# Patient Record
Sex: FEMALE | Race: WHITE | NOT HISPANIC OR LATINO | Employment: FULL TIME | URBAN - METROPOLITAN AREA
[De-identification: names, ages, dates, MRNs, and addresses within clinical notes are randomized per-mention and may not be internally consistent; named-entity substitution may affect disease eponyms.]

---

## 2017-06-26 ENCOUNTER — TRANSCRIBE ORDERS (OUTPATIENT)
Dept: ADMINISTRATIVE | Facility: HOSPITAL | Age: 42
End: 2017-06-26

## 2017-06-26 ENCOUNTER — ALLSCRIPTS OFFICE VISIT (OUTPATIENT)
Dept: OTHER | Facility: OTHER | Age: 42
End: 2017-06-26

## 2017-06-26 DIAGNOSIS — R20.0 ANESTHESIA OF SKIN: ICD-10-CM

## 2017-06-26 DIAGNOSIS — E61.1 IRON DEFICIENCY: ICD-10-CM

## 2017-06-26 DIAGNOSIS — E55.9 VITAMIN D DEFICIENCY: ICD-10-CM

## 2017-06-26 DIAGNOSIS — G47.30 SLEEP APNEA, UNSPECIFIED TYPE: Primary | ICD-10-CM

## 2017-07-25 ENCOUNTER — LAB CONVERSION - ENCOUNTER (OUTPATIENT)
Dept: OTHER | Facility: OTHER | Age: 42
End: 2017-07-25

## 2017-07-25 ENCOUNTER — GENERIC CONVERSION - ENCOUNTER (OUTPATIENT)
Dept: OTHER | Facility: OTHER | Age: 42
End: 2017-07-25

## 2017-07-25 LAB
25(OH)D3 SERPL-MCNC: 52 NG/ML (ref 30–100)
ANA PATTERN 1 (HISTORICAL): ABNORMAL
ANA TITER 1 (HISTORICAL): ABNORMAL TITER
ANTI-NUCLEAR ANTIBODY (ANA) (HISTORICAL): POSITIVE
BASOPHILS # BLD AUTO: 0.5 %
BASOPHILS # BLD AUTO: 38 CELLS/UL (ref 0–200)
DEPRECATED RDW RBC AUTO: 12.1 % (ref 11–15)
EOSINOPHIL # BLD AUTO: 236 CELLS/UL (ref 15–500)
EOSINOPHIL # BLD AUTO: 3.1 %
ERYTHROCYTE SEDIMENTATION RATE (HISTORICAL): 2 MM/H
FERRITIN SERPL-MCNC: 24 NG/ML (ref 10–232)
FOLATE SERPL-MCNC: 20.1 NG/ML
HCT VFR BLD AUTO: 39.8 % (ref 35–45)
HGB BLD-MCNC: 12.9 G/DL (ref 11.7–15.5)
LYMPHOCYTES # BLD AUTO: 2364 CELLS/UL (ref 850–3900)
LYMPHOCYTES # BLD AUTO: 31.1 %
MCH RBC QN AUTO: 28.5 PG (ref 27–33)
MCHC RBC AUTO-ENTMCNC: 32.4 G/DL (ref 32–36)
MCV RBC AUTO: 87.9 FL (ref 80–100)
MONOCYTES # BLD AUTO: 737 CELLS/UL (ref 200–950)
MONOCYTES (HISTORICAL): 9.7 %
NEUTROPHILS # BLD AUTO: 4226 CELLS/UL (ref 1500–7800)
NEUTROPHILS # BLD AUTO: 55.6 %
PLATELET # BLD AUTO: 286 THOUSAND/UL (ref 140–400)
PMV BLD AUTO: 10.9 FL (ref 7.5–12.5)
RBC # BLD AUTO: 4.53 MILLION/UL (ref 3.8–5.1)
RHEUMATOID FACTOR (HISTORICAL): 9 IU/ML
VIT B12 SERPL-MCNC: 995 PG/ML (ref 200–1100)
VITAMIN B6 (HISTORICAL): NORMAL
WBC # BLD AUTO: 7.6 THOUSAND/UL (ref 3.8–10.8)

## 2017-09-07 ENCOUNTER — ALLSCRIPTS OFFICE VISIT (OUTPATIENT)
Dept: OTHER | Facility: OTHER | Age: 42
End: 2017-09-07

## 2017-09-07 DIAGNOSIS — Z12.31 ENCOUNTER FOR SCREENING MAMMOGRAM FOR MALIGNANT NEOPLASM OF BREAST: ICD-10-CM

## 2017-10-10 ENCOUNTER — TRANSCRIBE ORDERS (OUTPATIENT)
Dept: ADMINISTRATIVE | Facility: HOSPITAL | Age: 42
End: 2017-10-10

## 2017-10-10 DIAGNOSIS — F59: ICD-10-CM

## 2017-10-10 DIAGNOSIS — R20.0 NUMBNESS: ICD-10-CM

## 2017-10-10 DIAGNOSIS — R68.89 MECHANICAL AND MOTOR PROBLEMS WITH INTERNAL ORGANS: Primary | ICD-10-CM

## 2017-10-16 ENCOUNTER — HOSPITAL ENCOUNTER (OUTPATIENT)
Dept: NEUROLOGY | Facility: AMBULATORY SURGERY CENTER | Age: 42
Discharge: HOME | End: 2017-10-16
Payer: COMMERCIAL

## 2017-10-16 DIAGNOSIS — R68.89 MECHANICAL AND MOTOR PROBLEMS WITH INTERNAL ORGANS: ICD-10-CM

## 2017-10-16 DIAGNOSIS — R20.0 NUMBNESS: ICD-10-CM

## 2017-10-16 DIAGNOSIS — F59: ICD-10-CM

## 2017-10-16 PROCEDURE — 95816 EEG AWAKE AND DROWSY: CPT

## 2017-10-17 ENCOUNTER — GENERIC CONVERSION - ENCOUNTER (OUTPATIENT)
Dept: OTHER | Facility: OTHER | Age: 42
End: 2017-10-17

## 2017-10-17 NOTE — PROCEDURES
[address] [facility] , [address]  Phone [phone]  Fax [phone]                                                  Patient Name: Tamiko Urbano   Date performed:  10/16/2017 Medical Record #: [id number]   Report date: 10/17/17 File #: KVS    Referring physician: Dr Peggy Wallace ACCT#: -   : 1975 Study type: Routine, awake and drowsy   Age: 40 y Technologist: MIREYA Cox EEG  T   Location: Outpatient ICD diagnosis: -       ELECTROENCEPHALOGRAM      Patient History:  Patient is a 40 y o  female who has been experiencing the left part of her face and left arm going numb since about 6 years ago after the birth of her daughter  She had a difficult pregnancy, throwing up through the entire pregnancy, broke out in a rash and was in labor 27 hrs, then having emergency   She states her symptoms come and go, happening several times a month  She feels like this happens more when she's in a stressful situation and during the time of her menstrual cycle  A new symptom involves her left leg and she feels herself shaking inside  She said you can't see it, but when someone holds her hand, they can feel the tremor  MRI about 6 years ago was reported as normal      Tech Note: During HV, pt experienced numbness in her left face, mouth and hand  The hand symptom left, but she stated she still felt a slight numbness remaining in her left face at the end of the study  Medications:  Multi-vitamin, iron, vitamin D      Description of Procedure: This EEG was performed with simultaneous video recording  Electrodes were applied using the International 10-20 System, with additional electrodes used including EOG, ECG and T1/T2  The EEG was recorded from 7:35:42 AM  until 08:40:04 AM for a total of 33 3 minutes  The recording was technically satisfactory for interpretation  Skull Defect (if any):  None  Findings:     State:   This routine EEG was obtained during wakefulness, drowsiness and brief light sleep  Background Activity: The background during wakefulness consisted of low to moderate amplitude, very well-modulated 11 - 12 Hz alpha activity located symmetrically over the posterior head regions with good reactivity and attenuation to eye opening  Drowsiness was reflected by slowing and attenuation of background rhythms, and Stage 2 sleep was accompanied by well-formed and symmetrically placed vertex waves and sleep spindles  There was no pathologic focal, lateralized, or generalized slowing of the background noted  No epileptiform transients were seen  Activation Procedures:   Photic stimulation produced a symmetric occipital driving response to the mid-range of flash frequencies, and was non-activating for photo-paroxysmal abnormalities  Hyperventilation was performed with good effort for 4 minutes and specifically did not alter the background rhythms  Clinical note:   Hyperventilation was specifically non-activating electrographically  However, after 1 min 30 secs of hyperventilating, the patient reported some light-headedness  At 2 mins 40 secs she reported numbness and tingling of her left cheek and lips, which continued past cessation of over-breathing  At 3 mins post-hyperventilation she reported tingling of the fingertips on her left hand  By the end of the study, the hand symptoms had resolved; facial sensations were improved but not fully gone  Other findings: The single lead ECG revealed a normal sinus rhythm averaging 84 bpm without ectopy, increasing to 104 bpm during hyperventilation         Interpretation: This is a normal routine EEG obtained in waking and sleep states, without evidence of focal structural or paroxysmal abnormality  However, hyperventilation was clinically activating for reproduction of the patients presenting symptoms of left face and hand numbness and tingling    Further clinical correlation is advised  Alta ZHU    [facility]

## 2017-10-20 ENCOUNTER — HOSPITAL ENCOUNTER (OUTPATIENT)
Dept: RADIOLOGY | Facility: HOSPITAL | Age: 42
Discharge: HOME | End: 2017-10-20
Attending: PSYCHIATRY & NEUROLOGY
Payer: COMMERCIAL

## 2017-10-20 DIAGNOSIS — R68.89 MECHANICAL AND MOTOR PROBLEMS WITH INTERNAL ORGANS: ICD-10-CM

## 2017-10-20 DIAGNOSIS — R20.0 NUMBNESS: ICD-10-CM

## 2017-10-20 DIAGNOSIS — F59: ICD-10-CM

## 2017-10-20 PROCEDURE — 70551 MRI BRAIN STEM W/O DYE: CPT

## 2017-10-26 NOTE — PROGRESS NOTES
Assessment  1  Encounter for gynecological examination without abnormal finding (V72 31) (Z01 419)    Plan  Encounter for gynecological examination without abnormal finding    · Call [phone] if: You find a new or different kind of lump in your breast ;  Status:Complete;   Done: 11LOJ5168 04:37PM  Ordered; For:Encounter for gynecological examination without abnormal finding; Ordered   By:Neno Payne;   · Call [phone] if: You have any warning signs of skin cancer ; Status:Complete;    Done: 54AJL7624 04:37PM  Ordered; For:Encounter for gynecological examination without abnormal finding; Ordered   By:Neno Payne;   · Follow-up visit in 1 year Evaluation and Treatment  Follow-up  Status: Hold For -  Scheduling  Requested for: [id number]  Ordered; For: Encounter for gynecological examination without abnormal finding;    Ordered By: Radha Burleson  Performed:   Due: 48QSG9505   · We recommend that you follow these steps to lower your risk of osteoporosis  ;  Status:Complete;   Done: 82UXE4249 04:37PM  Ordered; For:Encounter for gynecological examination without abnormal finding; Ordered   By:Neno Payne;  Encounter for screening mammogram for malignant neoplasm of breast    · * MAMMO SCREENING BILATERAL W CAD; Status:Hold For - Scheduling,Retrospective  Authorization; Requested RHO:[id number];   Perform:[facility]; XHI:[id number]; Last Updated Emeka Granado; 9/7/2017   2:38:26 PM;Ordered; For:Encounter for screening mammogram for malignant neoplasm   of breast; Ordered By:Neno Payne;  Vaginal itching    · Start: Fluconazole 150 MG Oral Tablet (Diflucan); TAKE 1 TABLET 1 TIME ONLY  Rx By: Radha Burleson; Dispense: 1 Days ; #:1 Tablet;  Refill: 0;For: Vaginal itching;   PAOLA = N; Verified Transmission to [id number]; Last Updated By: System,   [employer]; 9/7/2017 3:11:10 PM    Call for heavy or prolonged bleeding, to discuss IUD contraception or p r n  She is aware that I would advise against using entirely natural methods for treatment of a rheumatologic disorder  At this time she would plan to use these only as an adjuvant  Discussion/Summary  health maintenance visit the risks and benefits of cervical cancer screening were discussed cervical cancer screening is current Breast cancer screening: the risks and benefits of breast cancer screening were discussed, monthly self breast exam was advised and mammogram has been ordered  The patient has the current Goals: Diagnosis of her new symptoms, contraception  The patent has the current Barriers: None  Patient is able to Self-Care  Self Referrals: No      Chief Complaint  Yearly- Patient is currently getting worked up for a autoimmune deficiency  History of Present Illness  HPI: Patient returns today for annual exam  She had an episode of tingling in her face in June of this year  Her family doctor believes it may be rheumatologic as her FLOR was elevated  She has an appointment with Rheumatology next month  Discussed that withdrawal as a method of birth control may not be advisable given a possible rheumatologic diagnosis  Her partner has thus far been reluctant to have a vasectomy  She will revisit this with him  We also discussed IUD use as an alternative for her  She is willing to consider Mirena and ParaGard brochures and will let me know if she would like to pursue 1 of these methods  She would need to return for full discussion  She is aware that she is not due for a Pap smear today  She has had some minor vaginal itching since a recent course of antibiotics  She is requesting Diflucan as she believes she has a yeast infection  1        GYN HM, Adult Female [facility]: The patient is being seen for a gynecology evaluation  The last health maintenance visit was 1 year(s) ago  General Health:   Lifestyle:  She does not exercise regularly  -- She does not use tobacco  The patient has never smoked cigarettes  -- She denies alcohol use  -- She denies drug use  Reproductive health: the patient is premenopausal--   she reports no menstrual problems  Menstrual history: Recent menstrual periods: bleeding has been normal  The cycles have been regular  The duration of her recent periods has been regular  -- she uses contraception  For contraception, she uses withdrawal -- she is sexually active  She is monogamous with a male partner  -- pregnancy history: G 1P 1  Screening: Cervical cancer screening includes a pap smear performed 8/8/2016,-- human papilloma virus screening performed 8/8/2016-- and-- negative pap / negative HPV  Breast cancer screening includes a mammogram performed 5 years ago  She hasn't been previously screened for colorectal cancer  1 Amended By: Kathy Dunlap; Sep 07 2017 6:25 PM EST    Review of Systems  no pelvic pain,-- no pelvic pressure,-- no vaginal pain,-- no vaginal discharge,-- no vaginal itching,-- no nonmenstrual bleeding,-- no dysuria,-- no bladder pain,-- no burning sensation during urination,-- no change in urinary frequency-- and-- no urinary loss of control  Additional findings include Denies changes in bowel habits  Active Problems  1  Anxiety (300 00) (F41 9)  2  Bleeding after intercourse (626 7) (N93 0)  3  Encounter for gynecological examination without abnormal finding (V72 31) (Z01 419)  4  Encounter for screening for human papillomavirus (HPV) (V73 81) (Z11 51)  5  Encounter for screening mammogram for malignant neoplasm of breast (V76 12)   (Z12 31)  6  Fatigue (780 79) (R53 83)  7  Iron deficiency (280 9) (E61 1)  8  Lower back pain (724 2) (M54 5)  9  Marital or partner relational problem (V61 10) (Z63 0)  10  Numbness (782 0) (R20 0)  11  Other degeneration of cervical disc (722 4) (M50 30)  12  PAC (premature atrial contraction) (427 61) (I49 1)  13  Rotator cuff tear arthropathy (716 81) (M12 819)  14   Vitamin D deficiency (268 9) (E55 9)    Past Medical History   · History of Abdominal pain, epigastric (789 06) (R10 13)   · History of Abdominal pain, RUQ (789 01) (R10 11)   · History of Back Pain   · History of  Delivery (669 70)   · History of Dysmenorrhea (625 3) (N94 6)   · History of  1 (V22 0) (Z34 00)   · History of Groin (Inguinal) Pain (789 09)   · History of esophageal reflux (V12 79) (Z87 19)   · History of gastritis (V12 79) (Z87 19)   · History of herpes zoster (V12 09) (Z86 19)   · History of infection due to human papilloma virus (HPV) (V12 09) (Z86 19)   · History of insomnia (V13 89) (B52 752)   · History of migraine (V12 49) (Z86 69)   · History of ovarian cyst (V13 29) (Z87 42)   · History of shortness of breath (V13 89) (Y08 827)   · History of temporomandibular joint disorder (V13 59) (Z87 39)   · History of Hypertension in pregnancy, preeclampsia (642 40) (O14 90)   · History of Inguinal hernia (550 90) (K40 90)   · History of Mastodynia (611 71) (N64 4)   · History of Nasal congestion (478 19) (R09 81)   · History of Neck pain (723 1) (M54 2)   · History of Ovarian cyst (620 2) (N83 20)   · History of Pain, upper back (724 5) (M54 9)   · History of Palpitations (785 1) (R00 2)   · History of Right upper quadrant abdominal pain (789 01) (R10 11)   · History of Shoulder pain, left (719 41) (M25 512)   · History of Tachycardia (785 0) (R00 0)    The active problems and past medical history were reviewed and updated today  Surgical History   · History of Cervix Cryosurgery   · History of  Section   · History of Colonoscopy (Fiberoptic)   · History of Dilation And Curettage   · History of Laparoscopy (Diagnostic)   · History of Oral Surgery Tooth Extraction    The surgical history was reviewed and updated today         Family History  Mother    · Family history of Diabetes Mellitus (V18 0)   · Family history of cardiac disorder (V17 49) (Z82 49)   · Family history of Gallbladder Disease   · Family history of Hypertension (V17 49)  Father    · Family history of Diabetes Mellitus (V18 0)   · Family history of cardiac disorder (V17 49) (Z82 49)   · Family history of Hypertension (V17 49)   · Family history of Hypertension (V17 49)   · Family history of Pure Hypercholesterolemia  Sister    · Family history of Congenital Malformations  Family History    · Family history of Headache Syndromes    The family history was reviewed and updated today  Social History   · Denied: History of Alcohol Use (History)   · Denied: History of Coffee   · Currently sexually active   · Denied: History of Daily Cola Consumption (___ Cans/Day)   · Denied: History of Daily Tea Consumption (___ Cups/Day)   · Lack of exercise (V69 0) (Z72 3)   · Marital History - Currently    · Never A Smoker   · No drug use   · [employer] Affiliation [employer]   · Uses condoms   · Withdrawal contraception  The social history was reviewed and updated today  Current Meds  1  Iron TABS; Therapy: (Recorded:67Qnd7164) to Recorded  2  Turmeric TABS; Therapy: (Recorded:30Dgd9374) to Recorded  3  Vitamin D3 CHEW;   Therapy: (Recorded:32Pkb5506) to Recorded    Allergies  1  Amoxicillin TABS  2  Penicillins  3  Sulfa Drugs  4  Shellfish    Vitals   Recorded: 49JKF3540 74:14XE   Systolic 294, LUE, Sitting   Diastolic 76, LUE, Sitting   Height 4 ft 11 in   Weight 149 lb    BMI Calculated 30 09   BSA Calculated 1 63   LMP [id number]     Physical Exam    Constitutional   General appearance: No acute distress, well appearing and well nourished  Neck   Neck: Normal, supple, trachea midline, no masses  Thyroid: Normal, no thyromegaly  Genitourinary   External genitalia: Normal and no lesions appreciated  Vagina: Normal, no lesions or dryness appreciated  -- Discharge consistent with yeast infection1   Urethra: Normal    Urethral meatus: Normal    Bladder: Normal, soft, non-tender and no prolapse or masses appreciated     Cervix: Normal, no palpable masses  Uterus: Normal, non-tender, not enlarged, and no palpable masses  Adnexa/parametria: Normal, non-tender and no fullness or masses appreciated  Chest   Breasts: Normal and no dimpling or skin changes noted  Abdomen   Abdomen: Normal, non-tender, and no organomegaly noted  Lymphatic   Palpation of lymph nodes in neck, axillae, groin and/or other locations: No lymphadenopathy or masses noted          1 Amended By: Silverio Miller; Sep 07 2017 6:25 PM EST    Signatures   Electronically signed by : DIONY Arroyo ; Sep  7 2017  6:25PM EST                       (Author)

## 2018-01-11 NOTE — MISCELLANEOUS
Message   Date: 07/25/2017 04:31 PM    Per Dr Lazarus Rocker, needs to see a Rheumatologist because FLOR results show some sort of connective tissue disorder  Pt may cancel appt with neurologist if she has not seen them already  Forward labs to the Rheumatologist   Message was left for pt on her cellphone with instructions to schedule with Dr Asia Whitlock and to let us know when she is scheduled so we can forward labs  BF/VFP        Active Problems    1  Abnormal urine odor (791 9) (R82 90)   2  Acute bronchitis (466 0) (J20 9)   3  Acute pharyngitis (462) (J02 9)   4  Acute upper respiratory infection (465 9) (J06 9)   5  Anxiety (300 00) (F41 9)   6  Bleeding after intercourse (626 7) (N93 0)   7  Dehydration (276 51) (E86 0)   8  Encounter for gynecological examination without abnormal finding (V72 31) (Z01 419)   9  Encounter for screening for human papillomavirus (HPV) (V73 81) (Z11 51)   10  Encounter for screening mammogram for malignant neoplasm of breast (V76 12)    (Z12 31)   11  Fatigue (780 79) (R53 83)   12  Iron deficiency (280 9) (E61 1)   13  Lower back pain (724 2) (M54 5)   14  Marital or partner relational problem (V61 10) (Z63 0)   15  Numbness (782 0) (R20 0)   16  Other degeneration of cervical disc (722 4) (M50 30)   17  Ovarian cyst (620 2) (N83 20)   18  PAC (premature atrial contraction) (427 61) (I49 1)   19  Rotator cuff tear arthropathy (716 81) (M12 819)   20  Routine Gynecological Exam With Cervical Pap Smear (V72 31)   21  Screening for depression (V79 0) (Z13 89)   22  Visit for routine gyn exam (V72 31) (Z01 419)   23  Vitamin D deficiency (268 9) (E55 9)   24  Wheezing (786 07) (R06 2)    Current Meds   1  No Reported Medications Recorded    Allergies    1  Amoxicillin TABS   2  Penicillins   3  Sulfa Drugs    4   Shellfish    Signatures   Electronically signed by : Marisela Jacobo DO; Jul 26 2017  8:40AM EST                       (Author)

## 2018-01-13 VITALS
TEMPERATURE: 98.9 F | DIASTOLIC BLOOD PRESSURE: 72 MMHG | BODY MASS INDEX: 35.41 KG/M2 | SYSTOLIC BLOOD PRESSURE: 112 MMHG | HEIGHT: 55 IN | OXYGEN SATURATION: 98 % | WEIGHT: 153 LBS | HEART RATE: 83 BPM | RESPIRATION RATE: 16 BRPM

## 2018-01-14 VITALS
DIASTOLIC BLOOD PRESSURE: 76 MMHG | SYSTOLIC BLOOD PRESSURE: 112 MMHG | BODY MASS INDEX: 30.04 KG/M2 | HEIGHT: 59 IN | WEIGHT: 149 LBS

## 2018-03-27 ENCOUNTER — OFFICE VISIT (OUTPATIENT)
Dept: FAMILY MEDICINE CLINIC | Facility: CLINIC | Age: 43
End: 2018-03-27
Payer: COMMERCIAL

## 2018-03-27 VITALS
SYSTOLIC BLOOD PRESSURE: 118 MMHG | WEIGHT: 158 LBS | HEIGHT: 60 IN | HEART RATE: 95 BPM | TEMPERATURE: 99.4 F | DIASTOLIC BLOOD PRESSURE: 64 MMHG | OXYGEN SATURATION: 98 % | BODY MASS INDEX: 31.02 KG/M2

## 2018-03-27 DIAGNOSIS — S86.919A LOWER EXTREMITY TENDON STRAIN, INITIAL ENCOUNTER: ICD-10-CM

## 2018-03-27 DIAGNOSIS — R76.8 ELEVATED ANTINUCLEAR ANTIBODY (ANA) LEVEL: ICD-10-CM

## 2018-03-27 DIAGNOSIS — M79.671 FOOT PAIN, RIGHT: Primary | ICD-10-CM

## 2018-03-27 DIAGNOSIS — M99.06 SOMATIC DYSFUNCTION OF LOWER EXTREMITY: ICD-10-CM

## 2018-03-27 PROCEDURE — 98925 OSTEOPATH MANJ 1-2 REGIONS: CPT | Performed by: FAMILY MEDICINE

## 2018-03-27 PROCEDURE — 99213 OFFICE O/P EST LOW 20 MIN: CPT | Performed by: FAMILY MEDICINE

## 2018-03-27 RX ORDER — AZITHROMYCIN 250 MG/1
TABLET, FILM COATED ORAL
Refills: 0 | COMMUNITY
Start: 2018-03-25 | End: 2018-06-04 | Stop reason: ALTCHOICE

## 2018-03-27 NOTE — PROGRESS NOTES
Assessment/Plan: patient here today for right foot pain x 2 months and possible yeast infection x5 days    No problem-specific Assessment & Plan notes found for this encounter  1  Foot pain, right     2  Elevated antinuclear antibody (FLOR) level  Ambulatory referral to Gastroenterology   3  Lower extremity tendon strain, initial encounter     4  Somatic dysfunction of lower extremity          There are no diagnoses linked to this encounter  Subjective:      Patient ID: Sasha Jon is a 40 y o  female  Right foot around arch, discomfort comes and goes  No know etiology  Seen by Neurology and Rheumatology  Elevated FLOR, sed rate normal         The following portions of the patient's history were reviewed and updated as appropriate: allergies, current medications, past family history, past medical history, past social history, past surgical history and problem list     Review of Systems   HENT: Negative  Cardiovascular: Negative  Musculoskeletal:        Right foot pain, comes and goes  Right foot feels heavy  Skin: Negative  Neurological: Negative  Objective:      /64 (BP Location: Left arm, Patient Position: Sitting, Cuff Size: Standard)   Pulse 95   Temp 99 4 °F (37 4 °C) (Oral)   Ht 5' (1 524 m)   Wt 71 7 kg (158 lb)   SpO2 98%   BMI 30 86 kg/m²          Physical Exam   Constitutional: She is oriented to person, place, and time  She appears well-developed and well-nourished  Cardiovascular: Normal rate and regular rhythm  Pulmonary/Chest: Effort normal and breath sounds normal    Musculoskeletal:   Decreased dorsi flexion right foot  Neurological: She is alert and oriented to person, place, and time  Skin: Skin is warm and dry  Psychiatric: She has a normal mood and affect   Her behavior is normal  Judgment and thought content normal

## 2018-03-27 NOTE — PATIENT INSTRUCTIONS
GI evaluation for elevated FLOR  Was seen by rheumatology    Unfold ankle, recheck, dorsiflexion equal

## 2018-06-04 ENCOUNTER — OFFICE VISIT (OUTPATIENT)
Dept: URGENT CARE | Facility: CLINIC | Age: 43
End: 2018-06-04
Payer: COMMERCIAL

## 2018-06-04 VITALS
SYSTOLIC BLOOD PRESSURE: 134 MMHG | HEART RATE: 74 BPM | RESPIRATION RATE: 16 BRPM | DIASTOLIC BLOOD PRESSURE: 65 MMHG | OXYGEN SATURATION: 100 % | TEMPERATURE: 97.1 F

## 2018-06-04 DIAGNOSIS — N39.0 URINARY TRACT INFECTION WITH HEMATURIA, SITE UNSPECIFIED: ICD-10-CM

## 2018-06-04 DIAGNOSIS — R31.9 URINARY TRACT INFECTION WITH HEMATURIA, SITE UNSPECIFIED: ICD-10-CM

## 2018-06-04 DIAGNOSIS — R10.9 FLANK PAIN: Primary | ICD-10-CM

## 2018-06-04 LAB
SL AMB  POCT GLUCOSE, UA: ABNORMAL
SL AMB LEUKOCYTE ESTERASE,UA: ABNORMAL
SL AMB POCT BILIRUBIN,UA: ABNORMAL
SL AMB POCT BLOOD,UA: ABNORMAL
SL AMB POCT CLARITY,UA: CLEAR
SL AMB POCT COLOR,UA: YELLOW
SL AMB POCT KETONES,UA: ABNORMAL
SL AMB POCT NITRITE,UA: ABNORMAL
SL AMB POCT PH,UA: 6
SL AMB POCT SPECIFIC GRAVITY,UA: 1
SL AMB POCT URINE PROTEIN: ABNORMAL
SL AMB POCT UROBILINOGEN: 0.2

## 2018-06-04 PROCEDURE — 87086 URINE CULTURE/COLONY COUNT: CPT | Performed by: FAMILY MEDICINE

## 2018-06-04 PROCEDURE — 81002 URINALYSIS NONAUTO W/O SCOPE: CPT | Performed by: FAMILY MEDICINE

## 2018-06-04 PROCEDURE — 99213 OFFICE O/P EST LOW 20 MIN: CPT | Performed by: FAMILY MEDICINE

## 2018-06-04 RX ORDER — NITROFURANTOIN 25; 75 MG/1; MG/1
100 CAPSULE ORAL 2 TIMES DAILY
Qty: 14 CAPSULE | Refills: 0 | Status: SHIPPED | OUTPATIENT
Start: 2018-06-04 | End: 2018-06-04 | Stop reason: SDUPTHER

## 2018-06-04 RX ORDER — NITROFURANTOIN 25; 75 MG/1; MG/1
100 CAPSULE ORAL 2 TIMES DAILY
Qty: 14 CAPSULE | Refills: 0 | Status: SHIPPED | OUTPATIENT
Start: 2018-06-04 | End: 2018-06-08

## 2018-06-04 NOTE — PATIENT INSTRUCTIONS
Please increase her fluids and take the antibiotics as prescribed  Give a call to this clinic on Wednesday or Thursday to get the results of her culture  Consider having a formal urinalysis and a formal culture at her primary care doctor because of the blood found in your urine

## 2018-06-04 NOTE — PROGRESS NOTES
[facility] - Patient Visit Note  Terri Holm 44 y o  female MRN: [id number]      Assessment / Plan:  Flank pain [R10 9]  1  Flank pain  POCT urine dip auto non-scope    Urine culture   2  Urinary tract infection with hematuria, site unspecified  nitrofurantoin (MACROBID) 100 mg capsule     Reason For Visit / Chief Complaint  Chief Complaint   Patient presents with    Back Pain     right    Flank Pain     right, today             Moira Barker Discussion:  Will empirically treat for complicated urinary tract infection with culture pending  Patient will be calling on Wednesday or Thursday for the results  Patient will also check in with family practice and have repeat formal urinalysis to evaluate the hematuria noted  HPI:  Terri Holm is a 44 y o  female Patient           Who  Presents with symptoms referable to urinary tract infection  She states that she has mostly sign silent urinary tract infections and is told by her physician on routine urinalysis that she has a urinary tract infection  At the present time she complains of a bit of frequency but no dysuria or urgency per se 1st day of last normal menstrual period is that the patient is do right now and denies pregnancy  She is allergic to penicillin and sulfa  She has no history of acute or chronic kidney disease  She complains of pain in the right flank just this morning describes it is positional to a large extent and does not have any trauma to the area or reason for the pain  She has had no fever no chills           Historical Information   Past Medical History:   Diagnosis Date    Dysmenorrhea     Esophageal reflux     last assessed - [id number]    Herpes zoster     LUQ level T-8 resolved; last assessed - [id number]    History of infection due to human papilloma virus (HPV)     Inguinal hernia     last assessed - [id number]    Insomnia     Mastodynia     Resolved -     Migraine     Ovarian cyst     last assessed - [id number]    Ovarian cyst 2013    Palpitations     last assessed - [id number]    Shortness of breath     Tachycardia     Resolved - [id number]    Takes dietary supplements     Temporomandibular joint disorder     last assessed - [id number]     Past Surgical History:   Procedure Laterality Date     SECTION      COLONOSCOPY  2012    Fiberoptic    DIAGNOSTIC LAPAROSCOPY      DILATION AND CURETTAGE OF UTERUS      Resolved     GYNECOLOGIC CRYOSURGERY      age 24   [facility] TOOTH EXTRACTION       Social History   History   Alcohol Use No     History   Drug Use No     History   Smoking Status    Never Smoker   Smokeless Tobacco    Never Used     Family History   Problem Relation Age of Onset    Diabetes Mother     Heart disease Mother      cardiac disorder    Gallbladder disease Mother     Hypertension Mother     Diabetes Father     Heart disease Father      cardiac disorder    Hypertension Father     Hyperlipidemia Father      Pure Hypercholesterolemia    Other Sister      congenital malformations    Other Family      headache syndromes         ALLERGIES:         Allergies   Allergen Reactions    Amoxicillin Hives    Penicillins Hives    Shellfish Allergy     Sulfa Antibiotics Rash       MEDS:    Current Outpatient Prescriptions:     IRON PO, Take by mouth, Disp: , Rfl:     TURMERIC PO, Take by mouth, Disp: , Rfl:     nitrofurantoin (MACROBID) 100 mg capsule, Take 1 capsule (100 mg total) by mouth 2 (two) times a day for 7 days, Disp: 14 capsule, Rfl: 0    FACILITY ADMINISTERED MEDS:        REVIEW OF SYSTEMS    GENERAL: NEGATIVE for:  Generalized Fatigue                             Chills                              Fever                             Myalgias     OPTHALMIC: NEGATIVE for:  Diplopia                            Scotomata                            Visual Changes Blurred Vision     ENT:  EARS NEGATIVE for:  Hearing Difficulty                            Tinnitus                            Vertigo                            Dizziness                            Ear Pain                            Ear Drainage               NOSE NEGATIVE for:  Nasal Congestion                            Nasal Discharge                            Sinus Pain / Pressure               THROAT NEGATIVE for:  Sore Throat / Throat Pain                            Difficulty Swallowing     RESPIRATORY: NEGATIVE for:  Cough                            Wheezing                            Sputum Production                            Sob / Tachypnea                            Hemoptysis     CARDIOVASCULAR: NEGATIVE for:  Chest Pain                             SOB (cardiac Related)                             Dyspnea on Exertion                             Orthopnea                             PND                             Leg Edema                             Palpitations                               Irregularities/rythym                       CURRENT VITALS:   Blood Pressure: 134/65 (06/04/18 0953)  Pulse: 74 (06/04/18 0953)  Temperature: (!) 97 1 °F (36 2 °C) (06/04/18 0953)  Respirations: 16 (06/04/18 0953)  SpO2: 100 % (06/04/18 0953)  /65   Pulse 74   Temp (!) 97 1 °F (36 2 °C)   Resp 16   SpO2 100%       PHYSICAL EXAM:         General Appearance:    Alert, cooperative, no apparent distress, appears stated age     Oriented x3    Head:    Normocephalic, without obvious abnormality, atraumatic   Eyes:      EOM's intact,      CLARICE,        conjunctiva/corneas clear,          fundi not visualized well   Ears:     Normal external ear canals     Tm right side  Normal     Tm left side    Normal       Nose:   Nares normal externally, septum midline,     mucosa normal,     No anterior drainage         Sinuses   with out   tenderness to palpation / percussion     Throat:   Lips, mucosa, and tongue normal       Anterior pharynx   Normal      Posterior pharynx   Normal      No exudate obvious       Neck:   Supple, symmetrical, trachea midline and moveable    Normal thyroid click present    No carotid bruits appreciated        Lymphatics:     Adenopathy in anterior cervical chain  Normal    Adenopathy in posterior cervical chain   Normal     Lungs:     Clear to auscultation bilaterally    No rales    No ronchi    No wheeze     Heart[de-identified]    Regular rate and rhythm, S1 and S2 normal,     No S3, S4, audible    No murmurs, rubs      Extremities:     Extremities grossly normal     atraumatic,     no cyanosis or edema        Skin:     Skin color, texture, turgor normal, no rashes or lesions       Examination of the flank There is mild tenderness to palpation on the right flank and to percussion  There is no paravertebral   Muscle spasm noted  Counseling / Coordination of Care  Total clinic time spent today  15  minutes  Greater than 50% of total time was spent with the patient and / or family counseling and / or coordination of care  Portions of the record may have been created with voice recognition software   Occasional wrong word or "sound a like" substitutions may have occurred due to the inherent limitations of voice recognition software   Read the chart carefully and recognize, using context, where substitutions have occurred

## 2018-06-05 LAB — BACTERIA UR CULT: NORMAL

## 2018-06-07 ENCOUNTER — TELEPHONE (OUTPATIENT)
Dept: FAMILY MEDICINE CLINIC | Facility: CLINIC | Age: 43
End: 2018-06-07

## 2018-06-07 NOTE — TELEPHONE ENCOUNTER
Patient went urgent care on Monday 6/4/18 for back pain, they checked her urine which showed red blood cells and white blood cells in urine, they prescribed Macrobid 100mg for 7 days  She said she called urgent care for urine results and they said there was no signs of bacteria, patient asked them if she should continue antibiotic and they said she should call PCP  She states her temp today is 99 9, took antibiotic x3 days

## 2018-06-08 ENCOUNTER — OFFICE VISIT (OUTPATIENT)
Dept: FAMILY MEDICINE CLINIC | Facility: CLINIC | Age: 43
End: 2018-06-08
Payer: COMMERCIAL

## 2018-06-08 VITALS
BODY MASS INDEX: 30.55 KG/M2 | HEIGHT: 60 IN | DIASTOLIC BLOOD PRESSURE: 74 MMHG | OXYGEN SATURATION: 97 % | SYSTOLIC BLOOD PRESSURE: 126 MMHG | HEART RATE: 91 BPM | WEIGHT: 155.6 LBS | TEMPERATURE: 98.2 F

## 2018-06-08 DIAGNOSIS — M54.50 ACUTE LEFT-SIDED LOW BACK PAIN WITHOUT SCIATICA: ICD-10-CM

## 2018-06-08 DIAGNOSIS — M99.03 SEGMENTAL AND SOMATIC DYSFUNCTION OF LUMBAR REGION: ICD-10-CM

## 2018-06-08 DIAGNOSIS — S39.012A LUMBAR STRAIN, INITIAL ENCOUNTER: ICD-10-CM

## 2018-06-08 DIAGNOSIS — R50.9 FEVER, UNSPECIFIED FEVER CAUSE: Primary | ICD-10-CM

## 2018-06-08 PROCEDURE — 3008F BODY MASS INDEX DOCD: CPT | Performed by: FAMILY MEDICINE

## 2018-06-08 PROCEDURE — 1036F TOBACCO NON-USER: CPT | Performed by: FAMILY MEDICINE

## 2018-06-08 PROCEDURE — 99213 OFFICE O/P EST LOW 20 MIN: CPT | Performed by: FAMILY MEDICINE

## 2018-06-08 PROCEDURE — 98925 OSTEOPATH MANJ 1-2 REGIONS: CPT | Performed by: FAMILY MEDICINE

## 2018-06-08 RX ORDER — NITROFURANTOIN MONOHYD/M-CRYST 100 MG
100 CAPSULE ORAL 2 TIMES DAILY
COMMUNITY
End: 2019-09-05 | Stop reason: ALTCHOICE

## 2018-06-08 NOTE — PROGRESS NOTES
Assessment/Plan: patient here today for follow up from urgent care for uti and back pain   Pt stated that she has ua done in urgent care     No problem-specific Assessment & Plan notes found for this encounter  1  Fever, unspecified fever cause     2  Acute left-sided low back pain without sciatica     3  Lumbar strain, initial encounter     4  Segmental and somatic dysfunction of lumbar region            Diagnoses and all orders for this visit:    Fever, unspecified fever cause    Other orders  -     nitrofurantoin (MACROBID) 100 mg capsule; Take 100 mg by mouth 2 (two) times a day          Subjective:      Patient ID: Jose David Hall is a 44 y o  female  Left low back pain localized left mid lumbar  The following portions of the patient's history were reviewed and updated as appropriate: allergies, current medications, past family history, past medical history, past social history, past surgical history and problem list     Review of Systems   Constitutional: Negative  HENT: Negative  Respiratory: Negative  Gastrointestinal: Negative  Genitourinary: Negative  Negative for dysuria, flank pain, frequency, urgency, vaginal discharge and vaginal pain  Musculoskeletal: Positive for back pain  Objective:      /74 (BP Location: Left arm, Patient Position: Sitting, Cuff Size: Standard)   Pulse 91   Temp 98 2 °F (36 8 °C) (Oral)   Ht 5' (1 524 m)   Wt 70 6 kg (155 lb 9 6 oz)   LMP 05/14/2018   SpO2 97%   BMI 30 39 kg/m²          Physical Exam   Constitutional: She appears well-developed and well-nourished  Cardiovascular: Normal rate and regular rhythm  Pulmonary/Chest: Effort normal    Musculoskeletal:   Multiple TV foldings, L2-3 rotated right

## 2018-08-31 ENCOUNTER — OFFICE VISIT (OUTPATIENT)
Dept: URGENT CARE | Facility: CLINIC | Age: 43
End: 2018-08-31
Payer: COMMERCIAL

## 2018-08-31 VITALS
WEIGHT: 150 LBS | DIASTOLIC BLOOD PRESSURE: 62 MMHG | BODY MASS INDEX: 29.45 KG/M2 | TEMPERATURE: 98 F | HEART RATE: 74 BPM | SYSTOLIC BLOOD PRESSURE: 137 MMHG | HEIGHT: 60 IN | OXYGEN SATURATION: 97 %

## 2018-08-31 DIAGNOSIS — B02.9 HERPES ZOSTER WITHOUT COMPLICATION: Primary | ICD-10-CM

## 2018-08-31 PROCEDURE — 99213 OFFICE O/P EST LOW 20 MIN: CPT | Performed by: NURSE PRACTITIONER

## 2018-08-31 RX ORDER — VALACYCLOVIR HYDROCHLORIDE 1 G/1
1000 TABLET, FILM COATED ORAL 3 TIMES DAILY
Qty: 21 TABLET | Refills: 0 | Status: SHIPPED | OUTPATIENT
Start: 2018-08-31 | End: 2019-09-05 | Stop reason: ALTCHOICE

## 2018-08-31 NOTE — PROGRESS NOTES
[facility]        NAME: Katherine Sender is a 44 y o  female  : 1975    MRN: [id number]  DATE: 2018  TIME: 4:01 PM    Assessment and Plan   Herpes zoster without complication [T12 9]  1  Herpes zoster without complication  valACYclovir (VALTREX) 1,000 mg tablet         Patient Instructions       Follow up with PCP in 3-5 days  Proceed to  ER if symptoms worsen  Chief Complaint     Chief Complaint   Patient presents with    Neck Pain      c/o neck pain that started 2 days ago also c/o left side of face being numb         History of Present Illness       21-year-old female with a chief complaint of a swollen lymph node in the left occipital region  She noticed this about 2 days ago  At 1st she thought it was a stiff neck but now has noticed the swollen lymph node in the posterior aspect of her neck/occipital area  She started experiencing some tingling in the left lower jaw today and noticed some small reddened areas in the left occipital region  She has had shingles a total of 3 times in the past, always on her trunk  Last outbreak was about 5 years ago  Review of Systems   Review of Systems   Constitutional: Negative  Swollen, painful lymph node left occipital region  HENT: Negative  Eyes: Negative  Respiratory: Negative  Cardiovascular: Negative  Gastrointestinal: Negative  Endocrine: Negative  Genitourinary: Negative  Skin:        Red pimples in the left posterior neck/occipital area  Neurological: Positive for numbness (Tingling left lower jaw  )  Negative for dizziness           Current Medications       Current Outpatient Prescriptions:     IRON PO, Take by mouth, Disp: , Rfl:     nitrofurantoin (MACROBID) 100 mg capsule, Take 100 mg by mouth 2 (two) times a day, Disp: , Rfl:     TURMERIC PO, Take by mouth, Disp: , Rfl:     valACYclovir (VALTREX) 1,000 mg tablet, Take 1 tablet (1,000 mg total) by mouth 3 (three) times a day for 7 days, Disp: 21 tablet, Rfl: 0    Current Allergies     Allergies as of 2018 - Reviewed 2018   Allergen Reaction Noted    Amoxicillin Hives 10/24/2012    Penicillins Hives 10/24/2012    Shellfish allergy  2013    Sulfa antibiotics Rash 2013            The following portions of the patient's history were reviewed and updated as appropriate: allergies, current medications, past family history, past medical history, past social history, past surgical history and problem list      Past Medical History:   Diagnosis Date    Dysmenorrhea     Esophageal reflux     last assessed - [id number]    Herpes zoster     LUQ level T-8 resolved; last assessed - [id number]    History of infection due to human papilloma virus (HPV)     Inguinal hernia     last assessed - [id number]    Insomnia     Mastodynia     Resolved -     Migraine     Ovarian cyst     last assessed - [id number]    Ovarian cyst     Palpitations     last assessed - 36SVY5276    Shortness of breath     Tachycardia     Resolved - [id number]    Takes dietary supplements     Temporomandibular joint disorder     last assessed - 62Lnc0282       Past Surgical History:   Procedure Laterality Date     SECTION      COLONOSCOPY  2012    Fiberoptic    DIAGNOSTIC LAPAROSCOPY      DILATION AND CURETTAGE OF UTERUS      Resolved     GYNECOLOGIC CRYOSURGERY      age 24    TOOTH EXTRACTION         Family History   Problem Relation Age of Onset    Diabetes Mother     Heart disease Mother         cardiac disorder    Gallbladder disease Mother     Hypertension Mother     Diabetes Father     Heart disease Father         cardiac disorder    Hypertension Father     Hyperlipidemia Father         Pure Hypercholesterolemia    Other Sister         congenital malformations    Other Family         headache syndromes         Medications have been verified          Objective   /62   Pulse 74   Temp 98 °F (36 7 °C)   Ht 5' (1 524 m)   Wt 68 kg (150 lb)   SpO2 97%   BMI 29 29 kg/m²        Physical Exam     Physical Exam   Constitutional: She is oriented to person, place, and time  She appears well-developed and well-nourished  No distress  HENT:   Head: Normocephalic and atraumatic  Right Ear: External ear normal    Left Ear: External ear normal    Nose: Nose normal    Mouth/Throat: Oropharynx is clear and moist    Palpable left occipital lymph node, painful to palpation  There are scattered red papules noted in the distribution of the left occipital nerve  Sensory exam was normal in the distribution of the left occipital nerve without hyperesthesia  Eyes: Conjunctivae and EOM are normal  Pupils are equal, round, and reactive to light  Neck: Normal range of motion  Neck supple  Cardiovascular: Normal rate and regular rhythm  Pulmonary/Chest: Effort normal and breath sounds normal    Abdominal: Soft  Bowel sounds are normal    Neurological: She is alert and oriented to person, place, and time  She has normal reflexes  Skin: Skin is warm  She is not diaphoretic  Psychiatric: She has a normal mood and affect  Her behavior is normal  Judgment and thought content normal    Nursing note and vitals reviewed

## 2018-08-31 NOTE — PATIENT INSTRUCTIONS
Herpes Zoster   WHAT YOU SHOULD KNOW:   Herpes zoster (HZ) is also called shingles  It is an infection caused by the same virus that causes chickenpox (varicella-zoster virus)  After you get chickenpox, the virus stays in your body for several years without causing any symptoms  HZ occurs when the virus becomes active again  Once active, the virus will travel along a nerve to your skin and cause a rash  CARE AGREEMENT:   You have the right to help plan your care  Learn about your health condition and how it may be treated  Discuss treatment options with your caregivers to decide what care you want to receive  You always have the right to refuse treatment  RISKS:   If left untreated, HZ may cause eye problems, such as a drooping eyelid or blindness  It may lead to a brain infection or stroke  HZ can also cause nerve damage and lead to twitching, dizziness, or loss of taste and hearing  The blisters may leave scars or changes in skin color  HZ may cause pain even after the rash is gone  It may also lead to trouble moving parts of your body  WHILE YOU ARE HERE:   Informed consent  is a legal document that explains the tests, treatments, or procedures that you may need  Informed consent means you understand what will be done and can make decisions about what you want  You give your permission when you sign the consent form  You can have someone sign this form for you if you are not able to sign it  You have the right to understand your medical care in words you know  Before you sign the consent form, understand the risks and benefits of what will be done  Make sure all your questions are answered  Medicines:   · Antiviral medicine: These help decrease symptoms and healing time  They may also decrease your risk of developing nerve pain  You will need to start taking them within 3 days of the start of symptoms to prevent nerve pain  · Pain medicine:   You may need NSAIDs, acetaminophen, or opioid medicine depending on how much pain you are in  Do not wait until the pain is severe before you ask for more pain medicine  · Topical anesthetics: These are used to numb the skin and decrease pain  They can be a cream, gel, spray, or patch  · Anticonvulsants: These decrease nerve pain and may help you sleep at night  · Antidepressants: These may also be used to decrease nerve pain  · Epidural medicine: This is put into your spine to block pain  This medicine treats severe pain that does not get better with other pain medicines  Epidural medicine includes numbing medicine and steroids  Tests:  Your caregiver may send skin scrapings or fluid from your blisters for tests to see if you have HZ  © 2014 [employer] is for End User's use only and may not be sold, redistributed or otherwise used for commercial purposes  All illustrations and images included in CareNotes® are the copyrighted property of [employer] , [employer]  or [employer]  The above information is an  only  It is not intended as medical advice for individual conditions or treatments  Talk to your doctor, nurse or pharmacist before following any medical regimen to see if it is safe and effective for you

## 2018-12-03 ENCOUNTER — OFFICE VISIT (OUTPATIENT)
Dept: URGENT CARE | Facility: CLINIC | Age: 43
End: 2018-12-03
Payer: COMMERCIAL

## 2018-12-03 VITALS
SYSTOLIC BLOOD PRESSURE: 133 MMHG | DIASTOLIC BLOOD PRESSURE: 59 MMHG | WEIGHT: 150 LBS | HEART RATE: 82 BPM | OXYGEN SATURATION: 97 % | TEMPERATURE: 97.6 F | RESPIRATION RATE: 18 BRPM | BODY MASS INDEX: 29.45 KG/M2 | HEIGHT: 60 IN

## 2018-12-03 DIAGNOSIS — R82.90 MALODOROUS URINE: Primary | ICD-10-CM

## 2018-12-03 DIAGNOSIS — R42 DIZZINESS: ICD-10-CM

## 2018-12-03 LAB
SL AMB  POCT GLUCOSE, UA: NEGATIVE
SL AMB LEUKOCYTE ESTERASE,UA: NEGATIVE
SL AMB POCT BILIRUBIN,UA: NEGATIVE
SL AMB POCT BLOOD,UA: NEGATIVE
SL AMB POCT CLARITY,UA: CLEAR
SL AMB POCT COLOR,UA: YELLOW
SL AMB POCT KETONES,UA: NORMAL
SL AMB POCT NITRITE,UA: NEGATIVE
SL AMB POCT PH,UA: 7.5
SL AMB POCT SPECIFIC GRAVITY,UA: 1.01
SL AMB POCT URINE PROTEIN: NEGATIVE
SL AMB POCT UROBILINOGEN: NEGATIVE

## 2018-12-03 PROCEDURE — 99213 OFFICE O/P EST LOW 20 MIN: CPT | Performed by: PHYSICIAN ASSISTANT

## 2018-12-03 PROCEDURE — 81002 URINALYSIS NONAUTO W/O SCOPE: CPT | Performed by: PHYSICIAN ASSISTANT

## 2018-12-03 PROCEDURE — 87086 URINE CULTURE/COLONY COUNT: CPT | Performed by: PHYSICIAN ASSISTANT

## 2018-12-03 NOTE — PROGRESS NOTES
[facility]        NAME: Sheeba Mays is a 44 y o  female  : 1975    MRN: [id number]  DATE: December 3, 2018  TIME: 9:38 AM    Assessment and Plan   Malodorous urine [R82 90]  1  Malodorous urine  POCT urine dip    Urine culture   2  Dizziness           Patient Instructions     If symptoms return go to the emergency room for further evaluation  Follow up with PCP in 3-5 days  Chief Complaint     Chief Complaint   Patient presents with    Headache     Pt c/o headache with high BP, sweating alot, and lightheaded x 1 week off and on   Rash     Pt c/o red rash on stomach that started today  History of Present Illness       Patient states he woke up this morning with a mild headache some lightheadedness and feeling like she would pass out  She took her blood pressure and the diastolic blood pressure was elevated to 120  She states that she went to take a the recycle is a got winded coming back  All symptoms have since resolved  She incidentally notes some foul-smelling urine and cloudiness  Denies any burning on urination blood in urine frequency hesitancy or urgency  She also has a rash on her abdomen which she noticed upon awakening this morning  Rash was initially itchy that has since resolved  Review of Systems   Review of Systems   Constitutional: Negative for chills and fever  HENT: Negative for congestion, ear pain and sore throat  Eyes: Negative for redness  Respiratory: Negative for cough, shortness of breath and wheezing  Cardiovascular: Positive for chest pain (Had 1 episode of chest tightness 2 days ago which resolved with the Zantac)  Negative for palpitations  Gastrointestinal: Negative for abdominal pain, diarrhea, nausea and vomiting  Genitourinary: Negative for difficulty urinating, dysuria, flank pain, frequency, hematuria and urgency  Musculoskeletal: Negative for myalgias  Skin: Positive for rash     Neurological: Positive for dizziness and headaches (Frontal aspect now resolved)  Negative for speech difficulty, weakness and numbness  Hematological: Negative for adenopathy           Current Medications       Current Outpatient Prescriptions:     IRON PO, Take by mouth, Disp: , Rfl:     nitrofurantoin (MACROBID) 100 mg capsule, Take 100 mg by mouth 2 (two) times a day, Disp: , Rfl:     TURMERIC PO, Take by mouth, Disp: , Rfl:     valACYclovir (VALTREX) 1,000 mg tablet, Take 1 tablet (1,000 mg total) by mouth 3 (three) times a day for 7 days, Disp: 21 tablet, Rfl: 0    Current Allergies     Allergies as of 2018 - Reviewed 2018   Allergen Reaction Noted    Amoxicillin Hives 10/24/2012    Penicillins Hives 10/24/2012    Shellfish allergy  2013    Sulfa antibiotics Rash 2013            The following portions of the patient's history were reviewed and updated as appropriate: allergies, current medications, past family history, past medical history, past social history, past surgical history and problem list      Past Medical History:   Diagnosis Date    Dysmenorrhea     Esophageal reflux     last assessed - [id number]    Herpes zoster     LUQ level T-8 resolved; last assessed - 27LND2116    History of infection due to human papilloma virus (HPV)     Inguinal hernia     last assessed - [id number]    Insomnia     Mastodynia     Resolved -     Migraine     Ovarian cyst     last assessed - 41Vty2519    Ovarian cyst 2013    Palpitations     last assessed - [id number]    Shortness of breath     Tachycardia     Resolved - 02MYS1219    Takes dietary supplements     Temporomandibular joint disorder     last assessed - 16Edc5760       Past Surgical History:   Procedure Laterality Date     SECTION      COLONOSCOPY  2012    Fiberoptic    DIAGNOSTIC LAPAROSCOPY      DILATION AND CURETTAGE OF UTERUS      Resolved     GYNECOLOGIC CRYOSURGERY      age 24    TOOTH EXTRACTION         Family History   Problem Relation Age of Onset    Diabetes Mother     Heart disease Mother         cardiac disorder    Gallbladder disease Mother     Hypertension Mother     Diabetes Father     Heart disease Father         cardiac disorder    Hypertension Father     Hyperlipidemia Father         Pure Hypercholesterolemia    Other Sister         congenital malformations    Other Family         headache syndromes         Medications have been verified  Objective   /59   Pulse 82   Temp 97 6 °F (36 4 °C)   Resp 18   Ht 5' (1 524 m)   Wt 68 kg (150 lb)   SpO2 97%   BMI 29 29 kg/m²        Physical Exam     Physical Exam   Constitutional: She is oriented to person, place, and time  She appears well-developed and well-nourished  HENT:   Head: Normocephalic and atraumatic  Right Ear: External ear normal    Left Ear: External ear normal    Nose: Nose normal    Mouth/Throat: Oropharynx is clear and moist    Eyes: Pupils are equal, round, and reactive to light  Conjunctivae and EOM are normal    Neck: Normal range of motion  Neck supple  Cardiovascular: Normal rate, regular rhythm and normal heart sounds  Pulmonary/Chest: Effort normal and breath sounds normal    Lymphadenopathy:     She has no cervical adenopathy  Neurological: She is alert and oriented to person, place, and time  She has normal reflexes  Skin: Skin is warm and dry  Psychiatric: She has a normal mood and affect  Her behavior is normal  Judgment and thought content normal    Nursing note and vitals reviewed

## 2018-12-03 NOTE — PATIENT INSTRUCTIONS
Acute Rash   AMBULATORY CARE:   A rash  is irritation, redness, or itchiness in the skin or mucus membranes  Mucus membranes are areas such as the lining of your nose or throat  Acute means the rash starts suddenly, worsens quickly, and lasts a short time  An acute rash may be caused by a disease, such as hepatitis or vasculitis  The rash may be a reaction to something you are allergic to, such as certain foods, or latex  Certain medicines, including antibiotics, NSAIDs, prescription pain medicines, and aspirin can also cause a rash  Seek care immediately if:   · You have sudden trouble breathing or chest pain  · You are vomiting, have a headache or muscle aches, and your throat hurts  Contact your healthcare provider if:   · You have a fever  · You get open wounds from scratching your skin, or you have a wound that is red, swollen, or painful  · Your rash lasts longer than 3 months  · You have swelling or pain in your joints  · You have questions or concerns about your condition or care  Medicines:  If your rash does not go away on its own, you may need the following:  · Antihistamines  may be given to help decrease itching  · Steroids  may be given to decrease inflammation  · Antibiotics  help fight or prevent a bacterial infection  · Take your medicine as directed  Contact your healthcare provider if you think your medicine is not helping or if you have side effects  Tell him of her if you are allergic to any medicine  Keep a list of the medicines, vitamins, and herbs you take  Include the amounts, and when and why you take them  Bring the list or the pill bottles to follow-up visits  Carry your medicine list with you in case of an emergency  Prevent a rash or care for your skin when you have a rash:  Dry skin can lead to more problems  Do not scratch your skin if it itches  You may cause a skin infection by scratching   The following may prevent dry skin, and help your skin look better:  · Use thick cream lotions or petroleum jelly to help soothe your rash  These products work well on areas with thick skin, such as your feet  Cool compresses may also be used to soothe your skin  Apply a cool compress or a cool, wet towel, and then cover it with a dry towel  · Use lukewarm water when you bathe  Hot water may damage your skin more  Pat your skin dry  Do not rub your skin with a towel  · Use detergents, soaps, shampoos, and bubble baths made for sensitive skin  Wear clothes that are made of cotton instead of nylon or wool  Cotton is softer, so it will not hurt your skin as much  Follow up with your healthcare provider as directed: You may need to see a dermatologist if healthcare providers do not know what is causing your rash  You may also need to see a dermatologist if your rash does not get better even with treatment  You may need to see a dietitian if you have allergies to foods  Write down your questions so you remember to ask them during your visits  © 2017 [facility] [facility] Information is for End User's use only and may not be sold, redistributed or otherwise used for commercial purposes  All illustrations and images included in CareNotes® are the copyrighted property of A D A M , Inc  or Kenrick Dudley  The above information is an  only  It is not intended as medical advice for individual conditions or treatments  Talk to your doctor, nurse or pharmacist before following any medical regimen to see if it is safe and effective for you  Dizziness   AMBULATORY CARE:   Dizziness  is a feeling of being off balance or unsteady  Common causes of dizziness are an inner ear fluid imbalance or a lack of oxygen in your blood  Dizziness may be acute (lasts 3 days or less) or chronic (lasts longer than 3 days)  You may have dizzy spells that last from seconds to a few hours     Common symptoms that may happen with dizziness:   · A feeling that your surroundings are moving even though you are standing still    · Ringing in your ears or hearing loss     · Feeling faint or lightheaded     · Weakness or unsteadiness     · Double vision or eye movements you cannot control    · Nausea or vomiting     · Confusion  Seek care immediately if:   · You have a headache and a stiff neck  · You have shaking chills and a fever  · You vomit over and over with no relief  · Your vomit or bowel movements are red or black  · You have pain in your chest, back, or abdomen  · You have numbness, especially in your face, arms, or legs  · You have trouble moving your arms or legs  · You are confused  Contact your healthcare provider if:   · You have a fever  · Your symptoms do not get better with treatment  · You have questions or concerns about your condition or care  Treatment for dizziness  depends on the cause  Your healthcare provider may give you oxygen or medicines to decrease your dizziness and nausea  He may also refer you to a specialist  Pilo Gonzalez may need to be admitted to the hospital for treatment  Manage your symptoms:   · Do not drive  or operate heavy machinery when you are dizzy  · Get up slowly  from sitting or lying down  · Drink plenty of liquids  Liquids help prevent dehydration  Ask how much liquid to drink each day and which liquids are best for you  Follow up with your healthcare provider as directed:  Write down your questions so you remember to ask them during your visits  © 2017 [address] [employer] Information is for End User's use only and may not be sold, redistributed or otherwise used for commercial purposes  All illustrations and images included in CareNotes® are the copyrighted property of A D A M , Inc  or Kenrick Dudley  The above information is an  only  It is not intended as medical advice for individual conditions or treatments   Talk to your doctor, nurse or pharmacist before following any medical regimen to see if it is safe and effective for you

## 2018-12-04 LAB — BACTERIA UR CULT: NORMAL

## 2019-02-22 ENCOUNTER — ANNUAL EXAM (OUTPATIENT)
Dept: OBGYN CLINIC | Facility: CLINIC | Age: 44
End: 2019-02-22
Payer: COMMERCIAL

## 2019-02-22 VITALS — BODY MASS INDEX: 30.27 KG/M2 | DIASTOLIC BLOOD PRESSURE: 70 MMHG | WEIGHT: 155 LBS | SYSTOLIC BLOOD PRESSURE: 126 MMHG

## 2019-02-22 DIAGNOSIS — Z12.4 ENCOUNTER FOR PAPANICOLAOU SMEAR FOR CERVICAL CANCER SCREENING: Primary | ICD-10-CM

## 2019-02-22 DIAGNOSIS — Z12.39 SCREENING FOR MALIGNANT NEOPLASM OF BREAST: ICD-10-CM

## 2019-02-22 PROCEDURE — S0612 ANNUAL GYNECOLOGICAL EXAMINA: HCPCS | Performed by: OBSTETRICS & GYNECOLOGY

## 2019-02-22 NOTE — PROGRESS NOTES
ASSESSMENT & PLAN: Nini Dawson is a 44 y o   with normal gynecologic exam     1   Routine well woman exam done today  2  Pap and HPV:  The patient's last pap and hpv was   It was normal     Pap and cotesting was not done today  Due   Current ASCCP Guidelines reviewed  3   Mammogram ordered  4  The following were reviewed in today's visit: breast self exam and mammography screening ordered      CC:  Annual Gynecologic Examination    HPI: Nini Dawson is a 44 y o   who presents for annual gynecologic examination  She has the following concerns:  No GYN complaints; doing well  Cycles regular, reports acne    Health Maintenance:    She wears her seatbelt routinely  She does perform regular monthly self breast exams  She feels safe at home  There is no problem list on file for this patient        Past Medical History:   Diagnosis Date    Dysmenorrhea     Esophageal reflux     last assessed - [id number]    Herpes zoster     LUQ level T-8 resolved; last assessed - [id number]    History of infection due to human papilloma virus (HPV)     Inguinal hernia     last assessed - [id number]    Insomnia     Mastodynia     Resolved -     Migraine     Ovarian cyst     last assessed - [id number]    Ovarian cyst     Palpitations     last assessed - [id number]    Shortness of breath     Tachycardia     Resolved - [id number]    Takes dietary supplements     Temporomandibular joint disorder     last assessed - [id number]       Past Surgical History:   Procedure Laterality Date     SECTION      COLONOSCOPY  2012    Fiberoptic    DIAGNOSTIC LAPAROSCOPY      DILATION AND CURETTAGE OF UTERUS      Resolved     GYNECOLOGIC CRYOSURGERY      age 24   [place] TOOTH EXTRACTION         Past OB/Gyn History:  OB History        2    Para   1    Term                AB        Living           SAB        TAB        Ectopic        Multiple        Live Births Obstetric Comments   Hypertension in pregnancy, preeclampsia              Pt does not have menstrual issues     History of sexually transmitted infection: No   History of abnormal pap smears: Yes had cryo age 24  Patient is currently sexually active  heterosexual  The current method of family planning is none      Family History   Problem Relation Age of Onset    Diabetes Mother     Heart disease Mother         cardiac disorder    Gallbladder disease Mother     Hypertension Mother     Diabetes Father     Heart disease Father         cardiac disorder    Hypertension Father     Hyperlipidemia Father         Pure Hypercholesterolemia    Other Sister         congenital malformations    Other Family         headache syndromes    Breast cancer Cousin        Social History:  Social History     Socioeconomic History    Marital status:      Spouse name: Not on file    Number of children: Not on file    Years of education: Not on file    Highest education level: Not on file   Occupational History    Not on file   Social Needs    Financial resource strain: Not on file    Food insecurity:     Worry: Not on file     Inability: Not on file    Transportation needs:     Medical: Not on file     Non-medical: Not on file   Tobacco Use    Smoking status: Never Smoker    Smokeless tobacco: Never Used   Substance and Sexual Activity    Alcohol use: No    Drug use: No    Sexual activity: Not Currently     Birth control/protection: Condom     Comment: withdrawal contraception   Lifestyle    Physical activity:     Days per week: Not on file     Minutes per session: Not on file    Stress: Not on file   Relationships    Social connections:     Talks on phone: Not on file     Gets together: Not on file     Attends Hindu service: Not on file     Active member of club or organization: Not on file     Attends meetings of clubs or organizations: Not on file     Relationship status: Not on file   Natalia Jones Intimate partner violence:     Fear of current or ex partner: Not on file     Emotionally abused: Not on file     Physically abused: Not on file     Forced sexual activity: Not on file   Other Topics Concern    Not on file   Social History Narrative    Coffee - denied    History of daily cola consumption (___ Cans/Day) - denied    History of daily tea consumption (___ Cups/Day) - denied    Lack of exercise    Marital History - Currently  - per [employer]    Synagogue Affiliation [employer]       Allergies   Allergen Reactions    Amoxicillin Hives    Penicillins Hives    Shellfish Allergy     Sulfa Antibiotics Rash       Current Outpatient Medications:     IRON PO, Take by mouth, Disp: , Rfl:     TURMERIC PO, Take by mouth, Disp: , Rfl:     nitrofurantoin (MACROBID) 100 mg capsule, Take 100 mg by mouth 2 (two) times a day, Disp: , Rfl:     valACYclovir (VALTREX) 1,000 mg tablet, Take 1 tablet (1,000 mg total) by mouth 3 (three) times a day for 7 days, Disp: 21 tablet, Rfl: 0    Review of Systems:    Review of Systems  Constitutional :no fever, feels well, no tiredness, no recent weight gain or loss  ENT: no ear ache, no loss of hearing, no nosebleeds or nasal discharge, no sore throat or hoarseness  Cardiovascular: no complaints of slow or fast heart beat, no chest pain, no palpitations, no leg claudication or lower extremity edema  Respiratory: no complaints of shortness of shortness of breath, no BAUTISTA  Breasts:no complaints of breast pain, breast lump, or nipple discharge  Gastrointestinal: no complaints of abdominal pain, constipation, nausea, vomiting, or diarrhea or bloody stools  Genitourinary : no complaints of dysuria, incontinence, pelvic pain, no dysmenorrhea, vaginal discharge or abnormal vaginal bleeding and as noted in HPI  Musculoskeletal: no complaints of arthralgia, no myalgia, no joint swelling or stiffness, no limb pain or swelling    Integumentary: no complaints of skin rash or lesion, itching or dry skin  Neurological: no complaints of headache, no confusion, no numbness or tingling, no dizziness or fainting    Objective      /70   Wt 70 3 kg (155 lb)   LMP 02/16/2019   BMI 30 27 kg/m²     General:   appears stated age, cooperative, alert normal mood and affect   Neck: normal, supple,trachea midline, no masses   Heart: regular rate and rhythm, S1, S2 normal, no murmur, click, rub or gallop   Lungs: clear to auscultation bilaterally   Breasts: normal appearance, no masses or tenderness, Inspection negative, No nipple retraction or dimpling, No nipple discharge or bleeding, No axillary or supraclavicular adenopathy, Normal to palpation without dominant masses   Abdomen: soft, non-tender, without masses or organomegaly   Vulva: normal female genitalia, Bartholin's, Urethra, Zeb normal, no lesions, normal female hair distribution   Vagina: normal vagina, no discharge, exudate, lesion, or erythema   Urethra: normal   Cervix: Normal, no discharge  Uterus: normal size, contour, position, consistency, mobility, non-tender   Adnexa: normal adnexa   Lymphatic palpation of lymph nodes in neck, axilla, groin and/or other locations: no lymphadenopathy or masses noted   Skin normal skin turgor and no rashes     Psychiatric orientation to person, place, and time: normal  mood and affect: normal

## 2019-02-22 NOTE — PATIENT INSTRUCTIONS
Thank you for trusting [facility] with your care  Let us know how we did! You may be receiving a survey about your experience with [facility]  Please take a moment to complete the survey  Thank you!

## 2019-03-15 ENCOUNTER — HOSPITAL ENCOUNTER (OUTPATIENT)
Dept: MAMMOGRAPHY | Facility: HOSPITAL | Age: 44
Discharge: HOME | End: 2019-03-15
Payer: COMMERCIAL

## 2019-03-15 VITALS — BODY MASS INDEX: 30.04 KG/M2 | HEIGHT: 60 IN | WEIGHT: 153 LBS

## 2019-03-15 DIAGNOSIS — Z12.39 SCREENING FOR MALIGNANT NEOPLASM OF BREAST: ICD-10-CM

## 2019-03-15 PROCEDURE — 77067 SCR MAMMO BI INCL CAD: CPT

## 2019-03-15 PROCEDURE — 77063 BREAST TOMOSYNTHESIS BI: CPT

## 2019-04-18 ENCOUNTER — HOSPITAL ENCOUNTER (EMERGENCY)
Facility: HOSPITAL | Age: 44
Discharge: HOME | End: 2019-04-19
Attending: EMERGENCY MEDICINE | Admitting: INTERNAL MEDICINE
Payer: COMMERCIAL

## 2019-04-18 VITALS
BODY MASS INDEX: 30.3 KG/M2 | SYSTOLIC BLOOD PRESSURE: 119 MMHG | RESPIRATION RATE: 18 BRPM | HEIGHT: 60 IN | DIASTOLIC BLOOD PRESSURE: 67 MMHG | HEART RATE: 91 BPM | TEMPERATURE: 99.4 F | OXYGEN SATURATION: 98 % | WEIGHT: 154.32 LBS

## 2019-04-18 DIAGNOSIS — IMO0001 FOOD POISONING, ACCIDENTAL OR UNINTENTIONAL, INITIAL ENCOUNTER: Primary | ICD-10-CM

## 2019-04-18 LAB
ALBUMIN SERPL BCP-MCNC: 4.1 G/DL (ref 3.5–5.7)
ALP SERPL-CCNC: 58 U/L (ref 40–150)
ALT SERPL W P-5'-P-CCNC: 27 U/L (ref 7–52)
ANION GAP SERPL CALCULATED.3IONS-SCNC: 11 MMOL/L (ref 4–13)
AST SERPL W P-5'-P-CCNC: 22 U/L (ref 13–39)
BACTERIA UR QL AUTO: ABNORMAL /HPF
BASOPHILS # BLD AUTO: 0.1 THOUSANDS/ΜL (ref 0–0.1)
BASOPHILS NFR BLD AUTO: 1 % (ref 0–1)
BILIRUB DIRECT SERPL-MCNC: 0.1 MG/DL (ref 0–0.2)
BILIRUB SERPL-MCNC: 0.3 MG/DL (ref 0.2–1)
BILIRUB UR QL STRIP: NEGATIVE
BUN SERPL-MCNC: 18 MG/DL (ref 7–25)
CALCIUM SERPL-MCNC: 9.4 MG/DL (ref 8.6–10.5)
CHLORIDE SERPL-SCNC: 102 MMOL/L (ref 98–107)
CLARITY UR: CLEAR
CO2 SERPL-SCNC: 26 MMOL/L (ref 21–31)
COLOR UR: YELLOW
CREAT SERPL-MCNC: 0.8 MG/DL (ref 0.6–1.2)
EOSINOPHIL # BLD AUTO: 0.2 THOUSAND/ΜL (ref 0–0.61)
EOSINOPHIL NFR BLD AUTO: 1 % (ref 0–6)
ERYTHROCYTE [DISTWIDTH] IN BLOOD BY AUTOMATED COUNT: 12.3 % (ref 11.6–15.1)
ETHANOL SERPL-MCNC: <10 MG/DL
EXT PREG TEST URINE: NEGATIVE
GFR SERPL CREATININE-BSD FRML MDRD: 91 ML/MIN/1.73SQ M
GLUCOSE SERPL-MCNC: 133 MG/DL (ref 65–140)
GLUCOSE UR STRIP-MCNC: NEGATIVE MG/DL
HCT VFR BLD AUTO: 40.9 % (ref 37–47)
HGB BLD-MCNC: 14 G/DL (ref 11.5–15.4)
HGB UR QL STRIP.AUTO: NEGATIVE
KETONES UR STRIP-MCNC: ABNORMAL MG/DL
LEUKOCYTE ESTERASE UR QL STRIP: NEGATIVE
LYMPHOCYTES # BLD AUTO: 3.5 THOUSANDS/ΜL (ref 0.6–4.47)
LYMPHOCYTES NFR BLD AUTO: 24 % (ref 14–44)
MCH RBC QN AUTO: 29.1 PG (ref 26.8–34.3)
MCHC RBC AUTO-ENTMCNC: 34.2 G/DL (ref 31.4–37.4)
MCV RBC AUTO: 85 FL (ref 82–98)
MONOCYTES # BLD AUTO: 1.2 THOUSAND/ΜL (ref 0.17–1.22)
MONOCYTES NFR BLD AUTO: 8 % (ref 4–12)
NEUTROPHILS # BLD AUTO: 9.6 THOUSANDS/ΜL (ref 1.85–7.62)
NEUTS SEG NFR BLD AUTO: 66 % (ref 43–75)
NITRITE UR QL STRIP: NEGATIVE
NON-SQ EPI CELLS URNS QL MICRO: ABNORMAL /HPF
PH UR STRIP.AUTO: 6 [PH]
PLATELET # BLD AUTO: 447 THOUSANDS/UL (ref 149–390)
PMV BLD AUTO: 8.9 FL (ref 8.9–12.7)
POTASSIUM SERPL-SCNC: 3.1 MMOL/L (ref 3.5–5.5)
PROT SERPL-MCNC: 7.2 G/DL (ref 6.4–8.9)
PROT UR STRIP-MCNC: ABNORMAL MG/DL
RBC # BLD AUTO: 4.81 MILLION/UL (ref 3.81–5.12)
RBC #/AREA URNS AUTO: ABNORMAL /HPF
SODIUM SERPL-SCNC: 139 MMOL/L (ref 134–143)
SP GR UR STRIP.AUTO: 1.02 (ref 1–1.03)
UROBILINOGEN UR QL STRIP.AUTO: 0.2 E.U./DL
WBC # BLD AUTO: 14.6 THOUSAND/UL (ref 4.31–10.16)
WBC #/AREA URNS AUTO: ABNORMAL /HPF

## 2019-04-18 PROCEDURE — 99284 EMERGENCY DEPT VISIT MOD MDM: CPT

## 2019-04-18 PROCEDURE — G0480 DRUG TEST DEF 1-7 CLASSES: HCPCS | Performed by: EMERGENCY MEDICINE

## 2019-04-18 PROCEDURE — 96376 TX/PRO/DX INJ SAME DRUG ADON: CPT

## 2019-04-18 PROCEDURE — 85025 COMPLETE CBC W/AUTO DIFF WBC: CPT | Performed by: EMERGENCY MEDICINE

## 2019-04-18 PROCEDURE — 96361 HYDRATE IV INFUSION ADD-ON: CPT

## 2019-04-18 PROCEDURE — 96375 TX/PRO/DX INJ NEW DRUG ADDON: CPT

## 2019-04-18 PROCEDURE — 81001 URINALYSIS AUTO W/SCOPE: CPT | Performed by: EMERGENCY MEDICINE

## 2019-04-18 PROCEDURE — 96365 THER/PROPH/DIAG IV INF INIT: CPT

## 2019-04-18 PROCEDURE — 81025 URINE PREGNANCY TEST: CPT | Performed by: EMERGENCY MEDICINE

## 2019-04-18 PROCEDURE — 80076 HEPATIC FUNCTION PANEL: CPT | Performed by: EMERGENCY MEDICINE

## 2019-04-18 PROCEDURE — 36415 COLL VENOUS BLD VENIPUNCTURE: CPT | Performed by: EMERGENCY MEDICINE

## 2019-04-18 PROCEDURE — 80048 BASIC METABOLIC PNL TOTAL CA: CPT | Performed by: EMERGENCY MEDICINE

## 2019-04-18 PROCEDURE — 96366 THER/PROPH/DIAG IV INF ADDON: CPT

## 2019-04-18 PROCEDURE — 93005 ELECTROCARDIOGRAM TRACING: CPT

## 2019-04-18 PROCEDURE — 80320 DRUG SCREEN QUANTALCOHOLS: CPT | Performed by: EMERGENCY MEDICINE

## 2019-04-18 RX ORDER — POTASSIUM CHLORIDE 750 MG/1
20 TABLET, EXTENDED RELEASE ORAL ONCE
Status: DISCONTINUED | OUTPATIENT
Start: 2019-04-18 | End: 2019-04-18

## 2019-04-18 RX ORDER — ONDANSETRON 2 MG/ML
4 INJECTION INTRAMUSCULAR; INTRAVENOUS ONCE
Status: COMPLETED | OUTPATIENT
Start: 2019-04-18 | End: 2019-04-18

## 2019-04-18 RX ORDER — POTASSIUM CHLORIDE 14.9 MG/ML
20 INJECTION INTRAVENOUS ONCE
Status: COMPLETED | OUTPATIENT
Start: 2019-04-18 | End: 2019-04-18

## 2019-04-18 RX ORDER — ONDANSETRON 4 MG/1
4 TABLET, FILM COATED ORAL EVERY 6 HOURS
Qty: 12 TABLET | Refills: 0 | Status: SHIPPED | OUTPATIENT
Start: 2019-04-18 | End: 2019-09-05 | Stop reason: ALTCHOICE

## 2019-04-18 RX ADMIN — ONDANSETRON 4 MG: 2 INJECTION INTRAMUSCULAR; INTRAVENOUS at 18:27

## 2019-04-18 RX ADMIN — ONDANSETRON 4 MG: 2 INJECTION INTRAMUSCULAR; INTRAVENOUS at 21:19

## 2019-04-18 RX ADMIN — POTASSIUM CHLORIDE 20 MEQ: 200 INJECTION, SOLUTION INTRAVENOUS at 21:56

## 2019-04-18 RX ADMIN — SODIUM CHLORIDE 1000 ML: 0.9 INJECTION, SOLUTION INTRAVENOUS at 18:27

## 2019-04-19 LAB
ATRIAL RATE: 91 BPM
P AXIS: 48 DEGREES
PR INTERVAL: 144 MS
QRS AXIS: 23 DEGREES
QRSD INTERVAL: 82 MS
QT INTERVAL: 362 MS
QTC INTERVAL: 445 MS
T WAVE AXIS: 48 DEGREES
VENTRICULAR RATE: 91 BPM

## 2019-04-19 PROCEDURE — 93010 ELECTROCARDIOGRAM REPORT: CPT | Performed by: INTERNAL MEDICINE

## 2019-07-31 ENCOUNTER — TELEPHONE (OUTPATIENT)
Dept: OBGYN CLINIC | Facility: MEDICAL CENTER | Age: 44
End: 2019-07-31

## 2019-07-31 DIAGNOSIS — Z32.01 POSITIVE URINE PREGNANCY TEST: Primary | ICD-10-CM

## 2019-07-31 NOTE — TELEPHONE ENCOUNTER
Positive UPT  LMP=6/25/19  Will be completing PN labs at outside draw facility    Advised to complete labs on 8/5/19

## 2019-08-05 ENCOUNTER — APPOINTMENT (OUTPATIENT)
Dept: LAB | Facility: HOSPITAL | Age: 44
End: 2019-08-05
Payer: COMMERCIAL

## 2019-08-05 ENCOUNTER — TELEPHONE (OUTPATIENT)
Dept: OBGYN CLINIC | Facility: MEDICAL CENTER | Age: 44
End: 2019-08-05

## 2019-08-05 DIAGNOSIS — Z32.01 POSITIVE URINE PREGNANCY TEST: ICD-10-CM

## 2019-08-05 LAB
ABO GROUP BLD: NORMAL
B-HCG SERPL-ACNC: 5955 MIU/ML
BLD GP AB SCN SERPL QL: NEGATIVE
PROGEST SERPL-MCNC: 21.4 NG/ML
RH BLD: POSITIVE
SPECIMEN EXPIRATION DATE: NORMAL

## 2019-08-05 PROCEDURE — 86850 RBC ANTIBODY SCREEN: CPT

## 2019-08-05 PROCEDURE — 84144 ASSAY OF PROGESTERONE: CPT

## 2019-08-05 PROCEDURE — 36415 COLL VENOUS BLD VENIPUNCTURE: CPT

## 2019-08-05 PROCEDURE — 86900 BLOOD TYPING SEROLOGIC ABO: CPT

## 2019-08-05 PROCEDURE — 86901 BLOOD TYPING SEROLOGIC RH(D): CPT

## 2019-08-05 PROCEDURE — 84702 CHORIONIC GONADOTROPIN TEST: CPT

## 2019-08-05 NOTE — TELEPHONE ENCOUNTER
----- Message from Army Hallman sent at 8/5/2019 12:06 PM EDT -----  Pt called in stating she wanted to let you know she went for bw today, but over the weekend she had bleeding and a lot of cramping  Please follow up

## 2019-08-05 NOTE — TELEPHONE ENCOUNTER
----- Message from Virgilio Li sent at 8/5/2019 12:06 PM EDT -----  Pt called in stating she wanted to let you know she went for bw today, but over the weekend she had bleeding and a lot of cramping  Please follow up

## 2019-08-07 ENCOUNTER — TELEPHONE (OUTPATIENT)
Dept: OBGYN CLINIC | Facility: MEDICAL CENTER | Age: 44
End: 2019-08-07

## 2019-08-07 DIAGNOSIS — Z32.01 POSITIVE BLOOD PREGNANCY TEST: Primary | ICD-10-CM

## 2019-08-07 NOTE — TELEPHONE ENCOUNTER
Notified of need for dating US  States she had episode of cramping and passed a clot on Monday  Denies pain or bleeding now    Advised to schedule US ASAP

## 2019-08-07 NOTE — TELEPHONE ENCOUNTER
----- Message from Landry Hubbard RN sent at 8/6/2019  9:54 AM EDT -----  Left message on voicemail  ----- Message -----  From: Lacie Trivedi MD  Sent: 8/6/2019   9:32 AM EDT  To: [facility]    HCG level reviewed  The level is [id number]  Get ultrasound for dating

## 2019-08-08 ENCOUNTER — HOSPITAL ENCOUNTER (OUTPATIENT)
Dept: ULTRASOUND IMAGING | Facility: HOSPITAL | Age: 44
Discharge: HOME | End: 2019-08-08
Payer: COMMERCIAL

## 2019-08-08 DIAGNOSIS — Z32.01 POSITIVE BLOOD PREGNANCY TEST: ICD-10-CM

## 2019-08-08 PROCEDURE — 76801 OB US < 14 WKS SINGLE FETUS: CPT

## 2019-08-09 DIAGNOSIS — Z32.01 ENCOUNTER FOR PREGNANCY TEST, RESULT POSITIVE: Primary | ICD-10-CM

## 2019-08-22 ENCOUNTER — HOSPITAL ENCOUNTER (OUTPATIENT)
Dept: ULTRASOUND IMAGING | Facility: HOSPITAL | Age: 44
Discharge: HOME | End: 2019-08-22
Payer: COMMERCIAL

## 2019-08-22 DIAGNOSIS — Z32.01 ENCOUNTER FOR PREGNANCY TEST, RESULT POSITIVE: ICD-10-CM

## 2019-08-22 PROCEDURE — 76817 TRANSVAGINAL US OBSTETRIC: CPT

## 2019-08-22 PROCEDURE — 76816 OB US FOLLOW-UP PER FETUS: CPT

## 2019-09-04 ENCOUNTER — TELEPHONE (OUTPATIENT)
Dept: OBGYN CLINIC | Facility: MEDICAL CENTER | Age: 44
End: 2019-09-04

## 2019-09-04 NOTE — TELEPHONE ENCOUNTER
----- Message from Nasim Rosa sent at 9/4/2019 11:22 AM EDT -----  Prenatal pt  States she is 10 weeks  C/o spotting and cramping  Can you please follow up  Thanks!

## 2019-09-05 ENCOUNTER — INITIAL PRENATAL (OUTPATIENT)
Dept: OBGYN CLINIC | Facility: MEDICAL CENTER | Age: 44
End: 2019-09-05

## 2019-09-05 DIAGNOSIS — Z34.91 ENCOUNTER FOR PREGNANCY RELATED EXAMINATION IN FIRST TRIMESTER: Primary | ICD-10-CM

## 2019-09-05 PROCEDURE — OBC: Performed by: OBSTETRICS & GYNECOLOGY

## 2019-09-05 NOTE — PATIENT INSTRUCTIONS
Pregnancy at [address] to [address]:   What changes are happening in my body? You are now at the end of your first trimester and entering your second trimester  Morning sickness usually goes away by this time  You may have other symptoms such as fatigue, frequent urination, and headaches  You may have gained between 2 to 4 pounds by now  How do I care for myself at this stage of my pregnancy? · Get plenty of rest   You may feel more tired than normal  You may need to take naps or go to bed earlier  · Manage nausea and vomiting  Avoid fatty and spicy foods  Eat small meals throughout the day instead of large meals  Ny may help to decrease nausea  Ask your healthcare provider about other ways of decreasing nausea and vomiting  · Eat a variety of healthy foods  Healthy foods include fruits, vegetables, whole-grain breads, low-fat dairy foods, beans, lean meats, and fish  Drink liquids as directed  Ask how much liquid to drink each day and which liquids are best for you  Limit caffeine to less than 200 milligrams each day  Limit your intake of fish to 2 servings each week  Choose fish low in mercury such as canned light tuna, shrimp, salmon, cod, or tilapia  Do not  eat fish high in mercury such as swordfish, tilefish, xavier mackerel, and shark  · Take prenatal vitamins as directed  Your need for certain vitamins and minerals, such as folic acid, increases during pregnancy  Prenatal vitamins provide some of the extra vitamins and minerals you need  Prenatal vitamins may also help to decrease the risk of certain birth defects  · Do not smoke  If you smoke, it is never too late to quit  Smoking increases your risk of a miscarriage and other health problems during your pregnancy  Smoking can cause your baby to be born too early or weigh less at birth  Ask your healthcare provider for information if you need help quitting  · Do not drink alcohol    Alcohol passes from your body to your baby through the placenta  It can affect your baby's brain development and cause fetal alcohol syndrome (FAS)  FAS is a group of conditions that causes mental, behavior, and growth problems  · Talk to your healthcare provider before you take any medicines  Many medicines may harm your baby if you take them when you are pregnant  Do not take any medicines, vitamins, herbs, or supplements without first talking to your healthcare provider  Never use illegal or street drugs (such as marijuana or cocaine) while you are pregnant  What are some safety tips during pregnancy? · Avoid hot tubs and saunas  Do not use a hot tub or sauna while you are pregnant, especially during your first trimester  Hot tubs and saunas may raise your baby's temperature and increase the risk of birth defects  · Avoid toxoplasmosis  This is an infection caused by eating raw meat or being around infected cat feces  It can cause birth defects, miscarriages, and other problems  Wash your hands after you touch raw meat  Make sure any meat is well-cooked before you eat it  Avoid raw eggs and unpasteurized milk  Use gloves or ask someone else to clean your cat's litter box while you are pregnant  What changes are happening with my baby? Your baby has fully formed fingernails and toenails  Your baby's heartbeat can now be heard  Ask your healthcare provider if you can listen to your baby's heartbeat  By week 14, your baby is over 4 inches long from the top of the head to the rump (baby's bottom)  Your baby weighs over 3 ounces  What do I need to know about prenatal care? During the first 28 weeks of your pregnancy, you will see your healthcare provider once a month  Prenatal care can help prevent problems during pregnancy and childbirth  Your healthcare provider will check your blood pressure and weight  You may also need any of the following:  · A urine test  may also be done to check for sugar and protein   These can be signs of gestational diabetes or infection  · Genetic disorders screening tests  may be offered to you  This screening test checks your baby's risk of genetic disorders such as Down syndrome  The screening test includes a blood test and ultrasound  · Your baby's heart rate  will be checked  When should I seek immediate care? · You have pain or cramping in your abdomen or low back  · You have heavy vaginal bleeding or clotting  · You pass material that looks like tissue or large clots  Collect the material and bring it with you  When should I contact my healthcare provider? · You cannot keep food or drinks down, and you are losing weight  · You have light bleeding  · You have chills or a fever  · You have vaginal itching, burning, or pain  · You have yellow, green, white, or foul-smelling vaginal discharge  · You have pain or burning when you urinate, less urine than usual, or pink or bloody urine  · You have questions or concerns about your condition or care  CARE AGREEMENT:   You have the right to help plan your care  Learn about your health condition and how it may be treated  Discuss treatment options with your caregivers to decide what care you want to receive  You always have the right to refuse treatment  The above information is an  only  It is not intended as medical advice for individual conditions or treatments  Talk to your doctor, nurse or pharmacist before following any medical regimen to see if it is safe and effective for you  © 2017 [address] [employer]  Information is for End User's use only and may not be sold, redistributed or otherwise used for commercial purposes  All illustrations and images included in CareNotes® are the copyrighted property of A D A M [employer]  or Kenrick Dudley    Pregnancy at [address] to [address] [facility]:   What changes are happening to your body:  Pregnancy hormones may cause your body to go through many changes during this stage of your pregnancy  You may feel more tired than usual, and have mood swings, nausea and vomiting, and headaches  Your breasts may feel tender and swollen and you may urinate more frequently  Seek care immediately if:   · You have pain or cramping in your abdomen or low back  · You have heavy vaginal bleeding or clotting  · You pass material that looks like tissue or large clots  Collect the material and bring it with you  Contact your healthcare provider if:   · You have light bleeding  · You have chills or a fever  · You have vaginal itching, burning, or pain  · You have yellow, green, white, or foul-smelling vaginal discharge  · You have pain or burning when you urinate, less urine than usual, or pink or bloody urine  · You have questions or concerns about your condition or care  How to care for yourself at this stage of your pregnancy:   · Manage nausea and vomiting  Avoid fatty and spicy foods  Eat small meals throughout the day instead of large meals  Ny may help to decrease nausea  Ask your healthcare provider about other ways of decreasing nausea and vomiting  · Eat a variety of healthy foods  Healthy foods include fruits, vegetables, whole-grain breads, low-fat dairy foods, beans, lean meats, and fish  Drink liquids as directed  Ask how much liquid to drink each day and which liquids are best for you  Limit caffeine to less than 200 milligrams each day  Limit your intake of fish to 2 servings each week  Choose fish low in mercury such as canned light tuna, shrimp, salmon, cod, or tilapia  Do not  eat fish high in mercury such as swordfish, tilefish, xavier mackerel, and shark  · Take prenatal vitamins as directed  Your need for certain vitamins and minerals, such as folic acid, increases during pregnancy  Prenatal vitamins provide some of the extra vitamins and minerals you need   Prenatal vitamins may also help to decrease the risk of certain birth defects  · Ask how much weight you should gain each month  Too much or too little weight gain can be unhealthy for you and your baby  · Do not smoke  If you smoke, it is never too late to quit  Smoking increases your risk of a miscarriage and other health problems during your pregnancy  Smoking can cause your baby to be born too early or weigh less at birth  Ask your healthcare provider for information if you need help quitting  · Do not drink alcohol  Alcohol passes from your body to your baby through the placenta  It can affect your baby's brain development and cause fetal alcohol syndrome (FAS)  FAS is a group of conditions that causes mental, behavior, and growth problems  · Talk to your healthcare provider before you take any medicines  Many medicines may harm your baby if you take them when you are pregnant  Do not take any medicines, vitamins, herbs, or supplements without first talking to your healthcare provider  Never use illegal or street drugs (such as marijuana or cocaine) while you are pregnant  Safety tips during pregnancy:   · Avoid hot tubs and saunas  Do not use a hot tub or sauna while you are pregnant, especially during your first trimester  Hot tubs and saunas may raise your baby's temperature and increase the risk of birth defects  · Avoid toxoplasmosis  This is an infection caused by eating raw meat or being around infected cat feces  It can cause birth defects, miscarriages, and other problems  Wash your hands after you touch raw meat  Make sure any meat is well-cooked before you eat it  Avoid raw eggs and unpasteurized milk  Use gloves or ask someone else to clean your cat's litter box while you are pregnant  Changes that are happening with your baby:  By 10 weeks, your baby will be about 2 ½ inches long from the top of the head to the rump (baby's bottom)  Your baby weighs about ½ ounce  Major body organs, such as the brain, heart, and lungs, are forming  Your baby's facial features are also starting to form  What you need to know about prenatal care:  Prenatal care is a series of visits with your healthcare provider throughout your pregnancy  During the first 28 weeks of your pregnancy, you will see your healthcare provider once a month  Prenatal care can help prevent problems during pregnancy and childbirth  Your healthcare provider will ask questions about your health and any previous pregnancies you have had  He will also ask about any medicines you are taking  You may also need any of the following:  · A pap smear  will be done to check your cervix for abnormal cells  The cervix is the narrow opening at the bottom of your uterus  The cervix meets the top part of the vagina  · A pelvic exam  allows your healthcare provider to see your cervix (the bottom part of your uterus)  Your healthcare provider uses a speculum to gently open your vagina  He will check the size and shape of your uterus  · Blood tests  may be done to check for anemia or blood type  Your healthcare provider may also order other blood tests to check if you are immune to certain diseases such as Hepatitis B  He may also recommend an HIV test     · Urine tests  may also be done to check for signs of infection  · Your blood pressure and weight  will be checked  © 2017 [facility] Information is for End User's use only and may not be sold, redistributed or otherwise used for commercial purposes  All illustrations and images included in CareNotes® are the copyrighted property of A D A [facility] [employer]  or Kenrick Dudley  The above information is an  only  It is not intended as medical advice for individual conditions or treatments  Talk to your doctor, nurse or pharmacist before following any medical regimen to see if it is safe and effective for you

## 2019-09-05 NOTE — PROGRESS NOTES
OB INTAKE INTERVIEW      Pt presents for OB intake    OB History    Para Term  AB Living   2 1 1     1   SAB TAB Ectopic Multiple Live Births           1      # Outcome Date GA Lbr Uriah/2nd Weight Sex Delivery Anes PTL Lv   2 Current            1 Term 11 40w0d  3175 g (7 lb) F CS-Unspec  N NANCIE      Complications: Failure to progress in labor      Obstetric Comments   Hypertension in pregnancy, preeclampsia         Hx of  delivery prior to 36 weeks 6 days: NO     Last Menstrual Period:   Patient's last menstrual period was 2019  Ultrasound date:   2019 7 weeks  Estimated date of delivery:   Estimated Date of Delivery: None noted  confirmed by LMP or US  ? History of Diabetes: no  History of Hypertension:no    States she is currently being evaluated by endocrine for autoimmune disorder but has no definitive diagnosis      Infection Screening: Does the pt have a hx of MRSA? NO    H&P visit scheduled  2019? Interview education  Information on [facility] Pregnancy Essentials reviewed  Handouts given: Baby and Me phone rachael guide  Baby and Me support center  [facility] [place] Nicolasa Soto in Pregnancy information sheet   [facility]  Discussed genetic testing-    - pt interested :         Declined SEQ/QUAD/NIPT testing               Depression Screening Follow-up Plan: Patient's depression screening was Negative  with an [place] score of  4       Patient is unsure if she will be continuing pregnancy  Information on womens center given  Will complete labs prior to next appointment if decides to continue pregnancy    The patient was oriented to our practice and all questions were answered    Interviewed by: Shay Keller RN 19

## 2019-09-20 ENCOUNTER — OFFICE VISIT (OUTPATIENT)
Dept: FAMILY MEDICINE CLINIC | Facility: CLINIC | Age: 44
End: 2019-09-20
Payer: COMMERCIAL

## 2019-09-20 VITALS
OXYGEN SATURATION: 98 % | HEART RATE: 94 BPM | BODY MASS INDEX: 29.41 KG/M2 | HEIGHT: 60 IN | TEMPERATURE: 98.4 F | WEIGHT: 149.8 LBS | SYSTOLIC BLOOD PRESSURE: 126 MMHG | DIASTOLIC BLOOD PRESSURE: 88 MMHG

## 2019-09-20 DIAGNOSIS — R30.0 DYSURIA: Primary | ICD-10-CM

## 2019-09-20 DIAGNOSIS — R39.15 URGENCY OF URINATION: ICD-10-CM

## 2019-09-20 DIAGNOSIS — R35.0 FREQUENCY OF MICTURITION: ICD-10-CM

## 2019-09-20 LAB
SL AMB  POCT GLUCOSE, UA: ABNORMAL
SL AMB LEUKOCYTE ESTERASE,UA: ABNORMAL
SL AMB POCT BILIRUBIN,UA: ABNORMAL
SL AMB POCT BLOOD,UA: ABNORMAL
SL AMB POCT CLARITY,UA: ABNORMAL
SL AMB POCT COLOR,UA: YELLOW
SL AMB POCT KETONES,UA: ABNORMAL
SL AMB POCT NITRITE,UA: ABNORMAL
SL AMB POCT PH,UA: 6.5
SL AMB POCT SPECIFIC GRAVITY,UA: 1.02
SL AMB POCT URINE PROTEIN: ABNORMAL
SL AMB POCT UROBILINOGEN: 0.2

## 2019-09-20 PROCEDURE — 81003 URINALYSIS AUTO W/O SCOPE: CPT | Performed by: FAMILY MEDICINE

## 2019-09-20 PROCEDURE — 3008F BODY MASS INDEX DOCD: CPT | Performed by: FAMILY MEDICINE

## 2019-09-20 PROCEDURE — 99213 OFFICE O/P EST LOW 20 MIN: CPT | Performed by: FAMILY MEDICINE

## 2019-09-20 RX ORDER — CIPROFLOXACIN 250 MG/1
250 TABLET, FILM COATED ORAL EVERY 12 HOURS SCHEDULED
Qty: 14 TABLET | Refills: 0 | Status: SHIPPED | OUTPATIENT
Start: 2019-09-20 | End: 2019-09-27

## 2019-09-20 RX ORDER — PHENAZOPYRIDINE HYDROCHLORIDE 200 MG/1
200 TABLET, FILM COATED ORAL
Qty: 10 TABLET | Refills: 0 | Status: SHIPPED | OUTPATIENT
Start: 2019-09-20 | End: 2020-08-21 | Stop reason: ALTCHOICE

## 2019-09-20 NOTE — PROGRESS NOTES
Chief Complaint   Patient presents with    Difficulty Urinating    Urinary Frequency       Assessment/Plan:  Water for hydration  Diagnoses and all orders for this visit:    Dysuria  -     POCT urine dip auto non-scope  -     ciprofloxacin (CIPRO) 250 mg tablet; Take 1 tablet (250 mg total) by mouth every 12 (twelve) hours for 7 days  -     phenazopyridine (PYRIDIUM) 200 mg tablet; Take 1 tablet (200 mg total) by mouth 3 (three) times a day with meals    Urgency of urination  -     ciprofloxacin (CIPRO) 250 mg tablet; Take 1 tablet (250 mg total) by mouth every 12 (twelve) hours for 7 days    Frequency of micturition  -     ciprofloxacin (CIPRO) 250 mg tablet; Take 1 tablet (250 mg total) by mouth every 12 (twelve) hours for 7 days          Subjective:      Patient ID: Sophie Flynn is a 38 y o  female  Start past Monday with urgency and frequency  Had a D and C past Saturday  The following portions of the patient's history were reviewed and updated as appropriate: allergies, current medications, past family history, past medical history, past social history, past surgical history and problem list     Review of Systems   Constitutional: Negative  HENT: Negative  Eyes: Negative  Respiratory: Negative  Cardiovascular: Negative  Gastrointestinal: Negative  Genitourinary: Positive for frequency and urgency  Negative for dysuria  Musculoskeletal: Negative  Skin: Negative  Neurological: Negative  Psychiatric/Behavioral: Negative  Objective:      /88 (BP Location: Left arm, Patient Position: Sitting, Cuff Size: Standard)   Pulse 94   Temp 98 4 °F (36 9 °C) (Oral)   Ht 5' (1 524 m)   Wt 67 9 kg (149 lb 12 8 oz)   LMP 06/25/2019   SpO2 98%   BMI 29 26 kg/m²          Physical Exam   Constitutional: She is oriented to person, place, and time  She appears well-developed and well-nourished  HENT:   Head: Normocephalic and atraumatic     Right Ear: External ear normal    Left Ear: External ear normal    Nose: Nose normal    Mouth/Throat: Oropharynx is clear and moist    Eyes: Pupils are equal, round, and reactive to light  Conjunctivae and EOM are normal    Neck: Normal range of motion  Neck supple  Cardiovascular: Normal rate, regular rhythm and normal heart sounds  Pulmonary/Chest: Effort normal and breath sounds normal    Abdominal: Soft  Bowel sounds are normal    Neurological: She is alert and oriented to person, place, and time  She has normal reflexes  Skin: Skin is warm and dry  Psychiatric: She has a normal mood and affect  Her behavior is normal  Judgment and thought content normal          Depression Screening: Unable to be performed due to medical contraindication  Patient recently went through miscarriage

## 2019-11-11 ENCOUNTER — OFFICE VISIT (OUTPATIENT)
Dept: URGENT CARE | Facility: HOSPITAL | Age: 44
End: 2019-11-11
Payer: COMMERCIAL

## 2019-11-11 VITALS
BODY MASS INDEX: 30.35 KG/M2 | WEIGHT: 154.6 LBS | HEIGHT: 60 IN | TEMPERATURE: 99.1 F | HEART RATE: 90 BPM | OXYGEN SATURATION: 97 % | DIASTOLIC BLOOD PRESSURE: 84 MMHG | SYSTOLIC BLOOD PRESSURE: 125 MMHG

## 2019-11-11 DIAGNOSIS — N39.0 URINARY TRACT INFECTION WITHOUT HEMATURIA, SITE UNSPECIFIED: Primary | ICD-10-CM

## 2019-11-11 DIAGNOSIS — R35.0 FREQUENT URINATION: ICD-10-CM

## 2019-11-11 LAB
SL AMB  POCT GLUCOSE, UA: NEGATIVE
SL AMB LEUKOCYTE ESTERASE,UA: ABNORMAL
SL AMB POCT BILIRUBIN,UA: NEGATIVE
SL AMB POCT BLOOD,UA: ABNORMAL
SL AMB POCT CLARITY,UA: ABNORMAL
SL AMB POCT COLOR,UA: YELLOW
SL AMB POCT KETONES,UA: NEGATIVE
SL AMB POCT NITRITE,UA: POSITIVE
SL AMB POCT PH,UA: 5
SL AMB POCT SPECIFIC GRAVITY,UA: 1.02
SL AMB POCT URINE HCG: NEGATIVE
SL AMB POCT URINE PROTEIN: 30
SL AMB POCT UROBILINOGEN: 0.2

## 2019-11-11 PROCEDURE — 81002 URINALYSIS NONAUTO W/O SCOPE: CPT | Performed by: NURSE PRACTITIONER

## 2019-11-11 PROCEDURE — 99213 OFFICE O/P EST LOW 20 MIN: CPT | Performed by: NURSE PRACTITIONER

## 2019-11-11 PROCEDURE — 87086 URINE CULTURE/COLONY COUNT: CPT | Performed by: NURSE PRACTITIONER

## 2019-11-11 PROCEDURE — 87077 CULTURE AEROBIC IDENTIFY: CPT | Performed by: NURSE PRACTITIONER

## 2019-11-11 PROCEDURE — 81025 URINE PREGNANCY TEST: CPT | Performed by: NURSE PRACTITIONER

## 2019-11-11 PROCEDURE — 87186 SC STD MICRODIL/AGAR DIL: CPT | Performed by: NURSE PRACTITIONER

## 2019-11-11 RX ORDER — NITROFURANTOIN 25; 75 MG/1; MG/1
100 CAPSULE ORAL 2 TIMES DAILY
Qty: 6 CAPSULE | Refills: 0 | Status: SHIPPED | OUTPATIENT
Start: 2019-11-11 | End: 2019-11-14

## 2019-11-11 NOTE — PATIENT INSTRUCTIONS
Urine dip positive  Will send for culture  Call in 2-3 days for results  Start antibiotic  Take probiotic  Increase oral fluids  Tylenol or Motrin for pain or fever  Pyridium for bladder spasms  Follow up with PCP if no improvement  Go to ER with abd pain, flank pain or worsening symptoms  Urinary Tract Infection in Women   WHAT YOU NEED TO KNOW:   A urinary tract infection (UTI) is caused by bacteria that get inside your urinary tract  Most bacteria that enter your urinary tract come out when you urinate  If the bacteria stay in your urinary tract, you may get an infection  Your urinary tract includes your kidneys, ureters, bladder, and urethra  Urine is made in your kidneys, and it flows from the ureters to the bladder  Urine leaves the bladder through the urethra  A UTI is more common in your lower urinary tract, which includes your bladder and urethra  DISCHARGE INSTRUCTIONS:   Return to the emergency department if:   · You are urinating very little or not at all  · You have a high fever with shaking chills  · You have side or back pain that gets worse  Contact your healthcare provider if:   · You have a fever  · You do not feel better after 2 days of taking antibiotics  · You are vomiting  · You have questions or concerns about your condition or care  Medicines:   · Antibiotics  help fight a bacterial infection  · Medicines  may be given to decrease pain and burning when you urinate  They will also help decrease the feeling that you need to urinate often  These medicines will make your urine orange or red  · Take your medicine as directed  Contact your healthcare provider if you think your medicine is not helping or if you have side effects  Tell him or her if you are allergic to any medicine  Keep a list of the medicines, vitamins, and herbs you take  Include the amounts, and when and why you take them  Bring the list or the pill bottles to follow-up visits  Carry your medicine list with you in case of an emergency  Follow up with your healthcare provider as directed:  Write down your questions so you remember to ask them during your visits  Prevent another UTI:   · Empty your bladder often  Urinate and empty your bladder as soon as you feel the need  Do not hold your urine for long periods of time  · Wipe from front to back after you urinate or have a bowel movement  This will help prevent germs from getting into your urinary tract through your urethra  · Drink liquids as directed  Ask how much liquid to drink each day and which liquids are best for you  You may need to drink more liquids than usual to help flush out the bacteria  Do not drink alcohol, caffeine, or citrus juices  These can irritate your bladder and increase your symptoms  Your healthcare provider may recommend cranberry juice to help prevent a UTI  · Urinate after you have sex  This can help flush out bacteria passed during sex  · Do not douche or use feminine deodorants  These can change the chemical balance in your vagina  · Change sanitary pads or tampons often  This will help prevent germs from getting into your urinary tract  · Do pelvic muscle exercises often  Pelvic muscle exercises may help you start and stop urinating  Strong pelvic muscles may help you empty your bladder easier  Squeeze these muscles tightly for 5 seconds like you are trying to hold back urine  Then relax for 5 seconds  Gradually work up to squeezing for 10 seconds  Do 3 sets of 15 repetitions a day, or as directed  © 2017 [address] Information is for End User's use only and may not be sold, redistributed or otherwise used for commercial purposes  All illustrations and images included in CareNotes® are the copyrighted property of [employer]  or Kenrick Dudley  The above information is an  only   It is not intended as medical advice for individual conditions or treatments  Talk to your doctor, nurse or pharmacist before following any medical regimen to see if it is safe and effective for you

## 2019-11-12 NOTE — PROGRESS NOTES
[address] [facility]        NAME: Nikia Clay is a 38 y o  female  : 1975    MRN: [id number]  DATE: 2019  TIME: 7:02 PM    Assessment and Plan   Frequent urination [R35 0]  1  Frequent urination  POCT urine dip    Urine culture    POCT urine HCG   2  Urinary tract infection without hematuria, site unspecified  nitrofurantoin (MACROBID) 100 mg capsule         Patient Instructions     Patient Instructions     Urine dip positive  Will send for culture  Call in 2-3 days for results  Start antibiotic  Take probiotic  Increase oral fluids  Tylenol or Motrin for pain or fever  Pyridium for bladder spasms  Follow up with PCP if no improvement  Go to ER with abd pain, flank pain or worsening symptoms  Urinary Tract Infection in Women   WHAT YOU NEED TO KNOW:   A urinary tract infection (UTI) is caused by bacteria that get inside your urinary tract  Most bacteria that enter your urinary tract come out when you urinate  If the bacteria stay in your urinary tract, you may get an infection  Your urinary tract includes your kidneys, ureters, bladder, and urethra  Urine is made in your kidneys, and it flows from the ureters to the bladder  Urine leaves the bladder through the urethra  A UTI is more common in your lower urinary tract, which includes your bladder and urethra  DISCHARGE INSTRUCTIONS:   Return to the emergency department if:   · You are urinating very little or not at all  · You have a high fever with shaking chills  · You have side or back pain that gets worse  Contact your healthcare provider if:   · You have a fever  · You do not feel better after 2 days of taking antibiotics  · You are vomiting  · You have questions or concerns about your condition or care  Medicines:   · Antibiotics  help fight a bacterial infection  · Medicines  may be given to decrease pain and burning when you urinate   They will also help decrease the feeling that you need to urinate often  These medicines will make your urine orange or red  · Take your medicine as directed  Contact your healthcare provider if you think your medicine is not helping or if you have side effects  Tell him or her if you are allergic to any medicine  Keep a list of the medicines, vitamins, and herbs you take  Include the amounts, and when and why you take them  Bring the list or the pill bottles to follow-up visits  Carry your medicine list with you in case of an emergency  Follow up with your healthcare provider as directed:  Write down your questions so you remember to ask them during your visits  Prevent another UTI:   · Empty your bladder often  Urinate and empty your bladder as soon as you feel the need  Do not hold your urine for long periods of time  · Wipe from front to back after you urinate or have a bowel movement  This will help prevent germs from getting into your urinary tract through your urethra  · Drink liquids as directed  Ask how much liquid to drink each day and which liquids are best for you  You may need to drink more liquids than usual to help flush out the bacteria  Do not drink alcohol, caffeine, or citrus juices  These can irritate your bladder and increase your symptoms  Your healthcare provider may recommend cranberry juice to help prevent a UTI  · Urinate after you have sex  This can help flush out bacteria passed during sex  · Do not douche or use feminine deodorants  These can change the chemical balance in your vagina  · Change sanitary pads or tampons often  This will help prevent germs from getting into your urinary tract  · Do pelvic muscle exercises often  Pelvic muscle exercises may help you start and stop urinating  Strong pelvic muscles may help you empty your bladder easier  Squeeze these muscles tightly for 5 seconds like you are trying to hold back urine  Then relax for 5 seconds  Gradually work up to squeezing for 10 seconds   Do 3 sets of 15 repetitions a day, or as directed  © 2017 [address] Information is for End User's use only and may not be sold, redistributed or otherwise used for commercial purposes  All illustrations and images included in CareNotes® are the copyrighted property of A D A M [employer] Inc  or Kenrick Dudley  The above information is an  only  It is not intended as medical advice for individual conditions or treatments  Talk to your doctor, nurse or pharmacist before following any medical regimen to see if it is safe and effective for you  Chief Complaint     Chief Complaint   Patient presents with    Possible UTI     started yesterday with some discomfort          History of Present Illness   Alon Brennan presents to the clinic c/o    This is a 60-year-old female here today with complaints of urinary urgency, frequency and slight dysuria  She states dysuria is primarily at the end of voiding  She denies any abdominal pain or back pain  No fevers  No nausea or vomiting  She had a UTI several months ago  Urine pregnancy is negative in office  She has had symptoms for about 24 hours  Symptoms are similar      Review of Systems   Review of Systems   Constitutional: Negative  Negative for activity change, chills, fatigue and fever  Respiratory: Negative  Cardiovascular: Negative  Genitourinary: Positive for dysuria, frequency and urgency  Musculoskeletal: Negative  Skin: Negative  Neurological: Negative  Psychiatric/Behavioral: Negative  Current Medications     No long-term medications on file         Current Allergies     Allergies as of 11/11/2019 - Reviewed 11/11/2019   Allergen Reaction Noted    Amoxicillin Hives 10/24/2012    Penicillins Hives 10/24/2012    Shellfish allergy Hives 05/07/2013    Sulfa antibiotics Rash 02/25/2013            The following portions of the patient's history were reviewed and updated as appropriate: allergies, current medications, past family history, past medical history, past social history, past surgical history and problem list     Objective   /84 (BP Location: Left arm, Patient Position: Sitting, Cuff Size: Adult)   Pulse 90   Temp 99 1 °F (37 3 °C) (Tympanic)   Ht 5' (1 524 m)   Wt 70 1 kg (154 lb 9 6 oz)   LMP 10/15/2019   SpO2 97%   Breastfeeding? Unknown   BMI 30 19 kg/m²        Physical Exam     Physical Exam   Constitutional: She is oriented to person, place, and time  She appears well-developed and well-nourished  No distress  Cardiovascular: Normal rate and regular rhythm  Pulmonary/Chest: Effort normal and breath sounds normal    Neurological: She is alert and oriented to person, place, and time  Psychiatric: She has a normal mood and affect  Her behavior is normal    Nursing note and vitals reviewed

## 2019-11-13 LAB
BACTERIA UR CULT: ABNORMAL
BACTERIA UR CULT: ABNORMAL

## 2020-01-11 ENCOUNTER — OFFICE VISIT (OUTPATIENT)
Dept: URGENT CARE | Facility: HOSPITAL | Age: 45
End: 2020-01-11
Payer: COMMERCIAL

## 2020-01-11 VITALS
OXYGEN SATURATION: 96 % | SYSTOLIC BLOOD PRESSURE: 128 MMHG | HEART RATE: 95 BPM | TEMPERATURE: 98.3 F | DIASTOLIC BLOOD PRESSURE: 80 MMHG | WEIGHT: 154 LBS | HEIGHT: 60 IN | RESPIRATION RATE: 18 BRPM | BODY MASS INDEX: 30.23 KG/M2

## 2020-01-11 DIAGNOSIS — R42 DIZZINESS: Primary | ICD-10-CM

## 2020-01-11 LAB
ATRIAL RATE: 84 BPM
GLUCOSE SERPL-MCNC: 81 MG/DL (ref 65–140)
P AXIS: 53 DEGREES
PR INTERVAL: 130 MS
QRS AXIS: -7 DEGREES
QRSD INTERVAL: 80 MS
QT INTERVAL: 360 MS
QTC INTERVAL: 425 MS
SL AMB POCT GLUCOSE BLD: 81
T WAVE AXIS: 47 DEGREES
VENTRICULAR RATE: 84 BPM

## 2020-01-11 PROCEDURE — 99213 OFFICE O/P EST LOW 20 MIN: CPT | Performed by: PHYSICIAN ASSISTANT

## 2020-01-11 PROCEDURE — 93010 ELECTROCARDIOGRAM REPORT: CPT | Performed by: INTERNAL MEDICINE

## 2020-01-11 PROCEDURE — 93005 ELECTROCARDIOGRAM TRACING: CPT | Performed by: PHYSICIAN ASSISTANT

## 2020-01-11 PROCEDURE — 82948 REAGENT STRIP/BLOOD GLUCOSE: CPT | Performed by: PHYSICIAN ASSISTANT

## 2020-01-11 NOTE — PROGRESS NOTES
[facility]        NAME: Erasmo Hobbs is a 38 y o  female  : 1975    MRN: [id number]  DATE: 2020  TIME: 2:16 PM    Assessment and Plan   Dizziness [R42]  1  Dizziness  POCT ECG    POCT blood glucose         Patient Instructions   Patient Instructions     EKG is normal   Normal sinus rhythm with no ST elevation  Blood glucose is 81  Benign exam   Vital signs are stable  Likely viral syndrome  Discussed flu swab  This was deferred  No fever now without antipyretics  Hydration  Follow up with PCP for evaluation of previous abdominal pain  If chest pain or symptoms are worse go to the ER  Follow up with PCP in 3-5 days  Proceed to  ER if symptoms worsen  Chief Complaint     Chief Complaint   Patient presents with    Fever     Started about three days ago, highest temp 101    Dizziness     Started about three days ago, comes and goes  History of Present Illness         38year-old female complains of 3 days of lightheadedness and dizziness  She feels lightheaded when she stands up too fast   She complains of a fever T-max 101 at home yesterday  No Tylenol or Motrin today  No flu shot this year  No cough or runny nose  No chest pain or shortness of breath  No nausea or vomiting  She noticed her stool was a little bit lighter than normal   It was a tan color  No blood in her stool  No diarrhea  She did have some right-sided abdominal pain a few weeks ago but that went away  This was worse after eating  She has not had this pain over last few days  Review of Systems   Review of Systems   Constitutional: Positive for chills and fever  Negative for fatigue  HENT: Negative for ear pain, postnasal drip, rhinorrhea, sore throat and tinnitus  Eyes: Negative for visual disturbance  Respiratory: Negative for cough, chest tightness and shortness of breath  Cardiovascular: Negative for chest pain and palpitations     Gastrointestinal: Negative for diarrhea, nausea and vomiting  Neurological: Positive for dizziness and headaches           Current Medications       Current Outpatient Medications:     phenazopyridine (PYRIDIUM) 200 mg tablet, Take 1 tablet (200 mg total) by mouth 3 (three) times a day with meals (Patient not taking: Reported on 2019), Disp: 10 tablet, Rfl: 0    Prenatal MV-Min-Fe Fum-FA-DHA (PRENATAL 1 PO), Take 1 tablet by mouth daily, Disp: , Rfl:     Current Allergies     Allergies as of 2020 - Reviewed 2020   Allergen Reaction Noted    Amoxicillin Hives 10/24/2012    Penicillins Hives 10/24/2012    Shellfish allergy Hives 2013    Sulfa antibiotics Rash 2013            The following portions of the patient's history were reviewed and updated as appropriate: allergies, current medications, past family history, past medical history, past social history, past surgical history and problem list      Past Medical History:   Diagnosis Date    Abnormal Pap smear of cervix     Anemia     Disease of thyroid gland     thyroid storm with previous pregnancy    Dysmenorrhea     Esophageal reflux     last assessed - [id number]    Herpes zoster     LUQ level T-8 resolved; last assessed - [id number]    History of infection due to human papilloma virus (HPV)     HPV (human papilloma virus) infection     Inguinal hernia     last assessed - [id number]    Insomnia     Mastodynia     Resolved -     Migraine     Ovarian cyst     last assessed - [id number]    Ovarian cyst     Palpitations     last assessed - [id number]    Shortness of breath     Tachycardia     Resolved - [id number]    Takes dietary supplements     Temporomandibular joint disorder     last assessed - [id number]    Urinary tract infection     Varicella        Past Surgical History:   Procedure Laterality Date     SECTION      COLONOSCOPY  2012    Fiberoptic    CRYOTHERAPY      cervix, age 24   [facility]  DILATION AND CURETTAGE OF UTERUS      Resolved 2010    GYNECOLOGIC CRYOSURGERY      age 21    TOOTH EXTRACTION         Family History   Problem Relation Age of Onset    Diabetes Mother     Heart disease Mother         cardiac disorder    Gallbladder disease Mother     Hypertension Mother     Glaucoma Mother     Diabetes Father     Heart disease Father         cardiac disorder    Hypertension Father     Hyperlipidemia Father         Pure Hypercholesterolemia    Other Family         headache syndromes    Breast cancer Cousin     Post-traumatic stress disorder Brother     Hyperlipidemia Brother     Hypertension Brother     Diabetes Maternal Grandmother     Lung cancer Maternal Grandfather     Stroke Paternal Grandmother     Hypertension Paternal Grandmother     Cirrhosis Paternal Grandfather     Ovarian cancer Maternal Aunt          Medications have been verified  Objective   /80 (BP Location: Left arm, Patient Position: Sitting, Cuff Size: Standard)   Pulse 95   Temp 98 3 °F (36 8 °C) (Temporal)   Resp 18   Ht 5' (1 524 m)   Wt 69 9 kg (154 lb)   LMP 12/14/2019 (Approximate)   SpO2 96%   BMI 30 08 kg/m²        Physical Exam     Physical Exam   Constitutional: She is oriented to person, place, and time  She appears well-developed and well-nourished  No distress  HENT:   Head: Normocephalic and atraumatic  Right Ear: Tympanic membrane, external ear and ear canal normal  Tympanic membrane is not erythematous, not retracted and not bulging  No middle ear effusion  Left Ear: Tympanic membrane, external ear and ear canal normal  Tympanic membrane is not erythematous, not retracted and not bulging  No middle ear effusion  Nose: Nose normal  No mucosal edema or rhinorrhea  Right sinus exhibits no maxillary sinus tenderness and no frontal sinus tenderness  Left sinus exhibits no maxillary sinus tenderness and no frontal sinus tenderness     Mouth/Throat: Oropharynx is clear and moist and mucous membranes are normal  No posterior oropharyngeal erythema  Eyes: Pupils are equal, round, and reactive to light  Conjunctivae and EOM are normal  Right eye exhibits no chemosis and no discharge  Left eye exhibits no chemosis and no discharge  Right conjunctiva is not injected  Left conjunctiva is not injected  Neck: Normal range of motion  Neck supple  Cardiovascular: Normal rate, regular rhythm and normal heart sounds  Pulmonary/Chest: Effort normal and breath sounds normal  No respiratory distress  She has no wheezes  She has no rales  Abdominal: Soft  Bowel sounds are normal  There is no tenderness  There is no rigidity, no rebound, no guarding, no CVA tenderness, no tenderness at McBurney's point and negative Fernandez's sign  Lymphadenopathy:     She has no cervical adenopathy  Right cervical: No superficial cervical adenopathy present  Left cervical: No superficial cervical adenopathy present  Neurological: She is alert and oriented to person, place, and time  No cranial nerve deficit  Skin: Skin is warm and dry  No rash noted

## 2020-01-11 NOTE — PATIENT INSTRUCTIONS
EKG is normal   Normal sinus rhythm with no ST elevation  Blood glucose is 81  Benign exam   Vital signs are stable  Likely viral syndrome  Discussed flu swab  This was deferred  No fever now without antipyretics  Hydration  Follow up with PCP for evaluation of previous abdominal pain  If chest pain or symptoms are worse go to the ER

## 2020-02-13 ENCOUNTER — DOCUMENTATION (OUTPATIENT)
Dept: OBGYN CLINIC | Facility: MEDICAL CENTER | Age: 45
End: 2020-02-13

## 2020-02-13 ENCOUNTER — TELEPHONE (OUTPATIENT)
Dept: OBGYN CLINIC | Facility: MEDICAL CENTER | Age: 45
End: 2020-02-13

## 2020-03-06 ENCOUNTER — OFFICE VISIT (OUTPATIENT)
Dept: OBGYN CLINIC | Facility: CLINIC | Age: 45
End: 2020-03-06
Payer: COMMERCIAL

## 2020-03-06 ENCOUNTER — LAB (OUTPATIENT)
Dept: LAB | Facility: CLINIC | Age: 45
End: 2020-03-06
Payer: COMMERCIAL

## 2020-03-06 VITALS — SYSTOLIC BLOOD PRESSURE: 112 MMHG | WEIGHT: 162.3 LBS | DIASTOLIC BLOOD PRESSURE: 74 MMHG | BODY MASS INDEX: 31.7 KG/M2

## 2020-03-06 DIAGNOSIS — Z87.440 HISTORY OF UTI: ICD-10-CM

## 2020-03-06 DIAGNOSIS — N92.1 MENORRHAGIA WITH IRREGULAR CYCLE: Primary | ICD-10-CM

## 2020-03-06 DIAGNOSIS — R82.90 FOUL SMELLING URINE: ICD-10-CM

## 2020-03-06 DIAGNOSIS — N92.1 MENORRHAGIA WITH IRREGULAR CYCLE: ICD-10-CM

## 2020-03-06 DIAGNOSIS — R10.2 PELVIC PAIN IN FEMALE: ICD-10-CM

## 2020-03-06 LAB
ERYTHROCYTE [DISTWIDTH] IN BLOOD BY AUTOMATED COUNT: 12 % (ref 11.6–15.1)
FSH SERPL-ACNC: 2.4 MIU/ML
HCT VFR BLD AUTO: 40.2 % (ref 34.8–46.1)
HGB BLD-MCNC: 13 G/DL (ref 11.5–15.4)
LH SERPL-ACNC: 7.3 MIU/ML
MCH RBC QN AUTO: 28.5 PG (ref 26.8–34.3)
MCHC RBC AUTO-ENTMCNC: 32.3 G/DL (ref 31.4–37.4)
MCV RBC AUTO: 88 FL (ref 82–98)
PLATELET # BLD AUTO: 345 THOUSANDS/UL (ref 149–390)
PMV BLD AUTO: 11 FL (ref 8.9–12.7)
PROLACTIN SERPL-MCNC: 13.2 NG/ML
RBC # BLD AUTO: 4.56 MILLION/UL (ref 3.81–5.12)
TSH SERPL DL<=0.05 MIU/L-ACNC: 1.03 UIU/ML (ref 0.36–3.74)
WBC # BLD AUTO: 10.5 THOUSAND/UL (ref 4.31–10.16)

## 2020-03-06 PROCEDURE — 83002 ASSAY OF GONADOTROPIN (LH): CPT

## 2020-03-06 PROCEDURE — 84443 ASSAY THYROID STIM HORMONE: CPT

## 2020-03-06 PROCEDURE — 87077 CULTURE AEROBIC IDENTIFY: CPT | Performed by: OBSTETRICS & GYNECOLOGY

## 2020-03-06 PROCEDURE — 1036F TOBACCO NON-USER: CPT | Performed by: OBSTETRICS & GYNECOLOGY

## 2020-03-06 PROCEDURE — 87086 URINE CULTURE/COLONY COUNT: CPT | Performed by: OBSTETRICS & GYNECOLOGY

## 2020-03-06 PROCEDURE — 84146 ASSAY OF PROLACTIN: CPT

## 2020-03-06 PROCEDURE — 99214 OFFICE O/P EST MOD 30 MIN: CPT | Performed by: OBSTETRICS & GYNECOLOGY

## 2020-03-06 PROCEDURE — 36415 COLL VENOUS BLD VENIPUNCTURE: CPT

## 2020-03-06 PROCEDURE — 83001 ASSAY OF GONADOTROPIN (FSH): CPT

## 2020-03-06 PROCEDURE — 85027 COMPLETE CBC AUTOMATED: CPT

## 2020-03-06 PROCEDURE — 87186 SC STD MICRODIL/AGAR DIL: CPT | Performed by: OBSTETRICS & GYNECOLOGY

## 2020-03-06 RX ORDER — DIPHENOXYLATE HYDROCHLORIDE AND ATROPINE SULFATE 2.5; .025 MG/1; MG/1
1 TABLET ORAL DAILY
COMMUNITY

## 2020-03-06 NOTE — PROGRESS NOTES
Assessment Vicente Pearson was seen today for menorrhagia  Diagnoses and all orders for this visit:    Menorrhagia with irregular cycle  -     CBC; Future  -     Follicle stimulating hormone; Future  -     Luteinizing hormone; Future  -     TSH, 3rd generation with Free T4 reflex; Future  -     Prolactin; Future  -     US pelvis complete non OB; Future    Foul smelling urine  -     Urine culture    History of UTI  -     Urine culture    Pelvic pain in female       Plan  Plan to start workup for irregular heavy menses  Patient given referral for pelvic ultrasound  Patient to get blood work including CBC, [address], LH, TSH and prolactin  We will send her urine for urine cultures due to history of UTI and urinary odor  Patient to follow up once all testing has been resulted to discuss further management  All questions answered  Greater than 22 minute spent with the patient of which greater than 50% of the time was spent with direct counseling  Subjective   Karin Velázquez is a 38 y o  female here for a problem visit  Pt had a recent SAB in September 2019  Pt had a D&C at the clinic  When pt moves, she feels a "webby" feeling and a "pulling" sensation  Patient is complaining of menses q4-5 weeks, used to be "clock work"  Pt report she gets so heavy she has to leave work  Pt reports "pulling pain"  Pt reports some urinary odor  No dysuria and frequency  No hot flashes and night sweats  Since the miscarriage, patient's partner got a vasectomy  There is no problem list on file for this patient  Gynecologic History  Patient's last menstrual period was 02/15/2020 (exact date)  The current method of family planning is vasectomy      Past Medical History:   Diagnosis Date    Abnormal Pap smear of cervix     Anemia     Disease of thyroid gland     thyroid storm with previous pregnancy    Dysmenorrhea     Esophageal reflux     last assessed - [id number]    Herpes zoster     LUQ level T-8 resolved; last assessed - [id number]    History of infection due to human papilloma virus (HPV)     HPV (human papilloma virus) infection     Inguinal hernia     last assessed - [id number]    Insomnia     Mastodynia     Resolved -     Migraine     Ovarian cyst     last assessed - [id number]    Ovarian cyst 2013    Palpitations     last assessed - [id number]    Shortness of breath     Tachycardia     Resolved - [id number]    Takes dietary supplements     Temporomandibular joint disorder     last assessed - [id number]    Urinary tract infection     Varicella      Past Surgical History:   Procedure Laterality Date     SECTION      COLONOSCOPY  2012    Fiberoptic    CRYOTHERAPY      cervix, age 24   [facility] DIAGNOSTIC LAPAROSCOPY      DILATION AND CURETTAGE OF UTERUS      Resolved     GYNECOLOGIC CRYOSURGERY      age 24   [facility] TOOTH EXTRACTION       Family History   Problem Relation Age of Onset    Diabetes Mother     Heart disease Mother         cardiac disorder    Gallbladder disease Mother     Hypertension Mother     Glaucoma Mother     Diabetes Father     Heart disease Father         cardiac disorder    Hypertension Father     Hyperlipidemia Father         Pure Hypercholesterolemia    Other Family         headache syndromes    Breast cancer Cousin     Post-traumatic stress disorder Brother     Hyperlipidemia Brother     Hypertension Brother     Diabetes Maternal Grandmother     Lung cancer Maternal Grandfather     Stroke Paternal Grandmother     Hypertension Paternal Grandmother     Cirrhosis Paternal Grandfather     Ovarian cancer Maternal Aunt      Social History     Socioeconomic History    Marital status:      Spouse name: Not on file    Number of children: Not on file    Years of education: Not on file    Highest education level: Not on file   Occupational History    Not on file   Social Needs    Financial resource strain: Not on file    Food insecurity:     Worry: Not on file     Inability: Not on file    Transportation needs:     Medical: Not on file     Non-medical: Not on file   Tobacco Use    Smoking status: Never Smoker    Smokeless tobacco: Never Used   Substance and Sexual Activity    Alcohol use: Not Currently     Frequency: Never     Comment: social/prior to knowledge of pregnancy    Drug use: Never    Sexual activity: Yes     Partners: Male   Lifestyle    Physical activity:     Days per week: Not on file     Minutes per session: Not on file    Stress: Not on file   Relationships    Social connections:     Talks on phone: Not on file     Gets together: Not on file     Attends Denominational service: Not on file     Active member of club or organization: Not on file     Attends meetings of clubs or organizations: Not on file     Relationship status: Not on file    Intimate partner violence:     Fear of current or ex partner: Not on file     Emotionally abused: Not on file     Physically abused: Not on file     Forced sexual activity: Not on file   Other Topics Concern    Not on file   Social History Narrative    Coffee - denied    History of daily cola consumption (___ Cans/Day) - denied    History of daily tea consumption (___ Cups/Day) - denied    Lack of exercise    [facility] History - [facility]    [employer] Affiliation Zoroastrianism     Allergies   Allergen Reactions    Amoxicillin Hives    Penicillins Hives    Shellfish Allergy Hives    Sulfa Antibiotics Rash       Current Outpatient Medications:     multivitamin (THERAGRAN) TABS, Take 1 tablet by mouth daily, Disp: , Rfl:     phenazopyridine (PYRIDIUM) 200 mg tablet, Take 1 tablet (200 mg total) by mouth 3 (three) times a day with meals (Patient not taking: Reported on 11/11/2019), Disp: 10 tablet, Rfl: 0    Prenatal MV-Min-Fe Fum-FA-DHA (PRENATAL 1 PO), Take 1 tablet by mouth daily, Disp: , Rfl:     Review of Systems  Constitutional :no fever, feels well, no tiredness, no recent weight gain or loss  ENT: no ear ache, no loss of hearing, no nosebleeds or nasal discharge, no sore throat or hoarseness  Cardiovascular: no complaints of slow or fast heart beat, no chest pain, no palpitations, no leg claudication or lower extremity edema  Respiratory: no complaints of shortness of shortness of breath, no BAUTITSA  Breasts:no complaints of breast pain, breast lump, or nipple discharge  Gastrointestinal: no complaints of abdominal pain, constipation, nausea, vomiting, or diarrhea or bloody stools  Genitourinary : no complaints of dysuria, incontinence, +pelvic pain, no dysmenorrhea, vaginal discharge, +abnormal vaginal bleeding and as noted in HPI  Musculoskeletal: no complaints of arthralgia, no myalgia, no joint swelling or stiffness, no limb pain or swelling  Integumentary: no complaints of skin rash or lesion, itching or dry skin  Neurological: no complaints of headache, no confusion, no numbness or tingling, no dizziness or fainting     Objective     /74   Wt 73 6 kg (162 lb 4 8 oz)   LMP 02/15/2020 (Exact Date)   BMI 31 70 kg/m²     General Appears stated age, cooperative, alert normal mood and affect   Psychiatric oriented to person, place and time    Mood and affect normal

## 2020-03-08 LAB — BACTERIA UR CULT: ABNORMAL

## 2020-03-09 NOTE — RESULT ENCOUNTER NOTE
Please notify patient of normal results of bloodwork  WBC was very minimally elevated  Omar Gonzalez

## 2020-03-10 DIAGNOSIS — N30.00 ACUTE CYSTITIS WITHOUT HEMATURIA: Primary | ICD-10-CM

## 2020-03-10 RX ORDER — NITROFURANTOIN 25; 75 MG/1; MG/1
100 CAPSULE ORAL 2 TIMES DAILY
Qty: 10 CAPSULE | Refills: 0 | Status: SHIPPED | OUTPATIENT
Start: 2020-03-10 | End: 2020-03-15

## 2020-03-10 NOTE — TELEPHONE ENCOUNTER
----- Message from Renetta Loo MD sent at 3/9/2020  9:12 AM EDT -----  Please notify pt of abnormal result: +urine culture  Pls treat with macrobid 100 mg bid x 5 days

## 2020-03-10 NOTE — TELEPHONE ENCOUNTER
Notified of need for antibiotic d/t UTI    Is currently on vacation in [place], requests that script be sent there

## 2020-04-21 ENCOUNTER — TELEMEDICINE (OUTPATIENT)
Dept: FAMILY MEDICINE CLINIC | Facility: CLINIC | Age: 45
End: 2020-04-21
Payer: COMMERCIAL

## 2020-04-21 DIAGNOSIS — K21.00 REFLUX ESOPHAGITIS: Primary | ICD-10-CM

## 2020-04-21 PROCEDURE — 1036F TOBACCO NON-USER: CPT | Performed by: FAMILY MEDICINE

## 2020-04-21 PROCEDURE — 99214 OFFICE O/P EST MOD 30 MIN: CPT | Performed by: FAMILY MEDICINE

## 2020-04-21 RX ORDER — OMEPRAZOLE 40 MG/1
40 CAPSULE, DELAYED RELEASE ORAL DAILY
Qty: 30 CAPSULE | Refills: 1 | Status: SHIPPED | OUTPATIENT
Start: 2020-04-21 | End: 2020-08-21 | Stop reason: ALTCHOICE

## 2020-06-08 ENCOUNTER — OFFICE VISIT (OUTPATIENT)
Dept: FAMILY MEDICINE CLINIC | Facility: CLINIC | Age: 45
End: 2020-06-08
Payer: COMMERCIAL

## 2020-06-08 DIAGNOSIS — K64.9 HEMORRHOIDS, UNSPECIFIED HEMORRHOID TYPE: Primary | ICD-10-CM

## 2020-06-08 PROCEDURE — 1036F TOBACCO NON-USER: CPT | Performed by: FAMILY MEDICINE

## 2020-06-08 PROCEDURE — 99213 OFFICE O/P EST LOW 20 MIN: CPT | Performed by: FAMILY MEDICINE

## 2020-06-12 ENCOUNTER — OFFICE VISIT (OUTPATIENT)
Dept: OBGYN CLINIC | Facility: CLINIC | Age: 45
End: 2020-06-12
Payer: COMMERCIAL

## 2020-06-12 VITALS — HEIGHT: 60 IN | DIASTOLIC BLOOD PRESSURE: 72 MMHG | SYSTOLIC BLOOD PRESSURE: 116 MMHG | BODY MASS INDEX: 31.7 KG/M2

## 2020-06-12 DIAGNOSIS — N30.00 ACUTE CYSTITIS WITHOUT HEMATURIA: Primary | ICD-10-CM

## 2020-06-12 DIAGNOSIS — N89.8 VAGINAL ODOR: ICD-10-CM

## 2020-06-12 LAB
BACTERIA UR QL AUTO: ABNORMAL /HPF
BILIRUB UR QL STRIP: NEGATIVE
CLARITY UR: CLEAR
COLOR UR: YELLOW
GLUCOSE UR STRIP-MCNC: NEGATIVE MG/DL
HGB UR QL STRIP.AUTO: NEGATIVE
HYALINE CASTS #/AREA URNS LPF: ABNORMAL /LPF
KETONES UR STRIP-MCNC: NEGATIVE MG/DL
LEUKOCYTE ESTERASE UR QL STRIP: ABNORMAL
NITRITE UR QL STRIP: NEGATIVE
NON-SQ EPI CELLS URNS QL MICRO: ABNORMAL /HPF
PH UR STRIP.AUTO: 6.5 [PH]
PROT UR STRIP-MCNC: NEGATIVE MG/DL
RBC #/AREA URNS AUTO: ABNORMAL /HPF
SP GR UR STRIP.AUTO: 1.03 (ref 1–1.03)
UROBILINOGEN UR QL STRIP.AUTO: 0.2 E.U./DL
WBC #/AREA URNS AUTO: ABNORMAL /HPF

## 2020-06-12 PROCEDURE — 87086 URINE CULTURE/COLONY COUNT: CPT | Performed by: OBSTETRICS & GYNECOLOGY

## 2020-06-12 PROCEDURE — 87077 CULTURE AEROBIC IDENTIFY: CPT | Performed by: OBSTETRICS & GYNECOLOGY

## 2020-06-12 PROCEDURE — 1036F TOBACCO NON-USER: CPT | Performed by: OBSTETRICS & GYNECOLOGY

## 2020-06-12 PROCEDURE — 87186 SC STD MICRODIL/AGAR DIL: CPT | Performed by: OBSTETRICS & GYNECOLOGY

## 2020-06-12 PROCEDURE — 99213 OFFICE O/P EST LOW 20 MIN: CPT | Performed by: OBSTETRICS & GYNECOLOGY

## 2020-06-12 PROCEDURE — 81001 URINALYSIS AUTO W/SCOPE: CPT | Performed by: OBSTETRICS & GYNECOLOGY

## 2020-06-14 LAB — BACTERIA UR CULT: ABNORMAL

## 2020-06-15 DIAGNOSIS — N30.00 ACUTE CYSTITIS WITHOUT HEMATURIA: Primary | ICD-10-CM

## 2020-06-15 RX ORDER — NITROFURANTOIN 25; 75 MG/1; MG/1
100 CAPSULE ORAL 2 TIMES DAILY
Qty: 10 CAPSULE | Refills: 0 | Status: SHIPPED | OUTPATIENT
Start: 2020-06-15 | End: 2020-06-20

## 2020-06-17 ENCOUNTER — TELEPHONE (OUTPATIENT)
Dept: OBGYN CLINIC | Facility: MEDICAL CENTER | Age: 45
End: 2020-06-17

## 2020-06-17 ENCOUNTER — TELEPHONE (OUTPATIENT)
Dept: LAB | Facility: HOSPITAL | Age: 45
End: 2020-06-17

## 2020-06-19 ENCOUNTER — OFFICE VISIT (OUTPATIENT)
Dept: OBGYN CLINIC | Facility: CLINIC | Age: 45
End: 2020-06-19

## 2020-06-19 VITALS — SYSTOLIC BLOOD PRESSURE: 118 MMHG | DIASTOLIC BLOOD PRESSURE: 70 MMHG

## 2020-06-19 DIAGNOSIS — N89.8 VAGINAL ODOR: Primary | ICD-10-CM

## 2020-06-19 PROCEDURE — 99024 POSTOP FOLLOW-UP VISIT: CPT | Performed by: OBSTETRICS & GYNECOLOGY

## 2020-06-19 PROCEDURE — 87480 CANDIDA DNA DIR PROBE: CPT | Performed by: OBSTETRICS & GYNECOLOGY

## 2020-06-19 PROCEDURE — 87660 TRICHOMONAS VAGIN DIR PROBE: CPT | Performed by: OBSTETRICS & GYNECOLOGY

## 2020-06-19 PROCEDURE — 87510 GARDNER VAG DNA DIR PROBE: CPT | Performed by: OBSTETRICS & GYNECOLOGY

## 2020-06-21 LAB
CANDIDA RRNA VAG QL PROBE: POSITIVE
G VAGINALIS RRNA GENITAL QL PROBE: NEGATIVE
T VAGINALIS RRNA GENITAL QL PROBE: NEGATIVE

## 2020-06-22 DIAGNOSIS — B37.9 YEAST INFECTION: Primary | ICD-10-CM

## 2020-06-22 RX ORDER — FLUCONAZOLE 150 MG/1
150 TABLET ORAL ONCE
Qty: 1 TABLET | Refills: 0 | Status: SHIPPED | OUTPATIENT
Start: 2020-06-22 | End: 2020-06-22

## 2020-07-29 ENCOUNTER — HOSPITAL ENCOUNTER (OUTPATIENT)
Dept: ULTRASOUND IMAGING | Facility: HOSPITAL | Age: 45
Discharge: HOME | End: 2020-07-29
Payer: COMMERCIAL

## 2020-07-29 DIAGNOSIS — N92.1 MENORRHAGIA WITH IRREGULAR CYCLE: ICD-10-CM

## 2020-07-29 PROCEDURE — 76856 US EXAM PELVIC COMPLETE: CPT

## 2020-07-29 PROCEDURE — 76830 TRANSVAGINAL US NON-OB: CPT

## 2020-07-31 NOTE — RESULT ENCOUNTER NOTE
Please notify pt of abnormal result: +3 5 hemorrhagic follicle otherwise pelvic u/s WNL  Rec f/u if pt still with menstrual issues to discuss further management

## 2020-08-20 ENCOUNTER — ANESTHESIA EVENT (OUTPATIENT)
Dept: GASTROENTEROLOGY | Facility: HOSPITAL | Age: 45
End: 2020-08-20

## 2020-08-21 ENCOUNTER — HOSPITAL ENCOUNTER (OUTPATIENT)
Dept: GASTROENTEROLOGY | Facility: HOSPITAL | Age: 45
Setting detail: OUTPATIENT SURGERY
Discharge: HOME | End: 2020-08-21
Attending: COLON & RECTAL SURGERY
Payer: COMMERCIAL

## 2020-08-21 ENCOUNTER — ANESTHESIA (OUTPATIENT)
Dept: GASTROENTEROLOGY | Facility: HOSPITAL | Age: 45
End: 2020-08-21

## 2020-08-21 VITALS
BODY MASS INDEX: 29.06 KG/M2 | HEART RATE: 80 BPM | RESPIRATION RATE: 16 BRPM | OXYGEN SATURATION: 100 % | DIASTOLIC BLOOD PRESSURE: 60 MMHG | TEMPERATURE: 98.6 F | HEIGHT: 60 IN | WEIGHT: 148 LBS | SYSTOLIC BLOOD PRESSURE: 134 MMHG

## 2020-08-21 DIAGNOSIS — K21.9 GASTROESOPHAGEAL REFLUX DISEASE WITHOUT ESOPHAGITIS: ICD-10-CM

## 2020-08-21 DIAGNOSIS — K64.9 HEMORRHOIDS, UNSPECIFIED HEMORRHOID TYPE: ICD-10-CM

## 2020-08-21 PROCEDURE — 45380 COLONOSCOPY AND BIOPSY: CPT | Performed by: COLON & RECTAL SURGERY

## 2020-08-21 PROCEDURE — 88305 TISSUE EXAM BY PATHOLOGIST: CPT | Performed by: PATHOLOGY

## 2020-08-21 PROCEDURE — 43239 EGD BIOPSY SINGLE/MULTIPLE: CPT | Performed by: INTERNAL MEDICINE

## 2020-08-21 RX ORDER — PROPOFOL 10 MG/ML
INJECTION, EMULSION INTRAVENOUS AS NEEDED
Status: DISCONTINUED | OUTPATIENT
Start: 2020-08-21 | End: 2020-08-21

## 2020-08-21 RX ORDER — LIDOCAINE HYDROCHLORIDE 10 MG/ML
INJECTION, SOLUTION EPIDURAL; INFILTRATION; INTRACAUDAL; PERINEURAL AS NEEDED
Status: DISCONTINUED | OUTPATIENT
Start: 2020-08-21 | End: 2020-08-21

## 2020-08-21 RX ORDER — PROPOFOL 10 MG/ML
INJECTION, EMULSION INTRAVENOUS CONTINUOUS PRN
Status: DISCONTINUED | OUTPATIENT
Start: 2020-08-21 | End: 2020-08-21

## 2020-08-21 RX ORDER — SODIUM CHLORIDE 9 MG/ML
INJECTION, SOLUTION INTRAVENOUS CONTINUOUS PRN
Status: DISCONTINUED | OUTPATIENT
Start: 2020-08-21 | End: 2020-08-21

## 2020-08-21 RX ORDER — THIAMINE MONONITRATE (VIT B1) 100 MG
100 TABLET ORAL DAILY
COMMUNITY
End: 2020-11-09

## 2020-08-21 RX ADMIN — PROPOFOL 100 MG: 10 INJECTION, EMULSION INTRAVENOUS at 09:04

## 2020-08-21 RX ADMIN — SODIUM CHLORIDE: 0.9 INJECTION, SOLUTION INTRAVENOUS at 08:47

## 2020-08-21 RX ADMIN — LIDOCAINE HYDROCHLORIDE 50 MG: 10 INJECTION, SOLUTION EPIDURAL; INFILTRATION; INTRACAUDAL; PERINEURAL at 09:02

## 2020-08-21 RX ADMIN — PROPOFOL 75 MCG/KG/MIN: 10 INJECTION, EMULSION INTRAVENOUS at 09:09

## 2020-08-21 NOTE — H&P
History and Physical   Colon and Rectal Surgery   Page Etienne 41 y o  female MRN: [id number]  Unit/Bed#:  Encounter: [id number]  08/21/20   8:45 AM      No chief complaint on file          Assessment:  Diarrhea,   Iron deficiency, has required iron infusion in past  Gluten sensitivity, has not been celiac tested as does not like to go off diet given symptoms  Minimal hemorrhoidal findings  Rectal bleeding     Discussed dietary/lifestyle changes, diagnostic endoscopy for followup  Discussed in a face-to-face, personal, informed consent process, the benefits, alternatives, risks including not limited to bleeding, missed lesion, perforation requiring emergent surgery discussed/understood      Plan:  -EGD/Colonoscopy biopsies        HPI  Page Etienne is a 41 y o  female referred for evaluation today by Dr Yennifer Canada for hemorrhoidal symptoms  She has episodes of bright red blood on the toilet tissue  She has anal itching and burning, no abdominal pain  She uses suppositories, wipes, hemorrhoidal OTC creams and ice packs for her symptoms  Prior to menstruating she feels her rectal symptoms become worse  She struggles with loose/watery stools over her lifetime       Last colonoscopy 11/2012 with Dr Thea Leroy, no colitis by biopsies in letter reviewed, pathology not found    She denies family history of colorectal cancer        Historical Information   Past Medical History:   Diagnosis Date    Abnormal Pap smear of cervix     Anemia     Disease of thyroid gland     thyroid storm with previous pregnancy    Dysmenorrhea     Esophageal reflux     last assessed - [id number]    Herpes zoster     LUQ level T-8 resolved; last assessed - [id number]    History of infection due to human papilloma virus (HPV)     HPV (human papilloma virus) infection     Inguinal hernia     last assessed - [id number]    Insomnia     Mastodynia     Resolved - 2012    Migraine     Ovarian cyst     last assessed - [id number]    Ovarian cyst 2013    Palpitations     last assessed - 56Iib1778    Tachycardia     Resolved - 77DIY2642    Takes dietary supplements     Temporomandibular joint disorder     last assessed - 61Ooj7546    Urinary tract infection     Varicella      Past Surgical History:   Procedure Laterality Date     SECTION      COLONOSCOPY  2012    Fiberoptic    CRYOTHERAPY      cervix, age 24   Twyla Clunes DIAGNOSTIC LAPAROSCOPY      DILATION AND CURETTAGE OF UTERUS      Resolved     GYNECOLOGIC CRYOSURGERY      age 24   Twyla Clunes TOOTH EXTRACTION         Meds/Allergies     (Not in a hospital admission)        Current Outpatient Medications:     thiamine (VITAMIN B1) 100 mg tablet, Take 100 mg by mouth daily, Disp: , Rfl:     multivitamin (THERAGRAN) TABS, Take 1 tablet by mouth daily, Disp: , Rfl:     Allergies   Allergen Reactions    Amoxicillin Hives    Penicillins Hives    Shellfish Allergy Hives    Sulfa Antibiotics Rash         Social History   Social History     Substance and Sexual Activity   Alcohol Use Not Currently    Frequency: Never    Comment: social/prior to knowledge of pregnancy     Social History     Substance and Sexual Activity   Drug Use Never     Social History     Tobacco Use   Smoking Status Never Smoker   Smokeless Tobacco Never Used         Family History:   Family History   Problem Relation Age of Onset    Diabetes Mother     Heart disease Mother         cardiac disorder    Gallbladder disease Mother     Hypertension Mother     Glaucoma Mother     Diabetes Father     Heart disease Father         cardiac disorder    Hypertension Father     Hyperlipidemia Father         Pure Hypercholesterolemia    Other Family         headache syndromes    Breast cancer Cousin     Post-traumatic stress disorder Brother     Hyperlipidemia Brother     Hypertension Brother     Diabetes Maternal Grandmother     Lung cancer Maternal Grandfather     Stroke Paternal Grandmother     Hypertension Paternal Grandmother     Cirrhosis Paternal Grandfather     Ovarian cancer Maternal Aunt     Colon cancer Neg Hx          Objective     Current Vitals:   Blood Pressure: 132/88 (08/21/20 0735)  Pulse: 83 (08/21/20 0735)  Temperature: 98 6 °F (37 °C) (08/21/20 0735)  Temp Source: Tympanic (08/21/20 0735)  Respirations: 18 (08/21/20 0735)  Height: 5' (152 4 cm) (08/21/20 0735)  Weight - Scale: 67 1 kg (148 lb) (08/21/20 0735)  SpO2: 99 % (08/21/20 0735)  No intake or output data in the 24 hours ending 08/21/20 0845    Physical Exam:  General:no distress  Eyes:perrla/eomi  ENT:moist mucus membranes  Neck:supple  Pulm:no increased work of breathing  CV:sinus  Abdomen:soft,nontender

## 2020-08-21 NOTE — ANESTHESIA PREPROCEDURE EVALUATION
Procedure:  COLONOSCOPY  EGD    Relevant Problems   No relevant active problems        Physical Exam    Airway      TM Distance: >3 FB  Neck ROM: full     Dental       Cardiovascular  Cardiovascular exam normal    Pulmonary  Pulmonary exam normal     Other Findings        Anesthesia Plan  ASA Score- 2     Anesthesia Type- IV sedation with anesthesia with ASA Monitors  Additional Monitors:   Airway Plan:           Plan Factors-    Induction- intravenous  Postoperative Plan-     Informed Consent- Anesthetic plan and risks discussed with patient  I personally reviewed this patient with the CRNA  Discussed and agreed on the Anesthesia Plan with the CRNA  Andres Sharp

## 2020-08-21 NOTE — ANESTHESIA POSTPROCEDURE EVALUATION
Post-Op Assessment Note    CV Status:  Stable  Pain Score: 0    Pain management: adequate     Mental Status:  Sleepy   Hydration Status:  Stable      Post Op Vitals Reviewed: Yes      Staff: CRNA   Comments: hr 98, O2 sat ((% bp 118/60, rr 14        No complications documented      BP      Temp      Pulse     Resp      SpO2

## 2020-09-25 ENCOUNTER — OFFICE VISIT (OUTPATIENT)
Dept: OBGYN CLINIC | Facility: CLINIC | Age: 45
End: 2020-09-25
Payer: COMMERCIAL

## 2020-09-25 VITALS
BODY MASS INDEX: 29.37 KG/M2 | TEMPERATURE: 98.4 F | SYSTOLIC BLOOD PRESSURE: 108 MMHG | WEIGHT: 149.6 LBS | HEIGHT: 60 IN | DIASTOLIC BLOOD PRESSURE: 70 MMHG

## 2020-09-25 DIAGNOSIS — N76.0 BV (BACTERIAL VAGINOSIS): Primary | ICD-10-CM

## 2020-09-25 DIAGNOSIS — B96.89 BV (BACTERIAL VAGINOSIS): Primary | ICD-10-CM

## 2020-09-25 DIAGNOSIS — N89.8 VAGINAL DISCHARGE: ICD-10-CM

## 2020-09-25 LAB
BV WHIFF TEST VAG QL: POSITIVE
CLUE CELLS SPEC QL WET PREP: ABNORMAL
PH SMN: 5 [PH]
SL AMB POCT WET MOUNT: ABNORMAL
T VAGINALIS VAG QL WET PREP: NEGATIVE
YEAST VAG QL WET PREP: ABNORMAL

## 2020-09-25 PROCEDURE — 87660 TRICHOMONAS VAGIN DIR PROBE: CPT | Performed by: ADVANCED PRACTICE MIDWIFE

## 2020-09-25 PROCEDURE — 87070 CULTURE OTHR SPECIMN AEROBIC: CPT | Performed by: ADVANCED PRACTICE MIDWIFE

## 2020-09-25 PROCEDURE — 87210 SMEAR WET MOUNT SALINE/INK: CPT | Performed by: ADVANCED PRACTICE MIDWIFE

## 2020-09-25 PROCEDURE — 1036F TOBACCO NON-USER: CPT | Performed by: ADVANCED PRACTICE MIDWIFE

## 2020-09-25 PROCEDURE — 87480 CANDIDA DNA DIR PROBE: CPT | Performed by: ADVANCED PRACTICE MIDWIFE

## 2020-09-25 PROCEDURE — 87510 GARDNER VAG DNA DIR PROBE: CPT | Performed by: ADVANCED PRACTICE MIDWIFE

## 2020-09-25 PROCEDURE — 99213 OFFICE O/P EST LOW 20 MIN: CPT | Performed by: ADVANCED PRACTICE MIDWIFE

## 2020-09-25 RX ORDER — METRONIDAZOLE 500 MG/1
500 TABLET ORAL EVERY 12 HOURS SCHEDULED
Qty: 14 TABLET | Refills: 0 | Status: SHIPPED | OUTPATIENT
Start: 2020-09-25 | End: 2020-10-02

## 2020-09-25 RX ORDER — FLUCONAZOLE 150 MG/1
TABLET ORAL
Qty: 1 TABLET | Refills: 0 | Status: SHIPPED | OUTPATIENT
Start: 2020-09-25 | End: 2020-10-02

## 2020-09-25 NOTE — PROGRESS NOTES
A/P:  41 y o  yo female with:  Diagnoses and all orders for this visit:    BV (bacterial vaginosis)  -     metroNIDAZOLE (FLAGYL) 500 mg tablet; Take 1 tablet (500 mg total) by mouth every 12 (twelve) hours for 7 days  -     fluconazole (DIFLUCAN) 150 mg tablet; Take 1 tablet by mouth repeat and take 2nd tablet by mouth 3 days after the first dose  -     POCT wet mount    Vaginal discharge  -     metroNIDAZOLE (FLAGYL) 500 mg tablet; Take 1 tablet (500 mg total) by mouth every 12 (twelve) hours for 7 days  -     fluconazole (DIFLUCAN) 150 mg tablet; Take 1 tablet by mouth repeat and take 2nd tablet by mouth 3 days after the first dose  -     VAGINOSIS DNA PROBE (AFFIRM)  -     Genital Comprehensive Culture  -     POCT wet mount        1  Wet prep was obtained  Cultures for gonorrhea and chlamydia were not collected  Affirm was obtained  2   Will contact pt with results  3  Patient was treated based on history of recurrent discharge with fishy odor and burning  Wet mount with positive whiff and few clue cells  4   Will follow up Affirm test and genital culture when available  5  Discussed the use of Rephresh vaginal gel and coconut oil  6   Recommend Femdophilus for 1 month  CHIEF COMPLAINT:  Vaginal discharge and burning  for 1-2 months  Patient treated as yeast and has not had intercourse for 1 month due to symptoms  HISTORY OF PRESENT ILLNESS:  Omega Vo is a 41 y o  yo Katya Royal female who presents for vaginal discharge  She describes the discharge as white, fishy odor and burning  Her symptoms started approximately 1- 2 months ago  No improvement of symptoms noted  No urinary frequency or pain with urination  Alleviating factors: menses   Aggravating factors: intercourse    ROS:   She denies hematuria, dysuria, constipation, diarrhea, fever, chills, nausea or emesis  Denies urinary frequency  Neg skin lesions, rashes, or erythema      Past Medical History:   Diagnosis Date    Abnormal Pap smear of cervix     Anemia     Disease of thyroid gland     thyroid storm with previous pregnancy    Dysmenorrhea     Esophageal reflux     last assessed - [id number]    Herpes zoster     LUQ level T-8 resolved; last assessed - [id number]    History of infection due to human papilloma virus (HPV)     HPV (human papilloma virus) infection     Inguinal hernia     last assessed - [id number]    Insomnia     Mastodynia     Resolved - 2012    Migraine     Ovarian cyst     last assessed - 36Ydn5279    Ovarian cyst 2013    Palpitations     last assessed - 71LQQ7383    Tachycardia     Resolved - 55RBV4459    Takes dietary supplements     Temporomandibular joint disorder     last assessed - 15Rei0791    Urinary tract infection     Varicella        Past Medical History:   Diagnosis Date    Abnormal Pap smear of cervix     Anemia     Disease of thyroid gland     thyroid storm with previous pregnancy    Dysmenorrhea     Esophageal reflux     last assessed - 64Wuh6067    Herpes zoster     LUQ level T-8 resolved; last assessed - 36CMD0748    History of infection due to human papilloma virus (HPV)     HPV (human papilloma virus) infection     Inguinal hernia     last assessed - 83XVE1151    Insomnia     Mastodynia     Resolved - 2012    Migraine     Ovarian cyst     last assessed - 89Nws2860    Ovarian cyst 2013    Palpitations     last assessed - 46BFC6498    Tachycardia     Resolved - 17LOZ5414    Takes dietary supplements     Temporomandibular joint disorder     last assessed - 10Kzk6494    Urinary tract infection     Varicella        Social History     Socioeconomic History    Marital status:      Spouse name: Not on file    Number of children: Not on file    Years of education: Not on file    Highest education level: Not on file   Occupational History    Not on file   Social Needs    Financial resource strain: Not on file    Food insecurity     Worry: Not on file     Inability: Not on file    Transportation needs     Medical: Not on file     Non-medical: Not on file   Tobacco Use    Smoking status: Never Smoker    Smokeless tobacco: Never Used   Substance and Sexual Activity    Alcohol use: Not Currently     Frequency: Never     Comment: social/prior to knowledge of pregnancy    Drug use: Never    Sexual activity: Yes     Partners: Male     Birth control/protection: Male Sterilization     Comment: fiance schedule for a vasectomy   Lifestyle    Physical activity     Days per week: Not on file     Minutes per session: Not on file    Stress: Not on file   Relationships    Social connections     Talks on phone: Not on file     Gets together: Not on file     Attends Buddhism service: Not on file     Active member of club or organization: Not on file     Attends meetings of clubs or organizations: Not on file     Relationship status: Not on file    Intimate partner violence     Fear of current or ex partner: Not on file     Emotionally abused: Not on file     Physically abused: Not on file     Forced sexual activity: Not on file   Other Topics Concern    Not on file   Social History Narrative    Coffee - denied    History of daily cola consumption (___ Cans/Day) - denied    History of daily tea consumption (___ Cups/Day) - denied    Lack of exercise    Marital History - Currently  - per [facility]    Rastafari Affiliation Alevism       /70 (BP Location: Right arm, Patient Position: Sitting, Cuff Size: Standard)   Temp 98 4 °F (36 9 °C) (Temporal)   Ht 5' (1 524 m)   Wt 67 9 kg (149 lb 9 6 oz)   LMP 09/02/2020 (Approximate)   BMI 29 22 kg/m²     GEN: The patient was alert and oriented x3, pleasant well-appearing female in no acute distress  Pelvic: Normal appearing external female genitalia, abnormal  vaginal epithelium, normalappearing cervix  positive discharge noted        Wet Prep: positive for small amount of clue cells, pH 5 0, positive whiff test and no visible hyphae

## 2020-09-25 NOTE — PATIENT INSTRUCTIONS
Vulvovaginal Candidiasis   WHAT YOU NEED TO KNOW:   Vulvovaginal candidiasis, or yeast infection, is a common vaginal infection  Vulvovaginal candidiasis is caused by a fungus, or yeast-like germ  Fungi are normally found in your vagina  When there are too many fungi, it can cause an infection  DISCHARGE INSTRUCTIONS:   Return to the emergency department if:   · You have fever and chills  · You are bleeding from your vagina and it is not your monthly period  · You develop abdominal or pelvic pain  Contact your healthcare provider if:   · Your signs and symptoms get worse, even after treatment  · You have questions or concerns about your condition or care  Medicines:   · Medicines  help treat the fungal infection and decrease inflammation  The medicine may be a pill, topical cream, or vaginal suppository  · Take your medicine as directed  Contact your healthcare provider if you think your medicine is not helping or if you have side effects  Tell him of her if you are allergic to any medicine  Keep a list of the medicines, vitamins, and herbs you take  Include the amounts, and when and why you take them  Bring the list or the pill bottles to follow-up visits  Carry your medicine list with you in case of an emergency  Manage your symptoms:   · Wear cotton underwear  · Keep the vaginal area clean and dry  · Do not have sex until your symptoms are gone  · Do not douche  · Do not use feminine hygiene sprays, powders, or bubble bath  Prevent another infection:   · Take showers instead of baths  · Eat yogurt  · Limit the amount of alcohol you drink'    · Control your blood sugar if you are diabetic  · Limit your time in hot tubs  Follow up with your healthcare provider as directed:  Write down your questions so you remember to ask them during your visits     © 2017 [employer] Information is for End User's use only and may not be sold, redistributed or otherwise used for commercial purposes  All illustrations and images included in CareNotes® are the copyrighted property of A D A M [employer]  or Kenrick Dudley  The above information is an  only  It is not intended as medical advice for individual conditions or treatments  Talk to your doctor, nurse or pharmacist before following any medical regimen to see if it is safe and effective for you

## 2020-09-27 LAB
CANDIDA RRNA VAG QL PROBE: NEGATIVE
G VAGINALIS RRNA GENITAL QL PROBE: NEGATIVE
T VAGINALIS RRNA GENITAL QL PROBE: NEGATIVE

## 2020-09-28 LAB
BACTERIA GENITAL AEROBE CULT: ABNORMAL
GRAM STN SPEC: ABNORMAL
GRAM STN SPEC: ABNORMAL

## 2020-09-29 ENCOUNTER — TELEPHONE (OUTPATIENT)
Dept: OBGYN CLINIC | Facility: MEDICAL CENTER | Age: 45
End: 2020-09-29

## 2020-09-29 NOTE — TELEPHONE ENCOUNTER
Spoke with Sam Ham  She states that she is starting to feel better  I will contact lab to see if they can provide a susceptibility result

## 2020-09-29 NOTE — TELEPHONE ENCOUNTER
Left message regarding need to follow-up cultures done on Friday  At this time I would like to discuss with her repeating the genital culture  2 weeks after finishing the Flagyl or adding Cipro to current treatment and then repeat culture    Gave cell phone number to her to call at her convenience

## 2020-09-30 ENCOUNTER — TELEPHONE (OUTPATIENT)
Dept: OBGYN CLINIC | Facility: CLINIC | Age: 45
End: 2020-09-30

## 2020-09-30 NOTE — TELEPHONE ENCOUNTER
Recommend that she continue current treatment and we repeat the genital culture in 1 - 2 months if not feeling 100% better

## 2020-11-09 ENCOUNTER — OFFICE VISIT (OUTPATIENT)
Dept: FAMILY MEDICINE CLINIC | Facility: CLINIC | Age: 45
End: 2020-11-09
Payer: COMMERCIAL

## 2020-11-09 VITALS
OXYGEN SATURATION: 99 % | DIASTOLIC BLOOD PRESSURE: 72 MMHG | HEART RATE: 88 BPM | SYSTOLIC BLOOD PRESSURE: 118 MMHG | HEIGHT: 60 IN | RESPIRATION RATE: 20 BRPM | BODY MASS INDEX: 29.06 KG/M2 | TEMPERATURE: 97.7 F | WEIGHT: 148 LBS

## 2020-11-09 DIAGNOSIS — G47.00 INSOMNIA, UNSPECIFIED TYPE: ICD-10-CM

## 2020-11-09 DIAGNOSIS — R45.89 DEPRESSED MOOD: ICD-10-CM

## 2020-11-09 DIAGNOSIS — E61.1 IRON DEFICIENCY: ICD-10-CM

## 2020-11-09 DIAGNOSIS — E55.9 VITAMIN D DEFICIENCY: ICD-10-CM

## 2020-11-09 DIAGNOSIS — Z12.39 ENCOUNTER FOR SCREENING FOR MALIGNANT NEOPLASM OF BREAST, UNSPECIFIED SCREENING MODALITY: ICD-10-CM

## 2020-11-09 DIAGNOSIS — Z00.00 ANNUAL PHYSICAL EXAM: Primary | ICD-10-CM

## 2020-11-09 DIAGNOSIS — R53.83 FATIGUE, UNSPECIFIED TYPE: ICD-10-CM

## 2020-11-09 PROBLEM — Z12.31 ENCOUNTER FOR SCREENING MAMMOGRAM FOR MALIGNANT NEOPLASM OF BREAST: Status: RESOLVED | Noted: 2020-11-09 | Resolved: 2020-11-09

## 2020-11-09 PROBLEM — Z12.31 ENCOUNTER FOR SCREENING MAMMOGRAM FOR MALIGNANT NEOPLASM OF BREAST: Status: ACTIVE | Noted: 2020-11-09

## 2020-11-09 PROCEDURE — 99214 OFFICE O/P EST MOD 30 MIN: CPT | Performed by: INTERNAL MEDICINE

## 2020-11-09 PROCEDURE — 99396 PREV VISIT EST AGE 40-64: CPT | Performed by: INTERNAL MEDICINE

## 2020-11-09 RX ORDER — TRAZODONE HYDROCHLORIDE 50 MG/1
25 TABLET ORAL
Qty: 15 TABLET | Refills: 1 | Status: SHIPPED | OUTPATIENT
Start: 2020-11-09 | End: 2021-05-17

## 2020-11-09 RX ORDER — DULOXETIN HYDROCHLORIDE 20 MG/1
20 CAPSULE, DELAYED RELEASE ORAL DAILY
Qty: 30 CAPSULE | Refills: 1 | Status: SHIPPED | OUTPATIENT
Start: 2020-11-09 | End: 2021-05-17

## 2020-11-11 ENCOUNTER — LAB (OUTPATIENT)
Dept: LAB | Facility: CLINIC | Age: 45
End: 2020-11-11
Payer: COMMERCIAL

## 2020-11-11 DIAGNOSIS — R53.83 FATIGUE, UNSPECIFIED TYPE: ICD-10-CM

## 2020-11-11 DIAGNOSIS — E61.1 IRON DEFICIENCY: ICD-10-CM

## 2020-11-11 DIAGNOSIS — Z00.00 ANNUAL PHYSICAL EXAM: ICD-10-CM

## 2020-11-11 DIAGNOSIS — E55.9 VITAMIN D DEFICIENCY: ICD-10-CM

## 2020-11-11 LAB
25(OH)D3 SERPL-MCNC: 22.6 NG/ML (ref 30–100)
ALBUMIN SERPL BCP-MCNC: 3.4 G/DL (ref 3.5–5)
ALP SERPL-CCNC: 62 U/L (ref 46–116)
ALT SERPL W P-5'-P-CCNC: 27 U/L (ref 12–78)
ANION GAP SERPL CALCULATED.3IONS-SCNC: 4 MMOL/L (ref 4–13)
AST SERPL W P-5'-P-CCNC: 13 U/L (ref 5–45)
BASOPHILS # BLD AUTO: 0.05 THOUSANDS/ΜL (ref 0–0.1)
BASOPHILS NFR BLD AUTO: 1 % (ref 0–1)
BILIRUB SERPL-MCNC: 0.4 MG/DL (ref 0.2–1)
BUN SERPL-MCNC: 12 MG/DL (ref 5–25)
CALCIUM ALBUM COR SERPL-MCNC: 9.1 MG/DL (ref 8.3–10.1)
CALCIUM SERPL-MCNC: 8.6 MG/DL (ref 8.3–10.1)
CHLORIDE SERPL-SCNC: 113 MMOL/L (ref 100–108)
CO2 SERPL-SCNC: 24 MMOL/L (ref 21–32)
CREAT SERPL-MCNC: 0.74 MG/DL (ref 0.6–1.3)
EOSINOPHIL # BLD AUTO: 0.16 THOUSAND/ΜL (ref 0–0.61)
EOSINOPHIL NFR BLD AUTO: 2 % (ref 0–6)
ERYTHROCYTE [DISTWIDTH] IN BLOOD BY AUTOMATED COUNT: 12.1 % (ref 11.6–15.1)
EST. AVERAGE GLUCOSE BLD GHB EST-MCNC: 103 MG/DL
FERRITIN SERPL-MCNC: 22 NG/ML (ref 8–388)
GFR SERPL CREATININE-BSD FRML MDRD: 99 ML/MIN/1.73SQ M
GLUCOSE P FAST SERPL-MCNC: 88 MG/DL (ref 65–99)
HBA1C MFR BLD: 5.2 %
HCT VFR BLD AUTO: 41.2 % (ref 34.8–46.1)
HGB BLD-MCNC: 13.2 G/DL (ref 11.5–15.4)
IMM GRANULOCYTES # BLD AUTO: 0.02 THOUSAND/UL (ref 0–0.2)
IMM GRANULOCYTES NFR BLD AUTO: 0 % (ref 0–2)
IRON SATN MFR SERPL: 26 %
IRON SERPL-MCNC: 89 UG/DL (ref 50–170)
LYMPHOCYTES # BLD AUTO: 1.82 THOUSANDS/ΜL (ref 0.6–4.47)
LYMPHOCYTES NFR BLD AUTO: 22 % (ref 14–44)
MCH RBC QN AUTO: 28.7 PG (ref 26.8–34.3)
MCHC RBC AUTO-ENTMCNC: 32 G/DL (ref 31.4–37.4)
MCV RBC AUTO: 90 FL (ref 82–98)
MONOCYTES # BLD AUTO: 0.65 THOUSAND/ΜL (ref 0.17–1.22)
MONOCYTES NFR BLD AUTO: 8 % (ref 4–12)
NEUTROPHILS # BLD AUTO: 5.62 THOUSANDS/ΜL (ref 1.85–7.62)
NEUTS SEG NFR BLD AUTO: 67 % (ref 43–75)
NRBC BLD AUTO-RTO: 0 /100 WBCS
PLATELET # BLD AUTO: 279 THOUSANDS/UL (ref 149–390)
PMV BLD AUTO: 11.4 FL (ref 8.9–12.7)
POTASSIUM SERPL-SCNC: 3.7 MMOL/L (ref 3.5–5.3)
PROT SERPL-MCNC: 6.9 G/DL (ref 6.4–8.2)
RBC # BLD AUTO: 4.6 MILLION/UL (ref 3.81–5.12)
SODIUM SERPL-SCNC: 141 MMOL/L (ref 136–145)
TIBC SERPL-MCNC: 348 UG/DL (ref 250–450)
TSH SERPL DL<=0.05 MIU/L-ACNC: 1.06 UIU/ML (ref 0.36–3.74)
WBC # BLD AUTO: 8.32 THOUSAND/UL (ref 4.31–10.16)

## 2020-11-11 PROCEDURE — 83550 IRON BINDING TEST: CPT

## 2020-11-11 PROCEDURE — 36415 COLL VENOUS BLD VENIPUNCTURE: CPT

## 2020-11-11 PROCEDURE — 83036 HEMOGLOBIN GLYCOSYLATED A1C: CPT

## 2020-11-11 PROCEDURE — 80053 COMPREHEN METABOLIC PANEL: CPT

## 2020-11-11 PROCEDURE — 82728 ASSAY OF FERRITIN: CPT

## 2020-11-11 PROCEDURE — 82306 VITAMIN D 25 HYDROXY: CPT

## 2020-11-11 PROCEDURE — 83540 ASSAY OF IRON: CPT

## 2020-11-11 PROCEDURE — 85025 COMPLETE CBC W/AUTO DIFF WBC: CPT

## 2020-11-11 PROCEDURE — 84443 ASSAY THYROID STIM HORMONE: CPT

## 2020-11-16 ENCOUNTER — OFFICE VISIT (OUTPATIENT)
Dept: FAMILY MEDICINE CLINIC | Facility: CLINIC | Age: 45
End: 2020-11-16
Payer: COMMERCIAL

## 2020-11-16 VITALS
DIASTOLIC BLOOD PRESSURE: 82 MMHG | TEMPERATURE: 98.7 F | SYSTOLIC BLOOD PRESSURE: 128 MMHG | HEART RATE: 83 BPM | WEIGHT: 144.6 LBS | RESPIRATION RATE: 20 BRPM | BODY MASS INDEX: 28.39 KG/M2 | OXYGEN SATURATION: 98 % | HEIGHT: 60 IN

## 2020-11-16 DIAGNOSIS — F41.9 ANXIETY: Primary | ICD-10-CM

## 2020-11-16 DIAGNOSIS — E61.1 IRON DEFICIENCY: ICD-10-CM

## 2020-11-16 DIAGNOSIS — E55.9 VITAMIN D DEFICIENCY: ICD-10-CM

## 2020-11-16 DIAGNOSIS — R53.83 FATIGUE, UNSPECIFIED TYPE: ICD-10-CM

## 2020-11-16 PROCEDURE — 99213 OFFICE O/P EST LOW 20 MIN: CPT | Performed by: INTERNAL MEDICINE

## 2020-11-16 PROCEDURE — 3008F BODY MASS INDEX DOCD: CPT | Performed by: INTERNAL MEDICINE

## 2020-11-16 PROCEDURE — 1036F TOBACCO NON-USER: CPT | Performed by: INTERNAL MEDICINE

## 2020-11-16 PROCEDURE — 3725F SCREEN DEPRESSION PERFORMED: CPT | Performed by: INTERNAL MEDICINE

## 2020-11-16 RX ORDER — VENLAFAXINE HYDROCHLORIDE 37.5 MG/1
37.5 CAPSULE, EXTENDED RELEASE ORAL
Qty: 30 CAPSULE | Refills: 5 | Status: SHIPPED | OUTPATIENT
Start: 2020-11-16 | End: 2021-05-17

## 2021-01-08 ENCOUNTER — TELEPHONE (OUTPATIENT)
Dept: FAMILY MEDICINE CLINIC | Facility: CLINIC | Age: 46
End: 2021-01-08

## 2021-01-08 NOTE — TELEPHONE ENCOUNTER
Called and left message with patient requesting a call back in regards to appointment on 1/11/21, will need to reschedule

## 2021-01-25 ENCOUNTER — TELEPHONE (OUTPATIENT)
Dept: FAMILY MEDICINE CLINIC | Facility: CLINIC | Age: 46
End: 2021-01-25

## 2021-01-25 DIAGNOSIS — R53.83 FATIGUE, UNSPECIFIED TYPE: ICD-10-CM

## 2021-01-25 DIAGNOSIS — G44.009 CLUSTER HEADACHE, NOT INTRACTABLE, UNSPECIFIED CHRONICITY PATTERN: ICD-10-CM

## 2021-01-25 DIAGNOSIS — R52 BODY ACHES: ICD-10-CM

## 2021-01-25 DIAGNOSIS — G44.009 CLUSTER HEADACHE, NOT INTRACTABLE, UNSPECIFIED CHRONICITY PATTERN: Primary | ICD-10-CM

## 2021-01-25 PROCEDURE — U0005 INFEC AGEN DETEC AMPLI PROBE: HCPCS | Performed by: INTERNAL MEDICINE

## 2021-01-25 PROCEDURE — U0003 INFECTIOUS AGENT DETECTION BY NUCLEIC ACID (DNA OR RNA); SEVERE ACUTE RESPIRATORY SYNDROME CORONAVIRUS 2 (SARS-COV-2) (CORONAVIRUS DISEASE [COVID-19]), AMPLIFIED PROBE TECHNIQUE, MAKING USE OF HIGH THROUGHPUT TECHNOLOGIES AS DESCRIBED BY CMS-2020-01-R: HCPCS | Performed by: INTERNAL MEDICINE

## 2021-01-25 NOTE — TELEPHONE ENCOUNTER
Patient called started Symptoms Tuesday 01/19/2021 and they were Really Ttired, Heart Papulation, headaches and Arms being tires  Very achey no fever  MA Call to see what to do next [phone]

## 2021-01-27 LAB — SARS-COV-2 RNA RESP QL NAA+PROBE: NEGATIVE

## 2021-02-26 ENCOUNTER — OFFICE VISIT (OUTPATIENT)
Dept: URGENT CARE | Facility: CLINIC | Age: 46
End: 2021-02-26
Payer: COMMERCIAL

## 2021-02-26 VITALS
DIASTOLIC BLOOD PRESSURE: 76 MMHG | OXYGEN SATURATION: 100 % | HEIGHT: 60 IN | TEMPERATURE: 97.6 F | BODY MASS INDEX: 29.45 KG/M2 | RESPIRATION RATE: 18 BRPM | SYSTOLIC BLOOD PRESSURE: 151 MMHG | HEART RATE: 108 BPM | WEIGHT: 150 LBS

## 2021-02-26 DIAGNOSIS — B02.9 HERPES ZOSTER WITHOUT COMPLICATION: Primary | ICD-10-CM

## 2021-02-26 PROCEDURE — 99213 OFFICE O/P EST LOW 20 MIN: CPT | Performed by: NURSE PRACTITIONER

## 2021-02-26 RX ORDER — VALACYCLOVIR HYDROCHLORIDE 1 G/1
1000 TABLET, FILM COATED ORAL 3 TIMES DAILY
Qty: 21 TABLET | Refills: 0 | Status: SHIPPED | OUTPATIENT
Start: 2021-02-26 | End: 2021-08-06 | Stop reason: ALTCHOICE

## 2021-02-26 NOTE — PROGRESS NOTES
[address]        NAME: Anders Valenzuela is a 41 y o  female  : 1975    MRN: [id number]  DATE: 2021  TIME: 4:50 PM    Assessment and Plan   Herpes zoster without complication [V08 1]  1  Herpes zoster without complication  valACYclovir (VALTREX) 1,000 mg tablet         Patient Instructions     Patient Instructions       Take medication as directed  If rash is spreading, spreads to your face, if you have any visual changes, increased pain, any new or concerning symptoms please return or proceed ER  Advised follow-up with PCP in 2-3 days      Shingles   WHAT YOU NEED TO KNOW:   Shingles is a painful rash  Shingles is caused by the same virus that causes chickenpox (varicella-zoster)  After you get chickenpox, the virus stays in your body for several years without causing any symptoms  Shingles occurs when the virus becomes active again  The active virus travels along a nerve to your skin and causes a rash  DISCHARGE INSTRUCTIONS:   Call your local emergency number (911 in the [address] [place] [address],[address]) if:   · You have trouble moving your arms, legs, or face  · You become confused, or have difficulty speaking  · You have a seizure  Return to the emergency department if:   · You have weakness in an arm or leg  · You have dizziness, a severe headache, or hearing or vision loss  · You have painful, red, warm skin around the blisters, or the blisters drain pus  · Your neck is stiff or you have trouble moving it  Call your doctor if:   · You feel weak or have a headache  · You have a cough, chills, or a fever  · You have abdominal pain or nausea, or you are vomiting  · Your rash becomes more itchy or painful  · Your rash spreads to other parts of your body  · Your pain worsens and does not go away even after you take medicine  · You have questions or concerns about your condition or care  Medicines:   You may need any of the following:  · Antiviral medicine  helps decrease symptoms and healing time  They may also decrease your risk of developing nerve pain  You will need to start taking them within 3 days of the start of symptoms to prevent nerve pain  · Prescription pain medicine  may be given  Ask your healthcare provider how to take this medicine safely  Some prescription pain medicines contain acetaminophen  Do not take other medicines that contain acetaminophen without talking to your healthcare provider  Too much acetaminophen may cause liver damage  Prescription pain medicine may cause constipation  Ask your healthcare provider how to prevent or treat constipation  · Acetaminophen  decreases pain and fever  It is available without a doctor's order  Ask how much to take and how often to take it  Follow directions  Read the labels of all other medicines you are using to see if they also contain acetaminophen, or ask your doctor or pharmacist  Acetaminophen can cause liver damage if not taken correctly  Do not use more than 4 grams (4,000 milligrams) total of acetaminophen in one day  · NSAIDs , such as ibuprofen, help decrease swelling, pain, and fever  This medicine is available with or without a doctor's order  NSAIDs can cause stomach bleeding or kidney problems in certain people  If you take blood thinner medicine, always ask if NSAIDs are safe for you  Always read the medicine label and follow directions  Do not give these medicines to children under 10months of age without direction from your child's healthcare provider  · Topical anesthetics  are used to numb the skin and decrease pain  They can be a cream, gel, spray, or patch  · Anticonvulsants  decrease nerve pain and may help you sleep at night  · Antidepressants  may be used to decrease nerve pain  · Take your medicine as directed  Contact your healthcare provider if you think your medicine is not helping or if you have side effects   Tell him of her if you are allergic to any medicine  Keep a list of the medicines, vitamins, and herbs you take  Include the amounts, and when and why you take them  Bring the list or the pill bottles to follow-up visits  Carry your medicine list with you in case of an emergency  Self-care:  Keep your rash clean and dry  Cover your rash with a bandage or clothing  Do not use bandages that stick to your skin  The sticky part may irritate your skin and make your rash last longer  Prevent the spread of germs:       · Wash your hands often  Wash your hands several times each day  Wash after you use the bathroom, change a child's diaper, and before you prepare or eat food  Use soap and water every time  Rub your soapy hands together, lacing your fingers  Wash the front and back of your hands, and in between your fingers  Use the fingers of one hand to scrub under the fingernails of the other hand  Wash for at least 20 seconds  Rinse with warm, running water for several seconds  Then dry your hands with a clean towel or paper towel  Use hand  that contains alcohol if soap and water are not available  Do not touch your eyes, nose, or mouth without washing your hands first          · Cover a sneeze or cough  Use a tissue that covers your mouth and nose  Throw the tissue away in a trash can right away  Use the bend of your arm if a tissue is not available  Wash your hands well with soap and water or use a hand   · Stay away from others while you are sick  Avoid crowds as much as possible  · Ask about vaccines you may need  Talk to your healthcare provider about your vaccine history  He or she will tell you which vaccines you need, and when to get them  Prevent shingles or another shingles outbreak:  A vaccine may be given to help prevent shingles  You can get the vaccine even if you already had shingles  The vaccine can help prevent a future outbreak  If you do get shingles again, the vaccine can keep it from becoming severe   The vaccine comes in 2 forms  Your healthcare provider will tell you which form is right for you  The decision is based on your age and any medical conditions you have  A 2-dose vaccine is usually given to adults 48 years or older  A 1-dose vaccine may be given to adults 61 years or older  For more information:   · Centers for Disease Control and Prevention  [address] , [address]  Phone: 6- 003 - [phone]  Phone: 6- 885 - [phone]  Web Address: [employer] com     Follow up with your doctor as directed:  Write down your questions so you remember to ask them during your visits  © Copyright [address] Information is for End User's use only and may not be sold, redistributed or otherwise used for commercial purposes  All illustrations and images included in CareNotes® are the copyrighted property of A D A M , Inc  or [facility] [employer]   The above information is an  only  It is not intended as medical advice for individual conditions or treatments  Talk to your doctor, nurse or pharmacist before following any medical regimen to see if it is safe and effective for you  Follow up with PCP in 3-5 days  Proceed to  ER if symptoms worsen  Chief Complaint     Chief Complaint   Patient presents with    Rash     pt states that she has had shingles several times, has had a rash on face with "nerve pain"  History of Present Illness         Patient is a 71-year-old female presents with a 2 day history of a rash to her posterior left neck  Patient notes history of shingles and states it feels similar  Patient notes shingles outbreak happens when she is stressed, notes stress at work  Patient denies any lesions or nerve pain to face  Denies any blurred vision or visual disturbances  Denies any fever, chills, or body aches  Denies any URI symptoms  Denies any recent sick contacts or known exposure to COVID-19    Patient states she has had 4 previous outbreaks of shingles  Review of Systems   Review of Systems   Constitutional: Negative for chills, diaphoresis, fatigue and fever  HENT: Negative  Eyes: Negative for photophobia, pain, discharge, redness, itching and visual disturbance  Respiratory: Negative for cough, chest tightness and shortness of breath  Cardiovascular: Negative for chest pain and palpitations  Musculoskeletal: Negative for arthralgias, back pain, joint swelling, myalgias, neck pain and neck stiffness  Skin: Positive for rash  Neurological: Negative            Current Medications       Current Outpatient Medications:     multivitamin (THERAGRAN) TABS, Take 1 tablet by mouth daily, Disp: , Rfl:     Probiotic Product (PROBIOTIC ADVANCED PO), Take by mouth, Disp: , Rfl:     venlafaxine (EFFEXOR-XR) 37 5 mg 24 hr capsule, Take 1 capsule (37 5 mg total) by mouth daily with breakfast, Disp: 30 capsule, Rfl: 5    traZODone (DESYREL) 50 mg tablet, Take 0 5 tablets (25 mg total) by mouth daily at bedtime as needed for sleep (Patient not taking: Reported on 11/16/2020), Disp: 15 tablet, Rfl: 1    valACYclovir (VALTREX) 1,000 mg tablet, Take 1 tablet (1,000 mg total) by mouth 3 (three) times a day for 7 days, Disp: 21 tablet, Rfl: 0    Current Allergies     Allergies as of 02/26/2021 - Reviewed 02/26/2021   Allergen Reaction Noted    Amoxicillin Hives 10/24/2012    Cymbalta [duloxetine hcl] Other (See Comments) 02/26/2021    Penicillins Hives 10/24/2012    Shellfish allergy Hives 05/07/2013    Sulfa antibiotics Rash 02/25/2013            The following portions of the patient's history were reviewed and updated as appropriate: allergies, current medications, past family history, past medical history, past social history, past surgical history and problem list      Past Medical History:   Diagnosis Date    Abnormal Pap smear of cervix     Anemia     Disease of thyroid gland     thyroid storm with previous pregnancy    Dysmenorrhea     Esophageal reflux     last assessed - [id number]    Herpes zoster     LUQ level T-8 resolved; last assessed - [id number]    History of infection due to human papilloma virus (HPV)     HPV (human papilloma virus) infection     Inguinal hernia     last assessed - [id number]    Insomnia     Mastodynia     Resolved -     Migraine     Ovarian cyst     last assessed - [id number]    Ovarian cyst 2013    Palpitations     last assessed - [id number]    Tachycardia     Resolved - [id number]    Takes dietary supplements     Temporomandibular joint disorder     last assessed - [id number]    Urinary tract infection     Varicella        Past Surgical History:   Procedure Laterality Date     SECTION      COLONOSCOPY  2012    Fiberoptic    CRYOTHERAPY      cervix, age 24   Jeral Hose DIAGNOSTIC LAPAROSCOPY      DILATION AND CURETTAGE OF UTERUS      Resolved     GYNECOLOGIC CRYOSURGERY      age 24   Jeral Hose TOOTH EXTRACTION         Family History   Problem Relation Age of Onset    Diabetes Mother     Heart disease Mother         cardiac disorder    Gallbladder disease Mother     Hypertension Mother     Glaucoma Mother     Diabetes Father     Heart disease Father         cardiac disorder    Hypertension Father     Hyperlipidemia Father         Pure Hypercholesterolemia    Other Family         headache syndromes    Breast cancer Cousin     Post-traumatic stress disorder Brother     Hyperlipidemia Brother     Hypertension Brother     Diabetes Maternal Grandmother     Lung cancer Maternal Grandfather     Stroke Paternal Grandmother     Hypertension Paternal Grandmother     Cirrhosis Paternal Grandfather     Ovarian cancer Maternal Aunt     Colon cancer Neg Hx          Medications have been verified          Objective   /76   Pulse (!) 108   Temp 97 6 °F (36 4 °C) (Temporal)   Resp 18   Ht 5' (1 524 m)   Wt 68 kg (150 lb)   LMP 2021 (Approximate)   SpO2 100%   BMI 29 29 kg/m² Patient's last menstrual period was 02/05/2021 (approximate)  Physical Exam     Physical Exam  Constitutional:       General: She is not in acute distress  Appearance: Normal appearance  She is not ill-appearing or diaphoretic  HENT:      Head: Normocephalic and atraumatic  Eyes:      Extraocular Movements: Extraocular movements intact  Conjunctiva/sclera: Conjunctivae normal       Pupils: Pupils are equal, round, and reactive to light  Neck:      Musculoskeletal: Normal range of motion and neck supple  Cardiovascular:      Rate and Rhythm: Normal rate and regular rhythm  Heart sounds: Normal heart sounds, S1 normal and S2 normal    Pulmonary:      Effort: Pulmonary effort is normal       Breath sounds: Normal breath sounds  Skin:     General: Skin is warm and dry  Capillary Refill: Capillary refill takes less than 2 seconds  Findings: Rash present  Rash is papular and vesicular (few lesions to left posterior neck   rash not present on face  no drainage noted  )  Neurological:      Mental Status: She is alert

## 2021-02-26 NOTE — PATIENT INSTRUCTIONS
Take medication as directed  If rash is spreading, spreads to your face, if you have any visual changes, increased pain, any new or concerning symptoms please return or proceed ER  Advised follow-up with PCP in 2-3 days      Shingles   WHAT YOU NEED TO KNOW:   Shingles is a painful rash  Shingles is caused by the same virus that causes chickenpox (varicella-zoster)  After you get chickenpox, the virus stays in your body for several years without causing any symptoms  Shingles occurs when the virus becomes active again  The active virus travels along a nerve to your skin and causes a rash  DISCHARGE INSTRUCTIONS:   Call your local emergency number (911 in the [address] [place] [address],[address]) if:   · You have trouble moving your arms, legs, or face  · You become confused, or have difficulty speaking  · You have a seizure  Return to the emergency department if:   · You have weakness in an arm or leg  · You have dizziness, a severe headache, or hearing or vision loss  · You have painful, red, warm skin around the blisters, or the blisters drain pus  · Your neck is stiff or you have trouble moving it  Call your doctor if:   · You feel weak or have a headache  · You have a cough, chills, or a fever  · You have abdominal pain or nausea, or you are vomiting  · Your rash becomes more itchy or painful  · Your rash spreads to other parts of your body  · Your pain worsens and does not go away even after you take medicine  · You have questions or concerns about your condition or care  Medicines: You may need any of the following:  · Antiviral medicine  helps decrease symptoms and healing time  They may also decrease your risk of developing nerve pain  You will need to start taking them within 3 days of the start of symptoms to prevent nerve pain  · Prescription pain medicine  may be given  Ask your healthcare provider how to take this medicine safely  Some prescription pain medicines contain acetaminophen  Do not take other medicines that contain acetaminophen without talking to your healthcare provider  Too much acetaminophen may cause liver damage  Prescription pain medicine may cause constipation  Ask your healthcare provider how to prevent or treat constipation  · Acetaminophen  decreases pain and fever  It is available without a doctor's order  Ask how much to take and how often to take it  Follow directions  Read the labels of all other medicines you are using to see if they also contain acetaminophen, or ask your doctor or pharmacist  Acetaminophen can cause liver damage if not taken correctly  Do not use more than 4 grams (4,000 milligrams) total of acetaminophen in one day  · NSAIDs , such as ibuprofen, help decrease swelling, pain, and fever  This medicine is available with or without a doctor's order  NSAIDs can cause stomach bleeding or kidney problems in certain people  If you take blood thinner medicine, always ask if NSAIDs are safe for you  Always read the medicine label and follow directions  Do not give these medicines to children under 10months of age without direction from your child's healthcare provider  · Topical anesthetics  are used to numb the skin and decrease pain  They can be a cream, gel, spray, or patch  · Anticonvulsants  decrease nerve pain and may help you sleep at night  · Antidepressants  may be used to decrease nerve pain  · Take your medicine as directed  Contact your healthcare provider if you think your medicine is not helping or if you have side effects  Tell him of her if you are allergic to any medicine  Keep a list of the medicines, vitamins, and herbs you take  Include the amounts, and when and why you take them  Bring the list or the pill bottles to follow-up visits  Carry your medicine list with you in case of an emergency  Self-care:  Keep your rash clean and dry  Cover your rash with a bandage or clothing   Do not use bandages that stick to your skin  The sticky part may irritate your skin and make your rash last longer  Prevent the spread of germs:       · Wash your hands often  Wash your hands several times each day  Wash after you use the bathroom, change a child's diaper, and before you prepare or eat food  Use soap and water every time  Rub your soapy hands together, lacing your fingers  Wash the front and back of your hands, and in between your fingers  Use the fingers of one hand to scrub under the fingernails of the other hand  Wash for at least 20 seconds  Rinse with warm, running water for several seconds  Then dry your hands with a clean towel or paper towel  Use hand  that contains alcohol if soap and water are not available  Do not touch your eyes, nose, or mouth without washing your hands first          · Cover a sneeze or cough  Use a tissue that covers your mouth and nose  Throw the tissue away in a trash can right away  Use the bend of your arm if a tissue is not available  Wash your hands well with soap and water or use a hand   · Stay away from others while you are sick  Avoid crowds as much as possible  · Ask about vaccines you may need  Talk to your healthcare provider about your vaccine history  He or she will tell you which vaccines you need, and when to get them  Prevent shingles or another shingles outbreak:  A vaccine may be given to help prevent shingles  You can get the vaccine even if you already had shingles  The vaccine can help prevent a future outbreak  If you do get shingles again, the vaccine can keep it from becoming severe  The vaccine comes in 2 forms  Your healthcare provider will tell you which form is right for you  The decision is based on your age and any medical conditions you have  A 2-dose vaccine is usually given to adults 48 years or older  A 1-dose vaccine may be given to adults 61 years or older    For more information:   · Centers for Disease Control and Prevention  Donell Lalo  [address] , [address] [facility]  Phone: 5- 519 - [phone]  Phone: 5- 525 - [phone]  Web Address: [employer] com     Follow up with your doctor as directed:  Write down your questions so you remember to ask them during your visits  © Copyright [address] Information is for End User's use only and may not be sold, redistributed or otherwise used for commercial purposes  All illustrations and images included in CareNotes® are the copyrighted property of A D A M , Inc  or [facility] Osbaldo Lopez   The above information is an  only  It is not intended as medical advice for individual conditions or treatments  Talk to your doctor, nurse or pharmacist before following any medical regimen to see if it is safe and effective for you

## 2021-03-15 ENCOUNTER — OFFICE VISIT (OUTPATIENT)
Dept: FAMILY MEDICINE CLINIC | Facility: CLINIC | Age: 46
End: 2021-03-15
Payer: COMMERCIAL

## 2021-03-15 ENCOUNTER — TELEPHONE (OUTPATIENT)
Dept: FAMILY MEDICINE CLINIC | Facility: CLINIC | Age: 46
End: 2021-03-15

## 2021-03-15 VITALS
TEMPERATURE: 98.9 F | OXYGEN SATURATION: 99 % | BODY MASS INDEX: 30.82 KG/M2 | RESPIRATION RATE: 18 BRPM | HEIGHT: 60 IN | DIASTOLIC BLOOD PRESSURE: 86 MMHG | HEART RATE: 76 BPM | WEIGHT: 157 LBS | SYSTOLIC BLOOD PRESSURE: 126 MMHG

## 2021-03-15 DIAGNOSIS — G89.29 CHRONIC LEFT SHOULDER PAIN: ICD-10-CM

## 2021-03-15 DIAGNOSIS — M47.22 OSTEOARTHRITIS OF SPINE WITH RADICULOPATHY, CERVICAL REGION: ICD-10-CM

## 2021-03-15 DIAGNOSIS — R20.0 LEFT FACIAL NUMBNESS: ICD-10-CM

## 2021-03-15 DIAGNOSIS — M79.609 PARESTHESIA AND PAIN OF LEFT EXTREMITY: Primary | ICD-10-CM

## 2021-03-15 DIAGNOSIS — M25.512 CHRONIC LEFT SHOULDER PAIN: ICD-10-CM

## 2021-03-15 DIAGNOSIS — B02.7 DISSEMINATED HERPES ZOSTER: ICD-10-CM

## 2021-03-15 DIAGNOSIS — R20.2 PARESTHESIA AND PAIN OF LEFT EXTREMITY: Primary | ICD-10-CM

## 2021-03-15 DIAGNOSIS — R90.82 WHITE MATTER ABNORMALITY ON MRI OF BRAIN: ICD-10-CM

## 2021-03-15 PROCEDURE — 1036F TOBACCO NON-USER: CPT | Performed by: INTERNAL MEDICINE

## 2021-03-15 PROCEDURE — 99214 OFFICE O/P EST MOD 30 MIN: CPT | Performed by: INTERNAL MEDICINE

## 2021-03-15 PROCEDURE — 3725F SCREEN DEPRESSION PERFORMED: CPT | Performed by: INTERNAL MEDICINE

## 2021-03-15 PROCEDURE — 3008F BODY MASS INDEX DOCD: CPT | Performed by: INTERNAL MEDICINE

## 2021-03-15 NOTE — PROGRESS NOTES
[address] [place] [address] Primary Care        NAME: Rock Gore is a 41 y o  female  : 1975    MRN: [id number]  DATE: March 15, 2021  TIME: 1:30 PM    Assessment and Plan   1  Paresthesia and pain of left extremity  - suspect possible cervical spine degenerative disease, she has abnormal sensation on C8-T1 dermatome on left  MRI imaging  showed cervical degenerative disease at C5-6, C6-7  Will repeat at this time    2  Disseminated herpes zoster  - she reports several episodes of shingles crossing multiple dermatomes  Prior work up significant for FLOR 1:1280, she reports HIV testing during pregnancy 10 years ago was negative  Will repeat labs at this time  -      FLOR Screen w/ Reflex to Titer/Pattern; Future  -     Sjogren's Antibodies; Future  -     Sedimentation rate, automated; Future  -     C-reactive protein; Future  -     CBC and differential; Future  -     HIV 1/2 Antigen/Antibody (4th Generation) w Reflex [facility]; Future    3  Chronic left shoulder pain  - some tenderness/pain anteriorly, as well ROM restriction with internal/external rotation  No weakness  MRI left shoulder  showed tendinosis of supraspinatus and infraspinatus without tear  4  Left facial numbness  - normal exam today, previous brain MRI 2017 showed white matter changes of uncertain significance, will repeat at this time    5  Osteoarthritis of spine with radiculopathy, cervical region  -     MRI cervical spine wo contrast; Future; Expected date: 03/15/2021    6  White matter abnormality on MRI of brain  -     FLOR Screen w/ Reflex to Titer/Pattern; Future  -     Sjogren's Antibodies; Future  -     Sedimentation rate, automated; Future  -     C-reactive protein; Future  -     CBC and differential; Future  -     MRI brain w wo contrast; Future; Expected date: 03/15/2021             Chief Complaint     Chief Complaint   Patient presents with    Numbness     Pt c/o L sided face / arm tingling / numbness   Musclar pain shoulder shoulder / chest x4 days  Patient states she has had these symptoms for over 10 years - symptoms usually resolve after a day or two  Has been tested for MS, lupus - negative  History of Present Illness       Here for acute visit due to left shoulder and chest wall pain  She's had these episodes for ~10 years, since her daughter was born  Reports "squeezing pain" at anterior left shoulder, radiates across left chest, armpit, down top of left arm to 4th-5th digits  Associated with burning in 4-5th digits and arm feels cool to touch, no cyanosis or color change  No weakness  Episodes had been occurring every 1-2 months, prior to menses, last a few days  Most recent episode began last Thursday 03/11 and feels much worse  Also reports history of left facial numbness, feels like she drooling but isn't  No dysarthria, headaches or diplopia  Chest/shoulder pain seems worse with eating and lying flat  She also has history of acid reflux, especially with tomato sauce and chocolate  She previously saw a chiropractor with some relief  Prior work-up included MRI brain, shoulder, NCS and positive FLOR  She reports seeing rheumatology several years ago and had extensive testing which did not reveal a formal diagnosis  Past history also notable for recurrent shingles  Last episode 2 weeks ago  Started on right side of neck, spread to left and up scalp  Also has history of thoracic shingles, sometimes bilateral involvement  She went to urgent care 2 weeks ago and given Valtrex which has helped  These outbreaks are usually triggered by stress, reports increased stress recently at work  Denies history of facial or eye involvement  Review of Systems   Review of Systems   Constitutional: Positive for fatigue  Negative for appetite change, chills and fever  HENT: Negative for facial swelling, hearing loss, tinnitus and trouble swallowing  Eyes: Negative for photophobia, pain and visual disturbance  Respiratory: Negative for cough and shortness of breath  Cardiovascular: Positive for chest pain  Negative for palpitations and leg swelling  Musculoskeletal: Positive for arthralgias  Skin: Negative for rash  Neurological: Positive for numbness  Negative for dizziness, tremors, speech difficulty, weakness, light-headedness and headaches  Psychiatric/Behavioral: Positive for sleep disturbance  Negative for confusion, hallucinations and suicidal ideas  The patient is nervous/anxious            Current Medications       Current Outpatient Medications:     multivitamin (THERAGRAN) TABS, Take 1 tablet by mouth daily, Disp: , Rfl:     Probiotic Product (PROBIOTIC ADVANCED PO), Take by mouth, Disp: , Rfl:     valACYclovir (VALTREX) 1,000 mg tablet, Take 1 tablet (1,000 mg total) by mouth 3 (three) times a day for 7 days (Patient not taking: Reported on 3/15/2021), Disp: 21 tablet, Rfl: 0    Current Allergies     Allergies as of 03/15/2021 - Reviewed 03/15/2021   Allergen Reaction Noted    Amoxicillin Hives 10/24/2012    Cymbalta [duloxetine hcl] Other (See Comments) 02/26/2021    Penicillins Hives 10/24/2012    Shellfish allergy Hives 05/07/2013    Sulfa antibiotics Rash 02/25/2013            The following portions of the patient's history were reviewed and updated as appropriate: allergies, current medications, past family history, past medical history, past social history, past surgical history and problem list      Past Medical History:   Diagnosis Date    Abnormal Pap smear of cervix     Anemia     Disease of thyroid gland     thyroid storm with previous pregnancy    Dysmenorrhea     Esophageal reflux     last assessed - [id number]    Herpes zoster     LUQ level T-8 resolved; last assessed - [id number]    History of infection due to human papilloma virus (HPV)     HPV (human papilloma virus) infection     Inguinal hernia     last assessed - [id number]    Insomnia     Mastodynia     Resolved -     Migraine     Ovarian cyst     last assessed - [id number]    Ovarian cyst 2013    Palpitations     last assessed - [id number]    Tachycardia     Resolved - [id number]    Takes dietary supplements     Temporomandibular joint disorder     last assessed - 2012    Urinary tract infection     Varicella        Past Surgical History:   Procedure Laterality Date     SECTION      COLONOSCOPY  2012    Fiberoptic    CRYOTHERAPY      cervix, age 24   [facility] DIAGNOSTIC LAPAROSCOPY      DILATION AND CURETTAGE OF UTERUS      Resolved     GYNECOLOGIC CRYOSURGERY      age 24    TOOTH EXTRACTION         Family History   Problem Relation Age of Onset    Diabetes Mother     Heart disease Mother         cardiac disorder    Gallbladder disease Mother     Hypertension Mother     Glaucoma Mother     Diabetes Father     Heart disease Father         cardiac disorder    Hypertension Father     Hyperlipidemia Father         Pure Hypercholesterolemia    Other Family         headache syndromes    Breast cancer Cousin     Post-traumatic stress disorder Brother     Hyperlipidemia Brother     Hypertension Brother     Diabetes Maternal Grandmother     Lung cancer Maternal Grandfather     Stroke Paternal Grandmother     Hypertension Paternal Grandmother     Cirrhosis Paternal Grandfather     Ovarian cancer Maternal Aunt     Colon cancer Neg Hx          Medications have been verified  Objective   /86   Pulse 76   Temp 98 9 °F (37 2 °C) (Tympanic)   Resp 18   Ht 5' (1 524 m)   Wt 71 2 kg (157 lb)   SpO2 99%   BMI 30 66 kg/m²        Physical Exam     Physical Exam  Vitals signs reviewed  Constitutional:       General: She is not in acute distress  Appearance: Normal appearance  She is normal weight  HENT:      Head: Normocephalic and atraumatic  Mouth/Throat:      Tongue: Tongue does not deviate from midline  Pharynx: Uvula midline     Eyes:      Extraocular Movements: Extraocular movements intact  Right eye: No nystagmus  Left eye: No nystagmus  Conjunctiva/sclera: Conjunctivae normal       Pupils: Pupils are equal, round, and reactive to light  Chest:      Chest wall: Tenderness (left chest wall) present  Musculoskeletal:      Left shoulder: She exhibits decreased range of motion and tenderness  She exhibits no crepitus, no deformity and normal strength  Neurological:      Mental Status: She is alert and oriented to person, place, and time  Cranial Nerves: Cranial nerves are intact  No cranial nerve deficit, dysarthria or facial asymmetry  Sensory: Sensory deficit (diminished sensation of left forearm, hand) present  Motor: No weakness, tremor or abnormal muscle tone  Gait: Gait is intact  Deep Tendon Reflexes:      Reflex Scores:       Tricep reflexes are 2+ on the right side and 2+ on the left side  Bicep reflexes are 2+ on the right side and 2+ on the left side  Brachioradialis reflexes are 2+ on the right side and 2+ on the left side  Patellar reflexes are 2+ on the right side and 2+ on the left side  Achilles reflexes are 1+ on the right side and 1+ on the left side  Comments: CN: PERRLA, EOMI, no nystagmus, facial muscles full strength, no facial droop, tongue midline, uvula/palate symmetric, tongue full strength, chin push/shoulder shrug strength 5/5, hearing grossly intact    Sensation intact V1-3 light touch bilaterally   Psychiatric:         Mood and Affect: Mood and affect normal          Speech: Speech normal          Behavior: Behavior normal  Behavior is cooperative  Thought Content:  Thought content normal              Results:  Lab Results   Component Value Date    SODIUM 141 11/11/2020    K 3 7 11/11/2020     (H) 11/11/2020    CO2 24 11/11/2020    BUN 12 11/11/2020    CREATININE 0 74 11/11/2020    GLUC 81 01/11/2020    CALCIUM 8 6 11/11/2020       Lab Results Component Value Date    HGBA1C 5 2 11/11/2020       Lab Results   Component Value Date    WBC 8 32 11/11/2020    HGB 13 2 11/11/2020    HCT 41 2 11/11/2020    MCV 90 11/11/2020     11/11/2020     MRI brain 10/20/2017  FINDINGS:     BRAIN PARENCHYMA:  There are few scattered tiny foci of T2 and FLAIR signal abnormality located predominantly in subcortical white matter of frontal and parietal lobes, on the order of between 2 to 3 mm in size without associated mass effect or   associated diffusion restriction  No white matter signal abnormality noted in basal ganglia, brainstem, or cerebellum  Cerebellar tonsils are normally positioned at the level of the foramen magnum      There is no discrete mass, mass effect or midline shift  There is no intracranial hemorrhage    There is no evidence of acute infarction      VENTRICLES:  Normal      SELLA AND PITUITARY GLAND:  Normal      ORBITS:  Normal      PARANASAL SINUSES:  Normal      VASCULATURE:  Evaluation of the major intracranial vasculature demonstrates appropriate flow voids      CALVARIUM AND SKULL BASE:  Normal      EXTRACRANIAL SOFT TISSUES:  Normal      IMPRESSION:     Few scattered tiny foci of T2 and FLAIR signal abnormality on the order of 2 to 3 mm in size without associated mass effect or diffusion restriction predominantly in frontal white matter are nonspecific and while this could represent very early or very mild changes of demyelinating disease, this appearance can also be seen in patients with complicated migraine headaches or early/mild precocious microangiopathic change

## 2021-03-15 NOTE — TELEPHONE ENCOUNTER
Patient called stating she started Thursday with shoulder and chest pain (where her shoulder meets her chest), facial numbness, drooling sensation but not drooling, and left arm goes cold  She denies headaches, facial drooping, slurred speech  She also states she has been stressed at work lately (recent home bp 119/95)  After speaking with you, I did offer pt a same day appointment with you  I also advised her in the meantime if she develops any sudden weakness, worsening headache, nausea, or vomiting, to seek emergency care  Pt verbalized an understanding to this

## 2021-03-16 ENCOUNTER — LAB (OUTPATIENT)
Dept: LAB | Facility: CLINIC | Age: 46
End: 2021-03-16
Payer: COMMERCIAL

## 2021-03-16 DIAGNOSIS — B02.7 DISSEMINATED HERPES ZOSTER: ICD-10-CM

## 2021-03-16 DIAGNOSIS — R90.82 WHITE MATTER ABNORMALITY ON MRI OF BRAIN: ICD-10-CM

## 2021-03-16 LAB
BASOPHILS # BLD AUTO: 0.08 THOUSANDS/ΜL (ref 0–0.1)
BASOPHILS NFR BLD AUTO: 1 % (ref 0–1)
CRP SERPL QL: <3 MG/L
EOSINOPHIL # BLD AUTO: 0.29 THOUSAND/ΜL (ref 0–0.61)
EOSINOPHIL NFR BLD AUTO: 4 % (ref 0–6)
ERYTHROCYTE [DISTWIDTH] IN BLOOD BY AUTOMATED COUNT: 12.5 % (ref 11.6–15.1)
ERYTHROCYTE [SEDIMENTATION RATE] IN BLOOD: 8 MM/HOUR (ref 0–19)
HCT VFR BLD AUTO: 40 % (ref 34.8–46.1)
HGB BLD-MCNC: 13.1 G/DL (ref 11.5–15.4)
IMM GRANULOCYTES # BLD AUTO: 0.02 THOUSAND/UL (ref 0–0.2)
IMM GRANULOCYTES NFR BLD AUTO: 0 % (ref 0–2)
LYMPHOCYTES # BLD AUTO: 2.1 THOUSANDS/ΜL (ref 0.6–4.47)
LYMPHOCYTES NFR BLD AUTO: 27 % (ref 14–44)
MCH RBC QN AUTO: 29 PG (ref 26.8–34.3)
MCHC RBC AUTO-ENTMCNC: 32.8 G/DL (ref 31.4–37.4)
MCV RBC AUTO: 89 FL (ref 82–98)
MONOCYTES # BLD AUTO: 0.74 THOUSAND/ΜL (ref 0.17–1.22)
MONOCYTES NFR BLD AUTO: 10 % (ref 4–12)
NEUTROPHILS # BLD AUTO: 4.51 THOUSANDS/ΜL (ref 1.85–7.62)
NEUTS SEG NFR BLD AUTO: 58 % (ref 43–75)
NRBC BLD AUTO-RTO: 0 /100 WBCS
PLATELET # BLD AUTO: 334 THOUSANDS/UL (ref 149–390)
PMV BLD AUTO: 10.7 FL (ref 8.9–12.7)
RBC # BLD AUTO: 4.51 MILLION/UL (ref 3.81–5.12)
WBC # BLD AUTO: 7.74 THOUSAND/UL (ref 4.31–10.16)

## 2021-03-16 PROCEDURE — 36415 COLL VENOUS BLD VENIPUNCTURE: CPT

## 2021-03-16 PROCEDURE — 85025 COMPLETE CBC W/AUTO DIFF WBC: CPT

## 2021-03-16 PROCEDURE — 86038 ANTINUCLEAR ANTIBODIES: CPT

## 2021-03-16 PROCEDURE — 87389 HIV-1 AG W/HIV-1&-2 AB AG IA: CPT

## 2021-03-16 PROCEDURE — 86039 ANTINUCLEAR ANTIBODIES (ANA): CPT

## 2021-03-16 PROCEDURE — 86235 NUCLEAR ANTIGEN ANTIBODY: CPT

## 2021-03-16 PROCEDURE — 85652 RBC SED RATE AUTOMATED: CPT

## 2021-03-16 PROCEDURE — 86140 C-REACTIVE PROTEIN: CPT

## 2021-03-16 NOTE — RESULT ENCOUNTER NOTE
Please advise pt that some of her labs are back and show normal inflammatory markers, normal blood counts  Some tests still pending, thanks

## 2021-03-17 LAB
ANA HOMOGEN SER QL IF: NORMAL
ANA HOMOGEN TITR SER: NORMAL {TITER}
HIV 1+2 AB+HIV1 P24 AG SERPL QL IA: NORMAL
RYE IGE QN: POSITIVE

## 2021-03-18 LAB
ENA SS-A AB SER-ACNC: <0.2 AI (ref 0–0.9)
ENA SS-B AB SER-ACNC: <0.2 AI (ref 0–0.9)

## 2021-03-18 NOTE — RESULT ENCOUNTER NOTE
Please advise pt that her FLOR was a little positive but remainder of tests were negative  Let's see what MRI shows, then discuss possible referral like rheumatology or neurology   THanks

## 2021-03-29 ENCOUNTER — TELEPHONE (OUTPATIENT)
Dept: FAMILY MEDICINE CLINIC | Facility: CLINIC | Age: 46
End: 2021-03-29

## 2021-03-29 NOTE — TELEPHONE ENCOUNTER
Called [facility] [phone] spoke with Raji Chowdhuryer brain pending need to fax records to [phone]  Case number    Pt is also going for Mri Cervial spine  On 4/1 @ [facility]  Approved  Benita Meigs # [id number]  3-29-21 to 9-25-21

## 2021-04-01 ENCOUNTER — HOSPITAL ENCOUNTER (OUTPATIENT)
Dept: MRI IMAGING | Facility: HOSPITAL | Age: 46
Discharge: HOME | End: 2021-04-01
Attending: INTERNAL MEDICINE
Payer: COMMERCIAL

## 2021-04-01 ENCOUNTER — HOSPITAL ENCOUNTER (OUTPATIENT)
Dept: MRI IMAGING | Facility: HOSPITAL | Age: 46
Discharge: HOME | End: 2021-04-01
Payer: COMMERCIAL

## 2021-04-01 DIAGNOSIS — R90.82 WHITE MATTER ABNORMALITY ON MRI OF BRAIN: ICD-10-CM

## 2021-04-01 DIAGNOSIS — M47.22 OSTEOARTHRITIS OF SPINE WITH RADICULOPATHY, CERVICAL REGION: ICD-10-CM

## 2021-04-01 PROCEDURE — 72141 MRI NECK SPINE W/O DYE: CPT

## 2021-04-01 PROCEDURE — G1004 CDSM NDSC: HCPCS

## 2021-04-01 PROCEDURE — A9585 GADOBUTROL INJECTION: HCPCS | Performed by: INTERNAL MEDICINE

## 2021-04-01 PROCEDURE — 70551 MRI BRAIN STEM W/O DYE: CPT

## 2021-04-01 RX ADMIN — GADOBUTROL 7.5 ML: 604.72 INJECTION INTRAVENOUS at 11:51

## 2021-04-05 NOTE — PROCEDURES
Procedures by Bishop Roman MD  at 10/17/2017 10:58 AM      Author:  Bishop Roman MD Service:  (none) Author Type:  Physician    Filed:  10/17/2017 10:58 AM Date of Service:  10/17/2017 10:58 AM Status:  Signed    :  Bishop Roman MD (Physician)         [address]  [place], [place]  Phone [phone]  Fax [phone]                                                  Patient Name: Ratna Senior     Date performed:  10/16/2017 Medical Record #: [id number]   Report date: 10/17/17 File #: VVE    Referring physician: Dr Bishop Roman ACCT#: -   : 1975 Study type: Routine, awake and drowsy   Age: 40 y Technologist: MIREYA Todd EEG  T   Location: Outpatient ICD diagnosis: -       ELECTROENCEPHALOGRAM      Patient History:  Patient is a 40 y o  female who has been experiencing the left part of her face and left arm going numb since about 6 years ago after the birth  of her daughter  She had a difficult pregnancy, throwing up through the entire pregnancy, broke out in a rash and was in labor 27 hrs, then having emergency   She states her symptoms come and go, happening several times a month  She feels like  this happens more when she's in a stressful situation and during the time of her menstrual cycle  A new symptom involves her left leg and she feels herself shaking inside  She said you can't see it, but when someone holds her hand, they can feel the  tremor  MRI about 6 years ago was reported as normal      Tech Note: During HV, pt experienced numbness in her left face, mouth and hand  The hand symptom left, but she stated she still felt a slight numbness remaining in her left face at the end of the study  Medications:  Multi-vitamin, iron, vitamin D      Description of Procedure: This EEG was performed with simultaneous video recording   Electrodes were applied using the [employer] 10-20 System, with additional electrodes used including EOG, ECG and T1/T2  The EEG was recorded from 7:35:42 AM  until 08:40:04 AM for  a total of 33 3 minutes  The recording was technically satisfactory for interpretation  Skull Defect (if any):  None  Findings:     State: This routine EEG was obtained during wakefulness, drowsiness and brief light sleep  Background Activity: The background during wakefulness consisted of low to moderate amplitude, very well-modulated 11 - 12 Hz alpha activity located symmetrically over the posterior head regions with good reactivity and attenuation to eye  opening  Drowsiness was reflected by slowing and attenuation of background rhythms, and Stage 2 sleep was accompanied by well-formed and symmetrically placed vertex waves and sleep spindles  There was no pathologic focal, lateralized, or generalized  slowing of the background noted  No epileptiform transients were seen  Activation Procedures:   Photic stimulation produced a symmetric occipital driving response to the mid-range of flash frequencies, and was non-activating for photo-paroxysmal abnormalities  Hyperventilation was performed with good effort for 4 minutes and specifically  did not alter the background rhythms  Clinical note:   Hyperventilation was specifically non-activating electrographically  However, after 1 min 30 secs of hyperventilating, the patient reported some light-headedness  At 2 mins 40 secs she reported numbness and tingling  of her left cheek and lips, which continued past cessation of over-breathing  At 3 mins post-hyperventilation she reported tingling of the fingertips on her left hand  By the end of the study, the hand symptoms had resolved; facial sensations were improved  but not fully gone  Other findings: The single lead ECG revealed a normal sinus rhythm averaging 84 bpm without ectopy, increasing to 104 bpm during hyperventilation         Interpretation:    This is a normal routine EEG obtained in waking and sleep states, without evidence of focal structural or paroxysmal abnormality  However, hyperventilation was clinically activating for reproduction of the patients  presenting symptoms of left face and hand numbness and tingling  Further clinical correlation is advised  [facility]  [facility]                   Received for:Provider  EPIC   Oct 17 2017 10:58AM [facility] Standard Time Statement Selected

## 2021-04-07 DIAGNOSIS — R20.2 PARESTHESIA AND PAIN OF LEFT EXTREMITY: Primary | ICD-10-CM

## 2021-04-07 DIAGNOSIS — R90.82 WHITE MATTER ABNORMALITY ON MRI OF BRAIN: ICD-10-CM

## 2021-04-07 DIAGNOSIS — M79.609 PARESTHESIA AND PAIN OF LEFT EXTREMITY: Primary | ICD-10-CM

## 2021-04-07 DIAGNOSIS — R20.0 LEFT FACIAL NUMBNESS: ICD-10-CM

## 2021-04-07 NOTE — RESULT ENCOUNTER NOTE
Please advise pt that MRI brain showed some changes similar to last MRI but no new findings or masses  Neck MRI showed mild degenerative changes but otherwise normal  At this point would recommend seeing Neurology for further testing/evaluation   Will place referral

## 2021-04-08 DIAGNOSIS — Z23 ENCOUNTER FOR IMMUNIZATION: ICD-10-CM

## 2021-04-29 ENCOUNTER — TELEPHONE (OUTPATIENT)
Dept: FAMILY MEDICINE CLINIC | Facility: CLINIC | Age: 46
End: 2021-04-29

## 2021-04-29 NOTE — TELEPHONE ENCOUNTER
Please advise that tympanic can be less accurate  Recommend verifying with oral temp  Show Repair Anesthesia Variables: Yes Fusiform Excision Additional Text (Leave Blank If You Do Not Want): The margin was drawn around the clinically apparent lesion.  A fusiform shape was then drawn on the skin incorporating the lesion and margins.  Incisions were then made along these lines to the appropriate tissue plane and the lesion was extirpated. Island Pedicle Flap With Canthal Suspension Text: The defect edges were debeveled with a #15 scalpel blade.  Given the location of the defect, shape of the defect and the proximity to free margins an island pedicle advancement flap was deemed most appropriate.  Using a sterile surgical marker, an appropriate advancement flap was drawn incorporating the defect, outlining the appropriate donor tissue and placing the expected incisions within the relaxed skin tension lines where possible. The area thus outlined was incised deep to adipose tissue with a #15 scalpel blade.  The skin margins were undermined to an appropriate distance in all directions around the primary defect and laterally outward around the island pedicle utilizing iris scissors.  There was minimal undermining beneath the pedicle flap. A suspension suture was placed in the canthal tendon to prevent tension and prevent ectropion. Validate Referring Provider (Can Hide Referring Provider In Settings Tab): No V-Y Flap Text: The defect edges were debeveled with a #15 scalpel blade.  Given the location of the defect, shape of the defect and the proximity to free margins a V-Y flap was deemed most appropriate.  Using a sterile surgical marker, an appropriate advancement flap was drawn incorporating the defect and placing the expected incisions within the relaxed skin tension lines where possible.    The area thus outlined was incised deep to adipose tissue with a #15 scalpel blade.  The skin margins were undermined to an appropriate distance in all directions utilizing iris scissors. Anesthesia Volume In Cc: 0 Complex Repair And Bilobe Flap Text: The defect edges were debeveled with a #15 scalpel blade.  The primary defect was closed partially with a complex linear closure.  Given the location of the remaining defect, shape of the defect and the proximity to free margins a bilobe flap was deemed most appropriate for complete closure of the defect.  Using a sterile surgical marker, an appropriate advancement flap was drawn incorporating the defect and placing the expected incisions within the relaxed skin tension lines where possible.    The area thus outlined was incised deep to adipose tissue with a #15 scalpel blade.  The skin margins were undermined to an appropriate distance in all directions utilizing iris scissors. Complex Repair And Melolabial Flap Text: The defect edges were debeveled with a #15 scalpel blade.  The primary defect was closed partially with a complex linear closure.  Given the location of the remaining defect, shape of the defect and the proximity to free margins a melolabial flap was deemed most appropriate for complete closure of the defect.  Using a sterile surgical marker, an appropriate advancement flap was drawn incorporating the defect and placing the expected incisions within the relaxed skin tension lines where possible.    The area thus outlined was incised deep to adipose tissue with a #15 scalpel blade.  The skin margins were undermined to an appropriate distance in all directions utilizing iris scissors. Lip Wedge Excision Repair Text: Given the location of the defect and the proximity to free margins a full thickness wedge repair was deemed most appropriate.  Using a sterile surgical marker, the appropriate repair was drawn incorporating the defect and placing the expected incisions perpendicular to the vermillion border.  The vermillion border was also meticulously outlined to ensure appropriate reapproximation during the repair.  The area thus outlined was incised through and through with a #15 scalpel blade.  The muscularis and dermis were reaproximated with deep sutures following hemostasis. Care was taken to realign the vermillion border before proceeding with the superficial closure.  Once the vermillion was realigned the superfical and mucosal closure was finished. Mercedes Flap Text: The defect edges were debeveled with a #15 scalpel blade.  Given the location of the defect, shape of the defect and the proximity to free margins a Mercedes flap was deemed most appropriate.  Using a sterile surgical marker, an appropriate advancement flap was drawn incorporating the defect and placing the expected incisions within the relaxed skin tension lines where possible. The area thus outlined was incised deep to adipose tissue with a #15 scalpel blade.  The skin margins were undermined to an appropriate distance in all directions utilizing iris scissors. Consent was obtained from the patient. The risks and benefits to therapy were discussed in detail. Specifically, the risks of infection, scarring, bleeding, prolonged wound healing, incomplete removal, allergy to anesthesia, nerve injury and recurrence were addressed. Prior to the procedure, the treatment site was clearly identified and confirmed by the patient. All components of Universal Protocol/PAUSE Rule completed. Alar Island Pedicle Flap Text: The defect edges were debeveled with a #15 scalpel blade.  Given the location of the defect, shape of the defect and the proximity to the alar rim an island pedicle advancement flap was deemed most appropriate.  Using a sterile surgical marker, an appropriate advancement flap was drawn incorporating the defect, outlining the appropriate donor tissue and placing the expected incisions within the nasal ala running parallel to the alar rim. The area thus outlined was incised with a #15 scalpel blade.  The skin margins were undermined minimally to an appropriate distance in all directions around the primary defect and laterally outward around the island pedicle utilizing iris scissors.  There was minimal undermining beneath the pedicle flap. Double Island Pedicle Flap Text: The defect edges were debeveled with a #15 scalpel blade.  Given the location of the defect, shape of the defect and the proximity to free margins a double island pedicle advancement flap was deemed most appropriate.  Using a sterile surgical marker, an appropriate advancement flap was drawn incorporating the defect, outlining the appropriate donor tissue and placing the expected incisions within the relaxed skin tension lines where possible.    The area thus outlined was incised deep to adipose tissue with a #15 scalpel blade.  The skin margins were undermined to an appropriate distance in all directions around the primary defect and laterally outward around the island pedicle utilizing iris scissors.  There was minimal undermining beneath the pedicle flap. Anesthesia Type: 1% lidocaine with epinephrine Complex Repair And Rotation Flap Text: The defect edges were debeveled with a #15 scalpel blade.  The primary defect was closed partially with a complex linear closure.  Given the location of the remaining defect, shape of the defect and the proximity to free margins a rotation flap was deemed most appropriate for complete closure of the defect.  Using a sterile surgical marker, an appropriate advancement flap was drawn incorporating the defect and placing the expected incisions within the relaxed skin tension lines where possible.    The area thus outlined was incised deep to adipose tissue with a #15 scalpel blade.  The skin margins were undermined to an appropriate distance in all directions utilizing iris scissors. Eliptical Excision Additional Text (Leave Blank If You Do Not Want): The margin was drawn around the clinically apparent lesion.  An elliptical shape was then drawn on the skin incorporating the lesion and margins.  Incisions were then made along these lines to the appropriate tissue plane and the lesion was extirpated. Ftsg Text: The defect edges were debeveled with a #15 scalpel blade.  Given the location of the defect, shape of the defect and the proximity to free margins a full thickness skin graft was deemed most appropriate.  Using a sterile surgical marker, the primary defect shape was transferred to the donor site. The area thus outlined was incised deep to adipose tissue with a #15 scalpel blade.  The harvested graft was then trimmed of adipose tissue until only dermis and epidermis was left.  The skin margins of the secondary defect were undermined to an appropriate distance in all directions utilizing iris scissors.  The secondary defect was closed with interrupted buried subcutaneous sutures.  The skin edges were then re-apposed with running  sutures.  The skin graft was then placed in the primary defect and oriented appropriately. Wound Care: Bactroban Split-Thickness Skin Graft Text: The defect edges were debeveled with a #15 scalpel blade.  Given the location of the defect, shape of the defect and the proximity to free margins a split thickness skin graft was deemed most appropriate.  Using a sterile surgical marker, the primary defect shape was transferred to the donor site. The split thickness graft was then harvested.  The skin graft was then placed in the primary defect and oriented appropriately. Medical Necessity Information: It is in your best interest to select a reason for this procedure from the list below. All of these items fulfill various CMS LCD requirements except lesion extends to a margin. Island Pedicle Flap-Requiring Vessel Identification Text: The defect edges were debeveled with a #15 scalpel blade.  Given the location of the defect, shape of the defect and the proximity to free margins an island pedicle advancement flap was deemed most appropriate.  Using a sterile surgical marker, an appropriate advancement flap was drawn, based on the axial vessel mentioned above, incorporating the defect, outlining the appropriate donor tissue and placing the expected incisions within the relaxed skin tension lines where possible.    The area thus outlined was incised deep to adipose tissue with a #15 scalpel blade.  The skin margins were undermined to an appropriate distance in all directions around the primary defect and laterally outward around the island pedicle utilizing iris scissors.  There was minimal undermining beneath the pedicle flap. Saucerization Excision Additional Text (Leave Blank If You Do Not Want): The margin was drawn around the clinically apparent lesion.  Incisions were then made along these lines, in a tangential fashion, to the appropriate tissue plane and the lesion was extirpated. Where Do You Want The Question To Include Opioid Counseling Located?: Case Summary Tab Complex Repair And Rhombic Flap Text: The defect edges were debeveled with a #15 scalpel blade.  The primary defect was closed partially with a complex linear closure.  Given the location of the remaining defect, shape of the defect and the proximity to free margins a rhombic flap was deemed most appropriate for complete closure of the defect.  Using a sterile surgical marker, an appropriate advancement flap was drawn incorporating the defect and placing the expected incisions within the relaxed skin tension lines where possible.    The area thus outlined was incised deep to adipose tissue with a #15 scalpel blade.  The skin margins were undermined to an appropriate distance in all directions utilizing iris scissors. Modified Advancement Flap Text: The defect edges were debeveled with a #15 scalpel blade.  Given the location of the defect, shape of the defect and the proximity to free margins a modified advancement flap was deemed most appropriate.  Using a sterile surgical marker, an appropriate advancement flap was drawn incorporating the defect and placing the expected incisions within the relaxed skin tension lines where possible.    The area thus outlined was incised deep to adipose tissue with a #15 scalpel blade.  The skin margins were undermined to an appropriate distance in all directions utilizing iris scissors. Slit Excision Additional Text (Leave Blank If You Do Not Want): A linear line was drawn on the skin overlying the lesion. An incision was made slowly until the lesion was visualized.  Once visualized, the lesion was removed with blunt dissection. Complex Repair And Transposition Flap Text: The defect edges were debeveled with a #15 scalpel blade.  The primary defect was closed partially with a complex linear closure.  Given the location of the remaining defect, shape of the defect and the proximity to free margins a transposition flap was deemed most appropriate for complete closure of the defect.  Using a sterile surgical marker, an appropriate advancement flap was drawn incorporating the defect and placing the expected incisions within the relaxed skin tension lines where possible.    The area thus outlined was incised deep to adipose tissue with a #15 scalpel blade.  The skin margins were undermined to an appropriate distance in all directions utilizing iris scissors. Cartilage Graft Text: The defect edges were debeveled with a #15 scalpel blade.  Given the location of the defect, shape of the defect, the fact the defect involved a full thickness cartilage defect a cartilage graft was deemed most appropriate.  An appropriate donor site was identified, cleansed, and anesthetized. The cartilage graft was then harvested and transferred to the recipient site, oriented appropriately and then sutured into place.  The secondary defect was then repaired using a primary closure. Deep Sutures: 3-0 PDS Size Of Lesion In Cm: 1 Mucosal Advancement Flap Text: Given the location of the defect, shape of the defect and the proximity to free margins a mucosal advancement flap was deemed most appropriate. Incisions were made with a 15 blade scalpel in the appropriate fashion along the cutaneous vermillion border and the mucosal lip. The remaining actinically damaged mucosal tissue was excised.  The mucosal advancement flap was then elevated to the gingival sulcus with care taken to preserve the neurovascular structures and advanced into the primary defect. Care was taken to ensure that precise realignment of the vermillion border was achieved. Keystone Flap Text: The defect edges were debeveled with a #15 scalpel blade.  Given the location of the defect, shape of the defect a keystone flap was deemed most appropriate.  Using a sterile surgical marker, an appropriate keystone flap was drawn incorporating the defect, outlining the appropriate donor tissue and placing the expected incisions within the relaxed skin tension lines where possible. The area thus outlined was incised deep to adipose tissue with a #15 scalpel blade.  The skin margins were undermined to an appropriate distance in all directions around the primary defect and laterally outward around the flap utilizing iris scissors. O-T Plasty Text: The defect edges were debeveled with a #15 scalpel blade.  Given the location of the defect, shape of the defect and the proximity to free margins an O-T plasty was deemed most appropriate.  Using a sterile surgical marker, an appropriate O-T plasty was drawn incorporating the defect and placing the expected incisions within the relaxed skin tension lines where possible.    The area thus outlined was incised deep to adipose tissue with a #15 scalpel blade.  The skin margins were undermined to an appropriate distance in all directions utilizing iris scissors. Complex Repair And V-Y Plasty Text: The defect edges were debeveled with a #15 scalpel blade.  The primary defect was closed partially with a complex linear closure.  Given the location of the remaining defect, shape of the defect and the proximity to free margins a V-Y plasty was deemed most appropriate for complete closure of the defect.  Using a sterile surgical marker, an appropriate advancement flap was drawn incorporating the defect and placing the expected incisions within the relaxed skin tension lines where possible.    The area thus outlined was incised deep to adipose tissue with a #15 scalpel blade.  The skin margins were undermined to an appropriate distance in all directions utilizing iris scissors. Home Suture Removal Text: Patient was provided a home suture removal kit and will remove their sutures at home.  If they have any questions or difficulties they will call the office. Composite Graft Text: The defect edges were debeveled with a #15 scalpel blade.  Given the location of the defect, shape of the defect, the proximity to free margins and the fact the defect was full thickness a composite graft was deemed most appropriate.  The defect was outline and then transferred to the donor site.  A full thickness graft was then excised from the donor site. The graft was then placed in the primary defect, oriented appropriately and then sutured into place.  The secondary defect was then repaired using a primary closure. Rotation Flap Text: The defect edges were debeveled with a #15 scalpel blade.  Given the location of the defect, shape of the defect and the proximity to free margins a rotation flap was deemed most appropriate.  Using a sterile surgical marker, an appropriate rotation flap was drawn incorporating the defect and placing the expected incisions within the relaxed skin tension lines where possible.    The area thus outlined was incised deep to adipose tissue with a #15 scalpel blade.  The skin margins were undermined to an appropriate distance in all directions utilizing iris scissors. Hatchet Flap Text: The defect edges were debeveled with a #15 scalpel blade.  Given the location of the defect, shape of the defect and the proximity to free margins a hatchet flap was deemed most appropriate.  Using a sterile surgical marker, an appropriate hatchet flap was drawn incorporating the defect and placing the expected incisions within the relaxed skin tension lines where possible.    The area thus outlined was incised deep to adipose tissue with a #15 scalpel blade.  The skin margins were undermined to an appropriate distance in all directions utilizing iris scissors. Post-Care Instructions: I reviewed with the patient in detail post-care instructions. Patient is not to engage in any heavy lifting, exercise, or swimming for the next 14 days. Should the patient develop any fevers, chills, bleeding, severe pain patient will contact the office immediately. Epidermal Autograft Text: The defect edges were debeveled with a #15 scalpel blade.  Given the location of the defect, shape of the defect and the proximity to free margins an epidermal autograft was deemed most appropriate.  Using a sterile surgical marker, the primary defect shape was transferred to the donor site. The epidermal graft was then harvested.  The skin graft was then placed in the primary defect and oriented appropriately. Dressing: dry sterile dressing Excisional Biopsy Additional Text (Leave Blank If You Do Not Want): The margin was drawn around the clinically apparent lesion. An elliptical shape was then drawn on the skin incorporating the lesion and margins.  Incisions were then made along these lines to the appropriate tissue plane and the lesion was extirpated. Billing Type: Third-Party Bill Additional Anesthesia Volume In Cc: 6 O-Z Plasty Text: The defect edges were debeveled with a #15 scalpel blade.  Given the location of the defect, shape of the defect and the proximity to free margins an O-Z plasty (double transposition flap) was deemed most appropriate.  Using a sterile surgical marker, the appropriate transposition flaps were drawn incorporating the defect and placing the expected incisions within the relaxed skin tension lines where possible.    The area thus outlined was incised deep to adipose tissue with a #15 scalpel blade.  The skin margins were undermined to an appropriate distance in all directions utilizing iris scissors.  Hemostasis was achieved with electrocautery.  The flaps were then transposed into place, one clockwise and the other counterclockwise, and anchored with interrupted buried subcutaneous sutures. Spiral Flap Text: The defect edges were debeveled with a #15 scalpel blade.  Given the location of the defect, shape of the defect and the proximity to free margins a spiral flap was deemed most appropriate.  Using a sterile surgical marker, an appropriate rotation flap was drawn incorporating the defect and placing the expected incisions within the relaxed skin tension lines where possible. The area thus outlined was incised deep to adipose tissue with a #15 scalpel blade.  The skin margins were undermined to an appropriate distance in all directions utilizing iris scissors. Complex Repair And M Plasty Text: The defect edges were debeveled with a #15 scalpel blade.  The primary defect was closed partially with a complex linear closure.  Given the location of the remaining defect, shape of the defect and the proximity to free margins an M plasty was deemed most appropriate for complete closure of the defect.  Using a sterile surgical marker, an appropriate advancement flap was drawn incorporating the defect and placing the expected incisions within the relaxed skin tension lines where possible.    The area thus outlined was incised deep to adipose tissue with a #15 scalpel blade.  The skin margins were undermined to an appropriate distance in all directions utilizing iris scissors. Complex Repair And Double M Plasty Text: The defect edges were debeveled with a #15 scalpel blade.  The primary defect was closed partially with a complex linear closure.  Given the location of the remaining defect, shape of the defect and the proximity to free margins a double M plasty was deemed most appropriate for complete closure of the defect.  Using a sterile surgical marker, an appropriate advancement flap was drawn incorporating the defect and placing the expected incisions within the relaxed skin tension lines where possible.    The area thus outlined was incised deep to adipose tissue with a #15 scalpel blade.  The skin margins were undermined to an appropriate distance in all directions utilizing iris scissors. Perilesional Excision Additional Text (Leave Blank If You Do Not Want): The margin was drawn around the clinically apparent lesion. Incisions were then made along these lines to the appropriate tissue plane and the lesion was extirpated. Dermal Autograft Text: The defect edges were debeveled with a #15 scalpel blade.  Given the location of the defect, shape of the defect and the proximity to free margins a dermal autograft was deemed most appropriate.  Using a sterile surgical marker, the primary defect shape was transferred to the donor site. The area thus outlined was incised deep to adipose tissue with a #15 scalpel blade.  The harvested graft was then trimmed of adipose and epidermal tissue until only dermis was left.  The skin graft was then placed in the primary defect and oriented appropriately. Size Of Margin In Cm: 0.5 Star Wedge Flap Text: The defect edges were debeveled with a #15 scalpel blade.  Given the location of the defect, shape of the defect and the proximity to free margins a star wedge flap was deemed most appropriate.  Using a sterile surgical marker, an appropriate rotation flap was drawn incorporating the defect and placing the expected incisions within the relaxed skin tension lines where possible. The area thus outlined was incised deep to adipose tissue with a #15 scalpel blade.  The skin margins were undermined to an appropriate distance in all directions utilizing iris scissors. Repair Performed By Another Provider Text (Leave Blank If You Do Not Want): After the tissue was excised the defect was repaired by another provider. Hemostasis: Electrocautery Double O-Z Plasty Text: The defect edges were debeveled with a #15 scalpel blade.  Given the location of the defect, shape of the defect and the proximity to free margins a Double O-Z plasty (double transposition flap) was deemed most appropriate.  Using a sterile surgical marker, the appropriate transposition flaps were drawn incorporating the defect and placing the expected incisions within the relaxed skin tension lines where possible. The area thus outlined was incised deep to adipose tissue with a #15 scalpel blade.  The skin margins were undermined to an appropriate distance in all directions utilizing iris scissors.  Hemostasis was achieved with electrocautery.  The flaps were then transposed into place, one clockwise and the other counterclockwise, and anchored with interrupted buried subcutaneous sutures. Skin Substitute Text: The defect edges were debeveled with a #15 scalpel blade.  Given the location of the defect, shape of the defect and the proximity to free margins a skin substitute graft was deemed most appropriate.  The graft material was trimmed to fit the size of the defect. The graft was then placed in the primary defect and oriented appropriately. S Plasty Text: Given the location and shape of the defect, and the orientation of relaxed skin tension lines, an S-plasty was deemed most appropriate for repair.  Using a sterile surgical marker, the appropriate outline of the S-plasty was drawn, incorporating the defect and placing the expected incisions within the relaxed skin tension lines where possible.  The area thus outlined was incised deep to adipose tissue with a #15 scalpel blade.  The skin margins were undermined to an appropriate distance in all directions utilizing iris scissors. The skin flaps were advanced over the defect.  The opposing margins were then approximated with interrupted buried subcutaneous sutures. Epidermal Closure Graft Donor Site (Optional): simple interrupted Complex Repair And W Plasty Text: The defect edges were debeveled with a #15 scalpel blade.  The primary defect was closed partially with a complex linear closure.  Given the location of the remaining defect, shape of the defect and the proximity to free margins a W plasty was deemed most appropriate for complete closure of the defect.  Using a sterile surgical marker, an appropriate advancement flap was drawn incorporating the defect and placing the expected incisions within the relaxed skin tension lines where possible.    The area thus outlined was incised deep to adipose tissue with a #15 scalpel blade.  The skin margins were undermined to an appropriate distance in all directions utilizing iris scissors. Anesthesia Volume In Cc: 5 No Repair - Repaired With Adjacent Surgical Defect Text (Leave Blank If You Do Not Want): After the excision the defect was repaired concurrently with another surgical defect which was in close approximation. Graft Donor Site Bandage (Optional-Leave Blank If You Don't Want In Note): Steri-strips and a pressure bandage were applied to the donor site. Excision Method: Elliptical Tissue Cultured Epidermal Autograft Text: The defect edges were debeveled with a #15 scalpel blade.  Given the location of the defect, shape of the defect and the proximity to free margins a tissue cultured epidermal autograft was deemed most appropriate.  The graft was then trimmed to fit the size of the defect.  The graft was then placed in the primary defect and oriented appropriately. V-Y Plasty Text: The defect edges were debeveled with a #15 scalpel blade.  Given the location of the defect, shape of the defect and the proximity to free margins an V-Y advancement flap was deemed most appropriate.  Using a sterile surgical marker, an appropriate advancement flap was drawn incorporating the defect and placing the expected incisions within the relaxed skin tension lines where possible.    The area thus outlined was incised deep to adipose tissue with a #15 scalpel blade.  The skin margins were undermined to an appropriate distance in all directions utilizing iris scissors. Information: Selecting Yes will display possible errors in your note based on the variables you have selected. This validation is only offered as a suggestion for you. PLEASE NOTE THAT THE VALIDATION TEXT WILL BE REMOVED WHEN YOU FINALIZE YOUR NOTE. IF YOU WANT TO FAX A PRELIMINARY NOTE YOU WILL NEED TO TOGGLE THIS TO 'NO' IF YOU DO NOT WANT IT IN YOUR FAXED NOTE. Complex Repair And Z Plasty Text: The defect edges were debeveled with a #15 scalpel blade.  The primary defect was closed partially with a complex linear closure.  Given the location of the remaining defect, shape of the defect and the proximity to free margins a Z plasty was deemed most appropriate for complete closure of the defect.  Using a sterile surgical marker, an appropriate advancement flap was drawn incorporating the defect and placing the expected incisions within the relaxed skin tension lines where possible.    The area thus outlined was incised deep to adipose tissue with a #15 scalpel blade.  The skin margins were undermined to an appropriate distance in all directions utilizing iris scissors. Transposition Flap Text: The defect edges were debeveled with a #15 scalpel blade.  Given the location of the defect and the proximity to free margins a transposition flap was deemed most appropriate.  Using a sterile surgical marker, an appropriate transposition flap was drawn incorporating the defect.    The area thus outlined was incised deep to adipose tissue with a #15 scalpel blade.  The skin margins were undermined to an appropriate distance in all directions utilizing iris scissors. Estimated Blood Loss (Cc): minimal Advancement Flap (Single) Text: The defect edges were debeveled with a #15 scalpel blade.  Given the location of the defect and the proximity to free margins a single advancement flap was deemed most appropriate.  Using a sterile surgical marker, an appropriate advancement flap was drawn incorporating the defect and placing the expected incisions within the relaxed skin tension lines where possible.    The area thus outlined was incised deep to adipose tissue with a #15 scalpel blade.  The skin margins were undermined to an appropriate distance in all directions utilizing iris scissors. Complex Repair And Dorsal Nasal Flap Text: The defect edges were debeveled with a #15 scalpel blade.  The primary defect was closed partially with a complex linear closure.  Given the location of the remaining defect, shape of the defect and the proximity to free margins a dorsal nasal flap was deemed most appropriate for complete closure of the defect.  Using a sterile surgical marker, an appropriate flap was drawn incorporating the defect and placing the expected incisions within the relaxed skin tension lines where possible.    The area thus outlined was incised deep to adipose tissue with a #15 scalpel blade.  The skin margins were undermined to an appropriate distance in all directions utilizing iris scissors. Detail Level: Detailed Muscle Hinge Flap Text: The defect edges were debeveled with a #15 scalpel blade.  Given the size, depth and location of the defect and the proximity to free margins a muscle hinge flap was deemed most appropriate.  Using a sterile surgical marker, an appropriate hinge flap was drawn incorporating the defect. The area thus outlined was incised with a #15 scalpel blade.  The skin margins were undermined to an appropriate distance in all directions utilizing iris scissors. Xenograft Text: The defect edges were debeveled with a #15 scalpel blade.  Given the location of the defect, shape of the defect and the proximity to free margins a xenograft was deemed most appropriate.  The graft was then trimmed to fit the size of the defect.  The graft was then placed in the primary defect and oriented appropriately. H Plasty Text: Given the location of the defect, shape of the defect and the proximity to free margins a H-plasty was deemed most appropriate for repair.  Using a sterile surgical marker, the appropriate advancement arms of the H-plasty were drawn incorporating the defect and placing the expected incisions within the relaxed skin tension lines where possible. The area thus outlined was incised deep to adipose tissue with a #15 scalpel blade. The skin margins were undermined to an appropriate distance in all directions utilizing iris scissors.  The opposing advancement arms were then advanced into place in opposite direction and anchored with interrupted buried subcutaneous sutures. Complex Repair Preamble Text (Leave Blank If You Do Not Want): Extensive wide undermining was performed. Purse String (Intermediate) Text: Given the location of the defect and the characteristics of the surrounding skin a pursestring intermediate closure was deemed most appropriate.  Undermining was performed circumfirentially around the surgical defect.  A purstring suture was then placed and tightened. W Plasty Text: The lesion was extirpated to the level of the fat with a #15 scalpel blade.  Given the location of the defect, shape of the defect and the proximity to free margins a W-plasty was deemed most appropriate for repair.  Using a sterile surgical marker, the appropriate transposition arms of the W-plasty were drawn incorporating the defect and placing the expected incisions within the relaxed skin tension lines where possible.    The area thus outlined was incised deep to adipose tissue with a #15 scalpel blade.  The skin margins were undermined to an appropriate distance in all directions utilizing iris scissors.  The opposing transposition arms were then transposed into place in opposite direction and anchored with interrupted buried subcutaneous sutures. Complex Repair And Ftsg Text: The defect edges were debeveled with a #15 scalpel blade.  The primary defect was closed partially with a complex linear closure.  Given the location of the defect, shape of the defect and the proximity to free margins a full thickness skin graft was deemed most appropriate to repair the remaining defect.  The graft was trimmed to fit the size of the remaining defect.  The graft was then placed in the primary defect, oriented appropriately, and sutured into place. Bilateral Helical Rim Advancement Flap Text: The defect edges were debeveled with a #15 blade scalpel.  Given the location of the defect and the proximity to free margins (helical rim) a bilateral helical rim advancement flap was deemed most appropriate.  Using a sterile surgical marker, the appropriate advancement flaps were drawn incorporating the defect and placing the expected incisions between the helical rim and antihelix where possible.  The area thus outlined was incised through and through with a #15 scalpel blade.  With a skin hook and iris scissors, the flaps were gently and sharply undermined and freed up. Medical Necessity Clause: This procedure was medically necessary because the lesion that was treated was: Rhombic Flap Text: The defect edges were debeveled with a #15 scalpel blade.  Given the location of the defect and the proximity to free margins a rhombic flap was deemed most appropriate.  Using a sterile surgical marker, an appropriate rhombic flap was drawn incorporating the defect.    The area thus outlined was incised deep to adipose tissue with a #15 scalpel blade.  The skin margins were undermined to an appropriate distance in all directions utilizing iris scissors. Melolabial Transposition Flap Text: The defect edges were debeveled with a #15 scalpel blade.  Given the location of the defect and the proximity to free margins a melolabial flap was deemed most appropriate.  Using a sterile surgical marker, an appropriate melolabial transposition flap was drawn incorporating the defect.    The area thus outlined was incised deep to adipose tissue with a #15 scalpel blade.  The skin margins were undermined to an appropriate distance in all directions utilizing iris scissors. Lab: [id number] Intermediate Repair Preamble Text (Leave Blank If You Do Not Want): Undermining was performed with blunt dissection. Advancement Flap (Double) Text: The defect edges were debeveled with a #15 scalpel blade.  Given the location of the defect and the proximity to free margins a double advancement flap was deemed most appropriate.  Using a sterile surgical marker, the appropriate advancement flaps were drawn incorporating the defect and placing the expected incisions within the relaxed skin tension lines where possible.    The area thus outlined was incised deep to adipose tissue with a #15 scalpel blade.  The skin margins were undermined to an appropriate distance in all directions utilizing iris scissors. Rhomboid Transposition Flap Text: The defect edges were debeveled with a #15 scalpel blade.  Given the location of the defect and the proximity to free margins a rhomboid transposition flap was deemed most appropriate.  Using a sterile surgical marker, an appropriate rhomboid flap was drawn incorporating the defect.    The area thus outlined was incised deep to adipose tissue with a #15 scalpel blade.  The skin margins were undermined to an appropriate distance in all directions utilizing iris scissors. Purse String (Simple) Text: Given the location of the defect and the characteristics of the surrounding skin a purse string simple closure was deemed most appropriate.  Undermining was performed circumferentially around the surgical defect.  A purse string suture was then placed and tightened. Z Plasty Text: The lesion was extirpated to the level of the fat with a #15 scalpel blade.  Given the location of the defect, shape of the defect and the proximity to free margins a Z-plasty was deemed most appropriate for repair.  Using a sterile surgical marker, the appropriate transposition arms of the Z-plasty were drawn incorporating the defect and placing the expected incisions within the relaxed skin tension lines where possible.    The area thus outlined was incised deep to adipose tissue with a #15 scalpel blade.  The skin margins were undermined to an appropriate distance in all directions utilizing iris scissors.  The opposing transposition arms were then transposed into place in opposite direction and anchored with interrupted buried subcutaneous sutures. Chonodrocutaneous Helical Advancement Flap Text: The defect edges were debeveled with a #15 scalpel blade.  Given the location of the defect and the proximity to free margins a chondrocutaneous helical advancement flap was deemed most appropriate.  Using a sterile surgical marker, the appropriate advancement flap was drawn incorporating the defect and placing the expected incisions within the relaxed skin tension lines where possible.    The area thus outlined was incised deep to adipose tissue with a #15 scalpel blade.  The skin margins were undermined to an appropriate distance in all directions utilizing iris scissors. Ear Star Wedge Flap Text: The defect edges were debeveled with a #15 blade scalpel.  Given the location of the defect and the proximity to free margins (helical rim) an ear star wedge flap was deemed most appropriate.  Using a sterile surgical marker, the appropriate flap was drawn incorporating the defect and placing the expected incisions between the helical rim and antihelix where possible.  The area thus outlined was incised through and through with a #15 scalpel blade. Complex Repair And Split-Thickness Skin Graft Text: The defect edges were debeveled with a #15 scalpel blade.  The primary defect was closed partially with a complex linear closure.  Given the location of the defect, shape of the defect and the proximity to free margins a split thickness skin graft was deemed most appropriate to repair the remaining defect.  The graft was trimmed to fit the size of the remaining defect.  The graft was then placed in the primary defect, oriented appropriately, and sutured into place. Cheek Interpolation Flap Text: A decision was made to reconstruct the defect utilizing an interpolation axial flap and a staged reconstruction.  A telfa template was made of the defect.  This telfa template was then used to outline the Cheek Interpolation flap.  The donor area for the pedicle flap was then injected with anesthesia.  The flap was excised through the skin and subcutaneous tissue down to the layer of the underlying musculature.  The interpolation flap was carefully excised within this deep plane to maintain its blood supply.  The edges of the donor site were undermined.   The donor site was closed in a primary fashion.  The pedicle was then rotated into position and sutured.  Once the tube was sutured into place, adequate blood supply was confirmed with blanching and refill.  The pedicle was then wrapped with xeroform gauze and dressed appropriately with a telfa and gauze bandage to ensure continued blood supply and protect the attached pedicle. Complex Repair And Epidermal Autograft Text: The defect edges were debeveled with a #15 scalpel blade.  The primary defect was closed partially with a complex linear closure.  Given the location of the defect, shape of the defect and the proximity to free margins an epidermal autograft was deemed most appropriate to repair the remaining defect.  The graft was trimmed to fit the size of the remaining defect.  The graft was then placed in the primary defect, oriented appropriately, and sutured into place. Burow's Advancement Flap Text: The defect edges were debeveled with a #15 scalpel blade.  Given the location of the defect and the proximity to free margins a Burow's advancement flap was deemed most appropriate.  Using a sterile surgical marker, the appropriate advancement flap was drawn incorporating the defect and placing the expected incisions within the relaxed skin tension lines where possible.    The area thus outlined was incised deep to adipose tissue with a #15 scalpel blade.  The skin margins were undermined to an appropriate distance in all directions utilizing iris scissors. Lab Facility: [id number] Suture Removal: 14 days Epidermal Sutures: 4-0 Prolene Repair Type: Intermediate Excision Depth: adipose tissue Bi-Rhombic Flap Text: The defect edges were debeveled with a #15 scalpel blade.  Given the location of the defect and the proximity to free margins a bi-rhombic flap was deemed most appropriate.  Using a sterile surgical marker, an appropriate rhombic flap was drawn incorporating the defect. The area thus outlined was incised deep to adipose tissue with a #15 scalpel blade.  The skin margins were undermined to an appropriate distance in all directions utilizing iris scissors. Banner Transposition Flap Text: The defect edges were debeveled with a #15 scalpel blade.  Given the location of the defect and the proximity to free margins a Banner transposition flap was deemed most appropriate.  Using a sterile surgical marker, an appropriate flap drawn around the defect. The area thus outlined was incised deep to adipose tissue with a #15 scalpel blade.  The skin margins were undermined to an appropriate distance in all directions utilizing iris scissors. Crescentic Advancement Flap Text: The defect edges were debeveled with a #15 scalpel blade.  Given the location of the defect and the proximity to free margins a crescentic advancement flap was deemed most appropriate.  Using a sterile surgical marker, the appropriate advancement flap was drawn incorporating the defect and placing the expected incisions within the relaxed skin tension lines where possible.    The area thus outlined was incised deep to adipose tissue with a #15 scalpel blade.  The skin margins were undermined to an appropriate distance in all directions utilizing iris scissors. Complex Repair And Single Advancement Flap Text: The defect edges were debeveled with a #15 scalpel blade.  The primary defect was closed partially with a complex linear closure.  Given the location of the remaining defect, shape of the defect and the proximity to free margins a single advancement flap was deemed most appropriate for complete closure of the defect.  Using a sterile surgical marker, an appropriate advancement flap was drawn incorporating the defect and placing the expected incisions within the relaxed skin tension lines where possible.    The area thus outlined was incised deep to adipose tissue with a #15 scalpel blade.  The skin margins were undermined to an appropriate distance in all directions utilizing iris scissors. Complex Repair And Double Advancement Flap Text: The defect edges were debeveled with a #15 scalpel blade.  The primary defect was closed partially with a complex linear closure.  Given the location of the remaining defect, shape of the defect and the proximity to free margins a double advancement flap was deemed most appropriate for complete closure of the defect.  Using a sterile surgical marker, an appropriate advancement flap was drawn incorporating the defect and placing the expected incisions within the relaxed skin tension lines where possible.    The area thus outlined was incised deep to adipose tissue with a #15 scalpel blade.  The skin margins were undermined to an appropriate distance in all directions utilizing iris scissors. Cheek-To-Nose Interpolation Flap Text: A decision was made to reconstruct the defect utilizing an interpolation axial flap and a staged reconstruction.  A telfa template was made of the defect.  This telfa template was then used to outline the Cheek-To-Nose Interpolation flap.  The donor area for the pedicle flap was then injected with anesthesia.  The flap was excised through the skin and subcutaneous tissue down to the layer of the underlying musculature.  The interpolation flap was carefully excised within this deep plane to maintain its blood supply.  The edges of the donor site were undermined.   The donor site was closed in a primary fashion.  The pedicle was then rotated into position and sutured.  Once the tube was sutured into place, adequate blood supply was confirmed with blanching and refill.  The pedicle was then wrapped with xeroform gauze and dressed appropriately with a telfa and gauze bandage to ensure continued blood supply and protect the attached pedicle. Complex Repair And Dermal Autograft Text: The defect edges were debeveled with a #15 scalpel blade.  The primary defect was closed partially with a complex linear closure.  Given the location of the defect, shape of the defect and the proximity to free margins an dermal autograft was deemed most appropriate to repair the remaining defect.  The graft was trimmed to fit the size of the remaining defect.  The graft was then placed in the primary defect, oriented appropriately, and sutured into place. A-T Advancement Flap Text: The defect edges were debeveled with a #15 scalpel blade.  Given the location of the defect, shape of the defect and the proximity to free margins an A-T advancement flap was deemed most appropriate.  Using a sterile surgical marker, an appropriate advancement flap was drawn incorporating the defect and placing the expected incisions within the relaxed skin tension lines where possible.    The area thus outlined was incised deep to adipose tissue with a #15 scalpel blade.  The skin margins were undermined to an appropriate distance in all directions utilizing iris scissors. Bilobed Flap Text: The defect edges were debeveled with a #15 scalpel blade.  Given the location of the defect and the proximity to free margins a bilobe flap was deemed most appropriate.  Using a sterile surgical marker, an appropriate bilobe flap drawn around the defect.    The area thus outlined was incised deep to adipose tissue with a #15 scalpel blade.  The skin margins were undermined to an appropriate distance in all directions utilizing iris scissors. Scalpel Size: 15 blade Bilobed Transposition Flap Text: The defect edges were debeveled with a #15 scalpel blade.  Given the location of the defect and the proximity to free margins a bilobed transposition flap was deemed most appropriate.  Using a sterile surgical marker, an appropriate bilobe flap drawn around the defect.    The area thus outlined was incised deep to adipose tissue with a #15 scalpel blade.  The skin margins were undermined to an appropriate distance in all directions utilizing iris scissors. Complex Repair And Modified Advancement Flap Text: The defect edges were debeveled with a #15 scalpel blade.  The primary defect was closed partially with a complex linear closure.  Given the location of the remaining defect, shape of the defect and the proximity to free margins a modified advancement flap was deemed most appropriate for complete closure of the defect.  Using a sterile surgical marker, an appropriate advancement flap was drawn incorporating the defect and placing the expected incisions within the relaxed skin tension lines where possible.    The area thus outlined was incised deep to adipose tissue with a #15 scalpel blade.  The skin margins were undermined to an appropriate distance in all directions utilizing iris scissors. Helical Rim Advancement Flap Text: The defect edges were debeveled with a #15 blade scalpel.  Given the location of the defect and the proximity to free margins (helical rim) a double helical rim advancement flap was deemed most appropriate.  Using a sterile surgical marker, the appropriate advancement flaps were drawn incorporating the defect and placing the expected incisions between the helical rim and antihelix where possible.  The area thus outlined was incised through and through with a #15 scalpel blade.  With a skin hook and iris scissors, the flaps were gently and sharply undermined and freed up. Interpolation Flap Text: A decision was made to reconstruct the defect utilizing an interpolation axial flap and a staged reconstruction.  A telfa template was made of the defect.  This telfa template was then used to outline the interpolation flap.  The donor area for the pedicle flap was then injected with anesthesia.  The flap was excised through the skin and subcutaneous tissue down to the layer of the underlying musculature.  The interpolation flap was carefully excised within this deep plane to maintain its blood supply.  The edges of the donor site were undermined.   The donor site was closed in a primary fashion.  The pedicle was then rotated into position and sutured.  Once the tube was sutured into place, adequate blood supply was confirmed with blanching and refill.  The pedicle was then wrapped with xeroform gauze and dressed appropriately with a telfa and gauze bandage to ensure continued blood supply and protect the attached pedicle. Complex Repair And Tissue Cultured Epidermal Autograft Text: The defect edges were debeveled with a #15 scalpel blade.  The primary defect was closed partially with a complex linear closure.  Given the location of the defect, shape of the defect and the proximity to free margins an tissue cultured epidermal autograft was deemed most appropriate to repair the remaining defect.  The graft was trimmed to fit the size of the remaining defect.  The graft was then placed in the primary defect, oriented appropriately, and sutured into place. Melolabial Interpolation Flap Text: A decision was made to reconstruct the defect utilizing an interpolation axial flap and a staged reconstruction.  A telfa template was made of the defect.  This telfa template was then used to outline the melolabial interpolation flap.  The donor area for the pedicle flap was then injected with anesthesia.  The flap was excised through the skin and subcutaneous tissue down to the layer of the underlying musculature.  The pedicle flap was carefully excised within this deep plane to maintain its blood supply.  The edges of the donor site were undermined.   The donor site was closed in a primary fashion.  The pedicle was then rotated into position and sutured.  Once the tube was sutured into place, adequate blood supply was confirmed with blanching and refill.  The pedicle was then wrapped with xeroform gauze and dressed appropriately with a telfa and gauze bandage to ensure continued blood supply and protect the attached pedicle. Complex Repair And Xenograft Text: The defect edges were debeveled with a #15 scalpel blade.  The primary defect was closed partially with a complex linear closure.  Given the location of the defect, shape of the defect and the proximity to free margins an tissue cultured epidermal autograft was deemed most appropriate to repair the remaining defect.  The graft was trimmed to fit the size of the remaining defect.  The graft was then placed in the primary defect, oriented appropriately, and sutured into place. Trilobed Flap Text: The defect edges were debeveled with a #15 scalpel blade.  Given the location of the defect and the proximity to free margins a trilobed flap was deemed most appropriate.  Using a sterile surgical marker, an appropriate trilobed flap drawn around the defect.    The area thus outlined was incised deep to adipose tissue with a #15 scalpel blade.  The skin margins were undermined to an appropriate distance in all directions utilizing iris scissors. O-T Advancement Flap Text: The defect edges were debeveled with a #15 scalpel blade.  Given the location of the defect, shape of the defect and the proximity to free margins an O-T advancement flap was deemed most appropriate.  Using a sterile surgical marker, an appropriate advancement flap was drawn incorporating the defect and placing the expected incisions within the relaxed skin tension lines where possible.    The area thus outlined was incised deep to adipose tissue with a #15 scalpel blade.  The skin margins were undermined to an appropriate distance in all directions utilizing iris scissors. Complex Repair And A-T Advancement Flap Text: The defect edges were debeveled with a #15 scalpel blade.  The primary defect was closed partially with a complex linear closure.  Given the location of the remaining defect, shape of the defect and the proximity to free margins an A-T advancement flap was deemed most appropriate for complete closure of the defect.  Using a sterile surgical marker, an appropriate advancement flap was drawn incorporating the defect and placing the expected incisions within the relaxed skin tension lines where possible.    The area thus outlined was incised deep to adipose tissue with a #15 scalpel blade.  The skin margins were undermined to an appropriate distance in all directions utilizing iris scissors. Complex Repair And O-T Advancement Flap Text: The defect edges were debeveled with a #15 scalpel blade.  The primary defect was closed partially with a complex linear closure.  Given the location of the remaining defect, shape of the defect and the proximity to free margins an O-T advancement flap was deemed most appropriate for complete closure of the defect.  Using a sterile surgical marker, an appropriate advancement flap was drawn incorporating the defect and placing the expected incisions within the relaxed skin tension lines where possible.    The area thus outlined was incised deep to adipose tissue with a #15 scalpel blade.  The skin margins were undermined to an appropriate distance in all directions utilizing iris scissors. Mastoid Interpolation Flap Text: A decision was made to reconstruct the defect utilizing an interpolation axial flap and a staged reconstruction.  A telfa template was made of the defect.  This telfa template was then used to outline the mastoid interpolation flap.  The donor area for the pedicle flap was then injected with anesthesia.  The flap was excised through the skin and subcutaneous tissue down to the layer of the underlying musculature.  The pedicle flap was carefully excised within this deep plane to maintain its blood supply.  The edges of the donor site were undermined.   The donor site was closed in a primary fashion.  The pedicle was then rotated into position and sutured.  Once the tube was sutured into place, adequate blood supply was confirmed with blanching and refill.  The pedicle was then wrapped with xeroform gauze and dressed appropriately with a telfa and gauze bandage to ensure continued blood supply and protect the attached pedicle. O-L Flap Text: The defect edges were debeveled with a #15 scalpel blade.  Given the location of the defect, shape of the defect and the proximity to free margins an O-L flap was deemed most appropriate.  Using a sterile surgical marker, an appropriate advancement flap was drawn incorporating the defect and placing the expected incisions within the relaxed skin tension lines where possible.    The area thus outlined was incised deep to adipose tissue with a #15 scalpel blade.  The skin margins were undermined to an appropriate distance in all directions utilizing iris scissors. Dorsal Nasal Flap Text: The defect edges were debeveled with a #15 scalpel blade.  Given the location of the defect and the proximity to free margins a dorsal nasal flap was deemed most appropriate.  Using a sterile surgical marker, an appropriate dorsal nasal flap was drawn around the defect.    The area thus outlined was incised deep to adipose tissue with a #15 scalpel blade.  The skin margins were undermined to an appropriate distance in all directions utilizing iris scissors. Complex Repair And Skin Substitute Graft Text: The defect edges were debeveled with a #15 scalpel blade.  The primary defect was closed partially with a complex linear closure.  Given the location of the remaining defect, shape of the defect and the proximity to free margins a skin substitute graft was deemed most appropriate to repair the remaining defect.  The graft was trimmed to fit the size of the remaining defect.  The graft was then placed in the primary defect, oriented appropriately, and sutured into place. Island Pedicle Flap Text: The defect edges were debeveled with a #15 scalpel blade.  Given the location of the defect, shape of the defect and the proximity to free margins an island pedicle advancement flap was deemed most appropriate.  Using a sterile surgical marker, an appropriate advancement flap was drawn incorporating the defect, outlining the appropriate donor tissue and placing the expected incisions within the relaxed skin tension lines where possible.    The area thus outlined was incised deep to adipose tissue with a #15 scalpel blade.  The skin margins were undermined to an appropriate distance in all directions around the primary defect and laterally outward around the island pedicle utilizing iris scissors.  There was minimal undermining beneath the pedicle flap. Complex Repair And O-L Flap Text: The defect edges were debeveled with a #15 scalpel blade.  The primary defect was closed partially with a complex linear closure.  Given the location of the remaining defect, shape of the defect and the proximity to free margins an O-L flap was deemed most appropriate for complete closure of the defect.  Using a sterile surgical marker, an appropriate flap was drawn incorporating the defect and placing the expected incisions within the relaxed skin tension lines where possible.    The area thus outlined was incised deep to adipose tissue with a #15 scalpel blade.  The skin margins were undermined to an appropriate distance in all directions utilizing iris scissors. Posterior Auricular Interpolation Flap Text: A decision was made to reconstruct the defect utilizing an interpolation axial flap and a staged reconstruction.  A telfa template was made of the defect.  This telfa template was then used to outline the posterior auricular interpolation flap.  The donor area for the pedicle flap was then injected with anesthesia.  The flap was excised through the skin and subcutaneous tissue down to the layer of the underlying musculature.  The pedicle flap was carefully excised within this deep plane to maintain its blood supply.  The edges of the donor site were undermined.   The donor site was closed in a primary fashion.  The pedicle was then rotated into position and sutured.  Once the tube was sutured into place, adequate blood supply was confirmed with blanching and refill.  The pedicle was then wrapped with xeroform gauze and dressed appropriately with a telfa and gauze bandage to ensure continued blood supply and protect the attached pedicle. Path Notes (To The Dermatopathologist): Please check margins. Double O-Z Flap Text: The defect edges were debeveled with a #15 scalpel blade.  Given the location of the defect, shape of the defect and the proximity to free margins a Double O-Z flap was deemed most appropriate.  Using a sterile surgical marker, an appropriate transposition flap was drawn incorporating the defect and placing the expected incisions within the relaxed skin tension lines where possible. The area thus outlined was incised deep to adipose tissue with a #15 scalpel blade.  The skin margins were undermined to an appropriate distance in all directions utilizing iris scissors. Paramedian Forehead Flap Text: A decision was made to reconstruct the defect utilizing an interpolation axial flap and a staged reconstruction.  A telfa template was made of the defect.  This telfa template was then used to outline the paramedian forehead pedicle flap.  The donor area for the pedicle flap was then injected with anesthesia.  The flap was excised through the skin and subcutaneous tissue down to the layer of the underlying musculature.  The pedicle flap was carefully excised within this deep plane to maintain its blood supply.  The edges of the donor site were undermined.   The donor site was closed in a primary fashion.  The pedicle was then rotated into position and sutured.  Once the tube was sutured into place, adequate blood supply was confirmed with blanching and refill.  The pedicle was then wrapped with xeroform gauze and dressed appropriately with a telfa and gauze bandage to ensure continued blood supply and protect the attached pedicle. O-Z Flap Text: The defect edges were debeveled with a #15 scalpel blade.  Given the location of the defect, shape of the defect and the proximity to free margins an O-Z flap was deemed most appropriate.  Using a sterile surgical marker, an appropriate transposition flap was drawn incorporating the defect and placing the expected incisions within the relaxed skin tension lines where possible. The area thus outlined was incised deep to adipose tissue with a #15 scalpel blade.  The skin margins were undermined to an appropriate distance in all directions utilizing iris scissors. Epidermal Closure: running

## 2021-04-29 NOTE — TELEPHONE ENCOUNTER
Called and spoke with pt  Informed her of recommendations  Pt verbalized an understanding  Pt is using a tympanic thermometer

## 2021-04-29 NOTE — TELEPHONE ENCOUNTER
Pt called and stated that she is to get her covid vaccine tomorrow,but woke up with a  Fever of 100  3  but it had dropped to 99 7      She doesn't know if she had exposure  Only symptom is   Congestion, but feels that this is because of allergies    She has not taken any over the counter medication at all    Should she get the covid vaccine or reschedule    Please advise    Phone: [phone]

## 2021-04-29 NOTE — TELEPHONE ENCOUNTER
As long as fever subsides, temp < 100 0, without use of fever-reducing medications and she remains asymptomatic for next 24 hours she okay to get vaccine  What method is she using to check temp? Ie what type of thermometer?

## 2021-05-17 ENCOUNTER — TELEPHONE (OUTPATIENT)
Dept: FAMILY MEDICINE CLINIC | Facility: CLINIC | Age: 46
End: 2021-05-17

## 2021-05-17 ENCOUNTER — OFFICE VISIT (OUTPATIENT)
Dept: FAMILY MEDICINE CLINIC | Facility: CLINIC | Age: 46
End: 2021-05-17
Payer: COMMERCIAL

## 2021-05-17 VITALS
RESPIRATION RATE: 20 BRPM | SYSTOLIC BLOOD PRESSURE: 120 MMHG | HEIGHT: 60 IN | HEART RATE: 79 BPM | TEMPERATURE: 99.2 F | BODY MASS INDEX: 31.29 KG/M2 | OXYGEN SATURATION: 99 % | WEIGHT: 159.4 LBS | DIASTOLIC BLOOD PRESSURE: 76 MMHG

## 2021-05-17 DIAGNOSIS — R53.83 FATIGUE, UNSPECIFIED TYPE: Primary | ICD-10-CM

## 2021-05-17 DIAGNOSIS — R76.8 POSITIVE ANA (ANTINUCLEAR ANTIBODY): ICD-10-CM

## 2021-05-17 DIAGNOSIS — E66.09 CLASS 1 OBESITY DUE TO EXCESS CALORIES WITHOUT SERIOUS COMORBIDITY WITH BODY MASS INDEX (BMI) OF 31.0 TO 31.9 IN ADULT: ICD-10-CM

## 2021-05-17 DIAGNOSIS — R90.82 WHITE MATTER ABNORMALITY ON MRI OF BRAIN: ICD-10-CM

## 2021-05-17 DIAGNOSIS — Z86.69 HISTORY OF OPTIC NEURITIS: ICD-10-CM

## 2021-05-17 DIAGNOSIS — E55.9 VITAMIN D DEFICIENCY: ICD-10-CM

## 2021-05-17 PROCEDURE — 3008F BODY MASS INDEX DOCD: CPT | Performed by: INTERNAL MEDICINE

## 2021-05-17 PROCEDURE — 99214 OFFICE O/P EST MOD 30 MIN: CPT | Performed by: INTERNAL MEDICINE

## 2021-05-17 PROCEDURE — 1036F TOBACCO NON-USER: CPT | Performed by: INTERNAL MEDICINE

## 2021-05-17 PROCEDURE — 3725F SCREEN DEPRESSION PERFORMED: CPT | Performed by: INTERNAL MEDICINE

## 2021-05-17 NOTE — TELEPHONE ENCOUNTER
Mammogram scheduled for patient for 5/20/2021 at 11 am  Attempted to contact patient to inform  No answer   Left message for patient to call the office

## 2021-05-17 NOTE — PATIENT INSTRUCTIONS
Please see sleep specialist to check for sleep apnea  See weight management for help with weight loss, dietary changes  I will look into a specialist for possible MS

## 2021-05-17 NOTE — PROGRESS NOTES
[address] [place] [address] Primary Care        NAME: Familia Rojas is a 40 y o  female  : 1975    MRN: [id number]  DATE: May 17, 2021  TIME: 1:38 PM    Assessment and Plan   1  Fatigue, unspecified type  -   Normal TSH, ferritin a little low at 22, normal Hgb, will refer for sleep study due to possible snoring  -   Ambulatory referral to Sleep Medicine; Future    2  White matter abnormality on MRI of brain  - recent MRI similar to previous  She does not have history of migraines, she has episodes of facial numbness, paresthesia in left arm  History of optic neuritis in left eye  She has seen Neurology in the past, we discussed having them re-evaluate her symptoms vs referring to [facility]  3  Positive FLOR (antinuclear antibody)  - speckled pattern, titer 1:320, previously 1:1280  She has seen rheumatology in the past      4  Vitamin D deficiency  - slightly low at 22 in November, she is taking D3 supplement    5  History of optic neuritis    6  Class 1 obesity due to excess calories without serious comorbidity with body mass index (BMI) of 31 0 to 31 9 in adult  - she is interested in seeing weight management for dietary changes/education     -    Ambulatory referral to Weight Management; Future             Chief Complaint     Chief Complaint   Patient presents with    Follow-up         History of Present Illness       Here for follow up left arm pain, paresthesia and facial numbness  Episode had resolved, usually occurs in flares  Main concern today is fatigue despite adequate sleep, feels tired and low energy throughout the day  Daren Henderson reports occasional snoring but no apnea  Abnormal MRI: saw Neuro and Rheumatology in the past due to concurrent positive FLOR  Reports testing was all negative for most common rheumatologic conditions  She does have a history of optic neuritis about 5 years ago  No history of migraines or headaches       Weight gain: tries to eat healthy, avoids junk food but can't seem to lose weight  Review of Systems   Review of Systems   Constitutional: Positive for fatigue and unexpected weight change  Negative for appetite change, chills and fever  Eyes: Negative for photophobia, pain and visual disturbance  Respiratory: Negative for cough, chest tightness and shortness of breath  Cardiovascular: Negative for chest pain, palpitations and leg swelling  Gastrointestinal: Negative for abdominal pain, constipation, diarrhea, nausea and vomiting  Musculoskeletal: Positive for arthralgias  Neurological: Positive for numbness  Negative for dizziness, tremors, weakness, light-headedness and headaches  Psychiatric/Behavioral: Positive for dysphoric mood  Negative for hallucinations, sleep disturbance and suicidal ideas  The patient is nervous/anxious            Current Medications       Current Outpatient Medications:     multivitamin (THERAGRAN) TABS, Take 1 tablet by mouth daily, Disp: , Rfl:     Probiotic Product (PROBIOTIC ADVANCED PO), Take by mouth, Disp: , Rfl:     Thiamine HCl (VITAMIN B1 PO), Take by mouth, Disp: , Rfl:     valACYclovir (VALTREX) 1,000 mg tablet, Take 1 tablet (1,000 mg total) by mouth 3 (three) times a day for 7 days (Patient not taking: Reported on 3/15/2021), Disp: 21 tablet, Rfl: 0    Current Allergies     Allergies as of 05/17/2021 - Reviewed 05/17/2021   Allergen Reaction Noted    Amoxicillin Hives 10/24/2012    Cymbalta [duloxetine hcl] Other (See Comments) 02/26/2021    Penicillins Hives 10/24/2012    Shellfish allergy - food allergy Hives 05/07/2013    Sulfa antibiotics Rash 02/25/2013            The following portions of the patient's history were reviewed and updated as appropriate: allergies, current medications, past family history, past medical history, past social history, past surgical history and problem list      Past Medical History:   Diagnosis Date    Abnormal Pap smear of cervix     Anemia     Disease of thyroid gland     thyroid storm with previous pregnancy    Dysmenorrhea     Esophageal reflux     last assessed - [id number]    Herpes zoster     LUQ level T-8 resolved; last assessed -     History of infection due to human papilloma virus (HPV)     HPV (human papilloma virus) infection     Inguinal hernia     last assessed - [id number]    Insomnia     Mastodynia     Resolved -     Migraine     Ovarian cyst     last assessed - [id number]    Ovarian cyst 2013    Palpitations     last assessed - [id number]    Tachycardia     Resolved - [id number]    Takes dietary supplements     Temporomandibular joint disorder     last assessed - [id number]    Urinary tract infection     Varicella        Past Surgical History:   Procedure Laterality Date     SECTION      COLONOSCOPY  2012    Fiberoptic    CRYOTHERAPY      cervix, age 24   [facility] DI[facility]NO[facility]IC LAPAROSCOPY      DILATION AND CURETTAGE OF UTERUS      Resolved     GYNECOLOGIC CRYOSURGERY      age 24   [place] TOOTH EXTRACTION         Family History   Problem Relation Age of Onset    Diabetes Mother     Heart disease Mother         cardiac disorder    Gallbladder disease Mother     Hypertension Mother     Glaucoma Mother     Diabetes Father     Heart disease Father         cardiac disorder    Hypertension Father     Hyperlipidemia Father         Pure Hypercholesterolemia    Other Family         headache syndromes    Breast cancer Cousin     Post-traumatic stress disorder Brother     Hyperlipidemia Brother     Hypertension Brother     Diabetes Maternal Grandmother     Lung cancer Maternal Grandfather     Stroke Paternal Grandmother     Hypertension Paternal Grandmother     Cirrhosis Paternal Grandfather     Ovarian cancer Maternal Aunt     Colon cancer Neg Hx          Medications have been verified          Objective   /76   Pulse 79   Temp 99 2 °F (37 3 °C)   Resp 20   Ht 5' (1 524 m)   Wt 72 3 kg (159 lb 6 4 oz)   SpO2 99%   BMI 31 13 kg/m²        Physical Exam     Physical Exam  Vitals signs reviewed  Constitutional:       General: She is not in acute distress  Appearance: Normal appearance  She is obese  Cardiovascular:      Rate and Rhythm: Normal rate and regular rhythm  Heart sounds: S1 normal and S2 normal  No friction rub  No gallop  Pulmonary:      Effort: Pulmonary effort is normal  No accessory muscle usage  Breath sounds: Normal breath sounds  No wheezing, rhonchi or rales  Abdominal:      General: Abdomen is flat  Bowel sounds are normal  There is no distension  Palpations: Abdomen is soft  There is no hepatomegaly or mass  Tenderness: There is no abdominal tenderness  There is no guarding or rebound  Hernia: No hernia is present  Neurological:      Mental Status: She is alert  Psychiatric:         Mood and Affect: Mood and affect normal          Speech: Speech normal          Behavior: Behavior normal  Behavior is cooperative  Thought Content: Thought content normal              Results:  Lab Results   Component Value Date    SODIUM 141 11/11/2020    K 3 7 11/11/2020     (H) 11/11/2020    CO2 24 11/11/2020    BUN 12 11/11/2020    CREATININE 0 74 11/11/2020    GLUC 81 01/11/2020    CALCIUM 8 6 11/11/2020       Lab Results   Component Value Date    HGBA1C 5 2 11/11/2020       Lab Results   Component Value Date    WBC 7 74 03/16/2021    HGB 13 1 03/16/2021    HCT 40 0 03/16/2021    MCV 89 03/16/2021     03/16/2021      MRI Brain 04/01/21  FINDINGS:     BRAIN PARENCHYMA:  There is no discrete mass, mass effect or midline shift  There is no intracranial hemorrhage  There is no evidence of acute infarction and diffusion imaging is unremarkable  A few scattered nonspecific white matter foci are seen which are stable from the prior study  These remain nonspecific although sequela of migraine headache should be considered    Additional etiologies to be considered in the right clinical setting include atypical demyelinating disease, early microangiopathic changes, collagen vascular disease, and sarcoidosis      VENTRICLES:  Normal for the patient's age      SELLA AND PITUITARY GLAND:  Normal      ORBITS:  Normal      PARANASAL SINUSES:  Normal      VASCULATURE:  Evaluation of the major intracranial vasculature demonstrates appropriate flow voids      CALVARIUM AND SKULL BASE:  Normal      EXTRACRANIAL SOFT TISSUES:  Normal      IMPRESSION:     Stable MRI of the brain with scattered nonspecific white matter foci in subcortical and deep white matter of the bilateral frontal and parietal lobes  There are no new lesions identified  MRI C-spine 04/01/21  FINDINGS:     ALIGNMENT:  Normal alignment of the cervical spine  No compression fracture  No subluxation  No scoliosis      MARROW SIGNAL:  Normal marrow signal is identified within the visualized bony structures  No discrete marrow lesion      CERVICAL AND VISUALIZED THORACIC CORD:  Normal signal within the visualized cord      PREVERTEBRAL AND PARASPINAL SOFT TISSUES:  Normal      VISUALIZED POSTERIOR FOSSA:  The visualized posterior fossa demonstrates no abnormal signal      CERVICAL DISC SPACES:     C2-C3:  Normal      C3-C4:  Normal      C4-C5:  Normal      C5-C6:  Mild annular bulging with small marginal osteophytes  There is mild central stenosis  Foramina are patent      C6-C7:  Small central broad-based protrusion type disc herniation eccentric to the right results in mild central stenosis  Foramina are patent      C7-T1:  Normal      UPPER THORACIC DISC SPACES:  Normal      IMPRESSION:     Normal appearance of the cervical cord  Mild degenerative disc disease C5-6 and C6-7 similar to prior study

## 2021-05-18 ENCOUNTER — TELEPHONE (OUTPATIENT)
Dept: PSYCHIATRY | Facility: CLINIC | Age: 46
End: 2021-05-18

## 2021-05-18 NOTE — TELEPHONE ENCOUNTER
Called regarding referral  She stated that she is looking for the sleep study not Psychiatrist  I gave her the # to call for that

## 2021-05-27 ENCOUNTER — HOSPITAL ENCOUNTER (OUTPATIENT)
Dept: MAMMOGRAPHY | Facility: HOSPITAL | Age: 46
Discharge: HOME | End: 2021-05-27
Attending: INTERNAL MEDICINE
Payer: COMMERCIAL

## 2021-05-27 VITALS — WEIGHT: 160 LBS | BODY MASS INDEX: 31.41 KG/M2 | HEIGHT: 60 IN

## 2021-05-27 DIAGNOSIS — Z12.39 ENCOUNTER FOR SCREENING FOR MALIGNANT NEOPLASM OF BREAST, UNSPECIFIED SCREENING MODALITY: ICD-10-CM

## 2021-05-27 PROCEDURE — 77063 BREAST TOMOSYNTHESIS BI: CPT

## 2021-05-27 PROCEDURE — 77067 SCR MAMMO BI INCL CAD: CPT

## 2021-06-03 ENCOUNTER — OFFICE VISIT (OUTPATIENT)
Dept: URGENT CARE | Facility: CLINIC | Age: 46
End: 2021-06-03
Payer: COMMERCIAL

## 2021-06-03 VITALS
DIASTOLIC BLOOD PRESSURE: 80 MMHG | BODY MASS INDEX: 30.43 KG/M2 | HEIGHT: 60 IN | WEIGHT: 155 LBS | OXYGEN SATURATION: 98 % | SYSTOLIC BLOOD PRESSURE: 157 MMHG | HEART RATE: 88 BPM | TEMPERATURE: 98.1 F | RESPIRATION RATE: 18 BRPM

## 2021-06-03 DIAGNOSIS — H11.421 CHEMOSIS OF RIGHT CONJUNCTIVA: Primary | ICD-10-CM

## 2021-06-03 PROCEDURE — 99213 OFFICE O/P EST LOW 20 MIN: CPT | Performed by: NURSE PRACTITIONER

## 2021-06-03 NOTE — PROGRESS NOTES
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]  1  Chemosis of right conjunctiva         Spoke with sandeep provider on-call for [facility]  Advised artificial tears every hour while awake  Will  See Dr Cory Ochoa at 8:30 tomorrow morning in the [facility] office  Strict return precautions discussed with patient  Patient verbalizes understanding  Patient Instructions     Patient Instructions    Recommend artificial tears every hour while awake  If she develops any severe pain, blurred vision, visual changes, any new or concerning symptoms please proceed ER  Appointment with Dr Cory Ochoa at 8:30 tomorrow  in the [facility] office  Eye Pain   WHAT YOU NEED TO KNOW:   Eye pain may be caused by a problem within your eye  A problem or condition in another body area can also cause pain that travels to your eye  Some causes of eye pain include dry eyes, inflammation, a sinus infection, or a cluster headache  DISCHARGE INSTRUCTIONS:   Medicines: You may need any of the following:  · Artificial tears  are eyedrops that can help moisturize your eyes and relieve your pain  Ask your healthcare provider how often to use artificial tears  · NSAIDs , such as ibuprofen, help decrease swelling, pain, and fever  This medicine is available with or without a doctor's order  NSAIDs can cause stomach bleeding or kidney problems in certain people  If you take blood thinner medicine, always ask if NSAIDs are safe for you  Always read the medicine label and follow directions  Do not give these medicines to children under 10months of age without direction from your child's healthcare provider  · Take your medicine as directed  Contact your healthcare provider if you think your medicine is not helping or if you have side effects   Tell him of her if you are allergic to any medicine  Keep a list of the medicines, vitamins, and herbs you take  Include the amounts, and when and why you take them  Bring the list or the pill bottles to follow-up visits  Carry your medicine list with you in case of an emergency  Follow up with your healthcare provider as directed: You may be referred to an eye specialist  Write down your questions so you remember to ask them during your visits  Contact your healthcare provider if:   · You have a fever  · Your eye pain gets worse when you move your eyes  · You have questions or concerns about your condition or care  Return to the emergency department if:   · You have any vision loss  · You have sudden vision changes such as blurred vision, double vision, or seeing halos around lights  · You develop severe eye pain  © Copyright [address] Information is for End User's use only and may not be sold, redistributed or otherwise used for commercial purposes  All illustrations and images included in CareNotes® are the copyrighted property of [employer]  or [employer]   The above information is an  only  It is not intended as medical advice for individual conditions or treatments  Talk to your doctor, nurse or pharmacist before following any medical regimen to see if it is safe and effective for you  Follow up with PCP in 3-5 days  Proceed to  ER if symptoms worsen  Chief Complaint     Chief Complaint   Patient presents with    Eye Problem     Right eye red, tearing, and painful started today  History of Present Illness         Patient is a 78-year-old female who presents with a one-week history of right eye redness, irritation, and tearing    Patient states that 30 minutes prior to arrival she developed 5/10 pain in her right eye describes as "irritated and I can feel a bubble in my eye "   Denies any blurred vision, double vision, photophobia, or any visual disturbances  Patient states she does occasionally wear contact lenses  Denies any eye drainage  Denies any fever, chills, or body aches  Denies any rash  Denies any headache, dizziness, or feeling lightheaded  Patient states she was using over-the-counter allergy drops with mild relief  Review of Systems   Review of Systems   Constitutional: Negative for chills, diaphoresis, fatigue and fever  HENT: Negative  Eyes: Positive for pain, redness and itching  Negative for photophobia, discharge and visual disturbance  Respiratory: Negative for cough, chest tightness, shortness of breath, wheezing and stridor  Cardiovascular: Negative for chest pain and palpitations  Musculoskeletal: Negative  Skin: Negative  Neurological: Negative for dizziness, weakness, light-headedness, numbness and headaches           Current Medications       Current Outpatient Medications:     multivitamin (THERAGRAN) TABS, Take 1 tablet by mouth daily, Disp: , Rfl:     Probiotic Product (PROBIOTIC ADVANCED PO), Take by mouth, Disp: , Rfl:     Thiamine HCl (VITAMIN B1 PO), Take by mouth, Disp: , Rfl:     valACYclovir (VALTREX) 1,000 mg tablet, Take 1 tablet (1,000 mg total) by mouth 3 (three) times a day for 7 days (Patient not taking: Reported on 3/15/2021), Disp: 21 tablet, Rfl: 0    Current Allergies     Allergies as of 06/03/2021 - Reviewed 06/03/2021   Allergen Reaction Noted    Amoxicillin Hives 10/24/2012    Cymbalta [duloxetine hcl] Other (See Comments) 02/26/2021    Penicillins Hives 10/24/2012    Shellfish allergy - food allergy Hives 05/07/2013    Sulfa antibiotics Rash 02/25/2013            The following portions of the patient's history were reviewed and updated as appropriate: allergies, current medications, past family history, past medical history, past social history, past surgical history and problem list      Past Medical History:   Diagnosis Date    Abnormal Pap smear of cervix     Anemia  Disease of thyroid gland     thyroid storm with previous pregnancy    Dysmenorrhea     Esophageal reflux     last assessed - [id number]    Herpes zoster     LUQ level T-8 resolved; last assessed - [id number]    History of infection due to human papilloma virus (HPV)     HPV (human papilloma virus) infection     Inguinal hernia     last assessed - [id number]    Insomnia     Mastodynia     Resolved -     Migraine     Ovarian cyst     last assessed - [id number]    Ovarian cyst 2013    Palpitations     last assessed - [id number]    Tachycardia     Resolved - [id number]    Takes dietary supplements     Temporomandibular joint disorder     last assessed - [id number]    Urinary tract infection     Varicella        Past Surgical History:   Procedure Laterality Date     SECTION      COLONOSCOPY  2012    Fiberoptic    CRYOTHERAPY      cervix, age 24   [facility] DIAGNO[facility]IC LAPAROSCOPY      DILATION AND CURETTAGE OF UTERUS      Resolved     GYNECOLOGIC CRYOSURGERY      age 24   [facility] TOOTH EXTRACTION         Family History   Problem Relation Age of Onset    Diabetes Mother     Heart disease Mother         cardiac disorder    Gallbladder disease Mother     Hypertension Mother     Glaucoma Mother     Diabetes Father     Heart disease Father         cardiac disorder    Hypertension Father     Hyperlipidemia Father         Pure Hypercholesterolemia    Other Family         headache syndromes    Breast cancer Cousin     Post-traumatic stress disorder Brother     Hyperlipidemia Brother     Hypertension Brother     Diabetes Maternal Grandmother     Lung cancer Maternal Grandfather     Stroke Paternal Grandmother     Hypertension Paternal Grandmother     Cirrhosis Paternal Grandfather     Ovarian cancer Maternal Aunt     No Known Problems Daughter     No Known Problems Paternal Aunt     Colon cancer Neg Hx          Medications have been verified          Objective   /80   Pulse 88   Temp 98 1 °F (36 7 °C)   Resp 18   Ht 5' (1 524 m)   Wt 70 3 kg (155 lb)   LMP 05/06/2021 (Approximate)   SpO2 98%   BMI 30 27 kg/m²   Patient's last menstrual period was 05/06/2021 (approximate)  Physical Exam     Physical Exam  Constitutional:       General: She is not in acute distress  Appearance: Normal appearance  She is not diaphoretic  HENT:      Head: Normocephalic and atraumatic  Eyes:      General: Lids are normal       Extraocular Movements: Extraocular movements intact  Conjunctiva/sclera:      Right eye: Right conjunctiva is injected  Chemosis present  No exudate or hemorrhage  Pupils: Pupils are equal, round, and reactive to light  Cardiovascular:      Rate and Rhythm: Normal rate and regular rhythm  Heart sounds: Normal heart sounds, S1 normal and S2 normal    Pulmonary:      Effort: Pulmonary effort is normal       Breath sounds: Normal breath sounds and air entry  Neurological:      Mental Status: She is alert and oriented to person, place, and time

## 2021-06-03 NOTE — PATIENT INSTRUCTIONS
Recommend artificial tears every hour while awake  If she develops any severe pain, blurred vision, visual changes, any new or concerning symptoms please proceed ER  Appointment with Dr Dawna Edwards at 8:30 tomorrow June 4th in the [facility] office  Eye Pain   WHAT YOU NEED TO KNOW:   Eye pain may be caused by a problem within your eye  A problem or condition in another body area can also cause pain that travels to your eye  Some causes of eye pain include dry eyes, inflammation, a sinus infection, or a cluster headache  DISCHARGE INSTRUCTIONS:   Medicines: You may need any of the following:  · Artificial tears  are eyedrops that can help moisturize your eyes and relieve your pain  Ask your healthcare provider how often to use artificial tears  · NSAIDs , such as ibuprofen, help decrease swelling, pain, and fever  This medicine is available with or without a doctor's order  NSAIDs can cause stomach bleeding or kidney problems in certain people  If you take blood thinner medicine, always ask if NSAIDs are safe for you  Always read the medicine label and follow directions  Do not give these medicines to children under 10months of age without direction from your child's healthcare provider  · Take your medicine as directed  Contact your healthcare provider if you think your medicine is not helping or if you have side effects  Tell him of her if you are allergic to any medicine  Keep a list of the medicines, vitamins, and herbs you take  Include the amounts, and when and why you take them  Bring the list or the pill bottles to follow-up visits  Carry your medicine list with you in case of an emergency  Follow up with your healthcare provider as directed: You may be referred to an eye specialist  Write down your questions so you remember to ask them during your visits  Contact your healthcare provider if:   · You have a fever  · Your eye pain gets worse when you move your eyes       · You have questions or concerns about your condition or care  Return to the emergency department if:   · You have any vision loss  · You have sudden vision changes such as blurred vision, double vision, or seeing halos around lights  · You develop severe eye pain  © Copyright [address] Information is for End User's use only and may not be sold, redistributed or otherwise used for commercial purposes  All illustrations and images included in CareNotes® are the copyrighted property of [employer]  or [facility] Osbaldo Lopez   The above information is an  only  It is not intended as medical advice for individual conditions or treatments  Talk to your doctor, nurse or pharmacist before following any medical regimen to see if it is safe and effective for you

## 2021-07-29 ENCOUNTER — CONSULT (OUTPATIENT)
Dept: BARIATRICS | Facility: CLINIC | Age: 46
End: 2021-07-29
Payer: COMMERCIAL

## 2021-07-29 ENCOUNTER — OFFICE VISIT (OUTPATIENT)
Dept: BARIATRICS | Facility: CLINIC | Age: 46
End: 2021-07-29
Payer: COMMERCIAL

## 2021-07-29 VITALS
WEIGHT: 158 LBS | DIASTOLIC BLOOD PRESSURE: 78 MMHG | TEMPERATURE: 97.9 F | SYSTOLIC BLOOD PRESSURE: 128 MMHG | RESPIRATION RATE: 20 BRPM | BODY MASS INDEX: 31.02 KG/M2 | HEIGHT: 60 IN | HEART RATE: 88 BPM

## 2021-07-29 DIAGNOSIS — E55.9 VITAMIN D DEFICIENCY: ICD-10-CM

## 2021-07-29 DIAGNOSIS — R63.5 ABNORMAL WEIGHT GAIN: ICD-10-CM

## 2021-07-29 DIAGNOSIS — R63.5 ABNORMAL WEIGHT GAIN: Primary | ICD-10-CM

## 2021-07-29 DIAGNOSIS — E66.9 CLASS 1 OBESITY: ICD-10-CM

## 2021-07-29 PROBLEM — E66.811 CLASS 1 OBESITY: Status: ACTIVE | Noted: 2021-07-29

## 2021-07-29 PROCEDURE — RECHECK

## 2021-07-29 PROCEDURE — 99204 OFFICE O/P NEW MOD 45 MIN: CPT | Performed by: PHYSICIAN ASSISTANT

## 2021-07-29 PROCEDURE — WMPRO12

## 2021-07-29 NOTE — PROGRESS NOTES
Assessment/Plan:    Class 1 obesity  -Discussed options of HealthyCORE-Intensive Lifestyle Intervention Program, Very Low Calorie Diet-VLCD and Conservative Program and the role of weight loss medications   -Initial weight loss goal of 5-10% weight loss for improved health  -Screening labs: reviewed; consider Lipid panel and repeating vitamin D level  -Patient is interested in pursuing HealthyCORE    Negative Stop-Bang    Vitamin D deficiency  -recommend 2000 IU D3 daily    Goals:    Food log (ie ) www myfitnesspal com,sparkpeople  com,loseit com,calorieking  com,etc    No sugary beverages  At least 64oz of water daily  Increase physical activity by 10 minutes daily  Gradually increase physical activity to a goal of 5 days per week for 30 minutes of MODERATE intensity PLUS 2 days per week of FULL BODY resistance training    Follow up with non-surgical dietician and 14 weeks with PA-C    Diagnoses and all orders for this visit:    Abnormal weight gain  Comments:  see plan under class 1 obesity  Orders:  -     Vitamin D 25 hydroxy; Future  -     Lipid panel; Future    Class 1 obesity  -     Ambulatory referral to Weight Management  -     Vitamin D 25 hydroxy; Future  -     Lipid panel; Future    Vitamin D deficiency    Other orders  -     Probiotic Product (PROBIOTIC + TURMERIC EXTRACT PO); Take by mouth  -     Green Tea, Lucrecia sinensis, (GREEN TEA EXTRACT PO); Take by mouth          Subjective:   Chief Complaint   Patient presents with   [address] [place] [address] Patient - [facility] consult        Patient ID: Terri Holm  is a 40 y o  female with excess weight/obesity here to pursue weight managment      Past Medical History:   Diagnosis Date    Abnormal Pap smear of cervix     Anemia     Disease of thyroid gland     thyroid storm with previous pregnancy    Dysmenorrhea     Esophageal reflux     last assessed - [id number]    Herpes zoster     LUQ level T-8 resolved; last assessed - [id number]    History of infection due to human papilloma virus (HPV)     HPV (human papilloma virus) infection     Inguinal hernia     last assessed - [id number]    Insomnia     Mastodynia     Resolved - 2012    Migraine     Ovarian cyst     last assessed - [id number]    Ovarian cyst 2013    Palpitations     last assessed - [id number]    Tachycardia     Resolved - [id number]    Takes dietary supplements     Temporomandibular joint disorder     last assessed - [id number]    Urinary tract infection     Varicella          HPI:  Obesity/Excess Weight:  Severity: Mild  Onset:  Mid 30's after pregnancy  Modifiers: Diet and Exercise, NOOM  Contributing factors: Pregnancy, decreased physical activity  Associated symptoms: depression, decreased self esteem, increased SOB     Goals: 125 lbs  Highest weight: 158 lbs    Hydration: 32 oz water; sweetened iced tea 32 oz, soda 2x per week  Exercise:  Walking 10,000 steps per day  Occupation:    SLeep: ~6 5 hours  ETOH: denies  Smoking: denies      Colonoscopy: N/A, no family hx colon CA    The following portions of the patient's history were reviewed and updated as appropriate: allergies, current medications, past family history, past medical history, past social history, past surgical history and problem list     Review of Systems   Constitutional: Negative for chills and fever  HENT: Negative for sore throat  Respiratory: Positive for cough (attributed to allergies, advised follow up with PCP)  Negative for shortness of breath  Cardiovascular: Negative for chest pain and palpitations  Gastrointestinal: Negative for abdominal pain, nausea and vomiting  Genitourinary: Negative for dysuria  Musculoskeletal: Positive for arthralgias (gneralized, shoulders elbows hands)  Skin: Negative for rash  Neurological: Negative for dizziness and headaches  Psychiatric/Behavioral: Negative for suicidal ideas  The patient is nervous/anxious           + stress       Objective:    BP 128/78   Pulse 88   Temp 97 9 °F (36 6 °C)   Resp 20   Ht 4' 11 5" (1 511 m)   Wt 71 7 kg (158 lb)   BMI 31 38 kg/m²     Physical Exam  Vitals and nursing note reviewed  Constitutional   General appearance: Abnormal   well developed and obese  Eyes No conjunctival pallor  Ears, Nose, Mouth, and Throat Oral mucosa moist    Pulmonary   Respiratory effort: No increased work of breathing or signs of respiratory distress  Auscultation of lungs: Clear to auscultation, equal breath sounds bilaterally, no wheezes, no rales, no rhonci  Cardiovascular   Auscultation of heart: Normal rate and rhythm, normal S1 and S2, without murmurs  Examination of extremities for edema and/or varicosities: Normal   no edema  Abdomen   Abdomen: Abnormal   The abdomen was obese  Bowel sounds were normal  The abdomen was soft and nontender     Musculoskeletal   Gait and station: Normal     Psychiatric   Orientation to person, place and time: Normal     Affect: appropriate

## 2021-07-29 NOTE — ASSESSMENT & PLAN NOTE
-Discussed options of HealthyCORE-Intensive Lifestyle Intervention Program, Very Low Calorie Diet-VLCD and Conservative Program and the role of weight loss medications   -Initial weight loss goal of 5-10% weight loss for improved health  -Screening labs: reviewed; consider Lipid panel and repeating vitamin D level  -Patient is interested in pursuing Healthy[facility]    Negative Stop-Bang

## 2021-07-29 NOTE — PROGRESS NOTES
Weight Management Medical Nutrition Assessment  Sameera Francosusanne ws here today as a new start in the Healthy Core Program   Today she weighs 158 0 lbs and has a goal weight of 125 - 130  She is not currently food logging, but did use the [employer] rachael last year and was successful  Reviewed the importance of accurate food logging, macro needs (carbs, roxie and pro)  She is not currently doing any type of physical activity other consistently walking 05690 steps per day  She plans to start with some cardio 3 times per week  Patient seen by Medical Provider in past 6 months:  yes  Requested to schedule appointment with Medical Provider: No      Anthropometric Measurements  Start Weight (#): 158  Current Weight (#):158  TBW % Change from start weight:0%  Ideal Body Weight (#):97 5  Goal Weight (#):125 -130    Weight Loss History  Previous weight loss attempts: Self Created Diets (Portion Control, Healthy Food Choices, etc )    Food and Nutrition Related History  Wake up: 6am  Bed Time:11 pm    Food Recall  Breakfast: left overs or eggs    Snack:  Lunch:spinach, walnuts and beets  Snack: banana or grazes  Dinner:lean protein, veggies and starch  Snack:grazing      Beverages: water,soda  Volume of beverage intake: 32    Weekends: Same  Cravings: sweets  Trouble area of day: evening    Frequency of Eating out: irregularly  Food restrictions:none  Cooking: self   Food Shopping: self    Physical Activity Intake  Activity:Walking  Frequency:daily  Physical limitations/barriers to exercise: none    Estimated Needs  Energy    Bear Hammad Energy Needs: BMR : 1264   1# loss weekly sedentary:  1030             1-2# loss weekly lightly active:  Maintenance calories for sedentary activity level: 1530  Protein: 53 - 66     (1 2-1 5g/kg IBW)  Fluid: 52   (35mL/kg IBW)    Nutrition Diagnosis  Yes;     Overweight/obesity  related to Excess energy intake as evidenced by  BMI more than normative standard for age and sex (obesity-grade I 30-34  9)       Nutrition Intervention    Nutrition Prescription  Calories: 1000 - 1100  Protein:53 - 77  Fluid:52      Nutrition Education:    Healthy Core Manual  Calorie controlled menu  Lean protein food choices  Healthy snack options  Food journaling tips      Nutrition Counseling:  Strategies: meal planning, portion sizes, healthy snack choices, hydration, fiber intake, protein intake, exercise, food journal      Monitoring and Evaluation:  Evaluation criteria:  Energy Intake  Meet protein needs  Maintain adequate hydration  Monitor weekly weight  Meal planning/preparation  Food journal   Decreased portions at mealtimes and snacks  Physical activity     Barriers to learning:none  Readiness to change: Action:  (Changing behavior)  Comprehension: good  Expected Compliance: good

## 2021-08-05 ENCOUNTER — CLINICAL SUPPORT (OUTPATIENT)
Dept: BARIATRICS | Facility: CLINIC | Age: 46
End: 2021-08-05

## 2021-08-05 DIAGNOSIS — R63.5 ABNORMAL WEIGHT GAIN: Primary | ICD-10-CM

## 2021-08-05 PROCEDURE — RECHECK

## 2021-08-06 ENCOUNTER — OFFICE VISIT (OUTPATIENT)
Dept: SLEEP CENTER | Facility: CLINIC | Age: 46
End: 2021-08-06
Payer: COMMERCIAL

## 2021-08-06 VITALS
TEMPERATURE: 98.8 F | SYSTOLIC BLOOD PRESSURE: 96 MMHG | HEART RATE: 76 BPM | HEIGHT: 60 IN | BODY MASS INDEX: 30.63 KG/M2 | WEIGHT: 156 LBS | DIASTOLIC BLOOD PRESSURE: 64 MMHG | OXYGEN SATURATION: 98 %

## 2021-08-06 VITALS — WEIGHT: 155 LBS | HEIGHT: 60 IN | BODY MASS INDEX: 30.43 KG/M2

## 2021-08-06 DIAGNOSIS — R53.83 TIREDNESS: ICD-10-CM

## 2021-08-06 DIAGNOSIS — R53.83 FATIGUE, UNSPECIFIED TYPE: Primary | ICD-10-CM

## 2021-08-06 DIAGNOSIS — G47.00 INSOMNIA, UNSPECIFIED TYPE: ICD-10-CM

## 2021-08-06 DIAGNOSIS — E66.9 CLASS 1 OBESITY: ICD-10-CM

## 2021-08-06 PROCEDURE — 99204 OFFICE O/P NEW MOD 45 MIN: CPT | Performed by: PSYCHIATRY & NEUROLOGY

## 2021-08-06 PROCEDURE — 1036F TOBACCO NON-USER: CPT | Performed by: PSYCHIATRY & NEUROLOGY

## 2021-08-06 PROCEDURE — 3008F BODY MASS INDEX DOCD: CPT | Performed by: PSYCHIATRY & NEUROLOGY

## 2021-08-06 NOTE — PATIENT INSTRUCTIONS
Recommendations:  1)In lab diagnostic Polysomnography   2) Driving safety was reviewed with patient  If the patient feels too sleepy to drive she knows not to drive  If she becomes sleepy while driving she will pull over and nap  3) Follow-up after initiation of treatment if needed  4) Call with any questions or concerns  5) middle of the night sleep aids should be avoided due to lasting effects into the day  Insomnia:  1) Get up at the same time seven days out of the week  2) Only go to bed when feeling sleepy  3) Wind down in the evening without electronics  4) Stimulus Control: If lying in bed for 15-20 minutes (estimated because the clock is turned away so you cannot see it) and you are not asleep get up and do something relaxing in a different room (reading a magazine article, solitaire with a deck of cards)  Do this in the middle of the night as well if awake  Avoid doing work or getting on the computer  5) Bedroom for sleep only  No watching TV or using electronics (computer, phone, tablet etc )  in bed  6) Turn clock away so you cannot see it in bed  7) Exercise regularly but try to avoid exercise within 4 hours of bedtime  Morning exercise is best     8) Avoid caffeine in the afternoon  Considering tapering down on caffeine by decreasing by one beverage with caffeine every 3 days until off  9) Avoid smoking near bedtime    10) Avoid alcohol before bed  If you consume one alcoholic beverage allow 3 hours between that drink and bedtime  If you consume two alcoholic beverages allow 5 hours  Between those drinks and bedtime  Alcohol may lead to waking at night  11) Avoid napping except for driving safety  If you feel to sleepy to drive do not drive  If you get sleepy while driving pull over and nap  You may resume driving once you feel alert      12) Read "No More Sleepless Nights" by Zhanna Batista PhD     13) There are some on-line resources that do require a fee that can be of help  Two credible websites are as follows:  http://iAgree/cbt-online-insomnia-treatment html  [employer] si  An rachael used by the [place] is as follows:  CBT-I   Go! To Sleep by the [facility]

## 2021-08-06 NOTE — PROGRESS NOTES
Sleep Medicine Consultation Note    HPI:  Ms Rashawn Rae is a 40 y o  female seen at the request of Clayton Husain MD for advice regarding suspected sleep disordered breathing  She has trouble sleeping for the last 10 years since she has been pregnant  She has been tested for auto immune problems and they are unable to pinpoint what kind it is as her FLOR is elevated  She sometimes takes benadryl sometimes in the middle of the night if she cannot get back to sleep  This will also make her tired in the morning  She has trouble staying asleep  And even when she feels she got a good nights sleep she is still tired  She is tired and sleepy all day  She pushes through her tiredness to stay awake and spend time with her daughter  She has started weight management to try and lose weight  She is too tired to exercise  She does get 10,000 steps a day        Please see below for continuation of the HPI:      Sleep Disordered Breathing:  -Snoring: no  -Observed Apneas: no  -Mouth Breathing at night: not usually  -Dry Mouth in morning: no   -Nocturnal Gasping: no  -Nasal Obstruction: only when allergies are acting up    -Weight: gained 15 lbs    Sleep Pattern:  -Location: bedroom   -Bed/Recliner/Wedge: bed  -Bed Partner: yes  -HOB: flat  -# of pillows under head: 2  -Position: prone or side  -Bedtime: 9pm  -Lights out: same time  -Environmental: TV on and she falls asleep with the TV that her fiance watches  -Latency: 1h  -Awakenings: 2   -Reason: pain can wake her   -Duration: can be awake for hours if she doesn't take benadryl  -Wake time: 630am   -Alarm: yes  -Rise time: same time  -Days off: same   -Shift Work: M-F 8a-5p  -Patient's estimate of total sleep time: 6h      Daytime Symptoms:  -Upon Awakening: tired and exhausted  -Daytime fatigue/sleepiness: sleepy all the time  -Naps: no  -Involuntary Dozing: sometimes  -Cognitive Symptoms: poor short term memory and concentration  -Driving: Difficulty with sleepiness and driving:  Yes, she sometimes doesn't recall how she got someplace  She has pulled over and gotten caffeine  -- Close calls related to sleepiness: no   -- Accidents related to sleepiness: no      Questionnaires:  Sitting and reading: Moderate chance of dozing  Watching TV: Moderate chance of dozing  Sitting, inactive in a public place (e g  a theatre or a meeting): Slight chance of dozing  As a passenger in a car for an hour without a break: Moderate chance of dozing  Lying down to rest in the afternoon when circumstances permit: Moderate chance of dozing  Sitting and talking to someone: Slight chance of dozing  Sitting quietly after a lunch without alcohol: Slight chance of dozing  In a car, while stopped for a few minutes in traffic: Slight chance of dozing  Total score: 12      Sleep Review of Symptoms:  -Parasomnias:  --Sleep Walking: no  --Dream Enactment: no  --Bruxism: yes  -Motor:  --RLS: no  --PLMS: no  -Narcolepsy:  --Hallucinations: yes  --Paralysis: yes in the past    --Cataplexy: no    Childhood Sleep History: insomnia, anxiety    Prior Sleep Studies/Evaluations:  no    Family History:  Family history of sleep disorders: parents have MALDONADO  Brother has MALDONADO      Patient Active Problem List   Diagnosis    Anxiety    Fatigue    Iron deficiency    Lower back pain    Rotator cuff tear arthropathy    Vitamin D deficiency    Osteoarthritis of spine with radiculopathy, cervical region    History of optic neuritis    Class 1 obesity     Past Medical History:   Diagnosis Date    Abnormal Pap smear of cervix     Anemia     Disease of thyroid gland     thyroid storm with previous pregnancy    Dysmenorrhea     Esophageal reflux     last assessed - [id number]    Herpes zoster     LUQ level T-8 resolved; last assessed - [id number]    History of infection due to human papilloma virus (HPV)     HPV (human papilloma virus) infection     Inguinal hernia     last assessed - [id number]    Insomnia     Mastodynia     Resolved - 2012    Migraine     Ovarian cyst     last assessed - [id number]    Ovarian cyst 2013    Palpitations     last assessed - [id number]    Tachycardia     Resolved - [id number]    Takes dietary supplements     Temporomandibular joint disorder     last assessed - [id number]    Urinary tract infection     Varicella        --> Denies any cardiopulmonary disease  --> Seizure hx: denies  --> Head injury with LOC: denies  --> Supplemental Oxygen Use: denies    Labs   Results for Anna Mancilla (MRN [id number]) as of 8/6/2021 09:28   Ref   Range 11/11/2020 08:00 1/25/2021 15:07 3/16/2021 09:10   Sodium Latest Ref Range: 136 - 145 mmol/L 141     Potassium Latest Ref Range: 3 5 - 5 3 mmol/L 3 7     Chloride Latest Ref Range: 100 - 108 mmol/L 113 (H)     CO2 Latest Ref Range: 21 - 32 mmol/L 24     Anion Gap Latest Ref Range: 4 - 13 mmol/L 4     BUN Latest Ref Range: 5 - 25 mg/dL 12     Creatinine Latest Ref Range: 0 60 - 1 30 mg/dL 0 74     GLUCOSE FASTING Latest Ref Range: 65 - 99 mg/dL 88     Calcium Latest Ref Range: 8 3 - 10 1 mg/dL 8 6     CORRECTED CALCIUM Latest Ref Range: 8 3 - 10 1 mg/dL 9 1     AST Latest Ref Range: 5 - 45 U/L 13     ALT Latest Ref Range: 12 - 78 U/L 27     Alkaline Phosphatase Latest Ref Range: 46 - 116 U/L 62     Total Protein Latest Ref Range: 6 4 - 8 2 g/dL 6 9     Albumin Latest Ref Range: 3 5 - 5 0 g/dL 3 4 (L)     TOTAL BILIRUBIN Latest Ref Range: 0 20 - 1 00 mg/dL 0 40     eGFR Latest Units: ml/min/1 73sq m 99     Iron Latest Ref Range: 50 - 170 ug/dL 89     Ferritin Latest Ref Range: 8 - 388 ng/mL 22     Iron Saturation Latest Units: % 26     TIBC Latest Ref Range: 250 - 450 ug/dL 348     Vit D, 25-Hydroxy Latest Ref Range: 30 0 - 100 0 ng/mL 22 6 (L)     WBC Latest Ref Range: 4 31 - 10 16 Thousand/uL 8 32  7 74   Red Blood Cell Count Latest Ref Range: 3 81 - 5 12 Million/uL 4 60  4 51   Hemoglobin Latest Ref Range: 11 5 - 15 4 g/dL 13 2  13 1   HCT Latest Ref Range: 34 8 - 46 1 % 41 2  40 0   MCV Latest Ref Range: 82 - 98 fL 90  89   MCH Latest Ref Range: 26 8 - 34 3 pg 28 7  29 0   MCHC Latest Ref Range: 31 4 - 37 4 g/dL 32 0  32 8   RDW Latest Ref Range: 11 6 - 15 1 % 12 1  12 5   Platelet Count Latest Ref Range: 149 - 390 Thousands/uL 279  334   MPV Latest Ref Range: 8 9 - 12 7 fL 11 4  10 7   nRBC Latest Units: /100 WBCs 0  0   Neutrophils % Latest Ref Range: 43 - 75 % 67  58   Immat GRANS % Latest Ref Range: 0 - 2 % 0  0   Lymphocytes Relative Latest Ref Range: 14 - 44 % 22  27   Monocytes Relative Latest Ref Range: 4 - 12 % 8  10   Eosinophils Latest Ref Range: 0 - 6 % 2  4   Basophils Relative Latest Ref Range: 0 - 1 % 1  1   Immature Grans Absolute Latest Ref Range: 0 00 - 0 20 Thousand/uL 0 02  0 02   Absolute Neutrophils Latest Ref Range: 1 85 - 7 62 Thousands/µL 5 62  4 51   Lymphocytes Absolute Latest Ref Range: 0 60 - 4 47 Thousands/µL 1 82  2 10   Absolute Monocytes Latest Ref Range: 0 17 - 1 22 Thousand/µL 0 65  0 74   Absolute Eosinophils Latest Ref Range: 0 00 - 0 61 Thousand/µL 0 16  0 29   Basophils Absolute Latest Ref Range: 0 00 - 0 10 Thousands/µL 0 05  0 08   Sed Rate Latest Ref Range: 0 - 19 mm/hour   8   Hemoglobin A1C Latest Ref Range: Normal 3 8-5 6%; PreDiabetic 5 7-6 4%;  Diabetic >=6 5%; Glycemic control for adults with diabetes <7 0% % 5 2     eAG, EST AVG Glucose Latest Units: mg/dl 103     TSH 3RD GENERATON Latest Ref Range: 0 358 - 3 740 uIU/mL 1 060     SARS-COV-2 Latest Ref Range: Negative   Negative    HIV-1/2 AB-AG Latest Ref Range: Non-Reactive    Non-Reactive   ANTI-NUCLEAR ANTIBODY (FLOR) Latest Ref Range: Negative    Positive (A)   FLOR Pattern 1 Unknown   Speckled pattern   FLOR Titer 1 Unknown   Titer of 320   C-REACTIVE PROTEIN Latest Ref Range: <3 0 mg/L   <3 0   NOVEL CORONAVIRUS (COVID-19), PCR [facility] Unknown  Rpt    SSA (RO) ANTIBODY Latest Ref Range: 0 0 - 0 9 AI   <0 2   SSB (LA) ANTIBODY Latest Ref Range: 0 0 - 0 9 AI   <0 2 EKG: Normal sinus rhythm  Normal ECG  When compared with ECG of 2019 18:15,  No significant change was found  Confirmed by Magalys Moreno ([id number]) on 2020 6:06:29 PM      Past Surgical History:   Procedure Laterality Date     SECTION      COLONOSCOPY  2012    Fiberoptic    CRYOTHERAPY      cervix, age 24   [facility] Cuevas DIAGNOSTIC LAPAROSCOPY      DILATION AND CURETTAGE OF UTERUS      Resolved     GYNECOLOGIC CRYOSURGERY      age 24   Levora Cuevas TOOTH EXTRACTION         --> ENT procedures: denies    Current Outpatient Medications   Medication Sig Dispense Refill    Green Tea, Lucrecia sinensis, (GREEN TEA EXTRACT PO) Take by mouth      multivitamin (THERAGRAN) TABS Take 1 tablet by mouth daily      Probiotic Product (PROBIOTIC + TURMERIC EXTRACT PO) Take by mouth      Probiotic Product (PROBIOTIC ADVANCED PO) Take by mouth      Thiamine HCl (VITAMIN B1 PO) Take by mouth       No current facility-administered medications for this visit           Social History:  -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  -Caffeine: no  -Alcohol: no  -THC: no  -OTC/Supplements/herbals: see med list  -Illicits:  denies  -Family: lives with daughter and fiancee    ROS:  Genitourinary excessive blood loss during menses and sleep problems that vary with menstrual cycle    Cardiology none   Gastrointestinal frequent heartburn/acid reflux and abdominal pain or cramping that disturb sleep    Neurology need to move extremities, muscle weakness, numbness/tingling of an extremity, loss of consciousness, forgetfulness, poor concentration or confusion,  and difficulty with memory   Constitutional fatigue   Integumentary none   Psychiatry anxiety   Musculoskeletal muscle aches   Pulmonary none   ENT none   Endocrine none   Hematological none       MSE:  -Alert and appropriate: alert, calm, cooperative  -Oriented to person, place and time:  name, age, location, day/date/mon/yr  -Behavior: good, sustained eye contact  -Speech: Unremarkable rate/rhythm/volume  -Mood: "good mood, but tired"  -Affect: mood congruent  -Thought Processes: linear, logical, goal directed  PE:  Body mass index is 4,387 2 kg/m²  Vitals:    08/06/21 0900   BP: 96/64   BP Location: Left arm   Cuff Size: Standard   Pulse: 76   Temp: 98 8 °F (37 1 °C)   SpO2: 98%   Weight: 70 8 kg (156 lb)   Height: (!) 5" (0 127 m)       -General:  In NAD    -Eyes: Conjunctival injection: none     -EOM:  PERRLA, EOMI   -Eyelid hooding: yes    -ENT: MP: 4/4   -Facial deformity: no retrognathia   -Hard palate: moderate arch   -Soft palate:  crowding   -Gums and teeth: normal dentition   -Tongue:  Scalloping   -Nares:  Patent    -Neck/Lymphatics: Lymphadenopathy:  none appreciated   -Masses:  none appreciated   -Circumference: Neck Circumference: 14 75 "    -Cardiac: Auscultation:  RRR   - LE edema over shins: none appreciated    -Pulm: -Respirations: unlaboured         -Auscultation:  CTA bilaterally, posterior fields    -Neuro: No resting tremor     -Musculoskeletal: Gait and stance: normal turning and ambulation; unremarkable  Assessment:  Ms Mathis Skiff is a 40 y o  female who is seen to evaluate for possible obstructive sleep apnea  The patient has long standing insomnia for about 10 years and anxiety since childhood  This is impacted her sleep as well as possibly fibromyalgia  She has chronic fatigue as well  She denied snoring, observed apneas, nocturnal gasping, daytime sleepiness, and nocturia  She does have a narrow airway and a family hx of MALDONADO which may put her at risk for this as well even though she doesn't have the aforementioned symptoms  The pathophysiology of, the reasons to treat and treatment options for obstructive sleep apnea were all reviewed with the patient today  She is not a good candidate for HST as she has insomnia, no snoring, and no observed apnea making her a low pre test probability  PSG is ordered     She is amenable to treatment with PAP therapy or OAT if needed  Discussed keeping nasal passages clear, abstaining from alcohol, and other sedating drugs at night- which will worsen symptoms of MALDONADO  She is willing to make changes in her sleep hygiene as well as try CBT-I as recommended below  --History provided by: patient   --Records reviewed: in chart      Recommendations:  1)In lab diagnostic Polysomnography   2) Driving safety was reviewed with patient  If the patient feels too sleepy to drive she knows not to drive  If she becomes sleepy while driving she will pull over and nap  3) Follow-up after initiation of treatment if needed  4) Call with any questions or concerns  5) middle of the night sleep aids should be avoided due to lasting effects into the day  Insomnia:  1) Get up at the same time seven days out of the week  2) Only go to bed when feeling sleepy  3) Wind down in the evening without electronics  4) Stimulus Control: If lying in bed for 15-20 minutes (estimated because the clock is turned away so you cannot see it) and you are not asleep get up and do something relaxing in a different room (reading a magazine article, solitaire with a deck of cards)  Do this in the middle of the night as well if awake  Avoid doing work or getting on the computer  5) Bedroom for sleep only  No watching TV or using electronics (computer, phone, tablet etc )  in bed  6) Turn clock away so you cannot see it in bed  7) Exercise regularly but try to avoid exercise within 4 hours of bedtime  Morning exercise is best     8) Avoid caffeine in the afternoon  Considering tapering down on caffeine by decreasing by one beverage with caffeine every 3 days until off  9) Avoid smoking near bedtime    10) Avoid alcohol before bed  If you consume one alcoholic beverage allow 3 hours between that drink and bedtime  If you consume two alcoholic beverages allow 5 hours  Between those drinks and bedtime    Alcohol may lead to waking at night  11) Avoid napping except for driving safety  If you feel to sleepy to drive do not drive  If you get sleepy while driving pull over and nap  You may resume driving once you feel alert  12) Read "No More Sleepless Nights" by Lester Valente PhD     13) There are some on-line resources that do require a fee that can be of help  Two credible websites are as follows:  http://digitalbox/cbt-online-insomnia-treatment html  [employer] si  An rachael used by the [place] is as follows:  CBT-I   Go! To Sleep by the [facility]  All questions answered for the patient, who indicated understanding and agreed with the plan       Basilio Rangel MD  Psychiatry/ Sleep medicine

## 2021-08-06 NOTE — LETTER
August 6, 2021     Komal Moon MD  [address]    Patient: Mathis Skiff   YOB: 1975   Date of Visit: 8/6/2021       Dear Dr Saeid Kim: Thank you for referring Rain Grigsby to me for evaluation  Below are my notes for this consultation  If you have questions, please do not hesitate to call me  I look forward to following your patient along with you  Sincerely,        Luzma Hutson MD        CC: No Recipients  Luzma Hutson MD  8/6/2021 10:04 AM  Incomplete  Sleep Medicine Consultation Note    HPI:  Ms Mathis Skiff is a 40 y o  female seen at the request of Merrick Estrada MD for advice regarding suspected sleep disordered breathing  She has trouble sleeping for the last 10 years since she has been pregnant  She has been tested for auto immune problems and they are unable to pinpoint what kind it is as her FLOR is elevated  She sometimes takes benadryl sometimes in the middle of the night if she cannot get back to sleep  This will also make her tired in the morning  She has trouble staying asleep  And even when she feels she got a good nights sleep she is still tired  She is tired and sleepy all day  She pushes through her tiredness to stay awake and spend time with her daughter  She has started weight management to try and lose weight  She is too tired to exercise  She does get 10,000 steps a day        Please see below for continuation of the HPI:      Sleep Disordered Breathing:  -Snoring: no  -Observed Apneas: no  -Mouth Breathing at night: not usually  -Dry Mouth in morning: no   -Nocturnal Gasping: no  -Nasal Obstruction: only when allergies are acting up    -Weight: gained 15 lbs    Sleep Pattern:  -Location: bedroom   -Bed/Recliner/Wedge: bed  -Bed Partner: yes  -HOB: flat  -# of pillows under head: 2  -Position: prone or side  -Bedtime: 9pm  -Lights out: same time  -Environmental: TV on and she falls asleep with the TV that her kaden liv  -Latency: 1h  -Awakenings: 2   -Reason: pain can wake her   -Duration: can be awake for hours if she doesn't take benadryl  -Wake time: 630am   -Alarm: yes  -Rise time: same time  -Days off: same   -Shift Work: M-F 8a-5p  -Patient's estimate of total sleep time: 6h      Daytime Symptoms:  -Upon Awakening: tired and exhausted  -Daytime fatigue/sleepiness: sleepy all the time  -Naps: no  -Involuntary Dozing: sometimes  -Cognitive Symptoms: poor short term memory and concentration  -Driving: Difficulty with sleepiness and driving:  Yes, she sometimes doesn't recall how she got someplace  She has pulled over and gotten caffeine  -- Close calls related to sleepiness: no   -- Accidents related to sleepiness: no      Questionnaires:  Sitting and reading: Moderate chance of dozing  Watching TV: Moderate chance of dozing  Sitting, inactive in a public place (e g  a theatre or a meeting): Slight chance of dozing  As a passenger in a car for an hour without a break: Moderate chance of dozing  Lying down to rest in the afternoon when circumstances permit: Moderate chance of dozing  Sitting and talking to someone: Slight chance of dozing  Sitting quietly after a lunch without alcohol: Slight chance of dozing  In a car, while stopped for a few minutes in traffic: Slight chance of dozing  Total score: 12      Sleep Review of Symptoms:  -Parasomnias:  --Sleep Walking: no  --Dream Enactment: no  --Bruxism: yes  -Motor:  --RLS: no  --PLMS: no  -Narcolepsy:  --Hallucinations: yes  --Paralysis: yes in the past    --Cataplexy: no    Childhood Sleep History: insomnia, anxiety    Prior Sleep Studies/Evaluations:  no    Family History:  Family history of sleep disorders: parents have MALDONADO  Brother has MALDONADO      Patient Active Problem List   Diagnosis    Anxiety    Fatigue    Iron deficiency    Lower back pain    Rotator cuff tear arthropathy    Vitamin D deficiency    Osteoarthritis of spine with radiculopathy, cervical region    History of optic neuritis    Class 1 obesity     Past Medical History:   Diagnosis Date    Abnormal Pap smear of cervix     Anemia     Disease of thyroid gland     thyroid storm with previous pregnancy    Dysmenorrhea     Esophageal reflux     last assessed - [id number]    Herpes zoster     LUQ level T-8 resolved; last assessed - [id number]    History of infection due to human papilloma virus (HPV)     HPV (human papilloma virus) infection     Inguinal hernia     last assessed - [id number]    Insomnia     Mastodynia     Resolved - 2012    Migraine     Ovarian cyst     last assessed - [id number]    Ovarian cyst 2013    Palpitations     last assessed - [id number]    Tachycardia     Resolved - [id number]    Takes dietary supplements     Temporomandibular joint disorder     last assessed - 44Glh1565    Urinary tract infection     Varicella        --> Denies any cardiopulmonary disease  --> Seizure hx: denies  --> Head injury with LOC: denies  --> Supplemental Oxygen Use: denies    Labs   Results for Jose Angel Vasquez (MRN [id number]) as of 8/6/2021 09:28   Ref   Range 11/11/2020 08:00 1/25/2021 15:07 3/16/2021 09:10   Sodium Latest Ref Range: 136 - 145 mmol/L 141     Potassium Latest Ref Range: 3 5 - 5 3 mmol/L 3 7     Chloride Latest Ref Range: 100 - 108 mmol/L 113 (H)     CO2 Latest Ref Range: 21 - 32 mmol/L 24     Anion Gap Latest Ref Range: 4 - 13 mmol/L 4     BUN Latest Ref Range: 5 - 25 mg/dL 12     Creatinine Latest Ref Range: 0 60 - 1 30 mg/dL 0 74     GLUCOSE FASTING Latest Ref Range: 65 - 99 mg/dL 88     Calcium Latest Ref Range: 8 3 - 10 1 mg/dL 8 6     CORRECTED CALCIUM Latest Ref Range: 8 3 - 10 1 mg/dL 9 1     AST Latest Ref Range: 5 - 45 U/L 13     ALT Latest Ref Range: 12 - 78 U/L 27     Alkaline Phosphatase Latest Ref Range: 46 - 116 U/L 62     Total Protein Latest Ref Range: 6 4 - 8 2 g/dL 6 9     Albumin Latest Ref Range: 3 5 - 5 0 g/dL 3 4 (L)     TOTAL BILIRUBIN Latest Ref Range: 0 20 - 1 00 mg/dL 0 40     eGFR Latest Units: ml/min/1 73sq m 99     Iron Latest Ref Range: 50 - 170 ug/dL 89     Ferritin Latest Ref Range: 8 - 388 ng/mL 22     Iron Saturation Latest Units: % 26     TIBC Latest Ref Range: 250 - 450 ug/dL 348     Vit D, 25-Hydroxy Latest Ref Range: 30 0 - 100 0 ng/mL 22 6 (L)     WBC Latest Ref Range: 4 31 - 10 16 Thousand/uL 8 32  7 74   Red Blood Cell Count Latest Ref Range: 3 81 - 5 12 Million/uL 4 60  4 51   Hemoglobin Latest Ref Range: 11 5 - 15 4 g/dL 13 2  13 1   HCT Latest Ref Range: 34 8 - 46 1 % 41 2  40 0   MCV Latest Ref Range: 82 - 98 fL 90  89   MCH Latest Ref Range: 26 8 - 34 3 pg 28 7  29 0   MCHC Latest Ref Range: 31 4 - 37 4 g/dL 32 0  32 8   RDW Latest Ref Range: 11 6 - 15 1 % 12 1  12 5   Platelet Count Latest Ref Range: 149 - 390 Thousands/uL 279  334   MPV Latest Ref Range: 8 9 - 12 7 fL 11 4  10 7   nRBC Latest Units: /100 WBCs 0  0   Neutrophils % Latest Ref Range: 43 - 75 % 67  58   Immat GRANS % Latest Ref Range: 0 - 2 % 0  0   Lymphocytes Relative Latest Ref Range: 14 - 44 % 22  27   Monocytes Relative Latest Ref Range: 4 - 12 % 8  10   Eosinophils Latest Ref Range: 0 - 6 % 2  4   Basophils Relative Latest Ref Range: 0 - 1 % 1  1   Immature Grans Absolute Latest Ref Range: 0 00 - 0 20 Thousand/uL 0 02  0 02   Absolute Neutrophils Latest Ref Range: 1 85 - 7 62 Thousands/µL 5 62  4 51   Lymphocytes Absolute Latest Ref Range: 0 60 - 4 47 Thousands/µL 1 82  2 10   Absolute Monocytes Latest Ref Range: 0 17 - 1 22 Thousand/µL 0 65  0 74   Absolute Eosinophils Latest Ref Range: 0 00 - 0 61 Thousand/µL 0 16  0 29   Basophils Absolute Latest Ref Range: 0 00 - 0 10 Thousands/µL 0 05  0 08   Sed Rate Latest Ref Range: 0 - 19 mm/hour   8   Hemoglobin A1C Latest Ref Range: Normal 3 8-5 6%; PreDiabetic 5 7-6 4%;  Diabetic >=6 5%; Glycemic control for adults with diabetes <7 0% % 5 2     eAG, EST AVG Glucose Latest Units: mg/dl 103     [facility] Latest Ref Range: 0 358 - 3 740 uIU/mL 1 060     SARS-COV-2 Latest Ref Range: Negative   Negative    HIV-1/2 AB-AG Latest Ref Range: Non-Reactive    Non-Reactive   ANTI-NUCLEAR ANTIBODY (FLOR) Latest Ref Range: Negative    Positive (A)   FLOR Pattern 1 Unknown   Speckled pattern   FLOR Titer 1 Unknown   Titer of 320   C-REACTIVE PROTEIN Latest Ref Range: <3 0 mg/L   <3 0   NOVEL CORONAVIRUS (COVID-19), PCR [facility] Unknown  Rpt    SSA (RO) ANTIBODY Latest Ref Range: 0 0 - 0 9 AI   <0 2   SSB (LA) ANTIBODY Latest Ref Range: 0 0 - 0 9 AI   <0 2     EKG: Normal sinus rhythm  Normal ECG  When compared with ECG of 2019 18:15,  No significant change was found  Confirmed by Herb Bond ([phone]) on 2020 6:06:29 PM      Past Surgical History:   Procedure Laterality Date     SECTION      COLONOSCOPY  2012    Fiberoptic    CRYOTHERAPY      cervix, age 24   [place] [facility] DIAGNOSTIC LAPAROSCOPY      DILATION AND CURETTAGE OF UTERUS      Resolved     GYNECOLOGIC CRYOSURGERY      age 24   [place] Ravel TOOTH EXTRACTION         --> ENT procedures: denies    Current Outpatient Medications   Medication Sig Dispense Refill    Green Tea, Lucrecia sinensis, (GREEN TEA EXTRACT PO) Take by mouth      multivitamin (THERAGRAN) TABS Take 1 tablet by mouth daily      Probiotic Product (PROBIOTIC + TURMERIC EXTRACT PO) Take by mouth      Probiotic Product (PROBIOTIC ADVANCED PO) Take by mouth      Thiamine HCl (VITAMIN B1 PO) Take by mouth       No current facility-administered medications for this visit           Social History:  -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  -Caffeine: no  -Alcohol: no  -THC: no  -OTC/Supplements/herbals: see med list  -Illicits:  denies  -Family: lives with daughter and jimmyancee    ROS:  Genitourinary excessive blood loss during menses and sleep problems that vary with menstrual cycle    Cardiology none   Gastrointestinal frequent heartburn/acid reflux and abdominal pain or cramping that disturb sleep Neurology need to move extremities, muscle weakness, numbness/tingling of an extremity, loss of consciousness, forgetfulness, poor concentration or confusion,  and difficulty with memory   Constitutional fatigue   Integumentary none   Psychiatry anxiety   Musculoskeletal muscle aches   Pulmonary none   ENT none   Endocrine none   Hematological none       MSE:  -Alert and appropriate: alert, calm, cooperative  -Oriented to person, place and time:  name, age, location, day/date/mon/yr  -Behavior: good, sustained eye contact  -Speech: Unremarkable rate/rhythm/volume  -Mood: "good mood, but tired"  -Affect: mood congruent  -Thought Processes: linear, logical, goal directed  PE:  Body mass index is 4,387 2 kg/m²  Vitals:    08/06/21 0900   BP: 96/64   BP Location: Left arm   Cuff Size: Standard   Pulse: 76   Temp: 98 8 °F (37 1 °C)   SpO2: 98%   Weight: 70 8 kg (156 lb)   Height: (!) 5" (0 127 m)       -General:  In NAD    -Eyes: Conjunctival injection: none     -EOM:  PERRLA, EOMI   -Eyelid hooding: yes    -ENT: MP: 4/4   -Facial deformity: no retrognathia   -Hard palate: moderate arch   -Soft palate:  crowding   -Gums and teeth: normal dentition   -Tongue:  Scalloping   -Nares:  Patent    -Neck/Lymphatics: Lymphadenopathy:  none appreciated   -Masses:  none appreciated   -Circumference: Neck Circumference: 14 75 "    -Cardiac: Auscultation:  RRR   - LE edema over shins: none appreciated    -Pulm: -Respirations: unlaboured         -Auscultation:  CTA bilaterally, posterior fields    -Neuro: No resting tremor     -Musculoskeletal: Gait and stance: normal turning and ambulation; unremarkable  Assessment:  Ms Terri Holm is a 40 y o  female who is seen to evaluate for possible obstructive sleep apnea  The patient has long standing insomnia for about 10 years and anxiety since childhood  This is impacted her sleep as well as possibly fibromyalgia  She has chronic fatigue as well   She denied snoring, observed apneas, nocturnal gasping, daytime sleepiness, and nocturia  She does have a narrow airway and a family hx of MALDONADO which may put her at risk for this as well even though she doesn't have the aforementioned symptoms  The pathophysiology of, the reasons to treat and treatment options for obstructive sleep apnea were all reviewed with the patient today  She is not a good candidate for HST as she has insomnia, no snoring, and no observed apnea making her a low pre test probability  PSG is ordered  She is amenable to treatment with PAP therapy or OAT if needed  Discussed keeping nasal passages clear, abstaining from alcohol, and other sedating drugs at night- which will worsen symptoms of MALDONADO  She is willing to make changes in her sleep hygiene as well as try CBT-I as recommended below  --History provided by: patient   --Records reviewed: in chart      Recommendations:  1)In lab diagnostic Polysomnography   2) Driving safety was reviewed with patient  If the patient feels too sleepy to drive she knows not to drive  If she becomes sleepy while driving she will pull over and nap  3) Follow-up after initiation of treatment if needed  4) Call with any questions or concerns  5) middle of the night sleep aids should be avoided due to lasting effects into the day  Insomnia:  1) Get up at the same time seven days out of the week  2) Only go to bed when feeling sleepy  3) Wind down in the evening without electronics  4) Stimulus Control: If lying in bed for 15-20 minutes (estimated because the clock is turned away so you cannot see it) and you are not asleep get up and do something relaxing in a different room (reading a magazine article, solitaire with a deck of cards)  Do this in the middle of the night as well if awake  Avoid doing work or getting on the computer  5) Bedroom for sleep only  No watching TV or using electronics (computer, phone, tablet etc )  in bed      6) Turn clock away so you cannot see it in bed  7) Exercise regularly but try to avoid exercise within 4 hours of bedtime  Morning exercise is best     8) Avoid caffeine in the afternoon  Considering tapering down on caffeine by decreasing by one beverage with caffeine every 3 days until off  9) Avoid smoking near bedtime    10) Avoid alcohol before bed  If you consume one alcoholic beverage allow 3 hours between that drink and bedtime  If you consume two alcoholic beverages allow 5 hours  Between those drinks and bedtime  Alcohol may lead to waking at night  11) Avoid napping except for driving safety  If you feel to sleepy to drive do not drive  If you get sleepy while driving pull over and nap  You may resume driving once you feel alert  12) Read "No More Sleepless Nights" by Ryan Paniagua PhD     13) There are some on-line resources that do require a fee that can be of help  Two credible websites are as follows:  http://[employer]/cbt-online-insomnia-treatment html  [employer]  An rahcael used by the [address] [facility] is as follows:  CBT-I   Go! To Sleep by the [facility]  All questions answered for the patient, who indicated understanding and agreed with the plan  Jerod Klein MD  Psychiatry/ [address] [place] [address],[address] [facility], [place]  8/6/2021 10:03 AM  Sign when Signing Visit  Sleep Medicine Consultation Note    HPI:  Ms Rashawn Rae is a 40 y o  female seen at the request of Clayton Husain MD for advice regarding suspected sleep disordered breathing  She has trouble sleeping for the last 10 years since she has been pregnant  She has been tested for auto immune problems and they are unable to pinpoint what kind it is as her FLOR is elevated  She sometimes takes benadryl sometimes in the middle of the night if she cannot get back to sleep  This will also make her tired in the morning  She has trouble staying asleep   And even when she feels she got a good nights sleep she is still tired  She is tired and sleepy all day  She pushes through her tiredness to stay awake and spend time with her daughter  She has started weight management to try and lose weight  She is too tired to exercise  She does get 10,000 steps a day  Please see below for continuation of the HPI:      Sleep Disordered Breathing:  -Snoring: no  -Observed Apneas: no  -Mouth Breathing at night: not usually  -Dry Mouth in morning: no   -Nocturnal Gasping: no  -Nasal Obstruction: only when allergies are acting up    -Weight: gained 15 lbs    Sleep Pattern:  -Location: bedroom   -Bed/Recliner/Wedge: bed  -Bed Partner: yes  -HOB: flat  -# of pillows under head: 2  -Position: prone or side  -Bedtime: 9pm  -Lights out: same time  -Environmental: TV on and she falls asleep with the TV that her fiance watches  -Latency: 1h  -Awakenings: 2   -Reason: pain can wake her   -Duration: can be awake for hours if she doesn't take benadryl  -Wake time: 630am   -Alarm: yes  -Rise time: same time  -Days off: same   -Shift Work: M-F 8a-5p  -Patient's estimate of total sleep time: 6h      Daytime Symptoms:  -Upon Awakening: tired and exhausted  -Daytime fatigue/sleepiness: sleepy all the time  -Naps: no  -Involuntary Dozing: sometimes  -Cognitive Symptoms: poor short term memory and concentration  -Driving: Difficulty with sleepiness and driving:  Yes, she sometimes doesn't recall how she got someplace  She has pulled over and gotten caffeine  -- Close calls related to sleepiness: no   -- Accidents related to sleepiness: no      Questionnaires:  Sitting and reading: Moderate chance of dozing  Watching TV: Moderate chance of dozing  Sitting, inactive in a public place (e g  a theatre or a meeting): Slight chance of dozing  As a passenger in a car for an hour without a break:  Moderate chance of dozing  Lying down to rest in the afternoon when circumstances permit: Moderate chance of dozing  Sitting and talking to someone: Slight chance of dozing  Sitting quietly after a lunch without alcohol: Slight chance of dozing  In a car, while stopped for a few minutes in traffic: Slight chance of dozing  Total score: 12      Sleep Review of Symptoms:  -Parasomnias:  --Sleep Walking: no  --Dream Enactment: no  --Bruxism: yes  -Motor:  --RLS: no  --PLMS: no  -Narcolepsy:  --Hallucinations: yes  --Paralysis: yes in the past    --Cataplexy: no    Childhood Sleep History: insomnia, anxiety    Prior Sleep Studies/Evaluations:  no    Family History:  Family history of sleep disorders: parents have MALDONADO  Brother has MALDONADO  Patient Active Problem List   Diagnosis    Anxiety    Fatigue    Iron deficiency    Lower back pain    Rotator cuff tear arthropathy    Vitamin D deficiency    Osteoarthritis of spine with radiculopathy, cervical region    History of optic neuritis    Class 1 obesity     Past Medical History:   Diagnosis Date    Abnormal Pap smear of cervix     Anemia     Disease of thyroid gland     thyroid storm with previous pregnancy    Dysmenorrhea     Esophageal reflux     last assessed - [id number]    Herpes zoster     LUQ level T-8 resolved; last assessed - [id number]    History of infection due to human papilloma virus (HPV)     HPV (human papilloma virus) infection     Inguinal hernia     last assessed - [id number]    Insomnia     Mastodynia     Resolved - 2012    Migraine     Ovarian cyst     last assessed - [id number]    Ovarian cyst 2013    Palpitations     last assessed - 05BUP9896    Tachycardia     Resolved - [id number]    Takes dietary supplements     Temporomandibular joint disorder     last assessed - 06Dsh6058    Urinary tract infection     Varicella        --> Denies any cardiopulmonary disease  --> Seizure hx: denies  --> Head injury with LOC: denies  --> Supplemental Oxygen Use: denies    Labs   Results for Shiela Garcia (MRN [id number]) as of 8/6/2021 09:28   Ref   Range 11/11/2020 08:00 1/25/2021 15:07 3/16/2021 09:10   Sodium Latest Ref Range: 136 - 145 mmol/L 141     Potassium Latest Ref Range: 3 5 - 5 3 mmol/L 3 7     Chloride Latest Ref Range: 100 - 108 mmol/L 113 (H)     CO2 Latest Ref Range: 21 - 32 mmol/L 24     Anion Gap Latest Ref Range: 4 - 13 mmol/L 4     BUN Latest Ref Range: 5 - 25 mg/dL 12     Creatinine Latest Ref Range: 0 60 - 1 30 mg/dL 0 74     GLUCOSE FASTING Latest Ref Range: 65 - 99 mg/dL 88     Calcium Latest Ref Range: 8 3 - 10 1 mg/dL 8 6     CORRECTED CALCIUM Latest Ref Range: 8 3 - 10 1 mg/dL 9 1     AST Latest Ref Range: 5 - 45 U/L 13     ALT Latest Ref Range: 12 - 78 U/L 27     Alkaline Phosphatase Latest Ref Range: 46 - 116 U/L 62     Total Protein Latest Ref Range: 6 4 - 8 2 g/dL 6 9     Albumin Latest Ref Range: 3 5 - 5 0 g/dL 3 4 (L)     TOTAL BILIRUBIN Latest Ref Range: 0 20 - 1 00 mg/dL 0 40     eGFR Latest Units: ml/min/1 73sq m 99     Iron Latest Ref Range: 50 - 170 ug/dL 89     Ferritin Latest Ref Range: 8 - 388 ng/mL 22     Iron Saturation Latest Units: % 26     TIBC Latest Ref Range: 250 - 450 ug/dL 348     Vit D, 25-Hydroxy Latest Ref Range: 30 0 - 100 0 ng/mL 22 6 (L)     WBC Latest Ref Range: 4 31 - 10 16 Thousand/uL 8 32  7 74   Red Blood Cell Count Latest Ref Range: 3 81 - 5 12 Million/uL 4 60  4 51   Hemoglobin Latest Ref Range: 11 5 - 15 4 g/dL 13 2  13 1   HCT Latest Ref Range: 34 8 - 46 1 % 41 2  40 0   MCV Latest Ref Range: 82 - 98 fL 90  89   MCH Latest Ref Range: 26 8 - 34 3 pg 28 7  29 0   MCHC Latest Ref Range: 31 4 - 37 4 g/dL 32 0  32 8   RDW Latest Ref Range: 11 6 - 15 1 % 12 1  12 5   Platelet Count Latest Ref Range: 149 - 390 Thousands/uL 279  334   MPV Latest Ref Range: 8 9 - 12 7 fL 11 4  10 7   nRBC Latest Units: /100 WBCs 0  0   Neutrophils % Latest Ref Range: 43 - 75 % 67  58   Immat GRANS % Latest Ref Range: 0 - 2 % 0  0   Lymphocytes Relative Latest Ref Range: 14 - 44 % 22  27   Monocytes Relative Latest Ref Range: 4 - 12 % 8  10   Eosinophils Latest Ref Range: 0 - 6 % 2  4   Basophils Relative Latest Ref Range: 0 - 1 % 1  1   Immature Grans Absolute Latest Ref Range: 0 00 - 0 20 Thousand/uL 0 02  0 02   Absolute Neutrophils Latest Ref Range: 1 85 - 7 62 Thousands/µL 5 62  4 51   Lymphocytes Absolute Latest Ref Range: 0 60 - 4 47 Thousands/µL 1 82  2 10   Absolute Monocytes Latest Ref Range: 0 17 - 1 22 Thousand/µL 0 65  0 74   Absolute Eosinophils Latest Ref Range: 0 00 - 0 61 Thousand/µL 0 16  0 29   Basophils Absolute Latest Ref Range: 0 00 - 0 10 Thousands/µL 0 05  0 08   Sed Rate Latest Ref Range: 0 - 19 mm/hour   8   Hemoglobin A1C Latest Ref Range: Normal 3 8-5 6%; PreDiabetic 5 7-6 4%;  Diabetic >=6 5%; Glycemic control for adults with diabetes <7 0% % 5 2     eAG, EST AVG Glucose Latest Units: mg/dl 103     TSH 3RD [facility] Latest Ref Range: 0 358 - 3 740 uIU/mL 1 060     SARS-COV-2 Latest Ref Range: Negative   Negative    HIV-1/2 AB-AG Latest Ref Range: Non-Reactive    Non-Reactive   ANTI-NUCLEAR ANTIBODY (FLOR) Latest Ref Range: Negative    Positive (A)   FLOR Pattern 1 Unknown   Speckled pattern   FLOR Titer 1 Unknown   Titer of 320   C-REACTIVE PROTEIN Latest Ref Range: <3 0 mg/L   <3 0   NOVEL CORONAVIRUS (COVID-19), PCR [facility] Unknown  Rpt    SSA (RO) ANTIBODY Latest Ref Range: 0 0 - 0 9 AI   <0 2   SSB (LA) ANTIBODY Latest Ref Range: 0 0 - 0 9 AI   <0 2     EKG: Normal sinus rhythm  Normal ECG  When compared with ECG of 2019 18:15,  No significant change was found  Confirmed by Charbel Garcia ([id number]) on 2020 6:06:29 PM      Past Surgical History:   Procedure Laterality Date     SECTION      COLONOSCOPY  2012    Fiberoptic    CRYOTHERAPY      cervix, age 24   [facility] DIAGNOSTIC LAPAROSCOPY      DILATION AND CURETTAGE OF UTERUS      Resolved     GYNECOLOGIC CRYOSURGERY      age 24   Reesa Reichmann TOOTH EXTRACTION         --> ENT procedures: denies    Current Outpatient Medications   Medication Sig Dispense Refill    Green Tea, Lucrecia sinensis, (GREEN TEA EXTRACT PO) Take by mouth      multivitamin (THERAGRAN) TABS Take 1 tablet by mouth daily      Probiotic Product (PROBIOTIC + TURMERIC EXTRACT PO) Take by mouth      Probiotic Product (PROBIOTIC ADVANCED PO) Take by mouth      Thiamine HCl (VITAMIN B1 PO) Take by mouth       No current facility-administered medications for this visit  Social History:  -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  -Caffeine: no  -Alcohol: no  -THC: no  -OTC/Supplements/herbals: see med list  -Illicits:  denies  -Family: lives with daughter and jimmyancee    ROS:  Genitourinary excessive blood loss during menses and sleep problems that vary with menstrual cycle    Cardiology none   Gastrointestinal frequent heartburn/acid reflux and abdominal pain or cramping that disturb sleep    Neurology need to move extremities, muscle weakness, numbness/tingling of an extremity, loss of consciousness, forgetfulness, poor concentration or confusion,  and difficulty with memory   Constitutional fatigue   Integumentary none   Psychiatry anxiety   Musculoskeletal muscle aches   Pulmonary none   ENT none   Endocrine none   Hematological none       MSE:  -Alert and appropriate: alert, calm, cooperative  -Oriented to person, place and time:  name, age, location, day/date/mon/yr  -Behavior: good, sustained eye contact  -Speech: Unremarkable rate/rhythm/volume  -Mood: "good mood, but tired"  -Affect: mood congruent  -Thought Processes: linear, logical, goal directed  PE:  Body mass index is 4,387 2 kg/m²    Vitals:    08/06/21 0900   BP: 96/64   BP Location: Left arm   Cuff Size: Standard   Pulse: 76   Temp: 98 8 °F (37 1 °C)   SpO2: 98%   Weight: 70 8 kg (156 lb)   Height: (!) 5" (0 127 m)       -General:  In NAD    -Eyes: Conjunctival injection: none     -EOM:  PERRLA, EOMI   -Eyelid hooding: yes    -ENT: MP: 4/4   -Facial deformity: no retrognathia   -Hard palate: moderate arch   -Soft palate:  crowding   -Gums and teeth: normal dentition   -Tongue:  Scalloping   -Nares:  Patent    -Neck/Lymphatics: Lymphadenopathy:  none appreciated   -Masses:  none appreciated   -Circumference: Neck Circumference: 14 75 "    -Cardiac: Auscultation:  RRR   - LE edema over shins: none appreciated    -Pulm: -Respirations: unlaboured         -Auscultation:  CTA bilaterally, posterior fields    -Neuro: No resting tremor     -Musculoskeletal: Gait and stance: normal turning and ambulation; unremarkable  Assessment:  Ms Merrill Pardo is a 40 y o  female who is seen to evaluate for possible obstructive sleep apnea  The patient has long standing insomnia for about 10 years and anxiety since childhood  This is impacted her sleep as well as possibly fibromyalgia  She has chronic fatigue as well  She denied snoring, observed apneas, nocturnal gasping, daytime sleepiness, and nocturia  She does have a narrow airway and a family hx of MALDONADO which may put her at risk for this as well even though she doesn't have the aforementioned symptoms  The pathophysiology of, the reasons to treat and treatment options for obstructive sleep apnea were all reviewed with the patient today  She is not a good candidate for HST as she has insomnia, no snoring, and no observed apnea making her a low pre test probability  PSG is ordered  She is amenable to treatment with PAP therapy or OAT if needed  Discussed keeping nasal passages clear, abstaining from alcohol, and other sedating drugs at night- which will worsen symptoms of MALDONADO  She is willing to make changes in her sleep hygiene as well as try CBT-I as recommended below  --History provided by: patient   --Records reviewed: in chart      Recommendations:  1)In lab diagnostic Polysomnography   2) Driving safety was reviewed with patient  If the patient feels too sleepy to drive she knows not to drive  If she becomes sleepy while driving she will pull over and nap    3) Follow-up after initiation of treatment if needed  4) Call with any questions or concerns  Insomnia:  1) Get up at the same time seven days out of the week  2) Only go to bed when feeling sleepy  3) Wind down in the evening without electronics  4) Stimulus Control: If lying in bed for 15-20 minutes (estimated because the clock is turned away so you cannot see it) and you are not asleep get up and do something relaxing in a different room (reading a magazine article, solitaire with a deck of cards)  Do this in the middle of the night as well if awake  Avoid doing work or getting on the computer  5) Bedroom for sleep only  No watching TV or using electronics (computer, phone, tablet etc )  in bed  6) Turn clock away so you cannot see it in bed  7) Exercise regularly but try to avoid exercise within 4 hours of bedtime  Morning exercise is best     8) Avoid caffeine in the afternoon  Considering tapering down on caffeine by decreasing by one beverage with caffeine every 3 days until off  9) Avoid smoking near bedtime    10) Avoid alcohol before bed  If you consume one alcoholic beverage allow 3 hours between that drink and bedtime  If you consume two alcoholic beverages allow 5 hours  Between those drinks and bedtime  Alcohol may lead to waking at night  11) Avoid napping except for driving safety  If you feel to sleepy to drive do not drive  If you get sleepy while driving pull over and nap  You may resume driving once you feel alert  12) Read "No More Sleepless Nights" by Case Rowell PhD     13) There are some on-line resources that do require a fee that can be of help  Two credible websites are as follows:  http://tapviva/cbt-online-insomnia-treatment html  [employer] si  An rachael used by the [place] is as follows:  CBT-I   Go! To Sleep by the [facility]  All questions answered for the patient, who indicated understanding and agreed with the plan       Chelo Story MD  Psychiatry/ Sleep medicine

## 2021-08-11 ENCOUNTER — TELEPHONE (OUTPATIENT)
Dept: SLEEP CENTER | Facility: CLINIC | Age: 46
End: 2021-08-11

## 2021-08-11 NOTE — TELEPHONE ENCOUNTER
Patients insurance denied authorization for CPT [phone]  Would you like me to set up peer to peer or would you like to order an HST?

## 2021-08-12 ENCOUNTER — CLINICAL SUPPORT (OUTPATIENT)
Dept: BARIATRICS | Facility: CLINIC | Age: 46
End: 2021-08-12

## 2021-08-12 ENCOUNTER — OFFICE VISIT (OUTPATIENT)
Dept: BARIATRICS | Facility: CLINIC | Age: 46
End: 2021-08-12

## 2021-08-12 VITALS — WEIGHT: 157 LBS | HEIGHT: 60 IN | BODY MASS INDEX: 30.82 KG/M2

## 2021-08-12 VITALS — HEIGHT: 60 IN | BODY MASS INDEX: 30.82 KG/M2 | WEIGHT: 157 LBS

## 2021-08-12 DIAGNOSIS — E66.9 CLASS 1 OBESITY: ICD-10-CM

## 2021-08-12 DIAGNOSIS — R63.5 ABNORMAL WEIGHT GAIN: Primary | ICD-10-CM

## 2021-08-12 DIAGNOSIS — R53.83 FATIGUE, UNSPECIFIED TYPE: Primary | ICD-10-CM

## 2021-08-12 DIAGNOSIS — G47.00 INSOMNIA, UNSPECIFIED TYPE: ICD-10-CM

## 2021-08-12 PROCEDURE — RECHECK

## 2021-08-12 NOTE — PROGRESS NOTES
Weight Management Medical Nutrition Assessment  Oc Olvera was here today for her 2 week follow-up in the [facility]   Today she weighs 157 lbs giving her a loss of 1 lbs in the last 2 weeks  She has been starting to log her food, but admits that she is not doing it consistently  She continues to walk and is adding some additional exercise in when her schedule allows  She continues to struggle with insomnia, and has a hard time getting to and staying asleep  She is going for a sleep study in 2 weeks         Patient seen by Medical Provider in past 6 months:  yes  Requested to schedule appointment with Medical Provider: No        Anthropometric Measurements  Start Weight (#): 158  Current Weight (#):157  TBW % Change from start weight:0%  Ideal Body Weight (#):97 5  Goal Weight (#):125 -130     Weight Loss History  Previous weight loss attempts: Self Created Diets (Portion Control, Healthy Food Choices, etc )     Food and Nutrition Related History  Wake up: 6am  Bed Time:11 pm     Food Recall  Breakfast: left overs or eggs                 Snack:  Lunch:spinach, walnuts and beets  Snack: banana or grazes  Dinner:lean protein, veggies and starch  Snack:grazing        Beverages: water,soda  Volume of beverage intake: 32     Weekends: Same  Cravings: sweets  Trouble area of day: evening     Frequency of Eating out: irregularly  Food restrictions:none  Cooking: self   Food Shopping: self     Physical Activity Intake  Activity:Walking  Frequency:daily  Physical limitations/barriers to exercise: none     Estimated Needs  Energy     [place] Energy Needs: BMR : 1705   1# loss weekly sedentary:  1030             1-2# loss weekly lightly active:  Maintenance calories for sedentary activity level: 1530  Protein: 53 - 66     (1 2-1 5g/kg IBW)  Fluid: 52   (35mL/kg IBW)     Nutrition Diagnosis  Yes;     Overweight/obesity  related to Excess energy intake as evidenced by  BMI more than normative standard for age and sex (obesity-grade I 30-34  9)     Nutrition Intervention     Nutrition Prescription  Calories: 1000 - 1100  Protein:53 - 77  Fluid:52        Nutrition Education:    [facility] Manual  Calorie controlled menu  Lean protein food choices  Healthy snack options  Food journaling tips        Nutrition Counseling:  Strategies: meal planning, portion sizes, healthy snack choices, hydration, fiber intake, protein intake, exercise, food journal        Monitoring and Evaluation:  Evaluation criteria:  Energy Intake  Meet protein needs  Maintain adequate hydration  Monitor weekly weight  Meal planning/preparation  Food journal   Decreased portions at mealtimes and snacks  Physical activity      Barriers to learning:none  Readiness to change: Action:  (Changing behavior)  Comprehension: good  Expected Compliance: good

## 2021-08-13 ENCOUNTER — TELEPHONE (OUTPATIENT)
Dept: BARIATRICS | Facility: CLINIC | Age: 46
End: 2021-08-13

## 2021-08-13 NOTE — TELEPHONE ENCOUNTER
lmom for pt to return our call and schedule appointment with [facility]   Pt is a [facility] pt in [place]

## 2021-08-26 ENCOUNTER — CLINICAL SUPPORT (OUTPATIENT)
Dept: BARIATRICS | Facility: CLINIC | Age: 46
End: 2021-08-26

## 2021-08-26 VITALS — BODY MASS INDEX: 30.19 KG/M2 | HEIGHT: 60 IN | WEIGHT: 153.8 LBS

## 2021-08-26 DIAGNOSIS — R63.5 ABNORMAL WEIGHT GAIN: Primary | ICD-10-CM

## 2021-08-26 PROCEDURE — RECHECK

## 2021-08-26 PROCEDURE — 3008F BODY MASS INDEX DOCD: CPT | Performed by: PSYCHIATRY & NEUROLOGY

## 2021-09-09 ENCOUNTER — CLINICAL SUPPORT (OUTPATIENT)
Dept: BARIATRICS | Facility: CLINIC | Age: 46
End: 2021-09-09

## 2021-09-09 VITALS — HEIGHT: 60 IN | BODY MASS INDEX: 30.86 KG/M2 | WEIGHT: 157.2 LBS

## 2021-09-09 DIAGNOSIS — R63.5 ABNORMAL WEIGHT GAIN: Primary | ICD-10-CM

## 2021-09-09 PROCEDURE — RECHECK
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
## 2021-09-10 ENCOUNTER — OFFICE VISIT (OUTPATIENT)
Dept: BARIATRICS | Facility: CLINIC | Age: 46
End: 2021-09-10

## 2021-09-10 DIAGNOSIS — F41.9 ANXIETY: Primary | ICD-10-CM

## 2021-09-10 PROCEDURE — RECHECK

## 2021-09-13 ENCOUNTER — OFFICE VISIT (OUTPATIENT)
Dept: FAMILY MEDICINE CLINIC | Facility: CLINIC | Age: 46
End: 2021-09-13
Payer: COMMERCIAL

## 2021-09-13 VITALS
BODY MASS INDEX: 30.86 KG/M2 | SYSTOLIC BLOOD PRESSURE: 124 MMHG | HEART RATE: 88 BPM | HEIGHT: 60 IN | RESPIRATION RATE: 20 BRPM | DIASTOLIC BLOOD PRESSURE: 78 MMHG | OXYGEN SATURATION: 99 % | TEMPERATURE: 99 F | WEIGHT: 157.2 LBS

## 2021-09-13 DIAGNOSIS — F33.9 DEPRESSION, RECURRENT (HCC): ICD-10-CM

## 2021-09-13 DIAGNOSIS — R90.82 WHITE MATTER ABNORMALITY ON MRI OF BRAIN: ICD-10-CM

## 2021-09-13 DIAGNOSIS — R20.2 PARESTHESIA AND PAIN OF LEFT EXTREMITY: ICD-10-CM

## 2021-09-13 DIAGNOSIS — R53.83 FATIGUE, UNSPECIFIED TYPE: Primary | ICD-10-CM

## 2021-09-13 DIAGNOSIS — R20.0 LEFT FACIAL NUMBNESS: ICD-10-CM

## 2021-09-13 DIAGNOSIS — E55.9 VITAMIN D DEFICIENCY: ICD-10-CM

## 2021-09-13 DIAGNOSIS — M79.609 PARESTHESIA AND PAIN OF LEFT EXTREMITY: ICD-10-CM

## 2021-09-13 DIAGNOSIS — E66.9 CLASS 1 OBESITY: ICD-10-CM

## 2021-09-13 PROCEDURE — 1036F TOBACCO NON-USER: CPT | Performed by: INTERNAL MEDICINE

## 2021-09-13 PROCEDURE — 99214 OFFICE O/P EST MOD 30 MIN: CPT | Performed by: INTERNAL MEDICINE

## 2021-09-13 PROCEDURE — 3725F SCREEN DEPRESSION PERFORMED: CPT | Performed by: INTERNAL MEDICINE

## 2021-09-13 RX ORDER — MELATONIN
5000 DAILY
COMMUNITY

## 2021-09-13 NOTE — PATIENT INSTRUCTIONS
Please get blood work done and see neurology for possible MS       Follow up in 6 months for annual physical
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
## 2021-09-15 ENCOUNTER — HOSPITAL ENCOUNTER (OUTPATIENT)
Dept: SLEEP CENTER | Facility: CLINIC | Age: 46
Discharge: HOME | End: 2021-09-15
Payer: COMMERCIAL

## 2021-09-15 DIAGNOSIS — G47.00 INSOMNIA, UNSPECIFIED: ICD-10-CM

## 2021-09-15 DIAGNOSIS — R53.83 OTHER FATIGUE: ICD-10-CM

## 2021-09-15 DIAGNOSIS — E66.9 OBESITY, UNSPECIFIED: ICD-10-CM

## 2021-09-15 PROCEDURE — G0399 HOME SLEEP TEST/TYPE 3 PORTA: HCPCS

## 2021-09-16 NOTE — PROGRESS NOTES
Home Sleep Study Documentation    Pre-Sleep Home Study:    Set-up and instructions performed by: Soto San    Technician performed demonstration for Patient: yes    Return demonstration performed by Patient: yes    Written instructions provided to Patient: yes    Patient signed consent form: yes        Post-Sleep Home Study:    Additional comments by Patient:       Home Sleep Study Failed:no:    Failure reason: N/A    Reported or Detected: N/A    Scored by: SAUL Moon, RPSGT

## 2021-09-17 ENCOUNTER — TELEPHONE (OUTPATIENT)
Dept: SLEEP CENTER | Facility: CLINIC | Age: 46
End: 2021-09-17

## 2021-09-17 DIAGNOSIS — R53.83 OTHER FATIGUE: Primary | ICD-10-CM

## 2021-09-17 DIAGNOSIS — G47.00 INSOMNIA, UNSPECIFIED TYPE: ICD-10-CM

## 2021-09-17 DIAGNOSIS — E66.9 CLASS 1 OBESITY WITHOUT SERIOUS COMORBIDITY WITH BODY MASS INDEX (BMI) OF 30.0 TO 30.9 IN ADULT, UNSPECIFIED OBESITY TYPE: ICD-10-CM

## 2021-09-17 PROCEDURE — 95806 SLEEP STUDY UNATT&RESP EFFT: CPT | Performed by: PSYCHIATRY & NEUROLOGY

## 2021-09-17 NOTE — TELEPHONE ENCOUNTER
Left message for patient to call office    Sleep study non diagnostic, due to symptoms, in lab diagnostic study ordered

## 2021-09-23 ENCOUNTER — APPOINTMENT (OUTPATIENT)
Dept: LAB | Facility: CLINIC | Age: 46
End: 2021-09-23
Payer: COMMERCIAL

## 2021-09-23 DIAGNOSIS — M79.609 PARESTHESIA AND PAIN OF LEFT EXTREMITY: ICD-10-CM

## 2021-09-23 DIAGNOSIS — R63.5 ABNORMAL WEIGHT GAIN: ICD-10-CM

## 2021-09-23 DIAGNOSIS — R20.2 PARESTHESIA AND PAIN OF LEFT EXTREMITY: ICD-10-CM

## 2021-09-23 DIAGNOSIS — R90.82 WHITE MATTER ABNORMALITY ON MRI OF BRAIN: ICD-10-CM

## 2021-09-23 DIAGNOSIS — E66.9 CLASS 1 OBESITY: ICD-10-CM

## 2021-09-23 DIAGNOSIS — E55.9 VITAMIN D DEFICIENCY: ICD-10-CM

## 2021-09-23 DIAGNOSIS — R53.83 FATIGUE, UNSPECIFIED TYPE: ICD-10-CM

## 2021-09-23 DIAGNOSIS — R20.0 LEFT FACIAL NUMBNESS: ICD-10-CM

## 2021-09-23 LAB
25(OH)D3 SERPL-MCNC: 28.8 NG/ML (ref 30–100)
ALBUMIN SERPL BCP-MCNC: 3.2 G/DL (ref 3.5–5)
ALP SERPL-CCNC: 74 U/L (ref 46–116)
ALT SERPL W P-5'-P-CCNC: 37 U/L (ref 12–78)
ANION GAP SERPL CALCULATED.3IONS-SCNC: 5 MMOL/L (ref 4–13)
AST SERPL W P-5'-P-CCNC: 19 U/L (ref 5–45)
BASOPHILS # BLD AUTO: 0.05 THOUSANDS/ΜL (ref 0–0.1)
BASOPHILS NFR BLD AUTO: 1 % (ref 0–1)
BILIRUB SERPL-MCNC: 0.75 MG/DL (ref 0.2–1)
BUN SERPL-MCNC: 11 MG/DL (ref 5–25)
CALCIUM ALBUM COR SERPL-MCNC: 8.8 MG/DL (ref 8.3–10.1)
CALCIUM SERPL-MCNC: 8.2 MG/DL (ref 8.3–10.1)
CHLORIDE SERPL-SCNC: 109 MMOL/L (ref 100–108)
CHOLEST SERPL-MCNC: 188 MG/DL (ref 50–200)
CO2 SERPL-SCNC: 22 MMOL/L (ref 21–32)
CREAT SERPL-MCNC: 0.69 MG/DL (ref 0.6–1.3)
EOSINOPHIL # BLD AUTO: 0.26 THOUSAND/ΜL (ref 0–0.61)
EOSINOPHIL NFR BLD AUTO: 4 % (ref 0–6)
ERYTHROCYTE [DISTWIDTH] IN BLOOD BY AUTOMATED COUNT: 12.6 % (ref 11.6–15.1)
GFR SERPL CREATININE-BSD FRML MDRD: 105 ML/MIN/1.73SQ M
GLUCOSE P FAST SERPL-MCNC: 95 MG/DL (ref 65–99)
HCT VFR BLD AUTO: 41 % (ref 34.8–46.1)
HDLC SERPL-MCNC: 38 MG/DL
HGB BLD-MCNC: 13.8 G/DL (ref 11.5–15.4)
IMM GRANULOCYTES # BLD AUTO: 0.02 THOUSAND/UL (ref 0–0.2)
IMM GRANULOCYTES NFR BLD AUTO: 0 % (ref 0–2)
LDLC SERPL CALC-MCNC: 128 MG/DL (ref 0–100)
LYMPHOCYTES # BLD AUTO: 2.11 THOUSANDS/ΜL (ref 0.6–4.47)
LYMPHOCYTES NFR BLD AUTO: 30 % (ref 14–44)
MCH RBC QN AUTO: 29.4 PG (ref 26.8–34.3)
MCHC RBC AUTO-ENTMCNC: 33.7 G/DL (ref 31.4–37.4)
MCV RBC AUTO: 87 FL (ref 82–98)
MONOCYTES # BLD AUTO: 0.6 THOUSAND/ΜL (ref 0.17–1.22)
MONOCYTES NFR BLD AUTO: 9 % (ref 4–12)
NEUTROPHILS # BLD AUTO: 4.03 THOUSANDS/ΜL (ref 1.85–7.62)
NEUTS SEG NFR BLD AUTO: 56 % (ref 43–75)
NONHDLC SERPL-MCNC: 150 MG/DL
NRBC BLD AUTO-RTO: 0 /100 WBCS
PLATELET # BLD AUTO: 307 THOUSANDS/UL (ref 149–390)
PMV BLD AUTO: 11.2 FL (ref 8.9–12.7)
POTASSIUM SERPL-SCNC: 3.9 MMOL/L (ref 3.5–5.3)
PROT SERPL-MCNC: 7.2 G/DL (ref 6.4–8.2)
RBC # BLD AUTO: 4.7 MILLION/UL (ref 3.81–5.12)
SODIUM SERPL-SCNC: 136 MMOL/L (ref 136–145)
TRIGL SERPL-MCNC: 111 MG/DL
VIT B12 SERPL-MCNC: 726 PG/ML (ref 100–900)
WBC # BLD AUTO: 7.07 THOUSAND/UL (ref 4.31–10.16)

## 2021-09-23 PROCEDURE — 80053 COMPREHEN METABOLIC PANEL: CPT

## 2021-09-23 PROCEDURE — 80061 LIPID PANEL: CPT

## 2021-09-23 PROCEDURE — 85025 COMPLETE CBC W/AUTO DIFF WBC: CPT

## 2021-09-23 PROCEDURE — 36415 COLL VENOUS BLD VENIPUNCTURE: CPT

## 2021-09-23 PROCEDURE — 82607 VITAMIN B-12: CPT

## 2021-09-23 PROCEDURE — 82306 VITAMIN D 25 HYDROXY: CPT

## 2021-09-30 ENCOUNTER — CLINICAL SUPPORT (OUTPATIENT)
Dept: BARIATRICS | Facility: CLINIC | Age: 46
End: 2021-09-30

## 2021-09-30 VITALS — WEIGHT: 155.4 LBS | BODY MASS INDEX: 30.51 KG/M2 | HEIGHT: 60 IN

## 2021-09-30 DIAGNOSIS — R63.5 ABNORMAL WEIGHT GAIN: Primary | ICD-10-CM

## 2021-09-30 PROCEDURE — 3008F BODY MASS INDEX DOCD: CPT | Performed by: INTERNAL MEDICINE

## 2021-09-30 PROCEDURE — RECHECK

## 2021-10-06 ENCOUNTER — OFFICE VISIT (OUTPATIENT)
Dept: OBGYN CLINIC | Facility: CLINIC | Age: 46
End: 2021-10-06
Payer: COMMERCIAL

## 2021-10-06 VITALS
HEIGHT: 60 IN | DIASTOLIC BLOOD PRESSURE: 74 MMHG | WEIGHT: 156.8 LBS | SYSTOLIC BLOOD PRESSURE: 112 MMHG | BODY MASS INDEX: 30.78 KG/M2

## 2021-10-06 DIAGNOSIS — Z12.4 SCREENING FOR MALIGNANT NEOPLASM OF CERVIX: ICD-10-CM

## 2021-10-06 DIAGNOSIS — Z01.419 ROUTINE GYNECOLOGICAL EXAMINATION: Primary | ICD-10-CM

## 2021-10-06 DIAGNOSIS — Z12.31 ENCOUNTER FOR SCREENING MAMMOGRAM FOR BREAST CANCER: ICD-10-CM

## 2021-10-06 PROCEDURE — G0476 HPV COMBO ASSAY CA SCREEN: HCPCS | Performed by: ADVANCED PRACTICE MIDWIFE

## 2021-10-06 PROCEDURE — G0145 SCR C/V CYTO,THINLAYER,RESCR: HCPCS | Performed by: ADVANCED PRACTICE MIDWIFE

## 2021-10-06 PROCEDURE — S0612 ANNUAL GYNECOLOGICAL EXAMINA: HCPCS | Performed by: ADVANCED PRACTICE MIDWIFE

## 2021-10-07 ENCOUNTER — OFFICE VISIT (OUTPATIENT)
Dept: BARIATRICS | Facility: CLINIC | Age: 46
End: 2021-10-07

## 2021-10-07 ENCOUNTER — CLINICAL SUPPORT (OUTPATIENT)
Dept: BARIATRICS | Facility: CLINIC | Age: 46
End: 2021-10-07

## 2021-10-07 VITALS — HEIGHT: 60 IN | BODY MASS INDEX: 30.67 KG/M2 | WEIGHT: 156.2 LBS

## 2021-10-07 DIAGNOSIS — R63.5 ABNORMAL WEIGHT GAIN: Primary | ICD-10-CM

## 2021-10-07 DIAGNOSIS — F41.9 ANXIETY: Primary | ICD-10-CM

## 2021-10-07 LAB
HPV HR 12 DNA CVX QL NAA+PROBE: NEGATIVE
HPV16 DNA CVX QL NAA+PROBE: NEGATIVE
HPV18 DNA CVX QL NAA+PROBE: NEGATIVE

## 2021-10-07 PROCEDURE — RECHECK

## 2021-10-11 LAB
LAB AP GYN PRIMARY INTERPRETATION: NORMAL
Lab: NORMAL

## 2021-10-14 ENCOUNTER — CLINICAL SUPPORT (OUTPATIENT)
Dept: BARIATRICS | Facility: CLINIC | Age: 46
End: 2021-10-14

## 2021-10-14 VITALS — HEIGHT: 60 IN | WEIGHT: 152.8 LBS | BODY MASS INDEX: 30 KG/M2

## 2021-10-14 DIAGNOSIS — R63.5 ABNORMAL WEIGHT GAIN: Primary | ICD-10-CM

## 2021-10-14 PROCEDURE — RECHECK

## 2021-10-14 PROCEDURE — 3008F BODY MASS INDEX DOCD: CPT | Performed by: INTERNAL MEDICINE

## 2021-11-10 ENCOUNTER — HOSPITAL ENCOUNTER (OUTPATIENT)
Dept: SLEEP CENTER | Facility: CLINIC | Age: 46
Discharge: HOME | End: 2021-11-10
Payer: COMMERCIAL

## 2021-11-10 DIAGNOSIS — E66.9 CLASS 1 OBESITY WITHOUT SERIOUS COMORBIDITY WITH BODY MASS INDEX (BMI) OF 30.0 TO 30.9 IN ADULT, UNSPECIFIED OBESITY TYPE: ICD-10-CM

## 2021-11-10 DIAGNOSIS — G47.00 INSOMNIA, UNSPECIFIED TYPE: ICD-10-CM

## 2021-11-10 DIAGNOSIS — R53.83 OTHER FATIGUE: ICD-10-CM

## 2021-11-10 PROCEDURE — 95810 POLYSOM 6/> YRS 4/> PARAM: CPT

## 2021-11-11 ENCOUNTER — OFFICE VISIT (OUTPATIENT)
Dept: BARIATRICS | Facility: CLINIC | Age: 46
End: 2021-11-11

## 2021-11-11 DIAGNOSIS — F41.9 ANXIETY: Primary | ICD-10-CM

## 2021-11-11 PROCEDURE — RECHECK

## 2021-11-12 ENCOUNTER — TELEPHONE (OUTPATIENT)
Dept: SLEEP CENTER | Facility: CLINIC | Age: 46
End: 2021-11-12

## 2021-12-10 ENCOUNTER — OFFICE VISIT (OUTPATIENT)
Dept: FAMILY MEDICINE CLINIC | Facility: CLINIC | Age: 46
End: 2021-12-10
Payer: COMMERCIAL

## 2021-12-10 VITALS
HEART RATE: 84 BPM | BODY MASS INDEX: 30.63 KG/M2 | WEIGHT: 156 LBS | TEMPERATURE: 99.3 F | DIASTOLIC BLOOD PRESSURE: 82 MMHG | HEIGHT: 60 IN | OXYGEN SATURATION: 98 % | SYSTOLIC BLOOD PRESSURE: 118 MMHG

## 2021-12-10 DIAGNOSIS — G43.109 OCULAR MIGRAINE: Primary | ICD-10-CM

## 2021-12-10 PROCEDURE — 3008F BODY MASS INDEX DOCD: CPT | Performed by: NURSE PRACTITIONER

## 2021-12-10 PROCEDURE — 99214 OFFICE O/P EST MOD 30 MIN: CPT | Performed by: NURSE PRACTITIONER

## 2021-12-10 PROCEDURE — 1036F TOBACCO NON-USER: CPT | Performed by: NURSE PRACTITIONER

## 2021-12-10 PROCEDURE — 3725F SCREEN DEPRESSION PERFORMED: CPT | Performed by: NURSE PRACTITIONER

## 2022-01-31 ENCOUNTER — TELEPHONE (OUTPATIENT)
Dept: FAMILY MEDICINE CLINIC | Facility: CLINIC | Age: 47
End: 2022-01-31

## 2022-01-31 NOTE — TELEPHONE ENCOUNTER
Pt called and stated that her employer wants her to have a booster shot before coming back  She stated after the last symptom is gone    The CDC recommends 10 days  She is just asking about what is the protocol  Was at the office    Please advise    Phone: [phone]

## 2022-01-31 NOTE — TELEPHONE ENCOUNTER
Honestly, she can get her booster any time she is feeling better, I would recommend at least 7-10 from onset of symptoms

## 2022-01-31 NOTE — TELEPHONE ENCOUNTER
Called and spoke with patient in regards to provider's recommendations; pt verbalized an understanding

## 2022-03-01 ENCOUNTER — TELEPHONE (OUTPATIENT)
Dept: FAMILY MEDICINE CLINIC | Facility: CLINIC | Age: 47
End: 2022-03-01

## 2022-03-01 ENCOUNTER — OFFICE VISIT (OUTPATIENT)
Dept: FAMILY MEDICINE CLINIC | Facility: CLINIC | Age: 47
End: 2022-03-01
Payer: COMMERCIAL

## 2022-03-01 VITALS
WEIGHT: 157.8 LBS | HEIGHT: 60 IN | HEART RATE: 90 BPM | OXYGEN SATURATION: 99 % | TEMPERATURE: 98.8 F | BODY MASS INDEX: 30.98 KG/M2 | SYSTOLIC BLOOD PRESSURE: 116 MMHG | DIASTOLIC BLOOD PRESSURE: 74 MMHG | RESPIRATION RATE: 18 BRPM

## 2022-03-01 DIAGNOSIS — J02.9 SORE THROAT: Primary | ICD-10-CM

## 2022-03-01 DIAGNOSIS — J06.9 VIRAL UPPER RESPIRATORY TRACT INFECTION: ICD-10-CM

## 2022-03-01 DIAGNOSIS — J04.0 LARYNGITIS: ICD-10-CM

## 2022-03-01 LAB — S PYO AG THROAT QL: NEGATIVE

## 2022-03-01 PROCEDURE — 87070 CULTURE OTHR SPECIMN AEROBIC: CPT | Performed by: INTERNAL MEDICINE

## 2022-03-01 PROCEDURE — 99213 OFFICE O/P EST LOW 20 MIN: CPT | Performed by: INTERNAL MEDICINE

## 2022-03-01 PROCEDURE — 87147 CULTURE TYPE IMMUNOLOGIC: CPT | Performed by: INTERNAL MEDICINE

## 2022-03-01 PROCEDURE — 87880 STREP A ASSAY W/OPTIC: CPT | Performed by: INTERNAL MEDICINE

## 2022-03-01 RX ORDER — GUAIFENESIN AND CODEINE PHOSPHATE 100; 10 MG/5ML; MG/5ML
10 SOLUTION ORAL 3 TIMES DAILY PRN
Qty: 236 ML | Refills: 1 | Status: SHIPPED | OUTPATIENT
Start: 2022-03-01 | End: 2022-04-09

## 2022-03-01 RX ORDER — PREDNISONE 20 MG/1
40 TABLET ORAL DAILY
Qty: 10 TABLET | Refills: 0 | Status: SHIPPED | OUTPATIENT
Start: 2022-03-01 | End: 2022-03-07 | Stop reason: SDUPTHER

## 2022-03-01 NOTE — PROGRESS NOTES
[address] [place] [address] Primary Care        NAME: Sigrid Pereira is a 40 y o  female  : 1975    MRN: [id number]  DATE: 2022  TIME: 9:15 AM    Assessment and Plan   1  Sore throat  -     POCT rapid strepA  -     Throat culture; Future  -     Throat culture    2  Laryngitis  -     predniSONE 20 mg tablet; Take 2 tablets (40 mg total) by mouth daily for 5 days    3  Viral upper respiratory tract infection  -     guaifenesin-codeine (GUAIFENESIN AC) 100-10 MG/5ML liquid; Take 10 mL by mouth 3 (three) times a day as needed for cough or congestion  -     predniSONE 20 mg tablet; Take 2 tablets (40 mg total) by mouth daily for 5 days             Chief Complaint     Chief Complaint   Patient presents with    Sore Throat     daughter had strep recently  also has sinus drainage  taking OTC medications not helping         History of Present Illness       Here for acute visit due to URI symptoms  Started about 5 days ago, with rhinorrhea, post-nasal drip and dry cough  Denies fevers/chills, nausea/vomiting, abdominal pain or diarrhea  Using OTC medications without relief  Daughter had strep recently  Pt had covid in January  Review of Systems   Review of Systems   Constitutional: Positive for appetite change and fatigue  Negative for chills and fever  HENT: Positive for congestion, postnasal drip, rhinorrhea, sinus pressure, sore throat, trouble swallowing and voice change  Respiratory: Positive for cough  Negative for chest tightness and shortness of breath  Cardiovascular: Negative for chest pain, palpitations and leg swelling  Gastrointestinal: Negative for abdominal pain, diarrhea, nausea and vomiting           Current Medications       Current Outpatient Medications:     cholecalciferol (VITAMIN D3) 1,000 units tablet, Take 5,000 Units by mouth daily , Disp: , Rfl:     Green Tea, Lucrecia sinensis, (GREEN TEA EXTRACT PO), Take by mouth, Disp: , Rfl:     multivitamin (THERAGRAN) TABS, Take 1 tablet by mouth daily, Disp: , Rfl:     Probiotic Product (PROBIOTIC + TURMERIC EXTRACT PO), Take by mouth, Disp: , Rfl:     Probiotic Product (PROBIOTIC ADVANCED PO), Take by mouth, Disp: , Rfl:     Thiamine HCl (VITAMIN B1 PO), Take by mouth, Disp: , Rfl:     guaifenesin-codeine (GUAIFENESIN AC) 100-10 MG/5ML liquid, Take 10 mL by mouth 3 (three) times a day as needed for cough or congestion, Disp: 236 mL, Rfl: 1    predniSONE 20 mg tablet, Take 2 tablets (40 mg total) by mouth daily for 5 days, Disp: 10 tablet, Rfl: 0    Current Allergies     Allergies as of 03/01/2022 - Reviewed 03/01/2022   Allergen Reaction Noted    Amoxicillin Hives 10/24/2012    Cymbalta [duloxetine hcl] Other (See Comments) 02/26/2021    Penicillins Hives 10/24/2012    Shellfish allergy - food allergy Hives 05/07/2013    Sulfa antibiotics Rash 02/25/2013            The following portions of the patient's history were reviewed and updated as appropriate: allergies, current medications, past family history, past medical history, past social history, past surgical history and problem list      Past Medical History:   Diagnosis Date    Abnormal Pap smear of cervix     Anemia     Disease of thyroid gland     thyroid storm with previous pregnancy    Dysmenorrhea     Esophageal reflux     last assessed - [id number]    Herpes zoster     LUQ level T-8 resolved; last assessed - [id number]    History of infection due to human papilloma virus (HPV)     HPV (human papilloma virus) infection     Inguinal hernia     last assessed - [id number]    Insomnia     Mastodynia     Resolved - 2012    Migraine     Ovarian cyst     last assessed - [id number]    Ovarian cyst 2013    Palpitations     last assessed - [id number]    Tachycardia     Resolved - [id number]    Takes dietary supplements     Temporomandibular joint disorder     last assessed - [id number]    Urinary tract infection     Varicella        Past Surgical History:   Procedure Laterality Date     SECTION      COLONOSCOPY  2012    Fiberoptic    CRYOTHERAPY      cervix, age 24   [de-identified] DIAGNOSTIC LAPAROSCOPY      DILATION AND CURETTAGE OF UTERUS      Resolved 2010    GYNECOLOGIC CRYOSURGERY      age 24    TOOTH EXTRACTION         Family History   Problem Relation Age of Onset    Diabetes Mother     Heart disease Mother         cardiac disorder    Gallbladder disease Mother     Hypertension Mother     Glaucoma Mother     Obesity Mother         hx gastric bypass    Diabetes Father     Heart disease Father         cardiac disorder    Hypertension Father     Hyperlipidemia Father         Pure Hypercholesterolemia    Obesity Father     Other Family         headache syndromes    Breast cancer Cousin     Post-traumatic stress disorder Brother     Hyperlipidemia Brother     Hypertension Brother     Diabetes Maternal Grandmother     Lung cancer Maternal Grandfather     Stroke Paternal Grandmother     Hypertension Paternal Grandmother     Cirrhosis Paternal Grandfather     Ovarian cancer Maternal Aunt     No Known Problems Daughter     No Known Problems Paternal Aunt     Colon cancer Neg Hx          Medications have been verified  Objective   /74   Pulse 90   Temp 98 8 °F (37 1 °C)   Resp 18   Ht 5' (1 524 m)   Wt 71 6 kg (157 lb 12 8 oz)   SpO2 99%   BMI 30 82 kg/m²        Physical Exam     Physical Exam  Vitals reviewed  Constitutional:       General: She is not in acute distress  Appearance: She is normal weight  HENT:      Head: Normocephalic and atraumatic  Right Ear: Tympanic membrane and ear canal normal  No middle ear effusion  Tympanic membrane is not erythematous  Left Ear: Tympanic membrane and ear canal normal   No middle ear effusion  Tympanic membrane is not erythematous  Nose: Congestion and rhinorrhea present  Mouth/Throat:      Pharynx: Posterior oropharyngeal erythema present   No oropharyngeal exudate or uvula swelling  Tonsils: No tonsillar exudate  Cardiovascular:      Rate and Rhythm: Normal rate and regular rhythm  Heart sounds: Normal heart sounds  No murmur heard  No friction rub  No gallop  Pulmonary:      Effort: Pulmonary effort is normal  No respiratory distress  Breath sounds: Normal breath sounds  No wheezing, rhonchi or rales  Lymphadenopathy:      Cervical: No cervical adenopathy  Neurological:      General: No focal deficit present  Mental Status: She is alert     Psychiatric:         Mood and Affect: Mood normal          Behavior: Behavior normal              Results:  Lab Results   Component Value Date    SODIUM 136 09/23/2021    K 3 9 09/23/2021     (H) 09/23/2021    CO2 22 09/23/2021    BUN 11 09/23/2021    CREATININE 0 69 09/23/2021    GLUC 81 01/11/2020    CALCIUM 8 2 (L) 09/23/2021       Lab Results   Component Value Date    HGBA1C 5 2 11/11/2020       Lab Results   Component Value Date    WBC 7 07 09/23/2021    HGB 13 8 09/23/2021    HCT 41 0 09/23/2021    MCV 87 09/23/2021     09/23/2021

## 2022-03-01 NOTE — TELEPHONE ENCOUNTER
She thinks she has strep throat, she has a sore throat her daughter was dx with strep last week , she had covid around 1/18

## 2022-03-03 ENCOUNTER — TELEMEDICINE (OUTPATIENT)
Dept: SLEEP CENTER | Facility: CLINIC | Age: 47
End: 2022-03-03
Payer: COMMERCIAL

## 2022-03-03 DIAGNOSIS — G47.33 OSA (OBSTRUCTIVE SLEEP APNEA): Primary | ICD-10-CM

## 2022-03-03 PROCEDURE — 99213 OFFICE O/P EST LOW 20 MIN: CPT | Performed by: PSYCHIATRY & NEUROLOGY

## 2022-03-03 NOTE — PROGRESS NOTES
Virtual Regular Visit    Verification of patient location:    Patient is located in the following state in which I hold an active license [place]      Assessment/Plan:    Problem List Items Addressed This Visit        Respiratory    MALDONADO (obstructive sleep apnea) - Primary               Reason for visit is   Chief Complaint   Patient presents with    Virtual Regular Visit        Encounter provider Sidney Parks MD    Provider located at [address] [id number] [phone]      Recent Visits  Date Type Provider Dept   03/01/22 Office Visit Rakesh Rashid MD [facility] Primary [facility]   Showing recent visits within past 7 days and meeting all other requirements  Today's Visits  Date Type Provider Dept   03/03/22 1660 [address]  MD Karin Arias 10 today's visits and meeting all other requirements  Future Appointments  No visits were found meeting these conditions  Showing future appointments within next 150 days and meeting all other requirements       The patient was identified by name and date of birth  Nikia Perdue was informed that this is a telemedicine visit and that the visit is being conducted through [address] [place] [address] Now and patient was informed that this is a secure, HIPAA-compliant platform  She agrees to proceed     My office door was closed  No one else was in the room  She acknowledged consent and understanding of privacy and security of the video platform  The patient has agreed to participate and understands they can discontinue the visit at any time  Patient is aware this is a billable service  Sleep Medicine Follow-Up Note    HPI: 38yo with upper airway resistance is being seen for a follow up  Treatment Summary: HST 9/2021: respiratory event index (BRETT) of 0 3   The lowest SpO2 recorded is 89%  PSG 11/2021: apnea/hypopnea index (AHI) of 0 1 events per hour of sleep   The AHI in the supine position was 0 5   The AHI during REM sleep was 0 4    Intermittent snoring of mild intensity was noted  HPI:   Today, patient stated that she still feels like she is exhausted all the time  She has some rare days when she can get things done around the house, but they are few and far between  She will go to sleep at 10pm at the latest and is completely be exhausted by 8-830pm  She will wake up at 5-6am  She sometimes wakes up at night between 2-4am to void and can get back to sleep  Respiratory:  -Ongoing Snoring: yes  -Mouth Breathing: yes, she has a cold now  -Dry Mouth: not usually  -Nocturnal Gasping: no    ROS:     Review of Systems      Genitourinary sleep problems that vary with menstrual cycle    Cardiology palpitations/fluttering feeling in the chest   Gastrointestinal none   Neurology numbness/tingling of an extremity, forgetfulness and difficulty with memory   Constitutional fatigue   Integumentary rash or dry skin and itching   Psychiatry anxiety   Musculoskeletal none   Pulmonary snoring   ENT none   Endocrine none   Hematological abnormal blood loss       Daytime Symptoms:  -Upon Awakening: some days great and most days exhausted  -Daytime fatigue/sleepiness: tired and sleepy  -Naps: no  -Involuntary Dozing: yes  -Cognitive Symptoms: yes  -Driving: Difficulty with sleepiness and driving:  Yes, she has to pull over at the side of the road if she gets tired   -- Close calls related to sleepiness: no   -- Accidents related to sleepiness: no    Substance Use:  -Caffeine: not usually, only when driving  -Alcohol: no  -THC: no    --> Denies any significant medical changes since last visit  --> Supplemental Oxygen Use: denies    Questionnaire:  Sitting and reading: Moderate chance of dozing  Watching TV: Moderate chance of dozing  Sitting, inactive in a public place (e g  a theatre or a meeting): Moderate chance of dozing  As a passenger in a car for an hour without a break:  Moderate chance of dozing  Lying down to rest in the afternoon when circumstances permit: Moderate chance of dozing  Sitting and talking to someone: Would never doze  Sitting quietly after a lunch without alcohol: Slight chance of dozing  In a car, while stopped for a few minutes in traffic: Slight chance of dozing  Total score: 12      PE:    There were no vitals taken for this visit  General:  In NAD  Pul: Respirations: unlaboured  MS: No atrophy  Neuro: No resting tremor  Gait normal turning & station; unremarkable overall  Psych: Socially appropriate  Pleasant  No overt dysphoria  Assessment: The patient continues have exhaustion and tiredness most days  She does not have MALDONADO, but has some snoring  We discussed weight loss and improving her sleep environment  She has ian eanxiety which contributes to her exhaustion  She will discuss it with her PCP  No MALDONADO  Recommendations:    1) weight loss and treatment for anxiety  2) Safe driving was extensively reviewed with the patient  This included not driving if sleepy; switching drivers if becoming sleepy  Discussed if neither are possible to pull over to the side of the road to nap  Discussed lack of effectiveness of other actions (e g  Increasing music volume, A/C, eating, etc ) and need to nap  Advised to significantly limit driving  Pt is agreeable that if they feel too sleepy to drive she knows not to drive and will not drive  If becomes sleepy while driving she will pull over to a safe area and nap  3) Follow-up PRN  4) Call with any questions or concerns  All questions answered for the patient , who indicated understanding and agreed with the plan       Marylu Stokes MD  Psychiatry/ Sleep medicine      I spent 20 minutes with patient today in which greater than 50% of the time was spent in counseling/coordination of care regarding treatment    VIRTUAL VISIT DISCLAIMER      Jaclyn Farnsworth verbally agrees to participate in Virtual Care Services  Pt is aware that [facility] could be limited without vital signs or the ability to perform a full hands-on physical exam  Isabel Dias understands she or the provider may request at any time to terminate the video visit and request the patient to seek care or treatment in person

## 2022-03-03 NOTE — PATIENT INSTRUCTIONS
Recommendations:    1) weight loss and treatment for anxiety  2) Safe driving was extensively reviewed with the patient  This included not driving if sleepy; switching drivers if becoming sleepy  Discussed if neither are possible to pull over to the side of the road to nap  Discussed lack of effectiveness of other actions (e g  Increasing music volume, A/C, eating, etc ) and need to nap  Advised to significantly limit driving  Pt is agreeable that if they feel too sleepy to drive she knows not to drive and will not drive  If becomes sleepy while driving she will pull over to a safe area and nap  3) Follow-up PRN  4) Call with any questions or concerns

## 2022-03-04 ENCOUNTER — TELEPHONE (OUTPATIENT)
Dept: FAMILY MEDICINE CLINIC | Facility: CLINIC | Age: 47
End: 2022-03-04

## 2022-03-04 LAB — BACTERIA THROAT CULT: ABNORMAL

## 2022-03-04 NOTE — TELEPHONE ENCOUNTER
Patient called the office in regards to recent throat culture results  Patient asking if we know what type of strep she has? Patient also wondering if she should stop taking her steroid when she starts the azithromycin? Please advise

## 2022-03-07 ENCOUNTER — OFFICE VISIT (OUTPATIENT)
Dept: FAMILY MEDICINE CLINIC | Facility: CLINIC | Age: 47
End: 2022-03-07
Payer: COMMERCIAL

## 2022-03-07 VITALS
OXYGEN SATURATION: 99 % | WEIGHT: 153.8 LBS | HEIGHT: 60 IN | BODY MASS INDEX: 30.19 KG/M2 | DIASTOLIC BLOOD PRESSURE: 82 MMHG | TEMPERATURE: 98.5 F | SYSTOLIC BLOOD PRESSURE: 120 MMHG | HEART RATE: 79 BPM | RESPIRATION RATE: 18 BRPM

## 2022-03-07 DIAGNOSIS — M79.609 PARESTHESIA AND PAIN OF LEFT EXTREMITY: ICD-10-CM

## 2022-03-07 DIAGNOSIS — M25.50 POLYARTHRALGIA: ICD-10-CM

## 2022-03-07 DIAGNOSIS — E55.9 VITAMIN D DEFICIENCY: ICD-10-CM

## 2022-03-07 DIAGNOSIS — R20.2 PARESTHESIA AND PAIN OF LEFT EXTREMITY: ICD-10-CM

## 2022-03-07 DIAGNOSIS — R76.8 POSITIVE ANA (ANTINUCLEAR ANTIBODY): ICD-10-CM

## 2022-03-07 DIAGNOSIS — Z00.00 ANNUAL PHYSICAL EXAM: Primary | ICD-10-CM

## 2022-03-07 PROCEDURE — 3725F SCREEN DEPRESSION PERFORMED: CPT | Performed by: INTERNAL MEDICINE

## 2022-03-07 PROCEDURE — 99214 OFFICE O/P EST MOD 30 MIN: CPT | Performed by: INTERNAL MEDICINE

## 2022-03-07 PROCEDURE — 1036F TOBACCO NON-USER: CPT | Performed by: INTERNAL MEDICINE

## 2022-03-07 PROCEDURE — 99396 PREV VISIT EST AGE 40-64: CPT | Performed by: INTERNAL MEDICINE

## 2022-03-07 PROCEDURE — 3008F BODY MASS INDEX DOCD: CPT | Performed by: INTERNAL MEDICINE

## 2022-03-07 RX ORDER — PREDNISONE 20 MG/1
TABLET ORAL
Qty: 11 TABLET | Refills: 0 | Status: SHIPPED | OUTPATIENT
Start: 2022-03-07 | End: 2022-03-16

## 2022-03-07 NOTE — PROGRESS NOTES
ADULT ANNUAL [facility]    NAME: Rashi Yip  AGE: 40 y o  SEX: female  : 1975     DATE: 3/7/2022     Assessment and Plan:     Problem List Items Addressed This Visit        Other    Vitamin D deficiency    Relevant Orders    Vitamin D 25 hydroxy      Other Visit Diagnoses     Annual physical exam    -  Primary    Positive FLOR (antinuclear antibody)        Relevant Medications    predniSONE 20 mg tablet    Other Relevant Orders    Ambulatory Referral to Rheumatology    Comprehensive metabolic panel    CBC and differential    FLOR Screen w/ Reflex to Titer/Pattern    Paresthesia and pain of left extremity        Relevant Medications    predniSONE 20 mg tablet    Other Relevant Orders    Ambulatory Referral to Rheumatology    Polyarthralgia        Relevant Orders    Comprehensive metabolic panel    CBC and differential    FLOR Screen w/ Reflex to Titer/Pattern    RF Screen w/ Reflex to Titer    Cyclic citrul peptide antibody, IgG          Immunizations and preventive care screenings were discussed with patient today  Appropriate education was printed on patient's after visit summary  Counseling:  Alcohol/drug use: discussed moderation in alcohol intake, the recommendations for healthy alcohol use, and avoidance of illicit drug use  Dental Health: discussed importance of regular tooth brushing, flossing, and dental visits  Injury prevention: discussed safety/seat belts, safety helmets, smoke detectors, carbon dioxide detectors, and smoking near bedding or upholstery  Sexual health: discussed sexually transmitted diseases, partner selection, use of condoms, avoidance of unintended pregnancy, and contraceptive alternatives  · Exercise: the importance of regular exercise/physical activity was discussed  Recommend exercise 3-5 times per week for at least 30 minutes  BMI Counseling: Body mass index is 30 04 kg/m²   The BMI is above normal  Nutrition recommendations include encouraging healthy choices of fruits and vegetables  Exercise recommendations include moderate physical activity 150 minutes/week  Rationale for BMI follow-up plan is due to patient being overweight or obese  Return in about 6 months (around 2022) for Recheck  Chief Complaint:     Chief Complaint   Patient presents with    Annual Exam     here last week for strep, was Rx'd a steroid  still has symptoms  wondering if she has autoimmune disease as steroids helped facial numbness she has had for years  states steroids helped and states she feels great in this respect      History of Present Illness:     Adult Annual Physical   Patient here for a comprehensive physical exam  The patient reports problems - refer to separate note  Diet and Physical Activity  · Diet/Nutrition: well balanced diet  · Exercise: walking  Depression Screening  PHQ-2/9 Depression Screening    Little interest or pleasure in doing things: 0 - not at all  Feeling down, depressed, or hopeless: 0 - not at all  Trouble falling or staying asleep, or sleeping too much: 0 - not at all  Feeling tired or having little energy: 0 - not at all  Poor appetite or overeatin - not at all  Feeling bad about yourself - or that you are a failure or have let yourself or your family down: 0 - not at all  Trouble concentrating on things, such as reading the newspaper or watching television: 0 - not at all  Moving or speaking so slowly that other people could have noticed  Or the opposite - being so fidgety or restless that you have been moving around a lot more than usual: 0 - not at all  Thoughts that you would be better off dead, or of hurting yourself in some way: 0 - not at all  PHQ-9 Score: 0   PHQ-9 Interpretation: No or Minimal depression        General Health  · Sleep: sleeps well  · Hearing: normal - bilateral   · Vision: no vision problems  · Dental: regular dental visits  /GYN Health  · Patient is: premenopausal  · Last menstrual period: due in 1 week   · Contraceptive method: bennie had vasectomy       Review of Systems:     Review of Systems   Refer to separate note     Past Medical History:     Past Medical History:   Diagnosis Date    Abnormal Pap smear of cervix     Anemia     Disease of thyroid gland     thyroid storm with previous pregnancy    Dysmenorrhea     Esophageal reflux     last assessed - [id number]    Herpes zoster     LUQ level T-8 resolved; last assessed - [id number]    History of infection due to human papilloma virus (HPV)     HPV (human papilloma virus) infection     Inguinal hernia     last assessed - [id number]    Insomnia     Mastodynia     Resolved -     Migraine     Ovarian cyst     last assessed - [id number]    Ovarian cyst 2013    Palpitations     last assessed - [id number]    Tachycardia     Resolved - 54HIT0122    Takes dietary supplements     Temporomandibular joint disorder     last assessed - [id number]    Urinary tract infection     Varicella       Past Surgical History:     Past Surgical History:   Procedure Laterality Date     SECTION      COLONOSCOPY  2012    Fiberoptic    CRYOTHERAPY      cervix, age 24   Elizabet Courser DIAGNOSTIC LAPAROSCOPY      DILATION AND CURETTAGE OF UTERUS      Resolved     GYNECOLOGIC CRYOSURGERY      age 24   Elizabet Courser TOOTH EXTRACTION        Social History:     Social History     Socioeconomic History    Marital status:      Spouse name: None    Number of children: None    Years of education: None    Highest education level: None   Occupational History    None   Tobacco Use    Smoking status: Never Smoker    Smokeless tobacco: Never Used   Vaping Use    Vaping Use: Never used   Substance and Sexual Activity    Alcohol use: Not Currently     Comment: social/prior to knowledge of pregnancy    Drug use: Never    Sexual activity: Yes     Partners: Male     Birth control/protection: Male Sterilization     Comment: fiance schedule for a vasectomy   Other Topics Concern    None   Social History Narrative    Coffee - denied    History of daily cola consumption (___ Cans/Day) - denied    History of daily tea consumption (___ Cups/Day) - denied    Lack of exercise    Marital History - Currently  - per [facility]    [employer] Affiliation [employer]     Social Determinants of Health     Financial Resource Strain: Not on file   Food Insecurity: Not on file   Transportation Needs: Not on file   Physical Activity: Not on file   Stress: Not on file   Social Connections: Not on file   Intimate Partner Violence: Not on file   Housing Stability: Not on file      Family History:     Family History   Problem Relation Age of Onset    Diabetes Mother     Heart disease Mother         cardiac disorder    Gallbladder disease Mother     Hypertension Mother     Glaucoma Mother     Obesity Mother         hx gastric bypass    Diabetes Father     Heart disease Father         cardiac disorder    Hypertension Father     Hyperlipidemia Father         Pure Hypercholesterolemia    Obesity Father     Other Family         headache syndromes    Breast cancer Cousin     Post-traumatic stress disorder Brother     Hyperlipidemia Brother     Hypertension Brother     Diabetes Maternal Grandmother     Lung cancer Maternal Grandfather     Stroke Paternal Grandmother     Hypertension Paternal Grandmother     Cirrhosis Paternal Grandfather     Ovarian cancer Maternal Aunt     No Known Problems Daughter     No Known Problems Paternal Aunt     Colon cancer Neg Hx       Current Medications:     Current Outpatient Medications   Medication Sig Dispense Refill    azithromycin (Zithromax) 250 mg tablet Take 2 tablets (500 mg total) by mouth daily for 1 day, THEN 1 tablet (250 mg total) daily for 4 days   6 tablet 0    cholecalciferol (VITAMIN D3) 1,000 units tablet Take 5,000 Units by mouth daily       Green Tea, Lucrecia sinensis, (GREEN TEA EXTRACT PO) Take by mouth      multivitamin (THERAGRAN) TABS Take 1 tablet by mouth daily      Probiotic Product (PROBIOTIC + TURMERIC EXTRACT PO) Take by mouth      Probiotic Product (PROBIOTIC ADVANCED PO) Take by mouth      Thiamine HCl (VITAMIN B1 PO) Take by mouth      guaifenesin-codeine (GUAIFENESIN AC) 100-10 MG/5ML liquid Take 10 mL by mouth 3 (three) times a day as needed for cough or congestion (Patient not taking: Reported on 3/7/2022 ) 236 mL 1    predniSONE 20 mg tablet Take 2 tablets (40 mg total) by mouth daily for 3 days, THEN 1 tablet (20 mg total) daily for 3 days, THEN 0 5 tablets (10 mg total) daily for 3 days  11 tablet 0     No current facility-administered medications for this visit  Allergies:      Allergies   Allergen Reactions    Amoxicillin Hives    Cymbalta [Duloxetine Hcl] Other (See Comments)     Shaking      Penicillins Hives    Shellfish Allergy - Food Allergy Hives    Sulfa Antibiotics Rash      Physical Exam:     /82   Pulse 79   Temp 98 5 °F (36 9 °C)   Resp 18   Ht 5' (1 524 m)   Wt 69 8 kg (153 lb 12 8 oz)   SpO2 99%   BMI 30 04 kg/m²     Physical Exam   Refer to separate note    Cong Beckett MD  [place] [address]

## 2022-03-07 NOTE — PROGRESS NOTES
[address] [place] [address] Primary Care        NAME: Sasha Jon is a 40 y o  female  : 1975    MRN: [id number]  DATE: 2022  TIME: 12:40 PM    Assessment and Plan   1  Positive FLOR (antinuclear antibody)  -her joint pain and fatigue, as well as facial paresthesia improved with recent steroid burst for URI  Will refill for taper over next week  Discussed seeing rheumatology vs neurology for possible MS due to MRI findings  She has seen Neurology in the past without definitive diagnosis  She would like to start with rheumatology  Will repeat labs, check for RA, advised to wait until off steroids 2-4 weeks    -   Ambulatory Referral to Rheumatology; Future  -     predniSONE 20 mg tablet; Take 2 tablets (40 mg total) by mouth daily for 3 days, THEN 1 tablet (20 mg total) daily for 3 days, THEN 0 5 tablets (10 mg total) daily for 3 days  -     Comprehensive metabolic panel; Future  -     CBC and differential; Future  -     FLOR Screen w/ Reflex to Titer/Pattern; Future    2  Paresthesia and pain of left extremity  -     Ambulatory Referral to Rheumatology; Future  -     predniSONE 20 mg tablet; Take 2 tablets (40 mg total) by mouth daily for 3 days, THEN 1 tablet (20 mg total) daily for 3 days, THEN 0 5 tablets (10 mg total) daily for 3 days  3  Polyarthralgia  -     Comprehensive metabolic panel; Future  -     CBC and differential; Future  -     FLOR Screen w/ Reflex to Titer/Pattern; Future  -     RF Screen w/ Reflex to Titer; Future  -     Cyclic citrul peptide antibody, IgG; Future    4  Annual physical exam    5  Vitamin D deficiency  -     Vitamin D 25 hydroxy; Future             Chief Complaint     Chief Complaint   Patient presents with    Annual Exam     here last week for strep, was Rx'd a steroid  still has symptoms  wondering if she has autoimmune disease as steroids helped facial numbness she has had for years   states steroids helped and states she feels great in this respect History of Present Illness       Here for follow up/annual physical  Wanted to move her appointment sooner due to relief from fatigue, chronic pain with prednisone for laryngitis  She states that within 1-2 doses she noticed more energy, knee and elbow pain resolved, facial numbness also improved  She is concerned this may mean she has underlying autoimmune disease  She has seen neurology in the past, had an episode of optic neuritis of left eye in 2017  Obesity: she is seeing weight management, started walking again and waking up early to exercise since feeling better on steroids  Fatigue: saw sleep medicine, does not have MALDONADO  Review of Systems   Review of Systems   Constitutional: Positive for fatigue  Negative for chills and fever  HENT: Positive for sore throat and voice change  Negative for congestion  Respiratory: Negative for cough, chest tightness and shortness of breath  Cardiovascular: Negative for chest pain, palpitations and leg swelling  Gastrointestinal: Negative for abdominal pain, constipation, diarrhea, nausea and vomiting  Musculoskeletal: Positive for arthralgias  Neurological: Positive for numbness  Negative for dizziness, light-headedness and headaches  Psychiatric/Behavioral: Positive for dysphoric mood and sleep disturbance  The patient is nervous/anxious  Current Medications       Current Outpatient Medications:     azithromycin (Zithromax) 250 mg tablet, Take 2 tablets (500 mg total) by mouth daily for 1 day, THEN 1 tablet (250 mg total) daily for 4 days  , Disp: 6 tablet, Rfl: 0    cholecalciferol (VITAMIN D3) 1,000 units tablet, Take 5,000 Units by mouth daily , Disp: , Rfl:     Green Tea, Lucrecia sinensis, (GREEN TEA EXTRACT PO), Take by mouth, Disp: , Rfl:     multivitamin (THERAGRAN) TABS, Take 1 tablet by mouth daily, Disp: , Rfl:     Probiotic Product (PROBIOTIC + TURMERIC EXTRACT PO), Take by mouth, Disp: , Rfl:     Probiotic Product (PROBIOTIC ADVANCED PO), Take by mouth, Disp: , Rfl:     Thiamine HCl (VITAMIN B1 PO), Take by mouth, Disp: , Rfl:     guaifenesin-codeine (GUAIFENESIN AC) 100-10 MG/5ML liquid, Take 10 mL by mouth 3 (three) times a day as needed for cough or congestion (Patient not taking: Reported on 3/7/2022 ), Disp: 236 mL, Rfl: 1    predniSONE 20 mg tablet, Take 2 tablets (40 mg total) by mouth daily for 3 days, THEN 1 tablet (20 mg total) daily for 3 days, THEN 0 5 tablets (10 mg total) daily for 3 days  , Disp: 11 tablet, Rfl: 0    Current Allergies     Allergies as of 03/07/2022 - Reviewed 03/07/2022   Allergen Reaction Noted    Amoxicillin Hives 10/24/2012    Cymbalta [duloxetine hcl] Other (See Comments) 02/26/2021    Penicillins Hives 10/24/2012    Shellfish allergy - food allergy Hives 05/07/2013    Sulfa antibiotics Rash 02/25/2013            The following portions of the patient's history were reviewed and updated as appropriate: allergies, current medications, past family history, past medical history, past social history, past surgical history and problem list      Past Medical History:   Diagnosis Date    Abnormal Pap smear of cervix     Anemia     Disease of thyroid gland     thyroid storm with previous pregnancy    Dysmenorrhea     Esophageal reflux     last assessed - [id number]    Herpes zoster     LUQ level T-8 resolved; last assessed - [id number]    History of infection due to human papilloma virus (HPV)     HPV (human papilloma virus) infection     Inguinal hernia     last assessed - [id number]    Insomnia     Mastodynia     Resolved - 2012    Migraine     Ovarian cyst     last assessed - [id number]    Ovarian cyst 2013    Palpitations     last assessed - [id number]    Tachycardia     Resolved - [id number]    Takes dietary supplements     Temporomandibular joint disorder     last assessed - [id number]    Urinary tract infection     Varicella        Past Surgical History: Procedure Laterality Date     SECTION      COLONOSCOPY  2012    Fiberoptic    CRYOTHERAPY      cervix, age 24   [facility] [place] DI[facility]NO[facility]IC LAPAROSCOPY      DILATION AND CURETTAGE OF UTERUS      Resolved     GYNECOLOGIC CRYOSURGERY      age 24    TOOTH EXTRACTION         Family History   Problem Relation Age of Onset    Diabetes Mother     Heart disease Mother         cardiac disorder    Gallbladder disease Mother     Hypertension Mother     Glaucoma Mother     Obesity Mother         hx gastric bypass    Diabetes Father     Heart disease Father         cardiac disorder    Hypertension Father     Hyperlipidemia Father         Pure Hypercholesterolemia    Obesity Father     Other Family         headache syndromes    Breast cancer Cousin     Post-traumatic stress disorder Brother     Hyperlipidemia Brother     Hypertension Brother     Diabetes Maternal Grandmother     Lung cancer Maternal Grandfather     Stroke Paternal Grandmother     Hypertension Paternal Grandmother     Cirrhosis Paternal Grandfather     Ovarian cancer Maternal Aunt     No Known Problems Daughter     No Known Problems Paternal Aunt     Colon cancer Neg Hx          Medications have been verified  Objective   /82   Pulse 79   Temp 98 5 °F (36 9 °C)   Resp 18   Ht 5' (1 524 m)   Wt 69 8 kg (153 lb 12 8 oz)   SpO2 99%   BMI 30 04 kg/m²        Physical Exam     Physical Exam  Vitals reviewed  Constitutional:       General: She is not in acute distress  Appearance: She is normal weight  HENT:      Head: Normocephalic and atraumatic  Right Ear: Tympanic membrane, ear canal and external ear normal       Left Ear: Tympanic membrane, ear canal and external ear normal       Mouth/Throat:      Pharynx: No oropharyngeal exudate or posterior oropharyngeal erythema  Eyes:      General: No scleral icterus       Conjunctiva/sclera: Conjunctivae normal       Pupils: Pupils are equal, round, and reactive to light  Cardiovascular:      Rate and Rhythm: Normal rate and regular rhythm  Pulses: Normal pulses  Heart sounds: Normal heart sounds  No murmur heard  No friction rub  No gallop  Pulmonary:      Effort: Pulmonary effort is normal  No respiratory distress  Breath sounds: Normal breath sounds  No wheezing, rhonchi or rales  Abdominal:      General: Abdomen is flat  Bowel sounds are normal  There is no distension  Palpations: Abdomen is soft  There is no mass  Tenderness: There is no abdominal tenderness  There is no guarding or rebound  Hernia: No hernia is present  Lymphadenopathy:      Cervical: No cervical adenopathy  Neurological:      General: No focal deficit present  Mental Status: She is alert  Psychiatric:         Mood and Affect: Mood normal          Behavior: Behavior normal          Thought Content:  Thought content normal          Judgment: Judgment normal              Results:  Lab Results   Component Value Date    SODIUM 136 09/23/2021    K 3 9 09/23/2021     (H) 09/23/2021    CO2 22 09/23/2021    BUN 11 09/23/2021    CREATININE 0 69 09/23/2021    GLUC 81 01/11/2020    CALCIUM 8 2 (L) 09/23/2021       Lab Results   Component Value Date    HGBA1C 5 2 11/11/2020       Lab Results   Component Value Date    WBC 7 07 09/23/2021    HGB 13 8 09/23/2021    HCT 41 0 09/23/2021    MCV 87 09/23/2021     09/23/2021

## 2022-03-07 NOTE — PATIENT INSTRUCTIONS

## 2022-04-01 ENCOUNTER — APPOINTMENT (OUTPATIENT)
Dept: LAB | Facility: CLINIC | Age: 47
End: 2022-04-01
Payer: COMMERCIAL

## 2022-04-01 DIAGNOSIS — R76.8 POSITIVE ANA (ANTINUCLEAR ANTIBODY): ICD-10-CM

## 2022-04-01 DIAGNOSIS — E55.9 VITAMIN D DEFICIENCY: ICD-10-CM

## 2022-04-01 DIAGNOSIS — M25.50 POLYARTHRALGIA: ICD-10-CM

## 2022-04-01 LAB
25(OH)D3 SERPL-MCNC: 41.2 NG/ML (ref 30–100)
ALBUMIN SERPL BCP-MCNC: 3.3 G/DL (ref 3.5–5)
ALP SERPL-CCNC: 67 U/L (ref 46–116)
ALT SERPL W P-5'-P-CCNC: 53 U/L (ref 12–78)
ANION GAP SERPL CALCULATED.3IONS-SCNC: 7 MMOL/L (ref 4–13)
AST SERPL W P-5'-P-CCNC: 21 U/L (ref 5–45)
BASOPHILS # BLD AUTO: 0.05 THOUSANDS/ΜL (ref 0–0.1)
BASOPHILS NFR BLD AUTO: 1 % (ref 0–1)
BILIRUB SERPL-MCNC: 0.54 MG/DL (ref 0.2–1)
BUN SERPL-MCNC: 15 MG/DL (ref 5–25)
CALCIUM ALBUM COR SERPL-MCNC: 9.4 MG/DL (ref 8.3–10.1)
CALCIUM SERPL-MCNC: 8.8 MG/DL (ref 8.3–10.1)
CHLORIDE SERPL-SCNC: 108 MMOL/L (ref 100–108)
CO2 SERPL-SCNC: 23 MMOL/L (ref 21–32)
CREAT SERPL-MCNC: 0.89 MG/DL (ref 0.6–1.3)
EOSINOPHIL # BLD AUTO: 0.33 THOUSAND/ΜL (ref 0–0.61)
EOSINOPHIL NFR BLD AUTO: 3 % (ref 0–6)
ERYTHROCYTE [DISTWIDTH] IN BLOOD BY AUTOMATED COUNT: 12.4 % (ref 11.6–15.1)
GFR SERPL CREATININE-BSD FRML MDRD: 78 ML/MIN/1.73SQ M
GLUCOSE P FAST SERPL-MCNC: 100 MG/DL (ref 65–99)
HCT VFR BLD AUTO: 41.5 % (ref 34.8–46.1)
HGB BLD-MCNC: 13.5 G/DL (ref 11.5–15.4)
IMM GRANULOCYTES # BLD AUTO: 0.03 THOUSAND/UL (ref 0–0.2)
IMM GRANULOCYTES NFR BLD AUTO: 0 % (ref 0–2)
LYMPHOCYTES # BLD AUTO: 2.24 THOUSANDS/ΜL (ref 0.6–4.47)
LYMPHOCYTES NFR BLD AUTO: 20 % (ref 14–44)
MCH RBC QN AUTO: 29.3 PG (ref 26.8–34.3)
MCHC RBC AUTO-ENTMCNC: 32.5 G/DL (ref 31.4–37.4)
MCV RBC AUTO: 90 FL (ref 82–98)
MONOCYTES # BLD AUTO: 0.87 THOUSAND/ΜL (ref 0.17–1.22)
MONOCYTES NFR BLD AUTO: 8 % (ref 4–12)
NEUTROPHILS # BLD AUTO: 7.45 THOUSANDS/ΜL (ref 1.85–7.62)
NEUTS SEG NFR BLD AUTO: 68 % (ref 43–75)
NRBC BLD AUTO-RTO: 0 /100 WBCS
PLATELET # BLD AUTO: 306 THOUSANDS/UL (ref 149–390)
PMV BLD AUTO: 10.9 FL (ref 8.9–12.7)
POTASSIUM SERPL-SCNC: 3.8 MMOL/L (ref 3.5–5.3)
PROT SERPL-MCNC: 6.7 G/DL (ref 6.4–8.2)
RBC # BLD AUTO: 4.61 MILLION/UL (ref 3.81–5.12)
SODIUM SERPL-SCNC: 138 MMOL/L (ref 136–145)
WBC # BLD AUTO: 10.97 THOUSAND/UL (ref 4.31–10.16)

## 2022-04-01 PROCEDURE — 86430 RHEUMATOID FACTOR TEST QUAL: CPT

## 2022-04-01 PROCEDURE — 86039 ANTINUCLEAR ANTIBODIES (ANA): CPT

## 2022-04-01 PROCEDURE — 85025 COMPLETE CBC W/AUTO DIFF WBC: CPT

## 2022-04-01 PROCEDURE — 86200 CCP ANTIBODY: CPT

## 2022-04-01 PROCEDURE — 80053 COMPREHEN METABOLIC PANEL: CPT

## 2022-04-01 PROCEDURE — 86038 ANTINUCLEAR ANTIBODIES: CPT

## 2022-04-01 PROCEDURE — 82306 VITAMIN D 25 HYDROXY: CPT

## 2022-04-01 PROCEDURE — 36415 COLL VENOUS BLD VENIPUNCTURE: CPT

## 2022-04-04 LAB
ANA HOMOGEN SER QL IF: NORMAL
ANA HOMOGEN TITR SER: NORMAL {TITER}
RHEUMATOID FACT SER QL LA: NEGATIVE
RYE IGE QN: POSITIVE

## 2022-04-05 LAB — CCP AB SER IA-ACNC: 1.7

## 2022-04-09 ENCOUNTER — OFFICE VISIT (OUTPATIENT)
Dept: FAMILY MEDICINE CLINIC | Facility: CLINIC | Age: 47
End: 2022-04-09
Payer: COMMERCIAL

## 2022-04-09 ENCOUNTER — APPOINTMENT (OUTPATIENT)
Dept: LAB | Facility: CLINIC | Age: 47
End: 2022-04-09
Payer: COMMERCIAL

## 2022-04-09 VITALS
HEART RATE: 77 BPM | OXYGEN SATURATION: 99 % | RESPIRATION RATE: 18 BRPM | DIASTOLIC BLOOD PRESSURE: 70 MMHG | WEIGHT: 154.8 LBS | SYSTOLIC BLOOD PRESSURE: 116 MMHG | BODY MASS INDEX: 30.39 KG/M2 | TEMPERATURE: 98.7 F | HEIGHT: 60 IN

## 2022-04-09 DIAGNOSIS — R76.8 ANA POSITIVE: Primary | ICD-10-CM

## 2022-04-09 DIAGNOSIS — Z11.59 ENCOUNTER FOR HEPATITIS C SCREENING TEST FOR LOW RISK PATIENT: ICD-10-CM

## 2022-04-09 DIAGNOSIS — R76.8 ANA POSITIVE: ICD-10-CM

## 2022-04-09 DIAGNOSIS — M25.50 POLYARTHRALGIA: ICD-10-CM

## 2022-04-09 LAB
C3 SERPL-MCNC: 120 MG/DL (ref 90–180)
C4 SERPL-MCNC: 30 MG/DL (ref 10–40)
HCV AB SER QL: NORMAL

## 2022-04-09 PROCEDURE — 3725F SCREEN DEPRESSION PERFORMED: CPT | Performed by: INTERNAL MEDICINE

## 2022-04-09 PROCEDURE — 86015 ACTIN ANTIBODY EACH: CPT

## 2022-04-09 PROCEDURE — 86803 HEPATITIS C AB TEST: CPT

## 2022-04-09 PROCEDURE — 99214 OFFICE O/P EST MOD 30 MIN: CPT | Performed by: INTERNAL MEDICINE

## 2022-04-09 PROCEDURE — 86235 NUCLEAR ANTIGEN ANTIBODY: CPT

## 2022-04-09 PROCEDURE — 3008F BODY MASS INDEX DOCD: CPT | Performed by: INTERNAL MEDICINE

## 2022-04-09 PROCEDURE — 36415 COLL VENOUS BLD VENIPUNCTURE: CPT

## 2022-04-09 PROCEDURE — 86225 DNA ANTIBODY NATIVE: CPT

## 2022-04-09 PROCEDURE — 86160 COMPLEMENT ANTIGEN: CPT

## 2022-04-09 PROCEDURE — 1036F TOBACCO NON-USER: CPT | Performed by: INTERNAL MEDICINE

## 2022-04-09 RX ORDER — MELOXICAM 7.5 MG/1
7.5 TABLET ORAL DAILY
Qty: 30 TABLET | Refills: 1 | Status: SHIPPED | OUTPATIENT
Start: 2022-04-09

## 2022-04-09 RX ORDER — CETIRIZINE HYDROCHLORIDE 5 MG/1
5 TABLET ORAL DAILY
COMMUNITY

## 2022-04-09 NOTE — PATIENT INSTRUCTIONS
Please get additional blood work done today, try Mobic 7 5mg once daily, can increase to 15mg daily

## 2022-04-09 NOTE — PROGRESS NOTES
[address] [place] [address] Primary Care        NAME: Emerson Bosch is a 40 y o  female  : 1975    MRN: [id number]  DATE: 2022  TIME: 11:00 AM    Assessment and Plan   1  FLOR positive  - low positive at 1:320, CRP not elevated, RF and CCP negative  She does report fatigue, widespread joint pain in small and large joints, symptoms improved with prednisone, cannot get in with Rheumatology until July, will check complements, dsDNA, [place]  Trial of NSAIDs for joint pain, follow up 1 month     - Anti-smooth muscle antibody, IgG; Future  -     Anti-DNA antibody, double-stranded; Future  -     C3 complement; Future  -     C4 complement; Future  -     Sjogren's Antibodies; Future  -     meloxicam (Mobic) 7 5 mg tablet; Take 1 tablet (7 5 mg total) by mouth daily    2  Polyarthralgia  -     Anti-smooth muscle antibody, IgG; Future  -     Anti-DNA antibody, double-stranded; Future  -     C3 complement; Future  -     C4 complement; Future  -     Sjogren's Antibodies; Future  -     meloxicam (Mobic) 7 5 mg tablet; Take 1 tablet (7 5 mg total) by mouth daily    3  Encounter for hepatitis C screening test for low risk patient  -     Hepatitis C antibody; Future             Chief Complaint     Chief Complaint   Patient presents with    Follow-up     relief with prednisone  wondering if she has a cortisol imbalance  does not want to wait until July for Rheum for more testing  History of Present Illness       Here for follow up positive FLOR, steroid trial and discuss further testing  Reports arthralgias and fatigue improved with prednisone, within 1-2 days of starting  Now that she finished them joint pains have recurred, fatigue is back as well  Cannot get in with Rheum until July  Review of Systems   Review of Systems   Constitutional: Positive for fatigue  Negative for appetite change, chills and fever  Musculoskeletal: Positive for arthralgias and back pain  Skin: Positive for rash  Current Medications       Current Outpatient Medications:     cetirizine (ZyrTEC) 5 MG tablet, Take 5 mg by mouth daily, Disp: , Rfl:     cholecalciferol (VITAMIN D3) 1,000 units tablet, Take 5,000 Units by mouth daily , Disp: , Rfl:     Green Tea, Lucrecia sinensis, (GREEN TEA EXTRACT PO), Take by mouth, Disp: , Rfl:     multivitamin (THERAGRAN) TABS, Take 1 tablet by mouth daily, Disp: , Rfl:     Probiotic Product (PROBIOTIC + TURMERIC EXTRACT PO), Take by mouth, Disp: , Rfl:     Probiotic Product (PROBIOTIC ADVANCED PO), Take by mouth, Disp: , Rfl:     Thiamine HCl (VITAMIN B1 PO), Take by mouth, Disp: , Rfl:     meloxicam (Mobic) 7 5 mg tablet, Take 1 tablet (7 5 mg total) by mouth daily, Disp: 30 tablet, Rfl: 1    Current Allergies     Allergies as of 04/09/2022 - Reviewed 04/09/2022   Allergen Reaction Noted    Amoxicillin Hives 10/24/2012    Cymbalta [duloxetine hcl] Other (See Comments) 02/26/2021    Penicillins Hives 10/24/2012    Shellfish allergy - food allergy Hives 05/07/2013    Sulfa antibiotics Rash 02/25/2013            The following portions of the patient's history were reviewed and updated as appropriate: allergies, current medications, past family history, past medical history, past social history, past surgical history and problem list      Past Medical History:   Diagnosis Date    Abnormal Pap smear of cervix     Anemia     Disease of thyroid gland     thyroid storm with previous pregnancy    Dysmenorrhea     Esophageal reflux     last assessed - [id number]    Herpes zoster     LUQ level T-8 resolved; last assessed - [id number]    History of infection due to human papilloma virus (HPV)     HPV (human papilloma virus) infection     Inguinal hernia     last assessed - [id number]    Insomnia     Mastodynia     Resolved - 2012    Migraine     Ovarian cyst     last assessed - [id number]    Ovarian cyst 2013    Palpitations     last assessed - [id number]    Tachycardia Resolved - [id number]    Takes dietary supplements     Temporomandibular joint disorder     last assessed - 2012    Urinary tract infection     Varicella        Past Surgical History:   Procedure Laterality Date     SECTION      COLONOSCOPY  2012    Fiberoptic    CRYOTHERAPY      cervix, age 24   [place] DIAGNOSTIC LAPAROSCOPY      DILATION AND CURETTAGE OF UTERUS      Resolved     GYNECOLOGIC CRYOSURGERY      age 24    TOOTH EXTRACTION         Family History   Problem Relation Age of Onset    Diabetes Mother     Heart disease Mother         cardiac disorder    Gallbladder disease Mother     Hypertension Mother     Glaucoma Mother     Obesity Mother         hx gastric bypass    Diabetes Father     Heart disease Father         cardiac disorder    Hypertension Father     Hyperlipidemia Father         Pure Hypercholesterolemia    Obesity Father     Other Family         headache syndromes    Breast cancer Cousin     Post-traumatic stress disorder Brother     Hyperlipidemia Brother     Hypertension Brother     Diabetes Maternal Grandmother     Lung cancer Maternal Grandfather     Stroke Paternal Grandmother     Hypertension Paternal Grandmother     Cirrhosis Paternal Grandfather     Ovarian cancer Maternal Aunt     No Known Problems Daughter     No Known Problems Paternal Aunt     Colon cancer Neg Hx          Medications have been verified  Objective   /70   Pulse 77   Temp 98 7 °F (37 1 °C)   Resp 18   Ht 5' (1 524 m)   Wt 70 2 kg (154 lb 12 8 oz)   SpO2 99%   BMI 30 23 kg/m²        Physical Exam     Physical Exam  Vitals reviewed  Constitutional:       General: She is not in acute distress  Appearance: She is normal weight  Cardiovascular:      Rate and Rhythm: Normal rate and regular rhythm  Heart sounds: S1 normal and S2 normal  No murmur heard  No friction rub  No gallop      Pulmonary:      Effort: Pulmonary effort is normal  No accessory muscle usage  Breath sounds: Normal breath sounds  No wheezing, rhonchi or rales  Neurological:      General: No focal deficit present  Mental Status: She is alert  Psychiatric:         Mood and Affect: Mood normal  Affect is tearful  Speech: Speech normal          Behavior: Behavior normal  Behavior is cooperative  Thought Content:  Thought content normal              Results:  Lab Results   Component Value Date    SODIUM 138 04/01/2022    K 3 8 04/01/2022     04/01/2022    CO2 23 04/01/2022    BUN 15 04/01/2022    CREATININE 0 89 04/01/2022    GLUC 81 01/11/2020    CALCIUM 8 8 04/01/2022       Lab Results   Component Value Date    HGBA1C 5 2 11/11/2020       Lab Results   Component Value Date    WBC 10 97 (H) 04/01/2022    HGB 13 5 04/01/2022    HCT 41 5 04/01/2022    MCV 90 04/01/2022     04/01/2022

## 2022-04-11 LAB
ACTIN IGG SERPL-ACNC: 7 UNITS (ref 0–19)
DSDNA AB SER-ACNC: <1 IU/ML (ref 0–9)
ENA SS-A AB SER-ACNC: <0.2 AI (ref 0–0.9)
ENA SS-B AB SER-ACNC: <0.2 AI (ref 0–0.9)

## 2022-04-12 DIAGNOSIS — R76.8 POSITIVE ANA (ANTINUCLEAR ANTIBODY): Primary | ICD-10-CM

## 2022-04-12 DIAGNOSIS — R45.89 DEPRESSED MOOD: ICD-10-CM

## 2022-04-12 DIAGNOSIS — R53.82 CHRONIC FATIGUE: ICD-10-CM

## 2022-07-27 NOTE — PROGRESS NOTES
Assessment and Plan:   Patient is a 67-year-old female who presents for rheumatology consult regarding +FLOR, chronic fatigue and chronic joint pain  She reports over 10 years of symptoms of chronic fatigue, chronic joint pain mainly in her hips, lower back and knees, left-sided numbness and tingling affecting her face and extremities with a previous negative neurologic workup  She had an FLOR checked again which was again positive  We discussed that so far her workup was negative for marker of systemic lupus and Sjogren's along with rheumatoid arthritis  We discussed that also sent she has had the +FLOR for such a long time it is less likely that she would develop an autoimmune disease associated with this and we can also see this in up to 20% of the general healthy population  She has a lot of nonspecific complain and current suspicion is low for an autoimmune disease on the spectrum of lupus or rheumatoid  We will screen for a spondyloarthropathy given the large joint involvement and she is reporting a positive response to prednisone, however I discussed with her that this was a high dose of prednisone which is very nonspecific  Inflammatory arthritis responds to a very low dose of prednisone typically which is a true test and so we discussed that I am not sure how relevant her response to the prednisone was at the high dose  We also discussed that I would not suggest restarting prednisone for unclear reason or using it without a clear diagnosis and she was understanding of that as she was hoping to be able to use it again in the future  We discussed that right now I do not suspect an underlying autoimmune or inflammatory disease however we will do appropriate remainder of the workup and have a follow-up      Plan:  Diagnoses and all orders for this visit:    FLOR positive  -     Anti-Estella 1 Antibody  -     Anti-microsomal antibody  -     Anti-scleroderma antibody  -     Anti-thyroglobulin antibody  - Centromere Antibody  -     C-reactive protein  -     Sedimentation rate, automated  -     Nuclear antigen antibody  -     HLA-B27 antigen  -     C-reactive protein  -     Sedimentation rate, automated    Chronic fatigue  -     Anti-Estella 1 Antibody  -     Anti-microsomal antibody  -     Anti-scleroderma antibody  -     Anti-thyroglobulin antibody  -     Centromere Antibody  -     C-reactive protein  -     Sedimentation rate, automated  -     Nuclear antigen antibody  -     HLA-B27 antigen  -     C-reactive protein  -     Sedimentation rate, automated    Polyarthralgia  -     Anti-Estella 1 Antibody  -     Anti-microsomal antibody  -     Anti-scleroderma antibody  -     Anti-thyroglobulin antibody  -     Centromere Antibody  -     C-reactive protein  -     Sedimentation rate, automated  -     Nuclear antigen antibody  -     HLA-B27 antigen  -     C-reactive protein  -     Sedimentation rate, automated    Positive FLOR (antinuclear antibody)  -     Ambulatory Referral to Rheumatology    Paresthesia and pain of left extremity  -     Ambulatory Referral to Rheumatology    Chronic pain of multiple joints  -     XR hip/pelv 2-3 vws left if performed; Future  -     XR hip/pelv 2-3 vws right if performed; Future    Chronic bilateral low back pain without sciatica  -     XR sacroiliac joints 3+ views; Future        Follow-up plan: 3 months       HPI  Perez Reis is a 56 y o   female with migraines, MALDONADO, who presents for rheumatology consult by request of Dr Ward Cruz for +FLOR  Love Christie scl70 thyroid ab haq rnp esr crp     Patient does not recall seeing rheumatology in the past   Reports she did see endocrinology in the past for history of thyroid storm during her pregnancy  She recalls also at that time she had a +FLOR cell it sounds like she has had a +FLOR for many years  After birth of her daughter 11 years ago, started having numbness on the left side of her face, LUE and lower body   Still gets this symptom when she is more stressed  She has seen neuro in the past for this, had brain MRI and told it was normal    Very bad fatigue on a regular basis, does not feel well rested  She has joint pain worse in the morning, mainly in hips and knees  Has flares of these joint pains a few times per month  She does see a chiropractor who adjusts her hips and SI joints  She has very rough menstrual cycles with a lot of other symptoms with weight gain, increased joint pain, rashes on her fingertips, L sided numbness increases  Joint pain increases mainly around the menstrual cycle and also with stress  Otherwise these symptoms seem to fluctuate and then subside  Hands hurt in a different way, feels a burning pain that feels like they are on fire  Wonders if this is more of a neuropathy type issue  She recently went on prednisone 40mg for a sinus infection and felt "fabulous" for those 5 days  Wanted to go back on prednisone because of this but PCP referred here for further evaluation  She has dry eyes  She gets flares of a rash on her distal fingers again during the menstrual cycle and is unclear what this is, never saw Dermatology for this  No photosensitivity, psoriasis, oral or nasal ulcers, alopecia, Raynaud's, h/o pericarditis or pleurisy, h/o blood clots  She had 1 miscarriages  Review of Systems   Constitutional: Positive for fatigue  Negative for fever and unexpected weight change  HENT: Negative for mouth sores  Respiratory: Negative for cough and shortness of breath  Cardiovascular: Negative for chest pain and leg swelling  Gastrointestinal: Negative for abdominal pain, constipation and diarrhea  Musculoskeletal: Positive for arthralgias, back pain and neck pain  Negative for joint swelling and myalgias  Skin: Positive for rash  Negative for color change  Neurological: Positive for numbness  Negative for weakness  Hematological: Negative for adenopathy     Psychiatric/Behavioral: Negative for sleep disturbance         Allergies  Allergies   Allergen Reactions    Amoxicillin Hives    Cymbalta [Duloxetine Hcl] Other (See Comments)     Shaking      Penicillins Hives    Shellfish Allergy - Food Allergy Hives    Sulfa Antibiotics Rash       Home Medications    Current Outpatient Medications:     cetirizine (ZyrTEC) 5 MG tablet, Take 5 mg by mouth daily, Disp: , Rfl:     cholecalciferol (VITAMIN D3) 1,000 units tablet, Take 5,000 Units by mouth daily , Disp: , Rfl:     Green Tea, Lucrecia sinensis, (GREEN TEA EXTRACT PO), Take by mouth, Disp: , Rfl:     meloxicam (Mobic) 7 5 mg tablet, Take 1 tablet (7 5 mg total) by mouth daily, Disp: 30 tablet, Rfl: 1    multivitamin (THERAGRAN) TABS, Take 1 tablet by mouth daily, Disp: , Rfl:     Probiotic Product (PROBIOTIC + TURMERIC EXTRACT PO), Take by mouth, Disp: , Rfl:     Probiotic Product (PROBIOTIC ADVANCED PO), Take by mouth, Disp: , Rfl:     Thiamine HCl (VITAMIN B1 PO), Take by mouth, Disp: , Rfl:     Past Medical History  Past Medical History:   Diagnosis Date    Abnormal Pap smear of cervix     Anemia     Disease of thyroid gland     thyroid storm with previous pregnancy    Dysmenorrhea     Esophageal reflux     last assessed - [id number]    Herpes zoster     LUQ level T-8 resolved; last assessed - [id number]    History of infection due to human papilloma virus (HPV)     HPV (human papilloma virus) infection     Inguinal hernia     last assessed - [id number]    Insomnia     Mastodynia     Resolved -     Migraine     Ovarian cyst     last assessed - [id number]    Ovarian cyst 2013    Palpitations     last assessed - [id number]    Tachycardia     Resolved - [id number]    Takes dietary supplements     Temporomandibular joint disorder     last assessed - [id number]    Urinary tract infection     Varicella        Past Surgical History   Past Surgical History:   Procedure Laterality Date     SECTION      COLONOSCOPY 11/19/2012    Fiberoptic    CRYOTHERAPY      cervix, age 21    DIAGNOSTIC LAPAROSCOPY      DILATION AND CURETTAGE OF UTERUS      Resolved 2010    GYNECOLOGIC CRYOSURGERY      age 21    TOOTH EXTRACTION         Family History  No known family history of autoimmune or inflammatory diseases  Family History   Problem Relation Age of Onset    Diabetes Mother     Heart disease Mother         cardiac disorder    Gallbladder disease Mother     Hypertension Mother     Glaucoma Mother     Obesity Mother         hx gastric bypass    Diabetes Father     Heart disease Father         cardiac disorder    Hypertension Father     Hyperlipidemia Father         Pure Hypercholesterolemia    Obesity Father     Other Family         headache syndromes    Breast cancer Cousin     Post-traumatic stress disorder Brother     Hyperlipidemia Brother     Hypertension Brother     Diabetes Maternal Grandmother     Lung cancer Maternal Grandfather     Stroke Paternal Grandmother     Hypertension Paternal Grandmother     Cirrhosis Paternal Grandfather     Ovarian cancer Maternal Aunt     No Known Problems Daughter     No Known Problems Paternal Aunt     Colon cancer Neg Hx        Social History  Occupation: [employer]   Social History     Substance and Sexual Activity   Alcohol Use Not Currently    Comment: social/prior to knowledge of pregnancy     Social History     Substance and Sexual Activity   Drug Use Never     Social History     Tobacco Use   Smoking Status Never Smoker   Smokeless Tobacco Never Used       Objective:    Vitals:    07/29/22 0756   BP: 126/68   Pulse: 85   SpO2: 98%   Weight: 70 4 kg (155 lb 3 2 oz)   Height: 5' (1 524 m)       Physical Exam  Constitutional:       General: She is not in acute distress  HENT:      Head: Normocephalic and atraumatic  Eyes:      Conjunctiva/sclera: Conjunctivae normal    Pulmonary:      Effort: Pulmonary effort is normal  No respiratory distress  Musculoskeletal:      Cervical back: Neck supple  Comments: No obvious swelling or signs of inflammation on joint exam; no significant reproducible soft tissue tenderness on exam    Skin:     Coloration: Skin is not pale  Neurological:      Mental Status: She is alert  Mental status is at baseline     Psychiatric:         Mood and Affect: Mood normal          Behavior: Behavior normal            Labs:   Component      Latest Ref Rng & Units 4/1/2022 4/9/2022   WBC      4 31 - 10 16 Thousand/uL 10 97 (H)    Red Blood Cell Count      3 81 - 5 12 Million/uL 4 61    Hemoglobin      11 5 - 15 4 g/dL 13 5    HCT      34 8 - 46 1 % 41 5    MCV      82 - 98 fL 90    MCH      26 8 - 34 3 pg 29 3    MCHC      31 4 - 37 4 g/dL 32 5    RDW      11 6 - 15 1 % 12 4    MPV      8 9 - 12 7 fL 10 9    Platelet Count      296 - 390 Thousands/uL 306    nRBC      /100 WBCs 0    Neutrophils %      43 - 75 % 68    Immat GRANS %      0 - 2 % 0    Lymphocytes Relative      14 - 44 % 20    Monocytes Relative      4 - 12 % 8    Eosinophils      0 - 6 % 3    Basophils Relative      0 - 1 % 1    Absolute Neutrophils      1 85 - 7 62 Thousands/µL 7 45    Immature Grans Absolute      0 00 - 0 20 Thousand/uL 0 03    Lymphocytes Absolute      0 60 - 4 47 Thousands/µL 2 24    Absolute Monocytes      0 17 - 1 22 Thousand/µL 0 87    Absolute Eosinophils      0 00 - 0 61 Thousand/µL 0 33    Basophils Absolute      0 00 - 0 10 Thousands/µL 0 05    Sodium      136 - 145 mmol/L 138    Potassium      3 5 - 5 3 mmol/L 3 8    Chloride      100 - 108 mmol/L 108    CO2      21 - 32 mmol/L 23    Anion Gap      4 - 13 mmol/L 7    BUN      5 - 25 mg/dL 15    Creatinine      0 60 - 1 30 mg/dL 0 89    GLUCOSE FASTING      65 - 99 mg/dL 100 (H)    Calcium      8 3 - 10 1 mg/dL 8 8    CORRECTED CALCIUM      8 3 - 10 1 mg/dL 9 4    AST      5 - 45 U/L 21    ALT      12 - 78 U/L 53    Alkaline Phosphatase      46 - 116 U/L 67    Total Protein      6 4 - 8 2 g/dL 6 7    Albumin      3 5 - 5 0 g/dL 3 3 (L)    TOTAL BILIRUBIN      0 20 - 1 00 mg/dL 0 54    eGFR      ml/min/1 73sq m 78    FLOR Titer 1       Titer of 320    FLOR Pattern 1       Speckled pattern    SSA (RO) ANTIBODY      0 0 - 0 9 AI  <0 2   SSB (LA) ANTIBODY      0 0 - 0 9 AI  <0 2   ANTI-NUCLEAR ANTIBODY (FLOR)      Negative Positive (A)    RHEUMATOID FACTOR      Negative Negative    CYCLIC CITRULLINATED PEPTIDE ANTIBODY      See comment 1 7    Vit D, 25-Hydroxy      30 0 - 100 0 ng/mL 41 2    SMOOTH MUSCLE ANTIBODY      0 - 19 Units  7   ANTI DNA DOUBLE STRANDED      0 - 9 IU/mL  <1   C3 Complement      90 0 - 180 0 mg/dL  120 0   C4, COMPLEMENT      10 0 - 40 0 mg/dL  30 0   HEPATITIS C ANTIBODY      Non-reactive  Non-reactive

## 2022-07-29 ENCOUNTER — OFFICE VISIT (OUTPATIENT)
Dept: RHEUMATOLOGY | Facility: CLINIC | Age: 47
End: 2022-07-29
Payer: COMMERCIAL

## 2022-07-29 VITALS
WEIGHT: 155.2 LBS | SYSTOLIC BLOOD PRESSURE: 126 MMHG | HEART RATE: 85 BPM | DIASTOLIC BLOOD PRESSURE: 68 MMHG | HEIGHT: 60 IN | OXYGEN SATURATION: 98 % | BODY MASS INDEX: 30.47 KG/M2

## 2022-07-29 DIAGNOSIS — R76.8 ANA POSITIVE: Primary | ICD-10-CM

## 2022-07-29 DIAGNOSIS — R20.2 PARESTHESIA AND PAIN OF LEFT EXTREMITY: ICD-10-CM

## 2022-07-29 DIAGNOSIS — G89.29 CHRONIC BILATERAL LOW BACK PAIN WITHOUT SCIATICA: ICD-10-CM

## 2022-07-29 DIAGNOSIS — M79.609 PARESTHESIA AND PAIN OF LEFT EXTREMITY: ICD-10-CM

## 2022-07-29 DIAGNOSIS — G89.29 CHRONIC PAIN OF MULTIPLE JOINTS: ICD-10-CM

## 2022-07-29 DIAGNOSIS — R53.82 CHRONIC FATIGUE: ICD-10-CM

## 2022-07-29 DIAGNOSIS — M25.50 CHRONIC PAIN OF MULTIPLE JOINTS: ICD-10-CM

## 2022-07-29 DIAGNOSIS — M54.50 CHRONIC BILATERAL LOW BACK PAIN WITHOUT SCIATICA: ICD-10-CM

## 2022-07-29 DIAGNOSIS — M25.50 POLYARTHRALGIA: ICD-10-CM

## 2022-07-29 DIAGNOSIS — R76.8 POSITIVE ANA (ANTINUCLEAR ANTIBODY): ICD-10-CM

## 2022-07-29 PROCEDURE — 99245 OFF/OP CONSLTJ NEW/EST HI 55: CPT | Performed by: INTERNAL MEDICINE

## 2022-09-28 ENCOUNTER — OFFICE VISIT (OUTPATIENT)
Dept: FAMILY MEDICINE CLINIC | Facility: CLINIC | Age: 47
End: 2022-09-28
Payer: COMMERCIAL

## 2022-09-28 VITALS
DIASTOLIC BLOOD PRESSURE: 72 MMHG | HEIGHT: 60 IN | WEIGHT: 157.8 LBS | SYSTOLIC BLOOD PRESSURE: 124 MMHG | HEART RATE: 84 BPM | OXYGEN SATURATION: 99 % | BODY MASS INDEX: 30.98 KG/M2 | RESPIRATION RATE: 18 BRPM | TEMPERATURE: 96.7 F

## 2022-09-28 DIAGNOSIS — M25.50 POLYARTHRALGIA: Primary | ICD-10-CM

## 2022-09-28 DIAGNOSIS — E27.40 ADRENAL INSUFFICIENCY (HCC): ICD-10-CM

## 2022-09-28 DIAGNOSIS — R53.82 CHRONIC FATIGUE: ICD-10-CM

## 2022-09-28 DIAGNOSIS — E66.9 CLASS 1 OBESITY: ICD-10-CM

## 2022-09-28 DIAGNOSIS — Z12.31 ENCOUNTER FOR SCREENING MAMMOGRAM FOR MALIGNANT NEOPLASM OF BREAST: ICD-10-CM

## 2022-09-28 DIAGNOSIS — R76.8 POSITIVE ANA (ANTINUCLEAR ANTIBODY): ICD-10-CM

## 2022-09-28 PROCEDURE — 99214 OFFICE O/P EST MOD 30 MIN: CPT | Performed by: INTERNAL MEDICINE

## 2022-09-28 PROCEDURE — 3725F SCREEN DEPRESSION PERFORMED: CPT | Performed by: INTERNAL MEDICINE

## 2022-09-28 NOTE — PROGRESS NOTES
[facility]        NAME: Lata Gomes is a 56 y o  female  : 1975    MRN: [id number]  DATE: 2022  TIME: 8:58 AM    Assessment and Plan   1  Polyarthralgia  - recommend getting additional labs ordered by rheumatology at this time  She has both large and small joint pain as well as back pain  Testing thus far did not reveal inflammatory arthritis or SLE  She did improve with steroid trial, however  2  Class 1 obesity    3  Positive FLOR (antinuclear antibody)  - reviewed rheumatology notes, discussed getting additional labs ordered  She will also send me records of hormonal testing ordered by her naturopath, will check serum am cortisol and if abnormal, discussed seeing Endocrine  4  Encounter for screening mammogram for malignant neoplasm of breast  -     Mammo screening bilateral w 3d & cad; Future; Expected date: 2022    5  Adrenal insufficiency (HCC)  -     Cortisol Level, AM Specimen; Future    6  Chronic fatigue  -     Cortisol Level, AM Specimen; Future             Chief Complaint     Chief Complaint   Patient presents with    Follow-up         History of Present Illness       52yo female with history of depression, polyarthralgia, positive FLOR here for 6 month follow up  Saw rheumatology in July, who didn't suspect autoimmune process causing her symptoms  Also saw a  physician and reportedly had hormonal testing which low serum cortisol, DHEA  She is trying to use smart watch to track sleep, energy reserves  Started herbal supplements as well  Still having issues with fatigue, pain all over  Denies headaches, diplopia  Review of Systems   Review of Systems   Constitutional: Positive for fatigue  Negative for appetite change, chills and fever  Respiratory: Negative for cough and shortness of breath  Cardiovascular: Negative for chest pain  Musculoskeletal: Positive for arthralgias, myalgias and neck stiffness     Neurological: Positive for numbness  Negative for dizziness, tremors, light-headedness and headaches  Psychiatric/Behavioral: Positive for dysphoric mood and sleep disturbance           Current Medications       Current Outpatient Medications:     cetirizine (ZyrTEC) 5 MG tablet, Take 5 mg by mouth if needed, Disp: , Rfl:     cholecalciferol (VITAMIN D3) 1,000 units tablet, Take 5,000 Units by mouth daily , Disp: , Rfl:     multivitamin (THERAGRAN) TABS, Take 1 tablet by mouth daily, Disp: , Rfl:     Probiotic Product (PROBIOTIC + TURMERIC EXTRACT PO), Take by mouth, Disp: , Rfl:     Probiotic Product (PROBIOTIC ADVANCED PO), Take by mouth, Disp: , Rfl:     Thiamine HCl (VITAMIN B1 PO), Take by mouth, Disp: , Rfl:     Green Tea, Lucrecia sinensis, (GREEN TEA EXTRACT PO), Take by mouth (Patient not taking: Reported on 9/28/2022), Disp: , Rfl:     meloxicam (Mobic) 7 5 mg tablet, Take 1 tablet (7 5 mg total) by mouth daily (Patient not taking: Reported on 9/28/2022), Disp: 30 tablet, Rfl: 1    Current Allergies     Allergies as of 09/28/2022 - Reviewed 09/28/2022   Allergen Reaction Noted    Amoxicillin Hives 10/24/2012    Cymbalta [duloxetine hcl] Other (See Comments) 02/26/2021    Penicillins Hives 10/24/2012    Shellfish allergy - food allergy Hives 05/07/2013    Sulfa antibiotics Rash 02/25/2013            The following portions of the patient's history were reviewed and updated as appropriate: allergies, current medications, past family history, past medical history, past social history, past surgical history and problem list      Past Medical History:   Diagnosis Date    Abnormal Pap smear of cervix     Anemia     Disease of thyroid gland     thyroid storm with previous pregnancy    Dysmenorrhea     Esophageal reflux     last assessed - [id number]    Herpes zoster     LUQ level T-8 resolved; last assessed - [id number]    History of infection due to human papilloma virus (HPV)     HPV (human papilloma virus) infection     Inguinal hernia     last assessed - [id number]    Insomnia     Mastodynia     Resolved -     Migraine     Ovarian cyst     last assessed - [id number]    Ovarian cyst 2013    Palpitations     last assessed - [id number]    Tachycardia     Resolved - [id number]    Takes dietary supplements     Temporomandibular joint disorder     last assessed - [id number]    Urinary tract infection     Varicella        Past Surgical History:   Procedure Laterality Date     SECTION      COLONOSCOPY  2012    Fiberoptic    CRYOTHERAPY      cervix, age 24   [facility] DIAGNOSTIC LAPAROSCOPY      DILATION AND CURETTAGE OF UTERUS      Resolved     GYNECOLOGIC CRYOSURGERY      age 24   [facility] TOOTH EXTRACTION         Family History   Problem Relation Age of Onset    Diabetes Mother     Heart disease Mother         cardiac disorder    Gallbladder disease Mother     Hypertension Mother     Glaucoma Mother     Obesity Mother         hx gastric bypass    Diabetes Father     Heart disease Father         cardiac disorder    Hypertension Father     Hyperlipidemia Father         Pure Hypercholesterolemia    Obesity Father     Other Family         headache syndromes    Breast cancer Cousin     Post-traumatic stress disorder Brother     Hyperlipidemia Brother     Hypertension Brother     Diabetes Maternal Grandmother     Lung cancer Maternal Grandfather     Stroke Paternal Grandmother     Hypertension Paternal Grandmother     Cirrhosis Paternal Grandfather     Ovarian cancer Maternal Aunt     No Known Problems Daughter     No Known Problems Paternal Aunt     Colon cancer Neg Hx          Medications have been verified  Objective   /72   Pulse 84   Temp (!) 96 7 °F (35 9 °C)   Resp 18   Ht 5' (1 524 m)   Wt 71 6 kg (157 lb 12 8 oz)   SpO2 99%   BMI 30 82 kg/m²        Physical Exam     Physical Exam  Vitals reviewed     Constitutional:       General: She is not in acute distress  Appearance: She is obese  Cardiovascular:      Rate and Rhythm: Normal rate and regular rhythm  Heart sounds: Normal heart sounds  No murmur heard  No friction rub  No gallop  Pulmonary:      Effort: Pulmonary effort is normal  No respiratory distress  Breath sounds: Normal breath sounds  No wheezing, rhonchi or rales  Skin:     Findings: No rash  Neurological:      General: No focal deficit present  Mental Status: She is alert  Psychiatric:         Mood and Affect: Mood normal          Behavior: Behavior normal          Thought Content:  Thought content normal          Judgment: Judgment normal              Results:  Lab Results   Component Value Date    SODIUM 138 04/01/2022    K 3 8 04/01/2022     04/01/2022    CO2 23 04/01/2022    BUN 15 04/01/2022    CREATININE 0 89 04/01/2022    GLUC 81 01/11/2020    CALCIUM 8 8 04/01/2022       Lab Results   Component Value Date    HGBA1C 5 2 11/11/2020       Lab Results   Component Value Date    WBC 10 97 (H) 04/01/2022    HGB 13 5 04/01/2022    HCT 41 5 04/01/2022    MCV 90 04/01/2022     04/01/2022

## 2022-09-28 NOTE — PATIENT INSTRUCTIONS
Please get additional blood work ordered by Dr Benita White, I've added cortisol  Send me a copy of recent labs done by   I would recommend seeing Dr Sharonda Kramer with Endocrinology for additional evaluation

## 2022-10-04 ENCOUNTER — TELEPHONE (OUTPATIENT)
Dept: FAMILY MEDICINE CLINIC | Facility: CLINIC | Age: 47
End: 2022-10-04

## 2022-10-04 ENCOUNTER — OFFICE VISIT (OUTPATIENT)
Dept: FAMILY MEDICINE CLINIC | Facility: CLINIC | Age: 47
End: 2022-10-04
Payer: COMMERCIAL

## 2022-10-04 VITALS
HEIGHT: 60 IN | HEART RATE: 110 BPM | BODY MASS INDEX: 31.02 KG/M2 | SYSTOLIC BLOOD PRESSURE: 112 MMHG | OXYGEN SATURATION: 98 % | TEMPERATURE: 100.2 F | DIASTOLIC BLOOD PRESSURE: 78 MMHG | WEIGHT: 158 LBS

## 2022-10-04 DIAGNOSIS — J02.9 PHARYNGITIS, UNSPECIFIED ETIOLOGY: Primary | ICD-10-CM

## 2022-10-04 DIAGNOSIS — R50.9 FEVER, UNSPECIFIED FEVER CAUSE: ICD-10-CM

## 2022-10-04 DIAGNOSIS — M79.10 MYALGIA: ICD-10-CM

## 2022-10-04 PROCEDURE — 87636 SARSCOV2 & INF A&B AMP PRB: CPT | Performed by: NURSE PRACTITIONER

## 2022-10-04 PROCEDURE — 99213 OFFICE O/P EST LOW 20 MIN: CPT | Performed by: NURSE PRACTITIONER

## 2022-10-04 RX ORDER — AZITHROMYCIN 250 MG/1
TABLET, FILM COATED ORAL
Qty: 6 TABLET | Refills: 0 | Status: SHIPPED | OUTPATIENT
Start: 2022-10-04 | End: 2022-10-08

## 2022-10-04 RX ORDER — PREDNISONE 20 MG/1
20 TABLET ORAL DAILY
Qty: 5 TABLET | Refills: 0 | Status: SHIPPED | OUTPATIENT
Start: 2022-10-04 | End: 2022-10-09

## 2022-10-04 NOTE — TELEPHONE ENCOUNTER
Patient called with complaints of feeling unwell  Tested yesterday for covid and was negative  Saturday started with scratchy throat and yesterday got worse  Has sore throat, cough, 100 0 temp, left eat pain and some congestion  There are no same day appts available today, scheduled patient for appt tomorrow at 1:20  Patient is asking if you recommend anything OCT for the time being  Thank you

## 2022-10-04 NOTE — PROGRESS NOTES
[address] [place] [address] Primary Care        NAME: Sasha Jon is a 56 y o  female  : 1975    MRN: [id number]  DATE: 2022  TIME: 10:20 AM    Assessment and Plan   Pharyngitis, unspecified etiology [J02 9]  1  Pharyngitis, unspecified etiology  azithromycin (ZITHROMAX) 250 mg tablet    predniSONE 20 mg tablet   2  Fever, unspecified fever cause     3  Myalgia  predniSONE 20 mg tablet     1  Pharyngitis, unspecified etiology  Mother tested positive for group C strep on throat culture  Will treat with antibibotics but also test for flu  - azithromycin (ZITHROMAX) 250 mg tablet; Take 2 tablets today then 1 tablet daily x 4 days  Dispense: 6 tablet; Refill: 0  - predniSONE 20 mg tablet; Take 1 tablet (20 mg total) by mouth daily for 5 days  Dispense: 5 tablet; Refill: 0  - Covid/Flu- Office Collect    2  Fever, unspecified fever cause    - Covid/Flu- Office Collect    3  Myalgia    - predniSONE 20 mg tablet; Take 1 tablet (20 mg total) by mouth daily for 5 days  Dispense: 5 tablet; Refill: 0  - Covid/Flu- Office Collect      Patient Instructions     There are no Patient Instructions on file for this visit  Chief Complaint     Chief Complaint   Patient presents with    Sore Throat     Patient states symptoms started Saturday with scratchy throat  Has progressively gotten worse  Cough, ear pain  Was covid negative as of yesterday with home test           History of Present Illness       Patient here for sick visit  With c/o scratchy throat starting 3 days ago  Painful when drinking and eating, ear pressure left ear, coughing, congestion,post nasal drip  Felt cold at home, low grade fever under 100 4 Today has low grade fever after taking tylenol 30 min ago  Mother was tested 2 days ago for strep and culture showed Group C strep  Review of Systems   Review of Systems   Constitutional: Positive for chills, fatigue and fever     HENT: Positive for congestion, ear pain, postnasal drip, rhinorrhea and sore throat  Negative for sneezing  Respiratory: Positive for cough  Negative for wheezing  Cardiovascular: Negative  Musculoskeletal: Positive for myalgias  Skin: Negative  Negative for rash  Neurological: Negative for dizziness, light-headedness and headaches  Psychiatric/Behavioral: Negative  Negative for dysphoric mood  The patient is not nervous/anxious  PHQ-2/9 Depression Screening    Little interest or pleasure in doing things: 0 - not at all  Feeling down, depressed, or hopeless: 0 - not at all  Trouble falling or staying asleep, or sleeping too much: 0 - not at all  Feeling tired or having little energy: 0 - not at all  Poor appetite or overeatin - not at all  Feeling bad about yourself - or that you are a failure or have let yourself or your family down: 0 - not at all  Trouble concentrating on things, such as reading the newspaper or watching television: 0 - not at all  Moving or speaking so slowly that other people could have noticed   Or the opposite - being so fidgety or restless that you have been moving around a lot more than usual: 0 - not at all  Thoughts that you would be better off dead, or of hurting yourself in some way: 0 - not at all  PHQ-9 Score: 0   PHQ-9 Interpretation: No or Minimal depression         Current Medications       Current Outpatient Medications:     azithromycin (ZITHROMAX) 250 mg tablet, Take 2 tablets today then 1 tablet daily x 4 days, Disp: 6 tablet, Rfl: 0    cetirizine (ZyrTEC) 5 MG tablet, Take 5 mg by mouth if needed, Disp: , Rfl:     cholecalciferol (VITAMIN D3) 1,000 units tablet, Take 5,000 Units by mouth daily , Disp: , Rfl:     multivitamin (THERAGRAN) TABS, Take 1 tablet by mouth daily, Disp: , Rfl:     predniSONE 20 mg tablet, Take 1 tablet (20 mg total) by mouth daily for 5 days, Disp: 5 tablet, Rfl: 0    Probiotic Product (PROBIOTIC + TURMERIC EXTRACT PO), Take by mouth, Disp: , Rfl:     Probiotic Product (PROBIOTIC ADVANCED PO), Take by mouth, Disp: , Rfl:     Thiamine HCl (VITAMIN B1 PO), Take by mouth, Disp: , Rfl:     Green Tea, Lucrecia sinensis, (GREEN TEA EXTRACT PO), Take by mouth (Patient not taking: No sig reported), Disp: , Rfl:     meloxicam (Mobic) 7 5 mg tablet, Take 1 tablet (7 5 mg total) by mouth daily (Patient not taking: No sig reported), Disp: 30 tablet, Rfl: 1    Current Allergies     Allergies as of 10/04/2022 - Reviewed 10/04/2022   Allergen Reaction Noted    Amoxicillin Hives 10/24/2012    Cymbalta [duloxetine hcl] Other (See Comments) 2021    Penicillins Hives 10/24/2012    Shellfish allergy - food allergy Hives 2013    Sulfa antibiotics Rash 2013            The following portions of the patient's history were reviewed and updated as appropriate: allergies, current medications, past family history, past medical history, past social history, past surgical history and problem list      Past Medical History:   Diagnosis Date    Abnormal Pap smear of cervix     Anemia     Disease of thyroid gland     thyroid storm with previous pregnancy    Dysmenorrhea     Esophageal reflux     last assessed - [id number]    Herpes zoster     LUQ level T-8 resolved; last assessed - [id number]    History of infection due to human papilloma virus (HPV)     HPV (human papilloma virus) infection     Inguinal hernia     last assessed - [id number]    Insomnia     Mastodynia     Resolved -     Migraine     Ovarian cyst     last assessed - [id number]    Ovarian cyst     Palpitations     last assessed - [id number]    Tachycardia     Resolved - [id number]    Takes dietary supplements     Temporomandibular joint disorder     last assessed - 71Iqj3046    Urinary tract infection     Varicella        Past Surgical History:   Procedure Laterality Date     SECTION      COLONOSCOPY  2012    Fiberoptic    CRYOTHERAPY      cervix, age 24   Jamal Cristy DIAGNOSTIC LAPAROSCOPY  DILATION AND CURETTAGE OF UTERUS      Resolved 2010    GYNECOLOGIC CRYOSURGERY      age 21    TOOTH EXTRACTION         Family History   Problem Relation Age of Onset    Diabetes Mother     Heart disease Mother         cardiac disorder    Gallbladder disease Mother     Hypertension Mother     Glaucoma Mother     Obesity Mother         hx gastric bypass    Diabetes Father     Heart disease Father         cardiac disorder    Hypertension Father     Hyperlipidemia Father         Pure Hypercholesterolemia    Obesity Father     Other Family         headache syndromes    Breast cancer Cousin     Post-traumatic stress disorder Brother     Hyperlipidemia Brother     Hypertension Brother     Diabetes Maternal Grandmother     Lung cancer Maternal Grandfather     Stroke Paternal Grandmother     Hypertension Paternal Grandmother     Cirrhosis Paternal Grandfather     Ovarian cancer Maternal Aunt     No Known Problems Daughter     No Known Problems Paternal Aunt     Colon cancer Neg Hx          Medications have been verified  Objective   /78   Pulse (!) 124   Temp 100 2 °F (37 9 °C)   Ht 5' (1 524 m)   Wt 71 7 kg (158 lb)   SpO2 98%   BMI 30 86 kg/m²        Physical Exam     Physical Exam  Vitals and nursing note reviewed  Constitutional:       General: She is not in acute distress  Appearance: Normal appearance  She is well-developed  She is not ill-appearing  HENT:      Head: Normocephalic and atraumatic  Right Ear: Tympanic membrane and ear canal normal       Left Ear: Tympanic membrane and ear canal normal       Nose: Nose normal  No congestion or rhinorrhea  Mouth/Throat:      Mouth: Mucous membranes are moist       Pharynx: No oropharyngeal exudate  Eyes:      General: Lids are normal       Conjunctiva/sclera: Conjunctivae normal    Cardiovascular:      Rate and Rhythm: Normal rate and regular rhythm        Heart sounds: Normal heart sounds, S1 normal and S2 normal  No murmur heard  No friction rub  Pulmonary:      Effort: Pulmonary effort is normal  No respiratory distress  Breath sounds: Normal breath sounds  No decreased breath sounds or wheezing  Musculoskeletal:         General: No tenderness or deformity  Normal range of motion  Skin:     General: Skin is warm  Findings: No erythema or rash  Neurological:      Mental Status: She is alert and oriented to person, place, and time  Psychiatric:         Behavior: Behavior normal  Behavior is cooperative  Thought Content:  Thought content normal

## 2022-10-05 LAB
FLUAV RNA RESP QL NAA+PROBE: NEGATIVE
FLUBV RNA RESP QL NAA+PROBE: NEGATIVE
SARS-COV-2 RNA RESP QL NAA+PROBE: NEGATIVE

## 2022-10-22 ENCOUNTER — HOSPITAL ENCOUNTER (OUTPATIENT)
Dept: MAMMOGRAPHY | Facility: HOSPITAL | Age: 47
Discharge: HOME | End: 2022-10-22
Attending: INTERNAL MEDICINE
Payer: COMMERCIAL

## 2022-10-22 VITALS — HEIGHT: 60 IN | BODY MASS INDEX: 30.82 KG/M2 | WEIGHT: 157 LBS

## 2022-10-22 DIAGNOSIS — Z12.31 ENCOUNTER FOR SCREENING MAMMOGRAM FOR MALIGNANT NEOPLASM OF BREAST: ICD-10-CM

## 2022-10-22 PROCEDURE — 77063 BREAST TOMOSYNTHESIS BI: CPT

## 2022-10-22 PROCEDURE — 77067 SCR MAMMO BI INCL CAD: CPT

## 2022-12-01 ENCOUNTER — APPOINTMENT (EMERGENCY)
Dept: RADIOLOGY | Facility: HOSPITAL | Age: 47
End: 2022-12-01

## 2022-12-01 ENCOUNTER — HOSPITAL ENCOUNTER (EMERGENCY)
Facility: HOSPITAL | Age: 47
Discharge: HOME | End: 2022-12-01
Attending: EMERGENCY MEDICINE

## 2022-12-01 VITALS
SYSTOLIC BLOOD PRESSURE: 117 MMHG | TEMPERATURE: 99.3 F | DIASTOLIC BLOOD PRESSURE: 56 MMHG | HEIGHT: 60 IN | OXYGEN SATURATION: 96 % | WEIGHT: 153 LBS | HEART RATE: 81 BPM | BODY MASS INDEX: 30.04 KG/M2 | RESPIRATION RATE: 17 BRPM

## 2022-12-01 DIAGNOSIS — M54.9 MUSCULOSKELETAL BACK PAIN: ICD-10-CM

## 2022-12-01 DIAGNOSIS — R07.89 MUSCULOSKELETAL CHEST PAIN: Primary | ICD-10-CM

## 2022-12-01 LAB
ALBUMIN SERPL BCP-MCNC: 4.3 G/DL (ref 3.5–5)
ALP SERPL-CCNC: 61 U/L (ref 34–104)
ALT SERPL W P-5'-P-CCNC: 28 U/L (ref 7–52)
ANION GAP SERPL CALCULATED.3IONS-SCNC: 7 MMOL/L (ref 4–13)
APTT PPP: 27 SECONDS (ref 23–37)
AST SERPL W P-5'-P-CCNC: 15 U/L (ref 13–39)
BASOPHILS # BLD AUTO: 0.05 THOUSANDS/ÂΜL (ref 0–0.1)
BASOPHILS NFR BLD AUTO: 1 % (ref 0–1)
BILIRUB SERPL-MCNC: 0.32 MG/DL (ref 0.2–1)
BUN SERPL-MCNC: 19 MG/DL (ref 5–25)
CALCIUM SERPL-MCNC: 9.6 MG/DL (ref 8.4–10.2)
CARDIAC TROPONIN I PNL SERPL HS: 2 NG/L
CHLORIDE SERPL-SCNC: 105 MMOL/L (ref 96–108)
CO2 SERPL-SCNC: 26 MMOL/L (ref 21–32)
CREAT SERPL-MCNC: 0.82 MG/DL (ref 0.6–1.3)
EOSINOPHIL # BLD AUTO: 0.18 THOUSAND/ÂΜL (ref 0–0.61)
EOSINOPHIL NFR BLD AUTO: 2 % (ref 0–6)
ERYTHROCYTE [DISTWIDTH] IN BLOOD BY AUTOMATED COUNT: 11.6 % (ref 11.6–15.1)
GFR SERPL CREATININE-BSD FRML MDRD: 86 ML/MIN/1.73SQ M
GLUCOSE SERPL-MCNC: 85 MG/DL (ref 65–140)
HCT VFR BLD AUTO: 41 % (ref 34.8–46.1)
HGB BLD-MCNC: 13.9 G/DL (ref 11.5–15.4)
IMM GRANULOCYTES # BLD AUTO: 0.01 THOUSAND/UL (ref 0–0.2)
IMM GRANULOCYTES NFR BLD AUTO: 0 % (ref 0–2)
INR PPP: 1.01 (ref 0.84–1.19)
LIPASE SERPL-CCNC: 11 U/L (ref 11–82)
LYMPHOCYTES # BLD AUTO: 2.94 THOUSANDS/ÂΜL (ref 0.6–4.47)
LYMPHOCYTES NFR BLD AUTO: 34 % (ref 14–44)
MCH RBC QN AUTO: 30.4 PG (ref 26.8–34.3)
MCHC RBC AUTO-ENTMCNC: 33.9 G/DL (ref 31.4–37.4)
MCV RBC AUTO: 90 FL (ref 82–98)
MONOCYTES # BLD AUTO: 0.82 THOUSAND/ÂΜL (ref 0.17–1.22)
MONOCYTES NFR BLD AUTO: 9 % (ref 4–12)
NEUTROPHILS # BLD AUTO: 4.68 THOUSANDS/ÂΜL (ref 1.85–7.62)
NEUTS SEG NFR BLD AUTO: 54 % (ref 43–75)
NRBC BLD AUTO-RTO: 0 /100 WBCS
PLATELET # BLD AUTO: 330 THOUSANDS/UL (ref 149–390)
PMV BLD AUTO: 10.3 FL (ref 8.9–12.7)
POTASSIUM SERPL-SCNC: 3.5 MMOL/L (ref 3.5–5.3)
PROT SERPL-MCNC: 7.1 G/DL (ref 6.4–8.4)
PROTHROMBIN TIME: 13.3 SECONDS (ref 11.6–14.5)
RBC # BLD AUTO: 4.57 MILLION/UL (ref 3.81–5.12)
SODIUM SERPL-SCNC: 138 MMOL/L (ref 135–147)
WBC # BLD AUTO: 8.68 THOUSAND/UL (ref 4.31–10.16)

## 2022-12-02 NOTE — ED PROVIDER NOTES
History  Chief Complaint   Patient presents with   • Back Pain     Upper back pain progressively worse over past couple of days; hx of shingles       Patient is a 56-year-old female who presents for evaluation of chest and back discomfort  Patient says that initially started as chest pains on Saturday  She says it is intermittent and sharp in nature  She says it is not exertional in nature  She denies any associated shortness of breath  She says that over last few days it has progressed into the back as well  She denies pressure, lightheadedness, dizziness, nausea or vomiting  She denies any          Prior to Admission Medications   Prescriptions Last Dose Informant Patient Reported? Taking?    Green Tea, Lucrecia sinensis, (GREEN TEA EXTRACT PO)   Yes No   Sig: Take by mouth   Patient not taking: No sig reported   Probiotic Product (PROBIOTIC + TURMERIC EXTRACT PO)   Yes No   Sig: Take by mouth   Probiotic Product (PROBIOTIC ADVANCED PO)   Yes No   Sig: Take by mouth   Thiamine HCl (VITAMIN B1 PO)   Yes No   Sig: Take by mouth   cetirizine (ZyrTEC) 5 MG tablet   Yes No   Sig: Take 5 mg by mouth if needed   cholecalciferol (VITAMIN D3) 1,000 units tablet   Yes No   Sig: Take 5,000 Units by mouth daily    meloxicam (Mobic) 7 5 mg tablet   No No   Sig: Take 1 tablet (7 5 mg total) by mouth daily   Patient not taking: No sig reported   multivitamin (THERAGRAN) TABS   Yes No   Sig: Take 1 tablet by mouth daily      Facility-Administered Medications: None       Past Medical History:   Diagnosis Date   • Abnormal Pap smear of cervix    • Anemia    • Disease of thyroid gland     thyroid storm with previous pregnancy   • Dysmenorrhea    • Esophageal reflux     last assessed - [id number]   • Herpes zoster     LUQ level T-8 resolved; last assessed - [id number]   • History of infection due to human papilloma virus (HPV)    • HPV (human papilloma virus) infection    • Inguinal hernia     last assessed - [id number]   • Insomnia    • Mastodynia     Resolved -    • Migraine    • Ovarian cyst     last assessed - 70Pjd3563   • Ovarian cyst    • Palpitations     last assessed - 23TJK2543   • Tachycardia     Resolved - 18AAZ3618   • Takes dietary supplements    • Temporomandibular joint disorder     last assessed - 89Xxe3315   • Urinary tract infection    • Varicella        Past Surgical History:   Procedure Laterality Date   •  SECTION     • COLONOSCOPY  2012    Fiberoptic   • CRYOTHERAPY      cervix, age 24   • DIAGNOSTIC LAPAROSCOPY     • DILATION AND CURETTAGE OF UTERUS      Resolved    • GYNECOLOGIC CRYOSURGERY      age 24   • TOOTH EXTRACTION         Family History   Problem Relation Age of Onset   • Diabetes Mother    • Heart disease Mother         cardiac disorder   • Gallbladder disease Mother    • Hypertension Mother    • Glaucoma Mother    • Obesity Mother         hx gastric bypass   • Diabetes Father    • Heart disease Father         cardiac disorder   • Hypertension Father    • Hyperlipidemia Father         Pure Hypercholesterolemia   • Obesity Father    • Other Family         headache syndromes   • Breast cancer Cousin    • Post-traumatic stress disorder Brother    • Hyperlipidemia Brother    • Hypertension Brother    • Diabetes Maternal Grandmother    • Lung cancer Maternal Grandfather    • Stroke Paternal Grandmother    • Hypertension Paternal Grandmother    • Cirrhosis Paternal Grandfather    • Ovarian cancer Maternal Aunt    • No Known Problems Daughter    • No Known Problems Paternal Aunt    • Colon cancer Neg Hx      I have reviewed and agree with the history as documented      E-Cigarette/Vaping   • E-Cigarette Use Never User      E-Cigarette/Vaping Substances   • Nicotine No    • THC No    • CBD No    • Flavoring No    • Other No    • Unknown No      Social History     Tobacco Use   • Smoking status: Never   • Smokeless tobacco: Never   Vaping Use   • Vaping Use: Never used   Substance Use Topics   • Alcohol use: Not Currently     Comment: social/prior to knowledge of pregnancy   • Drug use: Never       Review of Systems   Constitutional: Negative for chills, diaphoresis and fever  HENT: Negative for congestion, sinus pressure, sore throat and trouble swallowing  Eyes: Negative for pain, discharge and itching  Respiratory: Positive for chest tightness  Negative for cough, shortness of breath and wheezing  Cardiovascular: Negative for chest pain, palpitations and leg swelling  Gastrointestinal: Negative for abdominal distention, abdominal pain, blood in stool, diarrhea, nausea and vomiting  Endocrine: Negative for polyphagia and polyuria  Genitourinary: Negative for difficulty urinating, dysuria, flank pain, hematuria, pelvic pain and vaginal bleeding  Musculoskeletal: Positive for back pain  Negative for arthralgias  Skin: Negative for rash  Neurological: Negative for dizziness, syncope, weakness, light-headedness and headaches  Physical Exam  Physical Exam  Vitals and nursing note reviewed  Constitutional:       General: She is not in acute distress  Appearance: She is well-developed  HENT:      Head: Normocephalic and atraumatic  Right Ear: External ear normal       Left Ear: External ear normal       Nose: Nose normal       Mouth/Throat:      Mouth: Mucous membranes are moist       Pharynx: No oropharyngeal exudate  Eyes:      Conjunctiva/sclera: Conjunctivae normal       Pupils: Pupils are equal, round, and reactive to light  Cardiovascular:      Rate and Rhythm: Normal rate and regular rhythm  Heart sounds: Normal heart sounds  No murmur heard  No friction rub  No gallop  Pulmonary:      Effort: Pulmonary effort is normal  No respiratory distress  Breath sounds: Normal breath sounds  No wheezing or rales  Chest:      Chest wall: Tenderness (Anterior) present  Abdominal:      General: There is no distension        Palpations: Abdomen is soft  Tenderness: There is no abdominal tenderness  There is no guarding  Musculoskeletal:         General: Tenderness (Upper back) present  No swelling or deformity  Normal range of motion  Cervical back: Normal range of motion and neck supple  Lymphadenopathy:      Cervical: No cervical adenopathy  Skin:     General: Skin is warm and dry  Neurological:      General: No focal deficit present  Mental Status: She is alert and oriented to person, place, and time  Mental status is at baseline  Cranial Nerves: No cranial nerve deficit  Sensory: No sensory deficit  Motor: No weakness or abnormal muscle tone        Coordination: Coordination normal          Vital Signs  ED Triage Vitals [12/01/22 2008]   Temperature Pulse Respirations Blood Pressure SpO2   99 3 °F (37 4 °C) 87 18 164/83 97 %      Temp Source Heart Rate Source Patient Position - Orthostatic VS BP Location FiO2 (%)   Temporal Monitor -- -- --      Pain Score       5           Vitals:    12/01/22 2008 12/01/22 2209 12/01/22 2300   BP: 164/83 123/71 117/56   Pulse: 87 80 81         Visual Acuity      ED Medications  Medications - No data to display    Diagnostic Studies  Results Reviewed     Procedure Component Value Units Date/Time    HS Troponin 0hr (reflex protocol) [[id number]]  (Normal) Collected: 12/01/22 2207    Lab Status: Final result Specimen: Blood from Arm, Right Updated: 12/01/22 2241     hs TnI 0hr 2 ng/L     Comprehensive metabolic panel [[id number]] Collected: 12/01/22 2207    Lab Status: Final result Specimen: Blood from Arm, Right Updated: 12/01/22 2234     Sodium 138 mmol/L      Potassium 3 5 mmol/L      Chloride 105 mmol/L      CO2 26 mmol/L      ANION GAP 7 mmol/L      BUN 19 mg/dL      Creatinine 0 82 mg/dL      Glucose 85 mg/dL      Calcium 9 6 mg/dL      AST 15 U/L      ALT 28 U/L      Alkaline Phosphatase 61 U/L      Total Protein 7 1 g/dL      Albumin 4 3 g/dL      Total Bilirubin 0 32 mg/dL eGFR 86 ml/min/1 73sq m     Narrative:      [place] guidelines for Chronic Kidney Disease (CKD):   •  Stage 1 with normal or high GFR (GFR > 90 mL/min/1 73 square meters)  •  Stage 2 Mild CKD (GFR = 60-89 mL/min/1 73 square meters)  •  Stage 3A Moderate CKD (GFR = 45-59 mL/min/1 73 square meters)  •  Stage 3B Moderate CKD (GFR = 30-44 mL/min/1 73 square meters)  •  Stage 4 Severe CKD (GFR = 15-29 mL/min/1 73 square meters)  •  Stage 5 End Stage CKD (GFR <15 mL/min/1 73 square meters)  Note: GFR calculation is accurate only with a steady state creatinine    Lipase [[id number]]  (Normal) Collected: 12/01/22 2207    Lab Status: Final result Specimen: Blood from Arm, Right Updated: 12/01/22 2234     Lipase 11 u/L     Protime-INR [[id number]]  (Normal) Collected: 12/01/22 2207    Lab Status: Final result Specimen: Blood from Arm, Right Updated: 12/01/22 2230     Protime 13 3 seconds      INR 1 01    APTT [[id number]]  (Normal) Collected: 12/01/22 2207    Lab Status: Final result Specimen: Blood from Arm, Right Updated: 12/01/22 2230     PTT 27 seconds     CBC and differential [[id number]] Collected: 12/01/22 2207    Lab Status: Final result Specimen: Blood from Arm, Right Updated: 12/01/22 2215     WBC 8 68 Thousand/uL      RBC 4 57 Million/uL      Hemoglobin 13 9 g/dL      Hematocrit 41 0 %      MCV 90 fL      MCH 30 4 pg      MCHC 33 9 g/dL      RDW 11 6 %      MPV 10 3 fL      Platelets 543 Thousands/uL      nRBC 0 /100 WBCs      Neutrophils Relative 54 %      Immat GRANS % 0 %      Lymphocytes Relative 34 %      Monocytes Relative 9 %      Eosinophils Relative 2 %      Basophils Relative 1 %      Neutrophils Absolute 4 68 Thousands/µL      Immature Grans Absolute 0 01 Thousand/uL      Lymphocytes Absolute 2 94 Thousands/µL      Monocytes Absolute 0 82 Thousand/µL      Eosinophils Absolute 0 18 Thousand/µL      Basophils Absolute 0 05 Thousands/µL                  XR chest 1 view portable   ED Interpretation by Daily Dominguez DO (12/01 2321)   No acute cardiopulmonary disease       Final Result by Sabas Hong MD (12/02 9726)      No acute cardiopulmonary disease  Findings are stable            Workstation performed: [id number]                    Procedures  Procedures         ED Course                               SBIRT 22yo+    Flowsheet Row Most Recent Value   SBIRT (23 yo +)    In order to provide better care to our patients, we are screening all of our patients for alcohol and drug use  Would it be okay to ask you these screening questions? No Filed at: 12/01/2022 2114   Initial Alcohol Screen: US AUDIT-C     1  How often do you have a drink containing alcohol? 0 Filed at: 12/01/2022 2114   2  How many drinks containing alcohol do you have on a typical day you are drinking? 0 Filed at: 12/01/2022 2114   3a  Male UNDER 65: How often do you have five or more drinks on one occasion? 0 Filed at: 12/01/2022 2114   3b  FEMALE Any Age, or MALE 65+: How often do you have 4 or more drinks on one occassion? 0 Filed at: 12/01/2022 2114   Audit-C Score 0 Filed at: 12/01/2022 2114   DEDE: How many times in the past year have you    Used an illegal drug or used a prescription medication for non-medical reasons? Never Filed at: 12/01/2022 2114                    MDM  Number of Diagnoses or Management Options  Musculoskeletal back pain  Musculoskeletal chest pain  Diagnosis management comments: 55 yo F presenting for chest pain/back pain  Intermittently since Saturday  Sharp in nature  Not exertional   No shortness of breath  Tenderness to the upper sternum  Vitals within normal limits  Symptoms musculoskeletal will    Patient PERC negative       Disposition  Final diagnoses:   Musculoskeletal chest pain   Musculoskeletal back pain     Time reflects when diagnosis was documented in both MDM as applicable and the Disposition within this note     Time User Action Codes Description Comment 12/1/2022 11:21 PM Pallavi Jey Add [R07 89] Musculoskeletal chest pain     12/1/2022 11:21 PM Jeffnano Jey Add [M54 9] Musculoskeletal back pain       ED Disposition     ED Disposition   Discharge    Condition   Stable    Date/Time   Thu Dec 1, 2022 11:21 PM    Comment   Libertad Mac discharge to home/self care  Follow-up Information     Follow up With Specialties Details Why Contact Info    Kate Smith MD Internal Medicine Schedule an appointment as soon as possible for a visit  For follow up of symptoms [address]  [phone]            Discharge Medication List as of 12/1/2022 11:22 PM      CONTINUE these medications which have NOT CHANGED    Details   cetirizine (ZyrTEC) 5 MG tablet Take 5 mg by mouth if needed, Historical Med      cholecalciferol (VITAMIN D3) 1,000 units tablet Take 5,000 Units by mouth daily , Historical Med      Green Tea, Lucrecia sinensis, (GREEN TEA EXTRACT PO) Take by mouth, Historical Med      meloxicam (Mobic) 7 5 mg tablet Take 1 tablet (7 5 mg total) by mouth daily, Starting Sat 4/9/2022, Normal      multivitamin (THERAGRAN) TABS Take 1 tablet by mouth daily, Historical Med      !! Probiotic Product (PROBIOTIC + TURMERIC EXTRACT PO) Take by mouth, Historical Med      !! Probiotic Product (PROBIOTIC ADVANCED PO) Take by mouth, Historical Med      Thiamine HCl (VITAMIN B1 PO) Take by mouth, Historical Med       !! - Potential duplicate medications found  Please discuss with provider  No discharge procedures on file      PDMP Review       Value Time User    PDMP Reviewed  Yes 3/1/2022  9:12 AM Kate Smith MD          ED Provider  Electronically Signed by           Ernst Pacheco DO  12/04/22 0965

## 2023-02-07 ENCOUNTER — APPOINTMENT (OUTPATIENT)
Dept: LAB | Facility: CLINIC | Age: 48
End: 2023-02-07

## 2023-02-07 DIAGNOSIS — R76.8 POSITIVE ANA (ANTINUCLEAR ANTIBODY): ICD-10-CM

## 2023-02-07 DIAGNOSIS — E27.40 ADRENAL INSUFFICIENCY (HCC): ICD-10-CM

## 2023-02-07 DIAGNOSIS — R45.89 DEPRESSED MOOD: ICD-10-CM

## 2023-02-07 DIAGNOSIS — R53.82 CHRONIC FATIGUE: ICD-10-CM

## 2023-02-07 LAB
CORTIS AM PEAK SERPL-MCNC: 19.3 UG/DL (ref 4.2–22.4)
TSH SERPL DL<=0.05 MIU/L-ACNC: 1.33 UIU/ML (ref 0.45–4.5)

## 2023-02-08 LAB
THYROGLOB AB SERPL-ACNC: <1 IU/ML (ref 0–0.9)
THYROPEROXIDASE AB SERPL-ACNC: <9 IU/ML (ref 0–34)

## 2023-03-03 ENCOUNTER — APPOINTMENT (EMERGENCY)
Dept: RADIOLOGY | Facility: HOSPITAL | Age: 48
End: 2023-03-03

## 2023-03-03 ENCOUNTER — HOSPITAL ENCOUNTER (EMERGENCY)
Facility: HOSPITAL | Age: 48
Discharge: HOME | End: 2023-03-03
Attending: EMERGENCY MEDICINE

## 2023-03-03 VITALS
WEIGHT: 155 LBS | DIASTOLIC BLOOD PRESSURE: 81 MMHG | HEART RATE: 84 BPM | TEMPERATURE: 98.8 F | RESPIRATION RATE: 18 BRPM | SYSTOLIC BLOOD PRESSURE: 146 MMHG | OXYGEN SATURATION: 95 % | HEIGHT: 60 IN | BODY MASS INDEX: 30.43 KG/M2
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
## 2023-03-03 RX ORDER — OXYCODONE HYDROCHLORIDE AND ACETAMINOPHEN 5; 325 MG/1; MG/1
1 TABLET ORAL ONCE
Status: COMPLETED | OUTPATIENT
Start: 2023-03-03 | End: 2023-03-03

## 2023-03-03 RX ORDER — CYCLOBENZAPRINE HCL 5 MG
5 TABLET ORAL 3 TIMES DAILY PRN
Qty: 7 TABLET | Refills: 0 | Status: SHIPPED | OUTPATIENT
Start: 2023-03-03

## 2023-03-03 RX ADMIN — OXYCODONE HYDROCHLORIDE AND ACETAMINOPHEN 1 TABLET: 5; 325 TABLET ORAL at 21:17

## 2023-03-04 NOTE — DISCHARGE INSTRUCTIONS
Return if symptoms worsen or if new symptoms develop or if you have any other concerns    Take ibuprofen 400mg every 6 hours as needed for pain AND/OR take 650mg tylenol as needed for pain every 6 hours
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
## 2023-03-21 ENCOUNTER — TELEPHONE (OUTPATIENT)
Dept: FAMILY MEDICINE CLINIC | Facility: CLINIC | Age: 48
End: 2023-03-21

## 2023-04-03 ENCOUNTER — OFFICE VISIT (OUTPATIENT)
Dept: URGENT CARE | Facility: CLINIC | Age: 48
End: 2023-04-03

## 2023-04-03 VITALS
RESPIRATION RATE: 16 BRPM | TEMPERATURE: 98 F | OXYGEN SATURATION: 97 % | DIASTOLIC BLOOD PRESSURE: 86 MMHG | SYSTOLIC BLOOD PRESSURE: 134 MMHG | WEIGHT: 155 LBS | HEART RATE: 85 BPM | BODY MASS INDEX: 30.43 KG/M2 | HEIGHT: 60 IN

## 2023-04-03 DIAGNOSIS — J02.9 SORE THROAT: ICD-10-CM

## 2023-04-03 DIAGNOSIS — H60.502 ACUTE OTITIS EXTERNA OF LEFT EAR, UNSPECIFIED TYPE: Primary | ICD-10-CM

## 2023-04-03 LAB — S PYO AG THROAT QL: NEGATIVE

## 2023-04-03 RX ORDER — OFLOXACIN 3 MG/ML
10 SOLUTION AURICULAR (OTIC) DAILY
Qty: 10 ML | Refills: 0 | Status: SHIPPED | OUTPATIENT
Start: 2023-04-03

## 2023-04-03 NOTE — PROGRESS NOTES
[facility]      NAME: Bryce Ramos is a 56 y o  female  : 1975    MRN: [id number]  DATE: April 3, 2023  TIME: 10:58 AM    Assessment and Plan   Acute otitis externa of left ear, unspecified type [H60 502]  1  Acute otitis externa of left ear, unspecified type  ofloxacin (FLOXIN) 0 3 % otic solution      2  Sore throat  POCT rapid strepA    Throat culture          Patient Instructions   To lay on the good ear and put the drops in the bad ear, allowing at least 5 minutes for the medication to fully penetrate the ear canal  Ibuprofen and/or tylenol as needed for pain or fever  If not improving over the next 3-5 days, follow up with PCP  Rapid strep test completed and negative  Will send for culture and call patient if positive  To present to the ER if symptoms worsen  Chief Complaint     Chief Complaint   Patient presents with   • Cold Like Symptoms     Patient c/o on and off left ear pain that started three weeks ago  Patients throat started to hurt last night on the left side  History of Present Illness   Bryce Ramos presents to the clinic c/o    Earache   There is pain in the left ear  This is a new problem  The current episode started 1 to 4 weeks ago  The problem has been unchanged  There has been no fever  The pain is moderate  Associated symptoms include a sore throat  Pertinent negatives include no abdominal pain, coughing, ear discharge, headaches, hearing loss or rash  She has tried nothing for the symptoms  The treatment provided no relief  Sore Throat   This is a new problem  The current episode started yesterday  The problem has been unchanged  There has been no fever  Associated symptoms include congestion and ear pain (left)  Pertinent negatives include no abdominal pain, coughing, ear discharge, headaches or shortness of breath  She has tried acetaminophen for the symptoms  The treatment provided mild relief         Review of Systems   Review of Systems Constitutional: Negative for chills, diaphoresis, fatigue and fever  HENT: Positive for congestion, ear pain (left), postnasal drip and sore throat  Negative for ear discharge, facial swelling and hearing loss  Eyes: Negative for photophobia, pain, discharge, redness, itching and visual disturbance  Respiratory: Negative for apnea, cough, chest tightness, shortness of breath and wheezing  Cardiovascular: Negative for chest pain and palpitations  Gastrointestinal: Negative for abdominal pain  Skin: Negative for color change, rash and wound  Neurological: Negative for dizziness and headaches  Hematological: Negative for adenopathy           Current Medications     Long-Term Medications   Medication Sig Dispense Refill   • multivitamin (THERAGRAN) TABS Take 1 tablet by mouth daily     • cetirizine (ZyrTEC) 5 MG tablet Take 5 mg by mouth if needed (Patient not taking: Reported on 4/3/2023)     • cyclobenzaprine (FLEXERIL) 5 mg tablet Take 1 tablet (5 mg total) by mouth 3 (three) times a day as needed for muscle spasms (Patient not taking: Reported on 4/3/2023) 7 tablet 0   • meloxicam (Mobic) 7 5 mg tablet Take 1 tablet (7 5 mg total) by mouth daily (Patient not taking: No sig reported) 30 tablet 1   • [DISCONTINUED] DULoxetine (CYMBALTA) 20 mg capsule Take 1 capsule (20 mg total) by mouth daily (Patient not taking: Reported on 11/16/2020) 30 capsule 1   • [DISCONTINUED] traZODone (DESYREL) 50 mg tablet Take 0 5 tablets (25 mg total) by mouth daily at bedtime as needed for sleep (Patient not taking: Reported on 11/16/2020) 15 tablet 1   • [DISCONTINUED] venlafaxine (EFFEXOR-XR) 37 5 mg 24 hr capsule Take 1 capsule (37 5 mg total) by mouth daily with breakfast (Patient not taking: Reported on 3/15/2021) 30 capsule 5       Current Allergies     Allergies as of 04/03/2023 - Reviewed 04/03/2023   Allergen Reaction Noted   • Amoxicillin Hives 10/24/2012   • Cymbalta [duloxetine hcl] Other (See Comments) 2021   • Penicillins Hives 10/24/2012   • Shellfish allergy - food allergy Hives 2013   • Sulfa antibiotics Rash 2013            The following portions of the patient's history were reviewed and updated as appropriate: allergies, current medications, past family history, past medical history, past social history, past surgical history and problem list   Past Medical History:   Diagnosis Date   • Abnormal Pap smear of cervix    • Anemia    • Disease of thyroid gland     thyroid storm with previous pregnancy   • Dysmenorrhea    • Esophageal reflux     last assessed - [id number]   • Herpes zoster     LUQ level T-8 resolved; last assessed - [id number]   • History of infection due to human papilloma virus (HPV)    • HPV (human papilloma virus) infection    • Inguinal hernia     last assessed -    • Insomnia    • Mastodynia     Resolved -    • Migraine    • Ovarian cyst     last assessed - 2013   • Ovarian cyst    • Palpitations     last assessed - [id number]   • Tachycardia     Resolved -    • Takes dietary supplements    • Temporomandibular joint disorder     last assessed - 2012   • Urinary tract infection    • Varicella      Past Surgical History:   Procedure Laterality Date   •  SECTION     • COLONOSCOPY  2012    Fiberoptic   • CRYOTHERAPY      cervix, age 24   • DIAGNOSTIC LAPAROSCOPY     • DILATION AND CURETTAGE OF UTERUS      Resolved    • GYNECOLOGIC CRYOSURGERY      age 24   • TOOTH EXTRACTION       Social History     Socioeconomic History   • Marital status:      Spouse name: Not on file   • Number of children: Not on file   • Years of education: Not on file   • Highest education level: Not on file   Occupational History   • Not on file   Tobacco Use   • Smoking status: Never   • Smokeless tobacco: Never   Vaping Use   • Vaping Use: Never used   Substance and Sexual Activity   • Alcohol use: Not Currently     Comment: social/prior to knowledge of pregnancy   • Drug use: Never   • Sexual activity: Yes     Partners: Male     Birth control/protection: Male Sterilization     Comment: fiance schedule for a vasectomy   Other Topics Concern   • Not on file   Social History Narrative    Coffee - denied    History of daily cola consumption (___ Cans/Day) - denied    History of daily tea consumption (___ Cups/Day) - denied    Lack of exercise    Marital History - Currently  - per [employer]    Mandaen Affiliation Rastafari     Social Determinants of Health     Financial Resource Strain: Not on file   Food Insecurity: Not on file   Transportation Needs: Not on file   Physical Activity: Not on file   Stress: Not on file   Social Connections: Not on file   Intimate Partner Violence: Not on file   Housing Stability: Not on file       Objective   /86   Pulse 85   Temp 98 °F (36 7 °C) (Temporal)   Resp 16   Ht 5' (1 524 m)   Wt 70 3 kg (155 lb)   SpO2 97%   BMI 30 27 kg/m²      Physical Exam     Physical Exam  Vitals and nursing note reviewed  Constitutional:       General: She is not in acute distress  Appearance: She is well-developed  She is not diaphoretic  HENT:      Head: Normocephalic and atraumatic  Right Ear: Tympanic membrane and external ear normal       Left Ear: Tympanic membrane and external ear normal  Swelling (mild eryhema of ext canal) present  No drainage  Tympanic membrane is not perforated or erythematous  Nose: Nose normal       Mouth/Throat:      Mouth: Mucous membranes are moist       Pharynx: Posterior oropharyngeal erythema (mild) present  No pharyngeal swelling or oropharyngeal exudate  Eyes:      General: No scleral icterus  Right eye: No discharge  Left eye: No discharge  Conjunctiva/sclera: Conjunctivae normal    Cardiovascular:      Rate and Rhythm: Normal rate and regular rhythm  Heart sounds: Normal heart sounds  No murmur heard  No friction rub   No gallop  Pulmonary:      Effort: Pulmonary effort is normal  No respiratory distress  Breath sounds: Normal breath sounds  No decreased breath sounds, wheezing, rhonchi or rales  Skin:     General: Skin is warm and dry  Coloration: Skin is not pale  Findings: No erythema or rash  Neurological:      Mental Status: She is alert and oriented to person, place, and time  Psychiatric:         Behavior: Behavior normal          Thought Content:  Thought content normal          Judgment: Judgment normal          Marie Toledo PA-C

## 2023-04-04 ENCOUNTER — OFFICE VISIT (OUTPATIENT)
Dept: FAMILY MEDICINE CLINIC | Facility: CLINIC | Age: 48
End: 2023-04-04

## 2023-04-04 VITALS
WEIGHT: 161 LBS | SYSTOLIC BLOOD PRESSURE: 132 MMHG | DIASTOLIC BLOOD PRESSURE: 80 MMHG | HEIGHT: 60 IN | TEMPERATURE: 97.7 F | OXYGEN SATURATION: 97 % | HEART RATE: 93 BPM | BODY MASS INDEX: 31.61 KG/M2

## 2023-04-04 DIAGNOSIS — Z00.00 ANNUAL PHYSICAL EXAM: ICD-10-CM

## 2023-04-04 DIAGNOSIS — R61 NIGHT SWEATS: ICD-10-CM

## 2023-04-04 DIAGNOSIS — R53.83 TIREDNESS: Primary | ICD-10-CM

## 2023-04-04 RX ORDER — MECOBALAMIN 1000 MCG
TABLET,CHEWABLE ORAL
COMMUNITY
Start: 2022-06-01

## 2023-04-04 RX ORDER — PHENOL 1.4 %
AEROSOL, SPRAY (ML) MUCOUS MEMBRANE
COMMUNITY
Start: 2022-06-01

## 2023-04-04 RX ORDER — LANOLIN ALCOHOL/MO/W.PET/CERES
CREAM (GRAM) TOPICAL
COMMUNITY

## 2023-04-04 RX ORDER — POTASSIUM GLUCONATE 595(99)MG
TABLET ORAL
COMMUNITY
Start: 2023-04-01

## 2023-04-04 RX ORDER — MAGNESIUM GLYCINATE 100 MG
CAPSULE ORAL
COMMUNITY
Start: 2023-03-01

## 2023-04-04 NOTE — PROGRESS NOTES
Name: Sandra Quispe      : 1975      MRN: [id number]  Encounter Provider: Beckie Reid DO  Encounter Date: 2023   Encounter department: [facility]    Assessment & Plan     Endocrine evaluation  Chief Complaint   Patient presents with   • New Patient Visit     Hormonal changes requesting referral to specialist    • Annual Exam     BMI Counseling: Body mass index is 31 44 kg/m²  The BMI is above normal  Nutrition recommendations include reducing portion sizes, consuming healthier snacks, moderation in carbohydrate intake and increasing intake of lean protein  BMI Counseling: Body mass index is 30 27 kg/m²  The BMI is above normal  Nutrition recommendations include decreasing portion sizes, consuming healthier snacks, moderation in carbohydrate intake and increasing intake of lean protein  Exercise recommendations include exercising 3-5 times per week  No pharmacotherapy was ordered  Rationale for BMI follow-up plan is due to patient being overweight or obese  PHQ-2/9 Depression Screening    Little interest or pleasure in doing things: 0 - not at all  Feeling down, depressed, or hopeless: 0 - not at all  Trouble falling or staying asleep, or sleeping too much: 2 - more than half the days  Feeling tired or having little energy: 3 - nearly every day  Poor appetite or overeatin - several days  Feeling bad about yourself - or that you are a failure or have let yourself or your family down: 0 - not at all  Trouble concentrating on things, such as reading the newspaper or watching television: 1 - several days  Moving or speaking so slowly that other people could have noticed   Or the opposite - being so fidgety or restless that you have been moving around a lot more than usual: 0 - not at all  Thoughts that you would be better off dead, or of hurting yourself in some way: 0 - not at all  PHQ-9 Score: 7   PHQ-9 Interpretation: Mild depression      Depression Screening Follow-up Plan: Patient's depression screening was positive with a PHQ-2 score of   Their PHQ-9 score was 7  Clinically patient does not have depression  No treatment is required  Subjective     Very pleasant patient who is known for years at [facility]  Tiredness, lacking energy, wakes up with hot flashes / sweats etc   Been going on for couple years  Admits to shorter time between cycles  Feeling a little frustrated  Has not seen endocrine  Prior testing for sleep apnea was negative  Review of Systems   Constitutional: Positive for fatigue  HENT: Negative  Eyes: Negative  Respiratory: Negative  Cardiovascular: Negative  Gastrointestinal: Negative  Genitourinary: Negative  Musculoskeletal: Negative  Skin: Negative  Neurological: Negative  Psychiatric/Behavioral: Negative          Past Medical History:   Diagnosis Date   • Abnormal Pap smear of cervix    • Anemia    • Disease of thyroid gland     thyroid storm with previous pregnancy   • Dysmenorrhea    • Esophageal reflux     last assessed - [id number]   • Herpes zoster     LUQ level T-8 resolved; last assessed - [id number]   • History of infection due to human papilloma virus (HPV)    • HPV (human papilloma virus) infection    • Inguinal hernia     last assessed - [id number]   • Insomnia    • Mastodynia     Resolved -    • Migraine    • Ovarian cyst     last assessed - [id number]   • Ovarian cyst    • Palpitations     last assessed - [id number]   • Tachycardia     Resolved - [id number]   • Takes dietary supplements    • Temporomandibular joint disorder     last assessed - 53Hmc9354   • Urinary tract infection    • Varicella      Past Surgical History:   Procedure Laterality Date   •  SECTION     • COLONOSCOPY  2012    Fiberoptic   • CRYOTHERAPY      cervix, age 24   • DIAGNOSTIC LAPAROSCOPY     • DILATION AND CURETTAGE OF UTERUS      Resolved    • GYNECOLOGIC CRYOSURGERY      age 24   • TOOTH EXTRACTION Family History   Problem Relation Age of Onset   • Diabetes Mother    • Heart disease Mother         cardiac disorder   • Gallbladder disease Mother    • Hypertension Mother    • Glaucoma Mother    • Obesity Mother         hx gastric bypass   • Diabetes Father    • Heart disease Father         cardiac disorder   • Hypertension Father    • Hyperlipidemia Father         Pure Hypercholesterolemia   • Obesity Father    • Other Family         headache syndromes   • Breast cancer Cousin    • Post-traumatic stress disorder Brother    • Hyperlipidemia Brother    • Hypertension Brother    • Diabetes Maternal Grandmother    • Lung cancer Maternal Grandfather    • Stroke Paternal Grandmother    • Hypertension Paternal Grandmother    • Cirrhosis Paternal Grandfather    • Ovarian cancer Maternal Aunt    • No Known Problems Daughter    • No Known Problems Paternal Aunt    • Colon cancer Neg Hx      Social History     Socioeconomic History   • Marital status:      Spouse name: None   • Number of children: None   • Years of education: None   • Highest education level: None   Occupational History   • None   Tobacco Use   • Smoking status: Never   • Smokeless tobacco: Never   Vaping Use   • Vaping Use: Never used   Substance and Sexual Activity   • Alcohol use: Not Currently     Comment: social/prior to knowledge of pregnancy   • Drug use: Never   • Sexual activity: Yes     Partners: Male     Birth control/protection: Male Sterilization     Comment: fiance schedule for a vasectomy   Other Topics Concern   • None   Social History Narrative    Coffee - denied    History of daily cola consumption (___ Cans/Day) - denied    History of daily tea consumption (___ Cups/Day) - denied    Lack of exercise    Marital History - Currently  - per [employer]    Religion Affiliation Taoism     Social Determinants of Health     Financial Resource Strain: Not on file   Food Insecurity: Not on file   Transportation Needs: Not on file   Physical Activity: Not on file   Stress: Not on file   Social Connections: Not on file   Intimate Partner Violence: Not on file   Housing Stability: Not on file     Current Outpatient Medications on File Prior to Visit   Medication Sig   • cholecalciferol (VITAMIN D3) 1,000 units tablet Take 5,000 Units by mouth daily    • Cod Liver Oil 10 MINIM CAPS    • Licorice, Glycyrrhiza glabra, (LICORICE ROOT PO)    • Magnesium Glycinate 100 MG CAPS    • melatonin 3 mg    • Methylcobalamin (Methyl B-12) 1000 MCG LOZG    • Pregnenolone 50 MG TABS    • Probiotic Product (PROBIOTIC ADVANCED PO) Take by mouth   • Quercetin 50 MG TABS    • Resveratrol 250 MG CAPS    • cetirizine (ZyrTEC) 5 MG tablet Take 5 mg by mouth if needed (Patient not taking: Reported on 4/3/2023)   • cyclobenzaprine (FLEXERIL) 5 mg tablet Take 1 tablet (5 mg total) by mouth 3 (three) times a day as needed for muscle spasms (Patient not taking: Reported on 4/3/2023)   • Green Tea, Lucrecia sinensis, (GREEN TEA EXTRACT PO) Take by mouth (Patient not taking: Reported on 9/28/2022)   • meloxicam (Mobic) 7 5 mg tablet Take 1 tablet (7 5 mg total) by mouth daily (Patient not taking: No sig reported)   • multivitamin (THERAGRAN) TABS Take 1 tablet by mouth daily   • ofloxacin (FLOXIN) 0 3 % otic solution Administer 10 drops into the left ear daily   • Probiotic Product (PROBIOTIC + TURMERIC EXTRACT PO) Take by mouth   • Thiamine HCl (VITAMIN B1 PO) Take by mouth   • [DISCONTINUED] DULoxetine (CYMBALTA) 20 mg capsule Take 1 capsule (20 mg total) by mouth daily (Patient not taking: Reported on 11/16/2020)   • [DISCONTINUED] traZODone (DESYREL) 50 mg tablet Take 0 5 tablets (25 mg total) by mouth daily at bedtime as needed for sleep (Patient not taking: Reported on 11/16/2020)   • [DISCONTINUED] venlafaxine (EFFEXOR-XR) 37 5 mg 24 hr capsule Take 1 capsule (37 5 mg total) by mouth daily with breakfast (Patient not taking: Reported on 3/15/2021) Allergies   Allergen Reactions   • Amoxicillin Hives   • Cymbalta [Duloxetine Hcl] Other (See Comments)     Shaking     • Penicillins Hives   • Shellfish Allergy - Food Allergy Hives   • Sulfa Antibiotics Rash     Immunization History   Administered Date(s) Administered   • COVID-19 PFIZER VACCINE 0 3 ML IM 04/08/2021, 04/30/2021   • INFLUENZA 10/17/2020, 10/15/2021   • Influenza, injectable, quadrivalent, preservative free 0 5 mL 10/17/2020   • Tdap 06/19/2011       Objective     /80 (BP Location: Left arm, Patient Position: Sitting, Cuff Size: Standard)   Pulse 93   Temp 97 7 °F (36 5 °C) (Temporal)   Ht 5' (1 524 m)   Wt 73 kg (161 lb)   SpO2 97%   BMI 31 44 kg/m²     Physical Exam  Constitutional:       Appearance: She is well-developed  HENT:      Head: Normocephalic and atraumatic  Right Ear: Tympanic membrane, ear canal and external ear normal       Left Ear: Tympanic membrane, ear canal and external ear normal       Nose: Nose normal       Mouth/Throat:      Mouth: Mucous membranes are moist       Pharynx: Oropharynx is clear  Eyes:      Conjunctiva/sclera: Conjunctivae normal       Pupils: Pupils are equal, round, and reactive to light  Cardiovascular:      Rate and Rhythm: Normal rate and regular rhythm  Heart sounds: Normal heart sounds  Pulmonary:      Effort: Pulmonary effort is normal       Breath sounds: Normal breath sounds  Abdominal:      General: Abdomen is flat  Bowel sounds are normal       Palpations: Abdomen is soft  Musculoskeletal:      Cervical back: Normal range of motion and neck supple  Skin:     General: Skin is warm and dry  Neurological:      Mental Status: She is alert and oriented to person, place, and time  Deep Tendon Reflexes: Reflexes are normal and symmetric  Psychiatric:         Mood and Affect: Mood normal          Behavior: Behavior normal          Thought Content:  Thought content normal          Judgment: Judgment normal  Romana Rich, DO

## 2023-04-05 LAB — BACTERIA THROAT CULT: NORMAL

## 2023-04-28 ENCOUNTER — APPOINTMENT (OUTPATIENT)
Dept: LAB | Facility: CLINIC | Age: 48
End: 2023-04-28

## 2023-04-28 DIAGNOSIS — R53.82 CHRONIC FATIGUE: ICD-10-CM

## 2023-04-28 LAB
CORTIS AM PEAK SERPL-MCNC: 19.5 UG/DL (ref 4.2–22.4)
T4 FREE SERPL-MCNC: 0.86 NG/DL (ref 0.76–1.46)
TSH SERPL DL<=0.05 MIU/L-ACNC: 1.18 UIU/ML (ref 0.45–4.5)

## 2023-04-29 LAB — DHEA-S SERPL-MCNC: 143 UG/DL (ref 41.2–243.7)

## 2023-05-08 NOTE — PROGRESS NOTES
[facility] [address] Brigido Meng    ASSESSMENT/PLAN: Jv Toth is a 53 y o  [id number] who presents for annual gynecologic exam     Encounter for routine gynecologic examination  - Routine well woman exam completed today  - Cervical Cancer Screening: Current ASCCP Guidelines reviewed  Last Pap: 2021  Next Pap Due: today  - HPV Vaccination status: Not immunized  - STI screening offered including HIV: not indicated based on hx or requested at time of visit  - Contraceptive counseling discussed  Current contraception: none:   - Breast Cancer Screening: Last Mammogram 10/22/2022, script given  - RTO 1 yr    Additional problems addressed at this visit:  1  Routine gynecological examination  -     Mammo screening bilateral w 3d & cad; Future; Expected date: 10/22/2023  -     Liquid-based pap, screening    2  Encounter for screening mammogram for breast cancer  -     Mammo screening bilateral w 3d & cad; Future; Expected date: 10/22/2023          CC: Annual Gynecologic Examination    HPI: Jv Toth is a 53 y o  Bonna Pin who presents for annual gynecologic examination  Shelton Lorenzo presents for gyn exam today  3/30/2023 LMP  Menses is irregular, flow heavy for 6 days  Using no birth control method  Happy with method  Last pap smear 2021  Hx of abnormal pap smear-HPV at age 24  Sexually active- yes  Does not desire STI testing  10/2022 mammogram- normal  Colonoscopy 6/2020- repeat 5 yrs  Interrupted sleep per day  Minimal servings of calcium rich food per day  No routine exercise per week  Minimal servings of caffeine per day  Performs SBE monthly  Safe at home- yes  Wears seatbelt - yes  Concerns : notes that she can not get into a deep sleep  Noted to be effected by her cortisol levels  She is seeing a naturopath and endocrinology  She is on multiple supplements  She does feel that things are improving, but it is slow   She notes that she crashes when she gets a stomach Yohana Lezama feels that she is on the right track and is willing to continue with care  The following portions of the patient's history were reviewed and updated as appropriate: allergies, current medications, past family history, past medical history, obstetric history, gynecologic history, past social history, past surgical history and problem list     Review of Systems   Constitutional: Positive for fatigue  Negative for chills and fever  Respiratory: Negative for cough and shortness of breath  Cardiovascular: Negative for chest pain, palpitations and leg swelling  Gastrointestinal: Negative for constipation and diarrhea  Genitourinary: Positive for menstrual problem  Negative for difficulty urinating, dysuria, frequency, pelvic pain, urgency, vaginal bleeding, vaginal discharge and vaginal pain  Neurological: Negative for light-headedness and headaches  Psychiatric/Behavioral: The patient is nervous/anxious  Objective:  /76   Ht 5' (1 524 m)   Wt 73 7 kg (162 lb 6 4 oz)   LMP 03/30/2023   BMI 31 72 kg/m²    Physical Exam  Vitals reviewed  Constitutional:       Appearance: Normal appearance  HENT:      Head: Normocephalic  Neck:      Thyroid: No thyroid mass or thyroid tenderness  Cardiovascular:      Rate and Rhythm: Normal rate and regular rhythm  Heart sounds: Normal heart sounds  Pulmonary:      Effort: Pulmonary effort is normal       Breath sounds: Normal breath sounds  Chest:   Breasts:     Right: No mass, nipple discharge, skin change or tenderness  Left: No mass, nipple discharge, skin change or tenderness  Abdominal:      General: There is no distension  Palpations: There is no mass  Tenderness: There is no abdominal tenderness  There is no guarding  Genitourinary:     General: Normal vulva  Exam position: Lithotomy position  Labia:         Right: No tenderness or lesion  Left: No tenderness or lesion  Vagina: No vaginal discharge, tenderness, bleeding or lesions  Cervix: No discharge, lesion, erythema or cervical bleeding  Uterus: Normal  Not enlarged and not tender  Adnexa:         Right: No mass, tenderness or fullness  Left: No mass, tenderness or fullness  Musculoskeletal:      Cervical back: Normal range of motion  Lymphadenopathy:      Upper Body:      Right upper body: No axillary adenopathy  Left upper body: No axillary adenopathy  Skin:     General: Skin is warm and dry  Neurological:      Mental Status: She is alert     Psychiatric:         Mood and Affect: Mood normal          Behavior: Behavior normal          Judgment: Judgment normal              Kun Herman CNM  [facility]  05/10/23 7:30 AM

## 2023-05-09 ENCOUNTER — ANNUAL EXAM (OUTPATIENT)
Dept: OBGYN CLINIC | Facility: CLINIC | Age: 48
End: 2023-05-09

## 2023-05-09 VITALS
SYSTOLIC BLOOD PRESSURE: 122 MMHG | HEIGHT: 60 IN | BODY MASS INDEX: 31.88 KG/M2 | DIASTOLIC BLOOD PRESSURE: 76 MMHG | WEIGHT: 162.4 LBS

## 2023-05-09 DIAGNOSIS — Z12.31 ENCOUNTER FOR SCREENING MAMMOGRAM FOR BREAST CANCER: ICD-10-CM

## 2023-05-09 DIAGNOSIS — Z01.419 ROUTINE GYNECOLOGICAL EXAMINATION: Primary | ICD-10-CM

## 2023-05-15 ENCOUNTER — OFFICE VISIT (OUTPATIENT)
Dept: FAMILY MEDICINE CLINIC | Facility: CLINIC | Age: 48
End: 2023-05-15

## 2023-05-15 VITALS
HEART RATE: 100 BPM | HEIGHT: 60 IN | WEIGHT: 160.6 LBS | RESPIRATION RATE: 18 BRPM | DIASTOLIC BLOOD PRESSURE: 82 MMHG | OXYGEN SATURATION: 99 % | BODY MASS INDEX: 31.53 KG/M2 | TEMPERATURE: 98.1 F | SYSTOLIC BLOOD PRESSURE: 122 MMHG

## 2023-05-15 DIAGNOSIS — R53.83 TIREDNESS: ICD-10-CM

## 2023-05-15 DIAGNOSIS — R05.1 ACUTE COUGH: ICD-10-CM

## 2023-05-15 DIAGNOSIS — J01.00 ACUTE NON-RECURRENT MAXILLARY SINUSITIS: Primary | ICD-10-CM

## 2023-05-15 RX ORDER — METHYLPREDNISOLONE 4 MG/1
TABLET ORAL
Qty: 21 EACH | Refills: 0 | Status: SHIPPED | OUTPATIENT
Start: 2023-05-15

## 2023-05-15 RX ORDER — AZITHROMYCIN 250 MG/1
TABLET, FILM COATED ORAL
Qty: 6 TABLET | Refills: 0 | Status: SHIPPED | OUTPATIENT
Start: 2023-05-15 | End: 2023-05-20

## 2023-05-15 NOTE — PROGRESS NOTES
Name: Maggi Villareal      : 1975      MRN: [id number]  Encounter Provider: Iain Sheppard DO  Encounter Date: 5/15/2023   Encounter department: [facility]    Assessment & Plan   BMI Counseling: Body mass index is 31 37 kg/m²  The BMI is above normal  Nutrition recommendations include reducing portion sizes  Chief Complaint   Patient presents with   • URI     Stated with seasonal allergy flared up a week ago, runny nose, cough, sinus pressure and congestion  Has been taking zyrtec and corricidin, night time cold and cough  1  Acute non-recurrent maxillary sinusitis  -     azithromycin (Zithromax) 250 mg tablet; Take 2 tablets (500 mg total) by mouth daily for 1 day, THEN 1 tablet (250 mg total) daily for 4 days  -     methylPREDNISolone 4 MG tablet therapy pack; Use as directed on package    2  Tiredness  -     Ambulatory Referral to Sleep Medicine; Future    3  Acute cough           Subjective     Week ago started with allergy/URI symptoms - progressed to sinus pressure, moving down into chest - coughing  Nasal with mostly clear containing yellow DC  Seen by endocrine - negative work up  Review labs - ALT elevated- alicia for US of lever  Tiredness, sleep study 1 5 yrs ago - neg for sleep Apnea  Sleeps 9 hours a night - continues being tired  Had colonoscopy during covid  Review of Systems   Constitutional: Positive for fatigue  HENT: Positive for congestion and sinus pressure  Respiratory: Positive for cough  Cardiovascular: Negative  Gastrointestinal: Negative  Genitourinary: Negative  Musculoskeletal: Negative  Skin: Negative  Psychiatric/Behavioral: Negative          Past Medical History:   Diagnosis Date   • Abnormal Pap smear of cervix    • Anemia    • Disease of thyroid gland     thyroid storm with previous pregnancy   • Dysmenorrhea    • Esophageal reflux     last assessed - [id number]   • Herpes zoster     LUQ level T-8 resolved; last assessed - [id number]   • History of infection due to human papilloma virus (HPV)    • HPV (human papilloma virus) infection    • Inguinal hernia     last assessed - [id number]   • Insomnia    • Mastodynia     Resolved -    • Migraine    • Ovarian cyst     last assessed - 24Bqi0122   • Ovarian cyst    • Palpitations     last assessed - [id number]   • Tachycardia     Resolved - 06VVG3683   • Takes dietary supplements    • Temporomandibular joint disorder     last assessed - 2012   • Urinary tract infection    • Varicella      Past Surgical History:   Procedure Laterality Date   •  SECTION     • COLONOSCOPY  2012    Fiberoptic   • CRYOTHERAPY      cervix, age 24   • DIAGNOSTIC LAPAROSCOPY     • DILATION AND CURETTAGE OF UTERUS      Resolved    • GYNECOLOGIC CRYOSURGERY      age 24   • TOOTH EXTRACTION       Family History   Problem Relation Age of Onset   • Diabetes Mother    • Heart disease Mother         cardiac disorder   • Gallbladder disease Mother    • Hypertension Mother    • Glaucoma Mother    • Obesity Mother         hx gastric bypass   • Diabetes Father    • Heart disease Father         cardiac disorder   • Hypertension Father    • Hyperlipidemia Father         Pure Hypercholesterolemia   • Obesity Father    • Other Family         headache syndromes   • Breast cancer Cousin    • Post-traumatic stress disorder Brother    • Hyperlipidemia Brother    • Hypertension Brother    • Diabetes Maternal Grandmother    • Lung cancer Maternal Grandfather    • Stroke Paternal Grandmother    • Hypertension Paternal Grandmother    • Cirrhosis Paternal Grandfather    • Ovarian cancer Maternal Aunt    • No Known Problems Daughter    • No Known Problems Paternal Aunt    • Colon cancer Neg Hx      Social History     Socioeconomic History   • Marital status:      Spouse name: None   • Number of children: None   • Years of education: None   • Highest education level: None   Occupational History   • None Tobacco Use   • Smoking status: Never   • Smokeless tobacco: Never   Vaping Use   • Vaping Use: Never used   Substance and Sexual Activity   • Alcohol use: Not Currently     Comment: social/prior to knowledge of pregnancy   • Drug use: Never   • Sexual activity: Yes     Partners: Male     Birth control/protection: None   Other Topics Concern   • None   Social History Narrative    Coffee - denied    History of daily cola consumption (___ Cans/Day) - denied    History of daily tea consumption (___ Cups/Day) - denied    Lack of exercise    Marital History - Currently  - per [employer]    [employer] Affiliation [employer]     Social Determinants of Health     Financial Resource Strain: Not on file   Food Insecurity: Not on file   Transportation Needs: Not on file   Physical Activity: Not on file   Stress: Not on file   Social Connections: Not on file   Intimate Partner Violence: Not on file   Housing Stability: Not on file     Current Outpatient Medications on File Prior to Visit   Medication Sig   • b complex vitamins capsule Take 1 capsule by mouth daily   • Bilberry, Vaccinium myrtillus, (BILBERRY EXTRACT PO) Take 1 capsule by mouth in the morning   • cholecalciferol (VITAMIN D3) 1,000 units tablet Take 5,000 Units by mouth daily    • Cod Liver Oil 10 MINIM CAPS Take 1 capsule by mouth Daily at 2am   • Licorice, Glycyrrhiza glabra, (LICORICE ROOT PO) Take 1 tablet by mouth in the morning   • Magnesium 300 MG CAPS Take 1 capsule by mouth Daily at 2am   • Magnesium Glycinate 100 MG CAPS Take 1 capsule by mouth Daily at 2am   • melatonin 3 mg Take 3 mg by mouth daily at bedtime   • MISC NATURAL PRODUCTS PO Take 1 capsule by mouth in the morning   • Pregnenolone Micronized (PREGNENOLONE PO) Take 1 tablet by mouth Daily at 2am Taking 30 mg daily   • Probiotic Product (PROBIOTIC ADVANCED PO) Take 1 capsule by mouth Daily at 2am   • Quercetin 500 MG CAPS Take 500 mg by mouth in the morning   • Resveratrol 250 MG CAPS Take 1 capsule by mouth Daily at 2am   • Methylcobalamin (Methyl B-12) 1000 MCG LOZG    • ofloxacin (FLOXIN) 0 3 % otic solution Administer 10 drops into the left ear daily   • ondansetron (Zofran ODT) 4 mg disintegrating tablet Take 1 tablet (4 mg total) by mouth every 6 (six) hours as needed for nausea or vomiting     Allergies   Allergen Reactions   • Amoxicillin Hives   • Cymbalta [Duloxetine Hcl] Other (See Comments)     Shaking     • Penicillins Hives   • Shellfish Allergy - Food Allergy Hives   • Sulfa Antibiotics Rash     Immunization History   Administered Date(s) Administered   • COVID-19 PFIZER VACCINE 0 3 ML IM 04/08/2021, 04/30/2021   • INFLUENZA 10/17/2020, 10/15/2021, 11/22/2022   • Influenza, injectable, quadrivalent, preservative free 0 5 mL 10/17/2020   • Tdap 06/19/2011       Objective     /82 (BP Location: Left arm, Patient Position: Sitting, Cuff Size: Standard)   Pulse 100   Temp 98 1 °F (36 7 °C) (Temporal)   Resp 18   Ht 5' (1 524 m)   Wt 72 8 kg (160 lb 9 6 oz)   LMP 05/14/2023 (Exact Date)   SpO2 99%   BMI 31 37 kg/m²     Physical Exam  Constitutional:       Appearance: She is well-developed  HENT:      Head: Normocephalic and atraumatic  Right Ear: Tympanic membrane, ear canal and external ear normal       Left Ear: Tympanic membrane, ear canal and external ear normal       Nose: Nose normal       Mouth/Throat:      Mouth: Mucous membranes are moist       Pharynx: Oropharynx is clear  Eyes:      Conjunctiva/sclera: Conjunctivae normal       Pupils: Pupils are equal, round, and reactive to light  Cardiovascular:      Rate and Rhythm: Normal rate and regular rhythm  Heart sounds: Normal heart sounds  Pulmonary:      Effort: Pulmonary effort is normal       Breath sounds: Normal breath sounds  Musculoskeletal:      Cervical back: Normal range of motion and neck supple  Skin:     General: Skin is warm and dry     Neurological:      Mental Status: She is alert and oriented to person, place, and time  Deep Tendon Reflexes: Reflexes are normal and symmetric  Psychiatric:         Mood and Affect: Mood normal          Behavior: Behavior normal          Thought Content:  Thought content normal          Judgment: Judgment normal        Permaria de jesus Wills DO

## 2023-05-16 LAB
LAB AP GYN PRIMARY INTERPRETATION: NORMAL
Lab: NORMAL

## 2023-05-19 ENCOUNTER — APPOINTMENT (OUTPATIENT)
Dept: LAB | Facility: HOSPITAL | Age: 48
End: 2023-05-19

## 2023-05-19 ENCOUNTER — HOSPITAL ENCOUNTER (OUTPATIENT)
Dept: ULTRASOUND IMAGING | Facility: HOSPITAL | Age: 48
End: 2023-05-19
Attending: INTERNAL MEDICINE

## 2023-05-19 DIAGNOSIS — R79.89 ELEVATED LFTS: ICD-10-CM

## 2023-05-19 LAB
ANA SER QL IA: NEGATIVE
FERRITIN SERPL-MCNC: 26 NG/ML (ref 11–307)
HAV IGM SER QL: NORMAL
HBV CORE IGM SER QL: NORMAL
HBV SURFACE AG SER QL: NORMAL
HCV AB SER QL: NORMAL
IRON SATN MFR SERPL: 20 % (ref 15–50)
IRON SERPL-MCNC: 71 UG/DL (ref 50–170)
TIBC SERPL-MCNC: 353 UG/DL (ref 250–450)

## 2023-05-20 LAB
A1AT SERPL-MCNC: 125 MG/DL (ref 101–187)
ACTIN IGG SERPL-ACNC: 10 UNITS (ref 0–19)
CERULOPLASMIN SERPL-MCNC: 24.1 MG/DL (ref 19–39)

## 2023-05-26 ENCOUNTER — HOSPITAL ENCOUNTER (EMERGENCY)
Facility: HOSPITAL | Age: 48
Discharge: HOME | End: 2023-05-26
Attending: EMERGENCY MEDICINE

## 2023-05-26 ENCOUNTER — APPOINTMENT (EMERGENCY)
Dept: CT IMAGING | Facility: HOSPITAL | Age: 48
End: 2023-05-26

## 2023-05-26 ENCOUNTER — APPOINTMENT (EMERGENCY)
Dept: ULTRASOUND IMAGING | Facility: HOSPITAL | Age: 48
End: 2023-05-26

## 2023-05-26 ENCOUNTER — OFFICE VISIT (OUTPATIENT)
Dept: URGENT CARE | Facility: CLINIC | Age: 48
End: 2023-05-26

## 2023-05-26 VITALS
HEART RATE: 93 BPM | OXYGEN SATURATION: 96 % | TEMPERATURE: 98.4 F | BODY MASS INDEX: 26.66 KG/M2 | SYSTOLIC BLOOD PRESSURE: 171 MMHG | RESPIRATION RATE: 18 BRPM | HEIGHT: 65 IN | DIASTOLIC BLOOD PRESSURE: 101 MMHG | WEIGHT: 160 LBS

## 2023-05-26 VITALS
SYSTOLIC BLOOD PRESSURE: 135 MMHG | OXYGEN SATURATION: 97 % | HEART RATE: 98 BPM | WEIGHT: 160 LBS | RESPIRATION RATE: 18 BRPM | BODY MASS INDEX: 31.41 KG/M2 | TEMPERATURE: 98.4 F | HEIGHT: 60 IN | DIASTOLIC BLOOD PRESSURE: 60 MMHG

## 2023-05-26 DIAGNOSIS — I88.9 LYMPHADENITIS: Primary | ICD-10-CM

## 2023-05-26 DIAGNOSIS — R10.2 PELVIC PAIN: ICD-10-CM

## 2023-05-26 DIAGNOSIS — R10.32 LEFT LOWER QUADRANT ABDOMINAL PAIN: Primary | ICD-10-CM

## 2023-05-26 LAB
ALBUMIN SERPL BCP-MCNC: 4 G/DL (ref 3.5–5)
ALP SERPL-CCNC: 76 U/L (ref 34–104)
ALT SERPL W P-5'-P-CCNC: 43 U/L (ref 7–52)
ANION GAP SERPL CALCULATED.3IONS-SCNC: 7 MMOL/L (ref 4–13)
AST SERPL W P-5'-P-CCNC: 21 U/L (ref 13–39)
BASOPHILS # BLD AUTO: 0.06 THOUSANDS/ÂΜL (ref 0–0.1)
BASOPHILS NFR BLD AUTO: 1 % (ref 0–1)
BILIRUB SERPL-MCNC: 0.39 MG/DL (ref 0.2–1)
BILIRUB UR QL STRIP: NEGATIVE
BUN SERPL-MCNC: 13 MG/DL (ref 5–25)
CALCIUM SERPL-MCNC: 9 MG/DL (ref 8.4–10.2)
CHLORIDE SERPL-SCNC: 102 MMOL/L (ref 96–108)
CLARITY UR: CLEAR
CO2 SERPL-SCNC: 28 MMOL/L (ref 21–32)
COLOR UR: YELLOW
CREAT SERPL-MCNC: 0.78 MG/DL (ref 0.6–1.3)
EOSINOPHIL # BLD AUTO: 0.15 THOUSAND/ÂΜL (ref 0–0.61)
EOSINOPHIL NFR BLD AUTO: 1 % (ref 0–6)
ERYTHROCYTE [DISTWIDTH] IN BLOOD BY AUTOMATED COUNT: 11.8 % (ref 11.6–15.1)
EXT PREGNANCY TEST URINE: NEGATIVE
GFR SERPL CREATININE-BSD FRML MDRD: 90 ML/MIN/1.73SQ M
GLUCOSE SERPL-MCNC: 89 MG/DL (ref 65–140)
GLUCOSE UR STRIP-MCNC: NEGATIVE MG/DL
HCG SERPL QL: NEGATIVE
HCT VFR BLD AUTO: 43.3 % (ref 34.8–46.1)
HGB BLD-MCNC: 14.1 G/DL (ref 11.5–15.4)
HGB UR QL STRIP.AUTO: NEGATIVE
IMM GRANULOCYTES # BLD AUTO: 0.03 THOUSAND/UL (ref 0–0.2)
IMM GRANULOCYTES NFR BLD AUTO: 0 % (ref 0–2)
KETONES UR STRIP-MCNC: NEGATIVE MG/DL
LEUKOCYTE ESTERASE UR QL STRIP: NEGATIVE
LIPASE SERPL-CCNC: 11 U/L (ref 11–82)
LYMPHOCYTES # BLD AUTO: 2.04 THOUSANDS/ÂΜL (ref 0.6–4.47)
LYMPHOCYTES NFR BLD AUTO: 18 % (ref 14–44)
MCH RBC QN AUTO: 29.2 PG (ref 26.8–34.3)
MCHC RBC AUTO-ENTMCNC: 32.6 G/DL (ref 31.4–37.4)
MCV RBC AUTO: 90 FL (ref 82–98)
MONOCYTES # BLD AUTO: 0.93 THOUSAND/ÂΜL (ref 0.17–1.22)
MONOCYTES NFR BLD AUTO: 8 % (ref 4–12)
NEUTROPHILS # BLD AUTO: 8.38 THOUSANDS/ÂΜL (ref 1.85–7.62)
NEUTS SEG NFR BLD AUTO: 72 % (ref 43–75)
NITRITE UR QL STRIP: NEGATIVE
NRBC BLD AUTO-RTO: 0 /100 WBCS
PH UR STRIP.AUTO: 6.5 [PH]
PLATELET # BLD AUTO: 370 THOUSANDS/UL (ref 149–390)
PMV BLD AUTO: 10 FL (ref 8.9–12.7)
POTASSIUM SERPL-SCNC: 3.7 MMOL/L (ref 3.5–5.3)
PROT SERPL-MCNC: 7.3 G/DL (ref 6.4–8.4)
PROT UR STRIP-MCNC: NEGATIVE MG/DL
RBC # BLD AUTO: 4.83 MILLION/UL (ref 3.81–5.12)
SODIUM SERPL-SCNC: 137 MMOL/L (ref 135–147)
SP GR UR STRIP.AUTO: 1.02
UROBILINOGEN UR QL STRIP.AUTO: 0.2 E.U./DL
WBC # BLD AUTO: 11.59 THOUSAND/UL (ref 4.31–10.16)

## 2023-05-26 RX ORDER — KETOROLAC TROMETHAMINE 30 MG/ML
15 INJECTION, SOLUTION INTRAMUSCULAR; INTRAVENOUS ONCE
Status: COMPLETED | OUTPATIENT
Start: 2023-05-26 | End: 2023-05-26

## 2023-05-26 RX ADMIN — SODIUM CHLORIDE 1000 ML: 0.9 INJECTION, SOLUTION INTRAVENOUS at 12:36

## 2023-05-26 RX ADMIN — KETOROLAC TROMETHAMINE 15 MG: 30 INJECTION, SOLUTION INTRAMUSCULAR at 12:56

## 2023-05-26 RX ADMIN — IOHEXOL 100 ML: 350 INJECTION, SOLUTION INTRAVENOUS at 15:07

## 2023-05-26 NOTE — PROGRESS NOTES
[address] [facility]        NAME: Lesli Madera is a 53 y o  female  : 1975    MRN: [id number]  DATE: May 26, 2023  TIME: 11:22 AM    Assessment and Plan   Left lower quadrant abdominal pain [R10 32]  1  Left lower quadrant abdominal pain  Transfer to other facility        ED for further evaluation  Patient Instructions     Proceed directly to the ED  Chief Complaint     Chief Complaint   Patient presents with   • Groin Pain     Patient c/o left groin pain that started last night  History of Present Illness       Patient is a 52 y/o/f presenting to [facility] with left lower pelvic pain  Patient reports waking up around 2am to use the restroom and when she stepped down noticed sharp left lower groin pain  Pain is constant but worse w/ weight bearing  Pt denies numbness/tingling  Also feels pain in heel w/ weight bearing  No known Injuries  PT denies any urinary symptoms  No N/V/D/C  Groin Pain  The patient's primary symptoms include pelvic pain  This is a new problem  The current episode started today  The problem occurs constantly  The problem has been unchanged  The problem affects the left side  She is not pregnant  Associated symptoms include abdominal pain and headaches  Pertinent negatives include no back pain, chills, dysuria, fever, hematuria, rash, sore throat or vomiting  Review of Systems   Review of Systems   Constitutional: Negative for chills and fever  HENT: Negative for ear pain and sore throat  Eyes: Negative for pain and visual disturbance  Respiratory: Negative for cough and shortness of breath  Cardiovascular: Negative for chest pain and palpitations  Gastrointestinal: Positive for abdominal pain  Negative for vomiting  Genitourinary: Positive for pelvic pain  Negative for dysuria and hematuria  Musculoskeletal: Positive for arthralgias (Left groin pain)  Negative for back pain  Skin: Negative for color change and rash     Neurological: Positive for headaches  Negative for seizures and syncope  All other systems reviewed and are negative          Current Medications       Current Outpatient Medications:   •  b complex vitamins capsule, Take 1 capsule by mouth daily, Disp: , Rfl:   •  Bilberry, Vaccinium myrtillus, (BILBERRY EXTRACT PO), Take 1 capsule by mouth in the morning, Disp: , Rfl:   •  cholecalciferol (VITAMIN D3) 1,000 units tablet, Take 5,000 Units by mouth daily , Disp: , Rfl:   •  Cod Liver Oil 10 MINIM CAPS, Take 1 capsule by mouth Daily at 2am, Disp: , Rfl:   •  Licorice, Glycyrrhiza glabra, (LICORICE ROOT PO), Take 1 tablet by mouth in the morning, Disp: , Rfl:   •  Magnesium 300 MG CAPS, Take 1 capsule by mouth Daily at 2am, Disp: , Rfl:   •  Magnesium Glycinate 100 MG CAPS, Take 1 capsule by mouth Daily at 2am, Disp: , Rfl:   •  melatonin 3 mg, Take 3 mg by mouth daily at bedtime, Disp: , Rfl:   •  Methylcobalamin (Methyl B-12) 1000 MCG LOZG, , Disp: , Rfl:   •  MISC NATURAL PRODUCTS PO, Take 1 capsule by mouth in the morning, Disp: , Rfl:   •  Pregnenolone Micronized (PREGNENOLONE PO), Take 1 tablet by mouth Daily at 2am Taking 30 mg daily, Disp: , Rfl:   •  Probiotic Product (PROBIOTIC ADVANCED PO), Take 1 capsule by mouth Daily at 2am, Disp: , Rfl:   •  Quercetin 500 MG CAPS, Take 500 mg by mouth in the morning, Disp: , Rfl:   •  Resveratrol 250 MG CAPS, Take 1 capsule by mouth Daily at 2am, Disp: , Rfl:   •  methylPREDNISolone 4 MG tablet therapy pack, Use as directed on package (Patient not taking: Reported on 5/26/2023), Disp: 21 each, Rfl: 0  •  ofloxacin (FLOXIN) 0 3 % otic solution, Administer 10 drops into the left ear daily (Patient not taking: Reported on 5/26/2023), Disp: 10 mL, Rfl: 0  •  ondansetron (Zofran ODT) 4 mg disintegrating tablet, Take 1 tablet (4 mg total) by mouth every 6 (six) hours as needed for nausea or vomiting (Patient not taking: Reported on 5/26/2023), Disp: 20 tablet, Rfl: 0    Current Allergies Allergies as of 2023 - Reviewed 2023   Allergen Reaction Noted   • Amoxicillin Hives 10/24/2012   • Cymbalta [duloxetine hcl] Other (See Comments) 2021   • Penicillins Hives 10/24/2012   • Shellfish allergy - food allergy Hives 2013   • Sulfa antibiotics Rash 2013            The following portions of the patient's history were reviewed and updated as appropriate: allergies, current medications, past family history, past medical history, past social history, past surgical history and problem list      Past Medical History:   Diagnosis Date   • Abnormal Pap smear of cervix    • Anemia    • Disease of thyroid gland     thyroid storm with previous pregnancy   • Dysmenorrhea    • Esophageal reflux     last assessed - [id number]   • Herpes zoster     LUQ level T-8 resolved; last assessed - [id number]   • History of infection due to human papilloma virus (HPV)    • HPV (human papilloma virus) infection    • Inguinal hernia     last assessed - [id number]   • Insomnia    • Mastodynia     Resolved -    • Migraine    • Ovarian cyst     last assessed - [id number]   • Ovarian cyst    • Palpitations     last assessed - [id number]   • Tachycardia     Resolved - [id number]   • Takes dietary supplements    • Temporomandibular joint disorder     last assessed - 2012   • Urinary tract infection    • Varicella        Past Surgical History:   Procedure Laterality Date   •  SECTION     • COLONOSCOPY  2012    Fiberoptic   • CRYOTHERAPY      cervix, age 24   • DIAGNOSTIC LAPAROSCOPY     • DILATION AND CURETTAGE OF UTERUS      Resolved    • GYNECOLOGIC CRYOSURGERY      age 24   • TOOTH EXTRACTION         Family History   Problem Relation Age of Onset   • Diabetes Mother    • Heart disease Mother         cardiac disorder   • Gallbladder disease Mother    • Hypertension Mother    • Glaucoma Mother    • Obesity Mother         hx gastric bypass   • Diabetes Father    • Heart disease Father         cardiac disorder   • Hypertension Father    • Hyperlipidemia Father         Pure Hypercholesterolemia   • Obesity Father    • Other Family         headache syndromes   • Breast cancer Cousin    • Post-traumatic stress disorder Brother    • Hyperlipidemia Brother    • Hypertension Brother    • Diabetes Maternal Grandmother    • Lung cancer Maternal Grandfather    • Stroke Paternal Grandmother    • Hypertension Paternal Grandmother    • Cirrhosis Paternal Grandfather    • Ovarian cancer Maternal Aunt    • No Known Problems Daughter    • No Known Problems Paternal Aunt    • Colon cancer Neg Hx          Medications have been verified  Objective   /60   Pulse 98   Temp 98 4 °F (36 9 °C) (Temporal)   Resp 18   Ht 5' (1 524 m)   Wt 72 6 kg (160 lb)   LMP 05/14/2023 (Exact Date)   SpO2 97%   BMI 31 25 kg/m²   Patient's last menstrual period was 05/14/2023 (exact date)  Physical Exam     Physical Exam  Constitutional:       Appearance: Normal appearance  HENT:      Head: Normocephalic and atraumatic  Nose: Nose normal       Mouth/Throat:      Mouth: Mucous membranes are moist    Eyes:      Extraocular Movements: Extraocular movements intact  Conjunctiva/sclera: Conjunctivae normal       Pupils: Pupils are equal, round, and reactive to light  Cardiovascular:      Rate and Rhythm: Normal rate  Pulmonary:      Effort: Pulmonary effort is normal    Abdominal:      Tenderness: There is abdominal tenderness (LLQ and left pelvic pain upon palpation with gurading)  There is guarding  Musculoskeletal:         General: Normal range of motion  Cervical back: Normal range of motion and neck supple  Skin:     General: Skin is warm and dry  Capillary Refill: Capillary refill takes less than 2 seconds  Neurological:      General: No focal deficit present  Mental Status: She is alert and oriented to person, place, and time     Psychiatric:         Mood and Affect: Mood normal          Behavior: Behavior normal

## 2023-05-29 NOTE — ED PROVIDER NOTES
History  Chief Complaint   Patient presents with   • Abdominal Pain     Pt report LLQ abd pain  Pt reports tender to the touch  Pt reports no problems urinating or producing bms     Patient is a 50-year-old female with history of ovarian cyst, urinary tract infection who presents for evaluation of left-sided pelvic pain  Patient says that over the past 12 hours she has had the gradual onset of moderate left-sided pelvic pain  She denies any preceding events  She otherwise denies associated symptoms including nausea or vomiting  No known fevers  She denies chest pain dyspnea or abdominal pain  No abnormal vaginal discharge or vaginal bleeding  No urinary complaints, hematuria or dysuria  She denies diarrhea melena or hematochezia  She has not used anything recently for the pain  No history of pain similar to this in the past   No associated redness or swelling  Patient is currently sexually active with 1 male partner with no history chlamydia or gonorrhea  Prior to Admission Medications   Prescriptions Last Dose Informant Patient Reported? Taking?    Bilberry, Vaccinium myrtillus, (BILBERRY EXTRACT PO)  Self Yes No   Sig: Take 1 capsule by mouth in the morning   Cod Liver Oil 10 MINIM CAPS  Self Yes No   Sig: Take 1 capsule by mouth Daily at 2am   Licorice, Glycyrrhiza glabra, (LICORICE ROOT PO)  Self Yes No   Sig: Take 1 tablet by mouth in the morning   MISC NATURAL PRODUCTS PO  Self Yes No   Sig: Take 1 capsule by mouth in the morning   Magnesium 300 MG CAPS  Self Yes No   Sig: Take 1 capsule by mouth Daily at 2am   Magnesium Glycinate 100 MG CAPS  Self Yes No   Sig: Take 1 capsule by mouth Daily at 2am   Methylcobalamin (Methyl B-12) 1000 MCG LOZG  Self Yes No   Pregnenolone Micronized (PREGNENOLONE PO)  Self Yes No   Sig: Take 1 tablet by mouth Daily at 2am Taking 30 mg daily   Probiotic Product (PROBIOTIC ADVANCED PO)  Self Yes No   Sig: Take 1 capsule by mouth Daily at 2am   Quercetin 500 MG CAPS  Self Yes No   Sig: Take 500 mg by mouth in the morning   Resveratrol 250 MG CAPS  Self Yes No   Sig: Take 1 capsule by mouth Daily at 2am   b complex vitamins capsule  Self Yes No   Sig: Take 1 capsule by mouth daily   cholecalciferol (VITAMIN D3) 1,000 units tablet  Self Yes No   Sig: Take 5,000 Units by mouth daily    melatonin 3 mg  Self Yes No   Sig: Take 3 mg by mouth daily at bedtime   methylPREDNISolone 4 MG tablet therapy pack   No No   Sig: Use as directed on package   Patient not taking: Reported on 2023   ofloxacin (FLOXIN) 0 3 % otic solution  Self No No   Sig: Administer 10 drops into the left ear daily   Patient not taking: Reported on 2023   ondansetron (Zofran ODT) 4 mg disintegrating tablet  Self No No   Sig: Take 1 tablet (4 mg total) by mouth every 6 (six) hours as needed for nausea or vomiting   Patient not taking: Reported on 2023      Facility-Administered Medications: None       Past Medical History:   Diagnosis Date   • Abnormal Pap smear of cervix    • Anemia    • Disease of thyroid gland     thyroid storm with previous pregnancy   • Dysmenorrhea    • Esophageal reflux     last assessed - [id number]   • Herpes zoster     LUQ level T-8 resolved; last assessed - [id number]   • History of infection due to human papilloma virus (HPV)    • HPV (human papilloma virus) infection    • Inguinal hernia     last assessed - [id number]   • Insomnia    • Mastodynia     Resolved -    • Migraine    • Ovarian cyst     last assessed - [id number]   • Ovarian cyst    • Palpitations     last assessed - [id number]   • Tachycardia     Resolved - [id number]   • Takes dietary supplements    • Temporomandibular joint disorder     last assessed - 2012   • Urinary tract infection    • Varicella        Past Surgical History:   Procedure Laterality Date   •  SECTION     • COLONOSCOPY  2012    Fiberoptic   • CRYOTHERAPY      cervix, age 24   • DIAGNOSTIC LAPAROSCOPY     • DILATION AND CURETTAGE OF UTERUS      Resolved 2010   • GYNECOLOGIC CRYOSURGERY      age 24   • TOOTH EXTRACTION         Family History   Problem Relation Age of Onset   • Diabetes Mother    • Heart disease Mother         cardiac disorder   • Gallbladder disease Mother    • Hypertension Mother    • Glaucoma Mother    • Obesity Mother         hx gastric bypass   • Diabetes Father    • Heart disease Father         cardiac disorder   • Hypertension Father    • Hyperlipidemia Father         Pure Hypercholesterolemia   • Obesity Father    • Other Family         headache syndromes   • Breast cancer Cousin    • Post-traumatic stress disorder Brother    • Hyperlipidemia Brother    • Hypertension Brother    • Diabetes Maternal Grandmother    • Lung cancer Maternal Grandfather    • Stroke Paternal Grandmother    • Hypertension Paternal Grandmother    • Cirrhosis Paternal Grandfather    • Ovarian cancer Maternal Aunt    • No Known Problems Daughter    • No Known Problems Paternal Aunt    • Colon cancer Neg Hx      I have reviewed and agree with the history as documented  E-Cigarette/Vaping   • E-Cigarette Use Never User      E-Cigarette/Vaping Substances   • Nicotine No    • THC No    • CBD No    • Flavoring No    • Other No    • Unknown No      Social History     Tobacco Use   • Smoking status: Never   • Smokeless tobacco: Never   Vaping Use   • Vaping Use: Never used   Substance Use Topics   • Alcohol use: Not Currently     Comment: social/prior to knowledge of pregnancy   • Drug use: Never       Review of Systems   Constitutional: Negative for fever  HENT: Negative for sore throat  Respiratory: Negative for shortness of breath  Cardiovascular: Negative for chest pain  Gastrointestinal: Negative for abdominal pain  Genitourinary: Positive for pelvic pain  Negative for dysuria  Musculoskeletal: Negative for back pain  Skin: Negative for rash  Neurological: Negative for light-headedness     Psychiatric/Behavioral: Negative for agitation  All other systems reviewed and are negative  Physical Exam  Physical Exam  Vitals reviewed  Constitutional:       General: She is not in acute distress  Appearance: She is well-developed  HENT:      Head: Normocephalic  Eyes:      Pupils: Pupils are equal, round, and reactive to light  Cardiovascular:      Rate and Rhythm: Normal rate and regular rhythm  Heart sounds: Normal heart sounds  Pulmonary:      Effort: Pulmonary effort is normal       Breath sounds: Normal breath sounds  Abdominal:      General: Bowel sounds are normal  There is no distension  Palpations: Abdomen is soft  Tenderness: There is abdominal tenderness  There is no guarding  Comments: Point tender over the left pelvis, enlarged lymph node palpated without overlying erythema or warmth   Musculoskeletal:         General: No tenderness or deformity  Normal range of motion  Cervical back: Normal range of motion and neck supple  Skin:     General: Skin is warm and dry  Capillary Refill: Capillary refill takes less than 2 seconds  Neurological:      Mental Status: She is alert and oriented to person, place, and time  Cranial Nerves: No cranial nerve deficit  Sensory: No sensory deficit  Psychiatric:         Behavior: Behavior normal          Thought Content:  Thought content normal          Judgment: Judgment normal          Vital Signs  ED Triage Vitals [05/26/23 1150]   Temperature Pulse Respirations Blood Pressure SpO2   98 4 °F (36 9 °C) (!) 106 18 (!) 171/101 96 %      Temp Source Heart Rate Source Patient Position - Orthostatic VS BP Location FiO2 (%)   Oral Monitor Lying Left arm --      Pain Score       5           Vitals:    05/26/23 1150 05/26/23 1448 05/26/23 1530   BP: (!) 171/101     Pulse: (!) 106 84 93   Patient Position - Orthostatic VS: Lying           Visual Acuity      ED Medications  Medications   sodium chloride 0 9 % bolus 1,000 mL (0 mL Intravenous Stopped 5/26/23 1440)   ketorolac (TORADOL) injection 15 mg (15 mg Intravenous Given 5/26/23 1256)   iohexol (OMNIPAQUE) 350 MG/ML injection (SINGLE-DOSE) 100 mL (100 mL Intravenous Given 5/26/23 1507)       Diagnostic Studies  Results Reviewed     Procedure Component Value Units Date/Time    hCG, qualitative pregnancy [[id number]]  (Normal) Collected: 05/26/23 1232    Lab Status: Final result Specimen: Blood from Arm, Right Updated: 05/26/23 1310     Preg, Serum Negative    Lipase [[id number]]  (Normal) Collected: 05/26/23 1232    Lab Status: Final result Specimen: Blood from Arm, Right Updated: 05/26/23 1304     Lipase 11 u/L     CMP [[id number]] Collected: 05/26/23 1232    Lab Status: Final result Specimen: Blood from Arm, Right Updated: 05/26/23 1304     Sodium 137 mmol/L      Potassium 3 7 mmol/L      Chloride 102 mmol/L      CO2 28 mmol/L      ANION GAP 7 mmol/L      BUN 13 mg/dL      Creatinine 0 78 mg/dL      Glucose 89 mg/dL      Calcium 9 0 mg/dL      AST 21 U/L      ALT 43 U/L      Alkaline Phosphatase 76 U/L      Total Protein 7 3 g/dL      Albumin 4 0 g/dL      Total Bilirubin 0 39 mg/dL      eGFR 90 ml/min/1 73sq m     Narrative:      [place] guidelines for Chronic Kidney Disease (CKD):   •  Stage 1 with normal or high GFR (GFR > 90 mL/min/1 73 square meters)  •  Stage 2 Mild CKD (GFR = 60-89 mL/min/1 73 square meters)  •  Stage 3A Moderate CKD (GFR = 45-59 mL/min/1 73 square meters)  •  Stage 3B Moderate CKD (GFR = 30-44 mL/min/1 73 square meters)  •  Stage 4 Severe CKD (GFR = 15-29 mL/min/1 73 square meters)  •  Stage 5 End Stage CKD (GFR <15 mL/min/1 73 square meters)  Note: GFR calculation is accurate only with a steady state creatinine    UA w Reflex to Microscopic w Reflex to Culture [[id number]]  (Normal) Collected: 05/26/23 1216    Lab Status: Final result Specimen: Urine, Clean Catch Updated: 05/26/23 1258     Color, UA Yellow     Clarity, UA Clear Specific [place], UA 1 020     pH, UA 6 5     Leukocytes, UA Negative     Nitrite, UA Negative     Protein, UA Negative mg/dl      Glucose, UA Negative mg/dl      Ketones, UA Negative mg/dl      Urobilinogen, UA 0 2 E U /dl      Bilirubin, UA Negative     Occult Blood, UA Negative    CBC and differential [[id number]]  (Abnormal) Collected: 05/26/23 1232    Lab Status: Final result Specimen: Blood from Arm, Right Updated: 05/26/23 1241     WBC 11 59 Thousand/uL      RBC 4 83 Million/uL      Hemoglobin 14 1 g/dL      Hematocrit 43 3 %      MCV 90 fL      MCH 29 2 pg      MCHC 32 6 g/dL      RDW 11 8 %      MPV 10 0 fL      Platelets 389 Thousands/uL      nRBC 0 /100 WBCs      Neutrophils Relative 72 %      Immat GRANS % 0 %      Lymphocytes Relative 18 %      Monocytes Relative 8 %      Eosinophils Relative 1 %      Basophils Relative 1 %      Neutrophils Absolute 8 38 Thousands/µL      Immature Grans Absolute 0 03 Thousand/uL      Lymphocytes Absolute 2 04 Thousands/µL      Monocytes Absolute 0 93 Thousand/µL      Eosinophils Absolute 0 15 Thousand/µL      Basophils Absolute 0 06 Thousands/µL     POCT pregnancy, urine [[id number]]  (Normal) Resulted: 05/26/23 1226    Lab Status: Final result Updated: 05/26/23 1226     EXT Preg Test, Ur Negative     Control --                 CT abdomen pelvis with contrast   Final Result by Sherly Duenas MD (05/26 4556)      Nonspecific inflamed appearing 10 mm left inguinal lymph node  The study was marked in [facility] for immediate notification  Workstation performed: [id number]         US pelvis complete w transvaginal   Final Result by Sam Harrell MD (05/26 4834)         1  No adnexal mass or ovarian torsion  2  Subendometrial cysts suggesting adenomyosis  3  Heterogeneous slightly thickened endometrial stripe which could be normal for the phase of the cycle   If there is abnormal vaginal bleeding, follow-up evaluation with pelvic ultrasound at the beginning of the endometrial cycle is recommended  Workstation performed: [id number]                    Procedures  Procedures         ED Course                               SBIRT 20yo+    Flowsheet Row Most Recent Value   Initial Alcohol Screen: US AUDIT-C     1  How often do you have a drink containing alcohol? 0 Filed at: 05/26/2023 1153   2  How many drinks containing alcohol do you have on a typical day you are drinking? 0 Filed at: 05/26/2023 1153   3b  FEMALE Any Age, or MALE 65+: How often do you have 4 or more drinks on one occassion? 0 Filed at: 05/26/2023 1153   Audit-C Score 0 Filed at: 05/26/2023 1153   DEDE: How many times in the past year have you    Used an illegal drug or used a prescription medication for non-medical reasons? Never Filed at: 05/26/2023 1153                    Medical Decision Making  Patient is a 56-year-old female presents for evaluation of left-sided pelvic pain with an enlarged lymph node  Initial concern for ovarian cyst, diverticulitis  Blood work reviewed by myself with no major abnormalities  Imaging reviewed by myself  Seems that there is an enlarged lymph node in this area  Unclear etiology, no evidence of bacterial infection at this time  She denies abnormal vaginal discharge and is not concerned about sexually transmitted infections at this time  Offered pelvic exam but will defer at this time  Strict return precautions discussed and advised use Tylenol/ibuprofen  Amount and/or Complexity of Data Reviewed  Labs: ordered  Radiology: ordered and independent interpretation performed  Risk  OTC drugs  Prescription drug management            Disposition  Final diagnoses:   Lymphadenitis   Pelvic pain     Time reflects when diagnosis was documented in both MDM as applicable and the Disposition within this note     Time User Action Codes Description Comment    5/26/2023  3:48 PM Rennie Jansky, Gerhardt Oz Add [I88 9] Lymphadenitis 5/26/2023  3:51 PM Ingrid Brown Add [R10 2] Pelvic pain       ED Disposition     ED Disposition   Discharge    Condition   Stable    Date/Time   Fri May 26, 2023  3:48 PM    Comment   Chelsea Chandler discharge to home/self care                 Follow-up Information     Follow up With Specialties Details Why [address] Emergency Department Emergency Medicine  If symptoms worsen [address] Josefa Patel [id number]  [facility] Emergency Department, [address] [place] [address] WITH [address]  Family Medicine Schedule an appointment as soon as possible for a visit in 2 days For symptom recheck [address]  [phone]             Discharge Medication List as of 5/26/2023  3:52 PM      CONTINUE these medications which have NOT CHANGED    Details   b complex vitamins capsule Take 1 capsule by mouth daily, Historical Med      Bilberry, Vaccinium myrtillus, (BILBERRY EXTRACT PO) Take 1 capsule by mouth in the morning, Starting Sat 4/1/2023, Historical Med      cholecalciferol (VITAMIN D3) 1,000 units tablet Take 5,000 Units by mouth daily , Historical Med      Cod Liver Oil 10 MINIM CAPS Take 1 capsule by mouth Daily at 2am, Starting Sat 4/1/2023, Historical Med      Licorice, Glycyrrhiza glabra, (LICORICE ROOT PO) Take 1 tablet by mouth in the morning, Historical Med      Magnesium 300 MG CAPS Take 1 capsule by mouth Daily at 2am, Historical Med      Magnesium Glycinate 100 MG CAPS Take 1 capsule by mouth Daily at 2am, Starting Wed 3/1/2023, Historical Med      melatonin 3 mg Take 3 mg by mouth daily at bedtime, Historical Med      Methylcobalamin (Methyl B-12) 1000 MCG LOZG Starting Wed 6/1/2022, Historical Med      methylPREDNISolone 4 MG tablet therapy pack Use as directed on package, Normal      MISC NATURAL PRODUCTS PO Take 1 capsule by mouth in the morning, Starting Wed 3/1/2023, Historical Med      ofloxacin (FLOXIN) 0 3 % otic solution Administer 10 drops into the left ear daily, Starting Mon 4/3/2023, Normal      ondansetron (Zofran ODT) 4 mg disintegrating tablet Take 1 tablet (4 mg total) by mouth every 6 (six) hours as needed for nausea or vomiting, Starting Tue 4/11/2023, Normal      Pregnenolone Micronized (PREGNENOLONE PO) Take 1 tablet by mouth Daily at 2am Taking 30 mg daily, Starting Wed 6/1/2022, Historical Med      Probiotic Product (PROBIOTIC ADVANCED PO) Take 1 capsule by mouth Daily at 2am, Historical Med      Quercetin 500 MG CAPS Take 500 mg by mouth in the morning, Starting Wed 6/1/2022, Historical Med      Resveratrol 250 MG CAPS Take 1 capsule by mouth Daily at 2am, Starting Wed 6/1/2022, Historical Med             No discharge procedures on file      PDMP Review       Value Time User    PDMP Reviewed  Yes 3/1/2022  9:12 AM Rukhsana Pritchett MD          ED Provider  Electronically Signed by           Lucila Nissen, MD  05/29/23 5434

## 2023-05-30 ENCOUNTER — VBI (OUTPATIENT)
Dept: ADMINISTRATIVE | Facility: OTHER | Age: 48
End: 2023-05-30

## 2023-05-30 NOTE — TELEPHONE ENCOUNTER
[facility]    ED Visit Information     Ed visit date:5[birth year]26/1972  Diagnosis Description: abdominal Pain  In Network? [facility]  Discharge status: Home  Discharged with meds ? No  Number of ED visits to date: 2  ED Severity:3     Outreach Information    Outreach successful: Yes 1  Date letter mailed: no  Date Finalized:5/30/23    Care Coordination    Follow up appointment with pcp: no will call when needed  Transportation issues ?  No    Value [employer] type: 3 Day Outreach  [facility] PCP: Yes  Transportation: Self Transport  Called PCP first?: Yes  Told to go to ED by PCP?: Yes  Same-Day or Next Day Appointment Offered?: No  Feels able to call PCP for urgent problems ?: Yes  Understands what emergencies can be handled by PCP ?: Yes  Ever any problems getting appointment with PCP for minor emergency/urgency problems?: No  Practice Contacted Patient ?: No  Pt had ED follow up with pcp/staff ?: No    Reason Patient went to ED instead of Urgent Care or PCP?: Perceived Severity of Illness  Urgent care Education?: Yes

## 2023-08-20 ENCOUNTER — APPOINTMENT (EMERGENCY)
Dept: RADIOLOGY | Facility: HOSPITAL | Age: 48
End: 2023-08-20
Payer: COMMERCIAL

## 2023-08-20 ENCOUNTER — HOSPITAL ENCOUNTER (EMERGENCY)
Facility: HOSPITAL | Age: 48
Discharge: HOME | End: 2023-08-20
Attending: EMERGENCY MEDICINE | Admitting: EMERGENCY MEDICINE
Payer: COMMERCIAL

## 2023-08-20 VITALS
DIASTOLIC BLOOD PRESSURE: 64 MMHG | RESPIRATION RATE: 18 BRPM | SYSTOLIC BLOOD PRESSURE: 124 MMHG | TEMPERATURE: 98 F | OXYGEN SATURATION: 98 % | HEART RATE: 78 BPM

## 2023-08-20 DIAGNOSIS — S29.019A STRAIN OF THORACIC REGION, INITIAL ENCOUNTER: Primary | ICD-10-CM

## 2023-08-20 LAB
ALBUMIN SERPL BCP-MCNC: 3.8 G/DL (ref 3.5–5)
ALP SERPL-CCNC: 60 U/L (ref 34–104)
ALT SERPL W P-5'-P-CCNC: 35 U/L (ref 7–52)
ANION GAP SERPL CALCULATED.3IONS-SCNC: 9 MMOL/L
AST SERPL W P-5'-P-CCNC: 35 U/L (ref 13–39)
ATRIAL RATE: 78 BPM
BASOPHILS # BLD AUTO: 0.05 THOUSANDS/ÂΜL (ref 0–0.1)
BASOPHILS NFR BLD AUTO: 0 % (ref 0–1)
BILIRUB SERPL-MCNC: 0.49 MG/DL (ref 0.2–1)
BUN SERPL-MCNC: 16 MG/DL (ref 5–25)
CALCIUM SERPL-MCNC: 8.8 MG/DL (ref 8.4–10.2)
CARDIAC TROPONIN I PNL SERPL HS: 3 NG/L
CHLORIDE SERPL-SCNC: 103 MMOL/L (ref 96–108)
CO2 SERPL-SCNC: 25 MMOL/L (ref 21–32)
CREAT SERPL-MCNC: 0.75 MG/DL (ref 0.6–1.3)
D DIMER PPP FEU-MCNC: 0.39 UG/ML FEU
EOSINOPHIL # BLD AUTO: 0.17 THOUSAND/ÂΜL (ref 0–0.61)
EOSINOPHIL NFR BLD AUTO: 1 % (ref 0–6)
ERYTHROCYTE [DISTWIDTH] IN BLOOD BY AUTOMATED COUNT: 12.2 % (ref 11.6–15.1)
GFR SERPL CREATININE-BSD FRML MDRD: 95 ML/MIN/1.73SQ M
GLUCOSE SERPL-MCNC: 92 MG/DL (ref 65–140)
HCG SERPL QL: NEGATIVE
HCT VFR BLD AUTO: 41.2 % (ref 34.8–46.1)
HGB BLD-MCNC: 13.5 G/DL (ref 11.5–15.4)
IMM GRANULOCYTES # BLD AUTO: 0.04 THOUSAND/UL (ref 0–0.2)
IMM GRANULOCYTES NFR BLD AUTO: 0 % (ref 0–2)
LYMPHOCYTES # BLD AUTO: 3.04 THOUSANDS/ÂΜL (ref 0.6–4.47)
LYMPHOCYTES NFR BLD AUTO: 23 % (ref 14–44)
MCH RBC QN AUTO: 29.2 PG (ref 26.8–34.3)
MCHC RBC AUTO-ENTMCNC: 32.8 G/DL (ref 31.4–37.4)
MCV RBC AUTO: 89 FL (ref 82–98)
MONOCYTES # BLD AUTO: 1.15 THOUSAND/ÂΜL (ref 0.17–1.22)
MONOCYTES NFR BLD AUTO: 9 % (ref 4–12)
NEUTROPHILS # BLD AUTO: 8.58 THOUSANDS/ÂΜL (ref 1.85–7.62)
NEUTS SEG NFR BLD AUTO: 67 % (ref 43–75)
NRBC BLD AUTO-RTO: 0 /100 WBCS
P AXIS: 52 DEGREES
PLATELET # BLD AUTO: 295 THOUSANDS/UL (ref 149–390)
PMV BLD AUTO: 10.3 FL (ref 8.9–12.7)
POTASSIUM SERPL-SCNC: 4.2 MMOL/L (ref 3.5–5.3)
PR INTERVAL: 132 MS
PROT SERPL-MCNC: 6.9 G/DL (ref 6.4–8.4)
QRS AXIS: 1 DEGREES
QRSD INTERVAL: 80 MS
QT INTERVAL: 378 MS
QTC INTERVAL: 430 MS
RBC # BLD AUTO: 4.62 MILLION/UL (ref 3.81–5.12)
SODIUM SERPL-SCNC: 137 MMOL/L (ref 135–147)
T WAVE AXIS: 39 DEGREES
VENTRICULAR RATE: 78 BPM
WBC # BLD AUTO: 13.03 THOUSAND/UL (ref 4.31–10.16)

## 2023-08-20 PROCEDURE — 84703 CHORIONIC GONADOTROPIN ASSAY: CPT | Performed by: EMERGENCY MEDICINE

## 2023-08-20 PROCEDURE — 85379 FIBRIN DEGRADATION QUANT: CPT | Performed by: EMERGENCY MEDICINE

## 2023-08-20 PROCEDURE — 99284 EMERGENCY DEPT VISIT MOD MDM: CPT | Performed by: EMERGENCY MEDICINE

## 2023-08-20 PROCEDURE — 36415 COLL VENOUS BLD VENIPUNCTURE: CPT | Performed by: EMERGENCY MEDICINE

## 2023-08-20 PROCEDURE — 71045 X-RAY EXAM CHEST 1 VIEW: CPT

## 2023-08-20 PROCEDURE — 84484 ASSAY OF TROPONIN QUANT: CPT | Performed by: EMERGENCY MEDICINE

## 2023-08-20 PROCEDURE — 99284 EMERGENCY DEPT VISIT MOD MDM: CPT

## 2023-08-20 PROCEDURE — 93010 ELECTROCARDIOGRAM REPORT: CPT | Performed by: INTERNAL MEDICINE

## 2023-08-20 PROCEDURE — 85025 COMPLETE CBC W/AUTO DIFF WBC: CPT | Performed by: EMERGENCY MEDICINE

## 2023-08-20 PROCEDURE — 80053 COMPREHEN METABOLIC PANEL: CPT | Performed by: EMERGENCY MEDICINE

## 2023-08-20 PROCEDURE — 93005 ELECTROCARDIOGRAM TRACING: CPT

## 2023-08-20 PROCEDURE — 96374 THER/PROPH/DIAG INJ IV PUSH: CPT

## 2023-08-20 RX ORDER — SODIUM CHLORIDE 9 MG/ML
3 INJECTION INTRAVENOUS
Status: DISCONTINUED | OUTPATIENT
Start: 2023-08-20 | End: 2023-08-21 | Stop reason: HOSPADM

## 2023-08-20 RX ORDER — KETOROLAC TROMETHAMINE 30 MG/ML
15 INJECTION, SOLUTION INTRAMUSCULAR; INTRAVENOUS ONCE
Status: COMPLETED | OUTPATIENT
Start: 2023-08-20 | End: 2023-08-20

## 2023-08-20 RX ADMIN — KETOROLAC TROMETHAMINE 15 MG: 30 INJECTION, SOLUTION INTRAMUSCULAR at 21:40

## 2023-08-20 NOTE — PROGRESS NOTES
[facility] [address] [place] [address]    Assessment/Plan:  No problem-specific Assessment & Plan notes found for this encounter. Diagnoses and all orders for this visit:    Thickened endometrium  -     US pelvis complete w transvaginal; Future    Pelvic pain  -     Chlamydia/GC amplified DNA by PCR; Future  -     HIV 1/2 AG/AB w Reflex [facility] for 2 yr old and above; Future  -     RPR-Syphilis Screening (Total Syphilis IGG/IGM); Future  -     Hepatitis B surface antigen; Future  -     Hepatitis C antibody; Future  -     Herpes I/II IgG JENNIFFER w Reflex to HSV-2; Future  -     Chlamydia/GC amplified DNA by PCR    Sexual behavior with high risk of exposure to communicable disease  -     Chlamydia/GC amplified DNA by PCR; Future  -     HIV 1/2 AG/AB w Reflex [facility] for 2 yr old and above; Future  -     RPR-Syphilis Screening (Total Syphilis IGG/IGM); Future  -     Hepatitis B surface antigen; Future  -     Hepatitis C antibody; Future  -     Herpes I/II IgG JENNIFFER w Reflex to HSV-2; Future  -     Chlamydia/GC amplified DNA by PCR    Enlarged lymph node  -     Chlamydia/GC amplified DNA by PCR; Future  -     HIV 1/2 AG/AB w Reflex [facility] for 2 yr old and above; Future  -     RPR-Syphilis Screening (Total Syphilis IGG/IGM); Future  -     Hepatitis B surface antigen; Future  -     Hepatitis C antibody; Future  -     Herpes I/II IgG JENNIFFER w Reflex to HSV-2; Future  -     Chlamydia/GC amplified DNA by PCR    - will repeat ultrasound- check endometrium  - test for STI   - follow-up results when available. Subjective:   Binta Tirado is a 53 y.o.  female. CC: follow-up ED visit x 2 and concerns related to possible pelvic inflammatory disease    HPI: Ava Liz is her to follow-up an ED visit 2023    ENDOMETRIUM:  The endometrial echo complex has an AP caliber of 17.0 mm. Subendometrial cysts are demonstrated which could be a reflection of adenomyosis.   There are punctate echogenic foci at the level of the lower uterine segment most likely dystrophic. Endometrial stripe is slightly heterogeneous.     OVARIES/ADNEXA:  Right ovary:  2.5 x 1.3 x 1.3 cm. 2.3 mL. Left ovary:  3.5 x 3.0 x 2.0 cm. 11.2 mL. Ovarian Doppler flow is within normal limits. No suspicious ovarian or adnexal abnormality.     OTHER:  Small amount of simple free fluid in the pelvis, likely physiologic in this premenopausal female.        IMPRESSION:        1. No adnexal mass or ovarian torsion. 2. Subendometrial cysts suggesting adenomyosis. 3. Heterogeneous slightly thickened endometrial stripe which could be normal for the phase of the cycle. If there is abnormal vaginal bleeding, follow-up evaluation with pelvic ultrasound at the beginning of the endometrial cycle is recommended. - she was seen in ED last night for left sided pain, which is in the left groin area. - she notes that she had an ingrown hair in the labial area that resolved. She is concerned about results of prior visit to ED with enlarged lymph node left groin. Currently she has menses and it started on Day 40. She is not getting monthly cycles. - She has 1 partner for past 5 yrs. ROS: Review of Systems   Constitutional: Positive for fatigue and fever. Negative for chills. Gastrointestinal: Positive for diarrhea. Negative for constipation. Genitourinary: Positive for dyspareunia, menstrual problem and pelvic pain. Negative for difficulty urinating, dysuria, frequency, vaginal bleeding, vaginal discharge and vaginal pain.        PFSH: The following portions of the patient's history were reviewed and updated as appropriate: allergies, current medications, past family history, past medical history, obstetric history, gynecologic history, past social history, past surgical history and problem list.       Objective:  /72   Ht 5' 5" (1.651 m)   Wt 70.8 kg (156 lb)   LMP 08/19/2023 (Exact Date)   BMI 25.96 kg/m²    Physical Exam  Vitals reviewed. Pulmonary:      Effort: Pulmonary effort is normal.   Neurological:      Mental Status: She is alert and oriented to person, place, and time.    Psychiatric:         Mood and Affect: Mood normal.         Behavior: Behavior normal.

## 2023-08-21 ENCOUNTER — OFFICE VISIT (OUTPATIENT)
Dept: OBGYN CLINIC | Facility: CLINIC | Age: 48
End: 2023-08-21
Payer: COMMERCIAL

## 2023-08-21 VITALS
BODY MASS INDEX: 25.99 KG/M2 | WEIGHT: 156 LBS | HEIGHT: 65 IN | DIASTOLIC BLOOD PRESSURE: 72 MMHG | SYSTOLIC BLOOD PRESSURE: 122 MMHG

## 2023-08-21 DIAGNOSIS — Z72.51 SEXUAL BEHAVIOR WITH HIGH RISK OF EXPOSURE TO COMMUNICABLE DISEASE: ICD-10-CM

## 2023-08-21 DIAGNOSIS — R10.2 PELVIC PAIN: ICD-10-CM

## 2023-08-21 DIAGNOSIS — Z20.9 SEXUAL BEHAVIOR WITH HIGH RISK OF EXPOSURE TO COMMUNICABLE DISEASE: ICD-10-CM

## 2023-08-21 DIAGNOSIS — R59.9 ENLARGED LYMPH NODE: ICD-10-CM

## 2023-08-21 DIAGNOSIS — R93.89 THICKENED ENDOMETRIUM: Primary | ICD-10-CM

## 2023-08-21 PROCEDURE — 87491 CHLMYD TRACH DNA AMP PROBE: CPT | Performed by: ADVANCED PRACTICE MIDWIFE

## 2023-08-21 PROCEDURE — 99213 OFFICE O/P EST LOW 20 MIN: CPT | Performed by: ADVANCED PRACTICE MIDWIFE

## 2023-08-21 PROCEDURE — 87591 N.GONORRHOEAE DNA AMP PROB: CPT | Performed by: ADVANCED PRACTICE MIDWIFE

## 2023-08-21 NOTE — ED PROVIDER NOTES
History  Chief Complaint   Patient presents with   • Back Pain     Reports upper right back pain that doesn't radiate that started yesterday. Reports no trauma or straining. Also reports nausea     Patient is a 56-year-old female without pertinent medical history that presents for evaluation of back pain. She says over the past 2 days she has had gradual onset of right-sided thoracic back pain. It is constant without alleviating exacerbating factors. Pain is currently moderate. She is not taking thing for it. She denies associated chest pain dyspnea nausea vomiting or diaphoresis. No PE or DVT risk factors. She denies abdominal pain urinary or bowel symptoms. Prior to Admission Medications   Prescriptions Last Dose Informant Patient Reported? Taking?    Bilberry, Vaccinium myrtillus, (BILBERRY EXTRACT PO)  Self Yes No   Sig: Take 1 capsule by mouth in the morning   Cod Liver Oil 10 MINIM CAPS  Self Yes No   Sig: Take 1 capsule by mouth Daily at 2am   Licorice, Glycyrrhiza glabra, (LICORICE ROOT PO)  Self Yes No   Sig: Take 1 tablet by mouth in the morning   MISC NATURAL PRODUCTS PO  Self Yes No   Sig: Take 1 capsule by mouth in the morning   Magnesium 300 MG CAPS  Self Yes No   Sig: Take 1 capsule by mouth Daily at 2am   Magnesium Glycinate 100 MG CAPS  Self Yes No   Sig: Take 1 capsule by mouth Daily at 2am   Methylcobalamin (Methyl B-12) 1000 MCG LOZG  Self Yes No   Pregnenolone Micronized (PREGNENOLONE PO)  Self Yes No   Sig: Take 1 tablet by mouth Daily at 2am Taking 30 mg daily   Probiotic Product (PROBIOTIC ADVANCED PO)  Self Yes No   Sig: Take 1 capsule by mouth Daily at 2am   Quercetin 500 MG CAPS  Self Yes No   Sig: Take 500 mg by mouth in the morning   Resveratrol 250 MG CAPS  Self Yes No   Sig: Take 1 capsule by mouth Daily at 2am   b complex vitamins capsule  Self Yes No   Sig: Take 1 capsule by mouth daily   cholecalciferol (VITAMIN D3) 1,000 units tablet  Self Yes No   Sig: Take 5,000 Units by mouth daily    melatonin 3 mg  Self Yes No   Sig: Take 3 mg by mouth daily at bedtime   methylPREDNISolone 4 MG tablet therapy pack   No No   Sig: Use as directed on package   Patient not taking: Reported on 2023   ofloxacin (FLOXIN) 0.3 % otic solution  Self No No   Sig: Administer 10 drops into the left ear daily   Patient not taking: Reported on 2023   ondansetron (Zofran ODT) 4 mg disintegrating tablet  Self No No   Sig: Take 1 tablet (4 mg total) by mouth every 6 (six) hours as needed for nausea or vomiting   Patient not taking: Reported on 2023      Facility-Administered Medications: None       Past Medical History:   Diagnosis Date   • Abnormal Pap smear of cervix    • Anemia    • Disease of thyroid gland     thyroid storm with previous pregnancy   • Dysmenorrhea    • Esophageal reflux     last assessed - [id number]   • Herpes zoster     LUQ level T-8 resolved; last assessed - [id number]   • History of infection due to human papilloma virus (HPV)    • HPV (human papilloma virus) infection    • Inguinal hernia     last assessed - [id number]   • Insomnia    • Mastodynia     Resolved -    • Migraine    • Ovarian cyst     last assessed - [id number]   • Ovarian cyst    • Palpitations     last assessed - [id number]   • Tachycardia     Resolved - [id number]   • Takes dietary supplements    • Temporomandibular joint disorder     last assessed - [id number]   • Urinary tract infection    • Varicella        Past Surgical History:   Procedure Laterality Date   •  SECTION     • COLONOSCOPY  2012    Fiberoptic   • CRYOTHERAPY      cervix, age 24   • DIAGNOSTIC LAPAROSCOPY     • DILATION AND CURETTAGE OF UTERUS      Resolved    • GYNECOLOGIC CRYOSURGERY      age 24   • TOOTH EXTRACTION         Family History   Problem Relation Age of Onset   • Diabetes Mother    • Heart disease Mother         cardiac disorder   • Gallbladder disease Mother    • Hypertension Mother    • Glaucoma Mother    • Obesity Mother         hx gastric bypass   • Diabetes Father    • Heart disease Father         cardiac disorder   • Hypertension Father    • Hyperlipidemia Father         Pure Hypercholesterolemia   • Obesity Father    • Other Family         headache syndromes   • Breast cancer Cousin    • Post-traumatic stress disorder Brother    • Hyperlipidemia Brother    • Hypertension Brother    • Diabetes Maternal Grandmother    • Lung cancer Maternal Grandfather    • Stroke Paternal Grandmother    • Hypertension Paternal Grandmother    • Cirrhosis Paternal Grandfather    • Ovarian cancer Maternal Aunt    • No Known Problems Daughter    • No Known Problems Paternal Aunt    • Colon cancer Neg Hx      I have reviewed and agree with the history as documented. E-Cigarette/Vaping   • E-Cigarette Use Never User      E-Cigarette/Vaping Substances   • Nicotine No    • THC No    • CBD No    • Flavoring No    • Other No    • Unknown No      Social History     Tobacco Use   • Smoking status: Never   • Smokeless tobacco: Never   Vaping Use   • Vaping Use: Never used   Substance Use Topics   • Alcohol use: Not Currently     Comment: social/prior to knowledge of pregnancy   • Drug use: Never       Review of Systems   Constitutional: Negative for fever. HENT: Negative for sore throat. Respiratory: Negative for shortness of breath. Cardiovascular: Negative for chest pain. Gastrointestinal: Negative for abdominal pain. Genitourinary: Negative for dysuria. Musculoskeletal: Positive for back pain. Skin: Negative for rash. Neurological: Negative for light-headedness. Psychiatric/Behavioral: Negative for agitation. All other systems reviewed and are negative. Physical Exam  Physical Exam  Vitals reviewed. Constitutional:       General: She is not in acute distress. Appearance: She is well-developed. HENT:      Head: Normocephalic. Eyes:      Pupils: Pupils are equal, round, and reactive to light. Cardiovascular:      Rate and Rhythm: Normal rate and regular rhythm. Heart sounds: Normal heart sounds. Pulmonary:      Effort: Pulmonary effort is normal.      Breath sounds: Normal breath sounds. Abdominal:      General: Bowel sounds are normal. There is no distension. Palpations: Abdomen is soft. Tenderness: There is no abdominal tenderness. There is no guarding. Musculoskeletal:         General: No tenderness or deformity. Normal range of motion. Cervical back: Normal range of motion and neck supple. Comments: No significant reproducible back tenderness. No rashes noted   Skin:     General: Skin is warm and dry. Capillary Refill: Capillary refill takes less than 2 seconds. Neurological:      Mental Status: She is alert and oriented to person, place, and time. Cranial Nerves: No cranial nerve deficit. Sensory: No sensory deficit. Psychiatric:         Behavior: Behavior normal.         Thought Content:  Thought content normal.         Judgment: Judgment normal.         Vital Signs  ED Triage Vitals [08/20/23 2059]   Temperature Pulse Respirations Blood Pressure SpO2   98 °F (36.7 °C) 80 18 126/66 98 %      Temp Source Heart Rate Source Patient Position - Orthostatic VS BP Location FiO2 (%)   Temporal Monitor Lying Left arm --      Pain Score       6           Vitals:    08/20/23 2059 08/20/23 2300   BP: 126/66 124/64   Pulse: 80 78   Patient Position - Orthostatic VS: Lying Lying         Visual Acuity      ED Medications  Medications   ketorolac (TORADOL) injection 15 mg (15 mg Intravenous Given 8/20/23 2140)       Diagnostic Studies  Results Reviewed     Procedure Component Value Units Date/Time    hCG, qualitative pregnancy [[id number]]  (Normal) Collected: 08/20/23 2136    Lab Status: Final result Specimen: Blood from Arm, Left Updated: 08/20/23 2208     Preg, Serum Negative    HS Troponin 0hr (reflex protocol) [[id number]]  (Normal) Collected: 08/20/23 2136 Lab Status: Final result Specimen: Blood from Arm, Left Updated: 08/20/23 2206     hs TnI 0hr 3 ng/L     Comprehensive metabolic panel [[id number]] Collected: 08/20/23 2136    Lab Status: Final result Specimen: Blood from Arm, Left Updated: 08/20/23 2206     Sodium 137 mmol/L      Potassium 4.2 mmol/L      Chloride 103 mmol/L      CO2 25 mmol/L      ANION GAP 9 mmol/L      BUN 16 mg/dL      Creatinine 0.75 mg/dL      Glucose 92 mg/dL      Calcium 8.8 mg/dL      AST 35 U/L      ALT 35 U/L      Alkaline Phosphatase 60 U/L      Total Protein 6.9 g/dL      Albumin 3.8 g/dL      Total Bilirubin 0.49 mg/dL      eGFR 95 ml/min/1.73sq m     Narrative:      Slightly Hemolyzed:Results may be affected.   National Kidney Disease Foundation guidelines for Chronic Kidney Disease (CKD):   •  Stage 1 with normal or high GFR (GFR > 90 mL/min/1.73 square meters)  •  Stage 2 Mild CKD (GFR = 60-89 mL/min/1.73 square meters)  •  Stage 3A Moderate CKD (GFR = 45-59 mL/min/1.73 square meters)  •  Stage 3B Moderate CKD (GFR = 30-44 mL/min/1.73 square meters)  •  Stage 4 Severe CKD (GFR = 15-29 mL/min/1.73 square meters)  •  Stage 5 End Stage CKD (GFR <15 mL/min/1.73 square meters)  Note: GFR calculation is accurate only with a steady state creatinine    D-dimer, quantitative [[id number]]  (Normal) Collected: 08/20/23 2136    Lab Status: Final result Specimen: Blood from Arm, Left Updated: 08/20/23 2156     D-Dimer, Quant 0.39 ug/ml FEU     CBC and differential [[id number]]  (Abnormal) Collected: 08/20/23 2136    Lab Status: Final result Specimen: Blood from Arm, Left Updated: 08/20/23 2141     WBC 13.03 Thousand/uL      RBC 4.62 Million/uL      Hemoglobin 13.5 g/dL      Hematocrit 41.2 %      MCV 89 fL      MCH 29.2 pg      MCHC 32.8 g/dL      RDW 12.2 %      MPV 10.3 fL      Platelets 431 Thousands/uL      nRBC 0 /100 WBCs      Neutrophils Relative 67 %      Immat GRANS % 0 %      Lymphocytes Relative 23 %      Monocytes Relative 9 % Eosinophils Relative 1 %      Basophils Relative 0 %      Neutrophils Absolute 8.58 Thousands/µL      Immature Grans Absolute 0.04 Thousand/uL      Lymphocytes Absolute 3.04 Thousands/µL      Monocytes Absolute 1.15 Thousand/µL      Eosinophils Absolute 0.17 Thousand/µL      Basophils Absolute 0.05 Thousands/µL                  X-ray chest 1 view portable   Final Result by Emmanuel Darden MD (08/21 0010)      No acute cardiopulmonary disease. Workstation performed: [id number]                    Procedures  ECG 12 Lead Documentation Only    Date/Time: 8/21/2023 1:50 AM    Performed by: Alexia Orantes MD  Authorized by: Alexia Orantes MD    ECG reviewed by me, the ED Provider: yes    Patient location:  ED  Previous ECG:     Previous ECG:  Unavailable    Comparison to cardiac monitor: Yes    Interpretation:     Interpretation: normal    Rate:     ECG rate assessment: normal    Rhythm:     Rhythm: sinus rhythm    Ectopy:     Ectopy: none    QRS:     QRS axis:  Normal    QRS intervals:  Normal  Conduction:     Conduction: normal    ST segments:     ST segments:  Normal  T waves:     T waves: normal               ED Course                               SBIRT 22yo+    Flowsheet Row Most Recent Value   Initial Alcohol Screen: US AUDIT-C     1. How often do you have a drink containing alcohol? 0 Filed at: 08/20/2023 2111   2. How many drinks containing alcohol do you have on a typical day you are drinking? 0 Filed at: 08/20/2023 2111   3a. Male UNDER 65: How often do you have five or more drinks on one occasion? 0 Filed at: 08/20/2023 2111   3b. FEMALE Any Age, or MALE 65+: How often do you have 4 or more drinks on one occassion? 0 Filed at: 08/20/2023 2111   Audit-C Score 0 Filed at: 08/20/2023 2111   DEDE: How many times in the past year have you. .. Used an illegal drug or used a prescription medication for non-medical reasons?  Never Filed at: 08/20/2023 2111                    Medical Decision Making  Patient is a 27-year-old female presents for evaluation of right-sided thoracic back pain. Initial concern for ACS, PE. However work-up including D-dimer and troponin negative. EKG reviewed and nonischemic. Chest x-ray reviewed, negative. Advised patient on ibuprofen and Tylenol ministration for likely muscular pain. Amount and/or Complexity of Data Reviewed  Labs: ordered. Radiology: ordered. Risk  Prescription drug management. Disposition  Final diagnoses:   Strain of thoracic region, initial encounter     Time reflects when diagnosis was documented in both MDM as applicable and the Disposition within this note     Time User Action Codes Description Comment    8/20/2023 10:40 PM Slick Marlow [S29.012A] Strain of thoracic paraspinal muscles excluding T1 and T2 levels     8/20/2023 10:40 PM Dennise Alicea [J89.525V] Strain of thoracic paraspinal muscles excluding T1 and T2 levels     8/20/2023 10:41 PM Carol Alicea [E16.194X] Strain of thoracic region, initial encounter       ED Disposition     ED Disposition   Discharge    Condition   Stable    Date/Time   [place] Aug 20, 2023 [facility] discharge to home/self care.                Follow-up Information     Follow up With Specialties Details Why Contact Info Additional [address] [facility] Emergency Department Emergency Medicine  If symptoms worsen [address] [place] [id number] [phone] [address] [facility] Emergency Department, [address] [facility] AFFILIATED WITH [address],  Family Medicine Schedule an appointment as soon as possible for a visit   [address]  [phone]             Discharge Medication List as of 8/20/2023 10:41 PM      CONTINUE these medications which have NOT CHANGED    Details   b complex vitamins capsule Take 1 capsule by mouth daily, Historical Med Bilberry, Vaccinium myrtillus, (BILBERRY EXTRACT PO) Take 1 capsule by mouth in the morning, Starting Sat 4/1/2023, Historical Med      cholecalciferol (VITAMIN D3) 1,000 units tablet Take 5,000 Units by mouth daily , Historical Med      Cod Liver Oil 10 MINIM CAPS Take 1 capsule by mouth Daily at 2am, Starting Sat 4/1/2023, Historical Med      Licorice, Glycyrrhiza glabra, (LICORICE ROOT PO) Take 1 tablet by mouth in the morning, Historical Med      Magnesium 300 MG CAPS Take 1 capsule by mouth Daily at 2am, Historical Med      Magnesium Glycinate 100 MG CAPS Take 1 capsule by mouth Daily at 2am, Starting Wed 3/1/2023, Historical Med      melatonin 3 mg Take 3 mg by mouth daily at bedtime, Historical Med      Methylcobalamin (Methyl B-12) 1000 MCG LOZG Starting Wed 6/1/2022, Historical Med      methylPREDNISolone 4 MG tablet therapy pack Use as directed on package, Normal      MISC NATURAL PRODUCTS PO Take 1 capsule by mouth in the morning, Starting Wed 3/1/2023, Historical Med      ofloxacin (FLOXIN) 0.3 % otic solution Administer 10 drops into the left ear daily, Starting Mon 4/3/2023, Normal      ondansetron (Zofran ODT) 4 mg disintegrating tablet Take 1 tablet (4 mg total) by mouth every 6 (six) hours as needed for nausea or vomiting, Starting Tue 4/11/2023, Normal      Pregnenolone Micronized (PREGNENOLONE PO) Take 1 tablet by mouth Daily at 2am Taking 30 mg daily, Starting Wed 6/1/2022, Historical Med      Probiotic Product (PROBIOTIC ADVANCED PO) Take 1 capsule by mouth Daily at 2am, Historical Med      Quercetin 500 MG CAPS Take 500 mg by mouth in the morning, Starting Wed 6/1/2022, Historical Med      Resveratrol 250 MG CAPS Take 1 capsule by mouth Daily at 2am, Starting Wed 6/1/2022, Historical Med             No discharge procedures on file.     PDMP Review       Value Time User    PDMP Reviewed  Yes 3/1/2022  9:12 AM Melia Blackman MD          ED Provider  Electronically Signed by Laurie Maurice MD  08/21/23 9532

## 2023-08-22 LAB
C TRACH DNA SPEC QL NAA+PROBE: NEGATIVE
N GONORRHOEA DNA SPEC QL NAA+PROBE: NEGATIVE

## 2023-08-25 ENCOUNTER — OFFICE VISIT (OUTPATIENT)
Dept: FAMILY MEDICINE CLINIC | Facility: CLINIC | Age: 48
End: 2023-08-25
Payer: COMMERCIAL

## 2023-08-25 VITALS
BODY MASS INDEX: 26.66 KG/M2 | OXYGEN SATURATION: 99 % | TEMPERATURE: 98.5 F | SYSTOLIC BLOOD PRESSURE: 124 MMHG | WEIGHT: 160 LBS | HEART RATE: 78 BPM | HEIGHT: 65 IN | DIASTOLIC BLOOD PRESSURE: 76 MMHG

## 2023-08-25 DIAGNOSIS — M79.602 PAIN OF LEFT UPPER EXTREMITY: Primary | ICD-10-CM

## 2023-08-25 PROCEDURE — 99213 OFFICE O/P EST LOW 20 MIN: CPT | Performed by: FAMILY MEDICINE

## 2023-08-25 NOTE — PROGRESS NOTES
Name: Lesia Moncada      : 1975      MRN: [id number]  Encounter Provider: Linda Hanson DO  Encounter Date: 2023   Encounter department: [facility]    Assessment & Plan     Massage the soft tissue        Chief Complaint   Patient presents with   • Follow-up     ED   • Arm Pain     L side/after AB     BMI Counseling: Body mass index is 26.63 kg/m². The BMI is above normal. Nutrition recommendations include reducing portion sizes and moderation in carbohydrate intake. PHQ-2/9 Depression Screening    Little interest or pleasure in doing things: 0 - not at all  Feeling down, depressed, or hopeless: 0 - not at all  Trouble falling or staying asleep, or sleeping too much: 0 - not at all  Feeling tired or having little energy: 3 - nearly every day  Poor appetite or overeatin - several days  Feeling bad about yourself - or that you are a failure or have let yourself or your family down: 0 - not at all  Trouble concentrating on things, such as reading the newspaper or watching television: 1 - several days  Moving or speaking so slowly that other people could have noticed. Or the opposite - being so fidgety or restless that you have been moving around a lot more than usual: 0 - not at all  Thoughts that you would be better off dead, or of hurting yourself in some way: 0 - not at all  PHQ-9 Score: 5   PHQ-9 Interpretation: Mild depression      Depression Screening Follow-up Plan: Patient's depression screening was positive with a PHQ-2 score of . Their PHQ-9 score was 5. Clinically patient does not have depression. No treatment is required. Subjective     Left distal arm pain distal to IV site, cubital fossa area, sharp. Discomfort comes and goes - thumb extensor tendon area. May feel if reach down to grab something. Review of Systems   Constitutional: Negative. HENT: Negative. Respiratory: Negative. Cardiovascular: Negative. Genitourinary: Negative. Musculoskeletal:        Left distal lateral upper forearm   Skin: Negative. Neurological: Negative. Psychiatric/Behavioral: Negative.         Past Medical History:   Diagnosis Date   • Abnormal Pap smear of cervix    • Anemia    • Disease of thyroid gland     thyroid storm with previous pregnancy   • Dysmenorrhea    • Esophageal reflux     last assessed - [id number]   • Herpes zoster     LUQ level T-8 resolved; last assessed - [id number]   • History of infection due to human papilloma virus (HPV)    • HPV (human papilloma virus) infection    • Inguinal hernia     last assessed - [id number]   • Insomnia    • Mastodynia     Resolved -    • Migraine    • Ovarian cyst     last assessed - [id number]   • Ovarian cyst    • Palpitations     last assessed - [id number]   • Tachycardia     Resolved - [id number]   • Takes dietary supplements    • Temporomandibular joint disorder     last assessed - 2012   • Urinary tract infection    • Varicella      Past Surgical History:   Procedure Laterality Date   •  SECTION     • COLONOSCOPY  2012    Fiberoptic   • CRYOTHERAPY      cervix, age 24   • DIAGNOSTIC LAPAROSCOPY     • DILATION AND CURETTAGE OF UTERUS      Resolved    • GYNECOLOGIC CRYOSURGERY      age 24   • TOOTH EXTRACTION       Family History   Problem Relation Age of Onset   • Diabetes Mother    • Heart disease Mother         cardiac disorder   • Gallbladder disease Mother    • Hypertension Mother    • Glaucoma Mother    • Obesity Mother         hx gastric bypass   • Diabetes Father    • Heart disease Father         cardiac disorder   • Hypertension Father    • Hyperlipidemia Father         Pure Hypercholesterolemia   • Obesity Father    • Other Family         headache syndromes   • Breast cancer Cousin    • Post-traumatic stress disorder Brother    • Hyperlipidemia Brother    • Hypertension Brother    • Diabetes Maternal Grandmother    • Lung cancer Maternal Grandfather    • Stroke Paternal Grandmother    • Hypertension Paternal Grandmother    • Cirrhosis Paternal Grandfather    • Ovarian cancer Maternal Aunt    • No Known Problems Daughter    • No Known Problems Paternal Aunt    • Colon cancer Neg Hx      Social History     Socioeconomic History   • Marital status:      Spouse name: None   • Number of children: None   • Years of education: None   • Highest education level: None   Occupational History   • None   Tobacco Use   • Smoking status: Never   • Smokeless tobacco: Never   Vaping Use   • Vaping Use: Never used   Substance and Sexual Activity   • Alcohol use: Not Currently     Comment: social/prior to knowledge of pregnancy   • Drug use: Never   • Sexual activity: Yes     Partners: Male     Birth control/protection: None   Other Topics Concern   • None   Social History Narrative    Coffee - denied    History of daily cola consumption (___ Cans/Day) - denied    History of daily tea consumption (___ Cups/Day) - denied    Lack of exercise    Marital History - Currently  - per [facility] [place] [facility]\""    Presyb[employer]ian Affiliation Christian     Social Determinants of Health     Financial Resource Strain: Not on file   Food Insecurity: Not on file   Transportation Needs: Not on file   Physical Activity: Not on file   Stress: Not on file   Social Connections: Not on file   Intimate Partner Violence: Not on file   Housing Stability: Not on file     Current Outpatient Medications on File Prior to Visit   Medication Sig   • b complex vitamins capsule Take 1 capsule by mouth daily   • Bilberry, Vaccinium myrtillus, (BILBERRY EXTRACT PO) Take 1 capsule by mouth in the morning   • cholecalciferol (VITAMIN D3) 1,000 units tablet Take 5,000 Units by mouth daily    • Cod Liver Oil 10 MINIM CAPS Take 1 capsule by mouth Daily at 2am   • Licorice, Glycyrrhiza glabra, (LICORICE ROOT PO) Take 1 tablet by mouth in the morning   • Magnesium 300 MG CAPS Take 1 capsule by mouth Daily at 2am   • Magnesium Glycinate 100 MG CAPS Take 1 capsule by mouth Daily at 2am   • MISC NATURAL PRODUCTS PO Take 1 capsule by mouth in the morning   • Pregnenolone Micronized (PREGNENOLONE PO) Take 1 tablet by mouth Daily at 2am Taking 30 mg daily   • Probiotic Product (PROBIOTIC ADVANCED PO) Take 1 capsule by mouth Daily at 2am   • Quercetin 500 MG CAPS Take 500 mg by mouth in the morning   • Resveratrol 250 MG CAPS Take 1 capsule by mouth Daily at 2am   • [DISCONTINUED] melatonin 3 mg Take 3 mg by mouth daily at bedtime   • [DISCONTINUED] Methylcobalamin (Methyl B-12) 1000 MCG LOZG    • [DISCONTINUED] methylPREDNISolone 4 MG tablet therapy pack Use as directed on package (Patient not taking: Reported on 5/26/2023)   • [DISCONTINUED] ofloxacin (FLOXIN) 0.3 % otic solution Administer 10 drops into the left ear daily (Patient not taking: Reported on 5/26/2023)   • [DISCONTINUED] ondansetron (Zofran ODT) 4 mg disintegrating tablet Take 1 tablet (4 mg total) by mouth every 6 (six) hours as needed for nausea or vomiting (Patient not taking: Reported on 5/26/2023)     Allergies   Allergen Reactions   • Amoxicillin Hives   • Cymbalta [Duloxetine Hcl] Other (See Comments)     Shaking     • Penicillins Hives   • Shellfish Allergy - Food Allergy Hives   • Sulfa Antibiotics Rash     Immunization History   Administered Date(s) Administered   • COVID-19 PFIZER VACCINE 0.3 ML IM 04/08/2021, 04/30/2021   • INFLUENZA 10/17/2020, 10/15/2021, 11/22/2022   • Influenza, injectable, quadrivalent, preservative free 0.5 mL 10/17/2020   • Tdap 06/19/2011       Objective     /76 (BP Location: Left arm, Patient Position: Sitting, Cuff Size: Standard)   Pulse 78   Temp 98.5 °F (36.9 °C) (Tympanic)   Ht 5' 5" (1.651 m)   Wt 72.6 kg (160 lb)   LMP 08/19/2023 (Exact Date)   SpO2 99%   BMI 26.63 kg/m²     Physical Exam  Constitutional:       Appearance: She is well-developed. HENT:      Head: Normocephalic and atraumatic.       Right Ear: External ear normal. Left Ear: External ear normal.      Nose: Nose normal.   Eyes:      Conjunctiva/sclera: Conjunctivae normal.      Pupils: Pupils are equal, round, and reactive to light. Cardiovascular:      Rate and Rhythm: Normal rate and regular rhythm. Pulses: Normal pulses. Heart sounds: Normal heart sounds. Pulmonary:      Effort: Pulmonary effort is normal.      Breath sounds: Normal breath sounds. Musculoskeletal:      Cervical back: Normal range of motion and neck supple. Comments: Strength = BL hands, good cap refill, good radial pulses. Normal skin color, neg temp changes. Skin:     General: Skin is warm and dry. Neurological:      Mental Status: She is alert and oriented to person, place, and time. Deep Tendon Reflexes: Reflexes are normal and symmetric. Psychiatric:         Behavior: Behavior normal.         Thought Content:  Thought content normal.         Judgment: Judgment normal.       Uli Moss DO

## 2023-08-26 ENCOUNTER — HOSPITAL ENCOUNTER (OUTPATIENT)
Dept: ULTRASOUND IMAGING | Facility: HOSPITAL | Age: 48
Discharge: HOME | End: 2023-08-26
Payer: COMMERCIAL

## 2023-08-26 DIAGNOSIS — R93.89 THICKENED ENDOMETRIUM: ICD-10-CM

## 2023-08-26 PROCEDURE — 76830 TRANSVAGINAL US NON-OB: CPT

## 2023-08-26 PROCEDURE — 76856 US EXAM PELVIC COMPLETE: CPT

## 2023-08-29 ENCOUNTER — TELEPHONE (OUTPATIENT)
Dept: OBGYN CLINIC | Facility: MEDICAL CENTER | Age: 48
End: 2023-08-29

## 2023-08-29 NOTE — TELEPHONE ENCOUNTER
Pt called office pt been bleeding for 10 days now she states the first 3 days was a heavy bleeding now it just light bleeding. Pt did get a US on Saturday still waiting for the results. She wants to know if she should make appt with you now or wait for the results.    Please review,   Thanks

## 2023-09-07 DIAGNOSIS — N83.201 CYST OF RIGHT OVARY: Primary | ICD-10-CM

## 2023-10-02 ENCOUNTER — APPOINTMENT (OUTPATIENT)
Dept: LAB | Facility: CLINIC | Age: 48
End: 2023-10-02
Payer: COMMERCIAL

## 2023-10-02 ENCOUNTER — OFFICE VISIT (OUTPATIENT)
Dept: OBGYN CLINIC | Facility: CLINIC | Age: 48
End: 2023-10-02
Payer: COMMERCIAL

## 2023-10-02 VITALS
SYSTOLIC BLOOD PRESSURE: 116 MMHG | BODY MASS INDEX: 27.22 KG/M2 | WEIGHT: 163.4 LBS | DIASTOLIC BLOOD PRESSURE: 74 MMHG | HEIGHT: 65 IN

## 2023-10-02 DIAGNOSIS — N95.1 MENOPAUSAL SYMPTOMS: Primary | ICD-10-CM

## 2023-10-02 DIAGNOSIS — N93.9 ABNORMAL UTERINE BLEEDING (AUB): ICD-10-CM

## 2023-10-02 DIAGNOSIS — N88.2 CERVICAL STENOSIS (UTERINE CERVIX): ICD-10-CM

## 2023-10-02 DIAGNOSIS — N95.1 MENOPAUSAL SYMPTOMS: ICD-10-CM

## 2023-10-02 PROCEDURE — 36415 COLL VENOUS BLD VENIPUNCTURE: CPT

## 2023-10-02 PROCEDURE — 99212 OFFICE O/P EST SF 10 MIN: CPT | Performed by: ADVANCED PRACTICE MIDWIFE

## 2023-10-02 PROCEDURE — 58100 BIOPSY OF UTERUS LINING: CPT | Performed by: ADVANCED PRACTICE MIDWIFE

## 2023-10-02 PROCEDURE — 82670 ASSAY OF TOTAL ESTRADIOL: CPT

## 2023-10-02 PROCEDURE — 83001 ASSAY OF GONADOTROPIN (FSH): CPT

## 2023-10-02 PROCEDURE — 83002 ASSAY OF GONADOTROPIN (LH): CPT

## 2023-10-02 RX ORDER — MISOPROSTOL 200 UG/1
TABLET ORAL
Qty: 4 TABLET | Refills: 0 | Status: SHIPPED | OUTPATIENT
Start: 2023-10-02

## 2023-10-02 NOTE — PROGRESS NOTES
[facility] [address] [place] [address]    Assessment/Plan:  No problem-specific Assessment & Plan notes found for this encounter. Diagnoses and all orders for this visit:    Menopausal symptoms  -     Luteinizing hormone; Future  -     Follicle stimulating hormone; Future  -     Estradiol; Future    Cervical stenosis (uterine cervix)  -     miSOPROStol (Cytotec) 200 mcg tablet; Take 1 tablet by mouth night before procedure and 1 morning of procedure. If unable to tolerate orally insert tablet in vagina near cervix. Keep additional 2 pills for later use. Abnormal uterine bleeding (AUB)  -     miSOPROStol (Cytotec) 200 mcg tablet; Take 1 tablet by mouth night before procedure and 1 morning of procedure. If unable to tolerate orally insert tablet in vagina near cervix. Keep additional 2 pills for later use. Subjective:   Danica Landa is a 53 y.o.  female. CC: irregular bleeding    HPI: Heavy menses in August that lasted for 14 days. Notes intense cramping with menses. Notes that hot flashes have improved. Notes Acne and feeling exhausted. Notes vaginal dryness and brain fog. Trying to control anxiety and notes sleep pattern is irregular. Reviewed last 2 ultrasounds and bleeding pattern. At this time recommending endometrial biopsy. ROS: Review of Systems   Constitutional: Negative for chills, diaphoresis, fatigue and fever. Gastrointestinal: Negative for constipation and diarrhea. Endocrine: Positive for heat intolerance. Genitourinary: Positive for menstrual problem. Negative for difficulty urinating, frequency, urgency, vaginal bleeding, vaginal discharge and vaginal pain. Notes some incontinence. Psychiatric/Behavioral: The patient is nervous/anxious.         PFSH: The following portions of the patient's history were reviewed and updated as appropriate: allergies, current medications, past family history, past medical history, obstetric history, gynecologic history, past social history, past surgical history and problem list.       Objective:  /74   Ht 5' 5" (1.651 m)   Wt 74.1 kg (163 lb 6.4 oz)   LMP 08/18/2023 (Exact Date)   BMI 27.19 kg/m²    Physical Exam  Vitals reviewed. Pulmonary:      Effort: Pulmonary effort is normal.   Genitourinary:     General: Normal vulva. Exam position: Lithotomy position. Labia:         Right: No rash, tenderness or lesion. Left: No rash, tenderness or lesion. Vagina: No vaginal discharge, erythema, tenderness or lesions. Cervix: No cervical motion tenderness, discharge, friability, lesion, erythema or cervical bleeding. Neurological:      Mental Status: She is alert and oriented to person, place, and time. Psychiatric:         Mood and Affect: Mood normal.         Behavior: Behavior normal.           Endometrial biopsy    Date/Time: 10/2/2023 11:00 AM    Performed by: Aaron Kellogg CNM  Authorized by: Aaron Kellogg CNM  Universal Protocol:  Consent: Written consent obtained. Risks and benefits: risks, benefits and alternatives were discussed  Consent given by: patient  Time out: Immediately prior to procedure a "time out" was called to verify the correct patient, procedure, equipment, support staff and site/side marked as required. Patient understanding: patient states understanding of the procedure being performed  Patient consent: the patient's understanding of the procedure matches consent given  Procedure consent: procedure consent matches procedure scheduled  Relevant documents: relevant documents present and verified  Test results: test results available and properly labeled  Radiology Images displayed and confirmed. If images not available, report reviewed: imaging studies available  Required items: required blood products, implants, devices, and special equipment available  Patient identity confirmed: verbally with patient      Indication:     Indications:  Other disorder of menstruation and other abnormal bleeding from female genital tract    Procedure:     Procedure: endometrial biopsy with Pipelle      A bivalve speculum was placed in the vagina: yes      Cervix cleaned and prepped: yes      Unable to perform due to: cervical stenosis    Comments:     Procedure comments:  Reviewed options. Will try cytotec prior to another attempt.

## 2023-10-03 LAB
ESTRADIOL SERPL-MCNC: 150.6 PG/ML
FSH SERPL-ACNC: 4.4 MIU/ML
LH SERPL-ACNC: 4.4 MIU/ML

## 2023-10-06 ENCOUNTER — OFFICE VISIT (OUTPATIENT)
Dept: FAMILY MEDICINE CLINIC | Facility: CLINIC | Age: 48
End: 2023-10-06
Payer: COMMERCIAL

## 2023-10-06 VITALS
BODY MASS INDEX: 26.82 KG/M2 | DIASTOLIC BLOOD PRESSURE: 76 MMHG | WEIGHT: 161 LBS | HEIGHT: 65 IN | SYSTOLIC BLOOD PRESSURE: 118 MMHG | OXYGEN SATURATION: 99 % | TEMPERATURE: 97.7 F | HEART RATE: 83 BPM

## 2023-10-06 DIAGNOSIS — E55.9 VITAMIN D INSUFFICIENCY: ICD-10-CM

## 2023-10-06 DIAGNOSIS — R53.83 FATIGUE, UNSPECIFIED TYPE: Primary | ICD-10-CM

## 2023-10-06 DIAGNOSIS — F51.5 NIGHTMARES: ICD-10-CM

## 2023-10-06 PROBLEM — F33.9 DEPRESSION, RECURRENT (HCC): Status: RESOLVED | Noted: 2021-09-13 | Resolved: 2023-10-06

## 2023-10-06 PROBLEM — R76.8 POSITIVE ANA (ANTINUCLEAR ANTIBODY): Status: RESOLVED | Noted: 2022-09-28 | Resolved: 2023-10-06

## 2023-10-06 PROBLEM — M25.50 POLYARTHRALGIA: Status: RESOLVED | Noted: 2022-09-28 | Resolved: 2023-10-06

## 2023-10-06 PROCEDURE — 99215 OFFICE O/P EST HI 40 MIN: CPT | Performed by: FAMILY MEDICINE

## 2023-10-06 NOTE — PROGRESS NOTES
Name: Galo Larson      : 1975      MRN: [id number]  Encounter Provider: Bright Miguel DO  Encounter Date: 10/6/2023   Encounter department: [facility]    Assessment & Plan     Fatigue- reviewed some of the causes, which are many, including depression, chemical - medicines including OTC,, nutrition, supplements with unknown ADR's, HS sleep- inadequate, stress, anxiety, etc.  Hold all supplements. Chief Complaint   Patient presents with   • Fatigue              Subjective     Fatigue. Wakes up tired. Takes multiple supplements including two for sleep but has no problem getting asleep. Recently had menses fro 2 weeks. Seen by endocrine- thyroid and adrenals ruled out. SH: Neg tob or [place]. Has been eating well balanced diet. Has tons of nightmares HS. Taking vitamin D 5000 IU daily. August CMP was  [facility]. I have spent a total time of 40 minutes on 10/06/23 in caring for this patient including Diagnostic results, Risks and benefits of tx options, Instructions for management, Patient and family education, Importance of tx compliance, Risk factor reductions, Impressions and Counseling / Coordination of care. Review of Systems   Constitutional: Positive for fatigue. HENT: Negative. Eyes: Negative. Respiratory: Negative. Cardiovascular: Negative. Gastrointestinal: Negative. Genitourinary: Negative. Musculoskeletal: Negative. Skin: Negative. Neurological: Negative. Psychiatric/Behavioral: Negative.         Past Medical History:   Diagnosis Date   • Abnormal Pap smear of cervix    • Anemia    • Disease of thyroid gland     thyroid storm with previous pregnancy   • Dysmenorrhea    • Esophageal reflux     last assessed - [id number]   • Female infertility    • Herpes zoster     LUQ level T-8 resolved; last assessed - [id number]   • History of infection due to human papilloma virus (HPV)    • HPV (human papilloma virus) infection    • Inguinal hernia     last assessed - [id number]   • Insomnia    • Liver disease 2024   • Mastodynia     Resolved -    • Migraine    • Miscarriage    • Ovarian cyst     last assessed - [id number]   • Ovarian cyst    • Palpitations     last assessed - [id number]   • Tachycardia     Resolved - [id number]   • Takes dietary supplements    • Temporomandibular joint disorder     last assessed - 35Yqt7061   • Urinary tract infection    • Varicella      Past Surgical History:   Procedure Laterality Date   •  SECTION     • COLONOSCOPY  2012    Fiberoptic   • CRYOTHERAPY      cervix, age 24   • DIAGNOSTIC LAPAROSCOPY     • DILATION AND CURETTAGE OF UTERUS      Resolved    • GYNECOLOGIC CRYOSURGERY      age 24   • TOOTH EXTRACTION       Family History   Problem Relation Age of Onset   • Diabetes Mother    • Heart disease Mother         cardiac disorder   • Gallbladder disease Mother    • Hypertension Mother    • Glaucoma Mother    • Obesity Mother         hx gastric bypass   • Stroke Mother    • Thyroid disease Mother    • Diabetes Father    • Heart disease Father         cardiac disorder   • Hypertension Father    • Hyperlipidemia Father         Pure Hypercholesterolemia   • Obesity Father    • Other Family         headache syndromes   • Breast cancer Cousin    • Post-traumatic stress disorder Brother    • Hyperlipidemia Brother    • Hypertension Brother    • Diabetes Maternal Grandmother    • Lung cancer Maternal Grandfather    • Stroke Paternal Grandmother    • Hypertension Paternal Grandmother    • Cirrhosis Paternal Grandfather    • Ovarian cancer Maternal Aunt    • No Known Problems Daughter    • No Known Problems Paternal Aunt    • Colon cancer Neg Hx      Social History     Socioeconomic History   • Marital status:      Spouse name: None   • Number of children: None   • Years of education: None   • Highest education level: None   Occupational History   • None   Tobacco Use   • Smoking status: Never   • Smokeless tobacco: Never   Vaping Use   • Vaping Use: Never used   Substance and Sexual Activity   • Alcohol use: Not Currently     Comment: social/prior to knowledge of pregnancy   • Drug use: Never   • Sexual activity: Yes     Partners: Male     Birth control/protection: Male Sterilization   Other Topics Concern   • None   Social History Narrative    Coffee - denied    History of daily cola consumption (___ Cans/Day) - denied    History of daily tea consumption (___ Cups/Day) - denied    Lack of exercise    [facility]ital History - Currently  - [facility]    Scientologist Affiliation Anglican     Social Determinants of Health     Financial Resource Strain: Not on file   Food Insecurity: Not on file   Transportation Needs: Not on file   Physical Activity: Not on file   Stress: Not on file   Social Connections: Not on file   Intimate Partner Violence: Not on file   Housing Stability: Not on file     Current Outpatient Medications on File Prior to Visit   Medication Sig   • b complex vitamins capsule Take 1 capsule by mouth daily   • Bilberry, Vaccinium myrtillus, (BILBERRY EXTRACT PO) Take 1 capsule by mouth in the morning   • cholecalciferol (VITAMIN D3) 1,000 units tablet Take 5,000 Units by mouth daily    • Cod Liver Oil 10 MINIM CAPS Take 1 capsule by mouth Daily at 2am   • Licorice, Glycyrrhiza glabra, (LICORICE ROOT PO) Take 1 tablet by mouth in the morning   • Magnesium 300 MG CAPS Take 1 capsule by mouth Daily at 2am   • Magnesium Glycinate 100 MG CAPS Take 1 capsule by mouth Daily at 2am   • MISC NATURAL PRODUCTS PO Take 1 capsule by mouth in the morning   • miSOPROStol (Cytotec) 200 mcg tablet Take 1 tablet by mouth night before procedure and 1 morning of procedure. If unable to tolerate orally insert tablet in vagina near cervix. Keep additional 2 pills for later use.    • Pregnenolone Micronized (PREGNENOLONE PO) Take 1 tablet by mouth Daily at 2am Taking 30 mg daily   • Probiotic Product (PROBIOTIC ADVANCED PO) Take 1 capsule by mouth Daily at 2am   • Quercetin 500 MG CAPS Take 500 mg by mouth in the morning   • Resveratrol 250 MG CAPS Take 1 capsule by mouth Daily at 2am     Allergies   Allergen Reactions   • Amoxicillin Hives   • Cymbalta [Duloxetine Hcl] Other (See Comments)     Shaking     • Penicillins Hives   • Shellfish Allergy - Food Allergy Hives   • Sulfa Antibiotics Rash     Immunization History   Administered Date(s) Administered   • COVID-19 PFIZER VACCINE 0.3 ML IM 04/08/2021, 04/30/2021   • INFLUENZA 10/17/2020, 10/15/2021, 11/22/2022   • Influenza, injectable, quadrivalent, preservative free 0.5 mL 10/17/2020   • Tdap 06/19/2011       Objective     /76 (BP Location: Left arm, Patient Position: Sitting, Cuff Size: Standard)   Pulse 83   Temp 97.7 °F (36.5 °C) (Tympanic)   Ht 5' 5" (1.651 m)   Wt 73 kg (161 lb)   LMP 08/18/2023 (Exact Date)   SpO2 99%   BMI 26.79 kg/m²     Physical Exam  Constitutional:       Appearance: She is well-developed. HENT:      Head: Normocephalic and atraumatic. Right Ear: Tympanic membrane, ear canal and external ear normal.      Left Ear: Tympanic membrane, ear canal and external ear normal.      Nose: Nose normal.      Mouth/Throat:      Mouth: Mucous membranes are moist.      Pharynx: Oropharynx is clear. Eyes:      Conjunctiva/sclera: Conjunctivae normal.      Pupils: Pupils are equal, round, and reactive to light. Cardiovascular:      Rate and Rhythm: Normal rate and regular rhythm. Pulses: Normal pulses. Heart sounds: Normal heart sounds. Pulmonary:      Effort: Pulmonary effort is normal.      Breath sounds: Normal breath sounds. Abdominal:      General: Abdomen is flat. Bowel sounds are normal.      Palpations: Abdomen is soft. Musculoskeletal:      Cervical back: Normal range of motion and neck supple. Skin:     General: Skin is warm and dry.    Neurological:      Mental Status: She is alert and oriented to person, place, and time. Deep Tendon Reflexes: Reflexes are normal and symmetric. Psychiatric:         Mood and Affect: Mood normal.         Behavior: Behavior normal.         Thought Content:  Thought content normal.         Judgment: Judgment normal.       Damian Bonner, DO

## 2023-10-11 ENCOUNTER — OFFICE VISIT (OUTPATIENT)
Dept: SLEEP CENTER | Facility: CLINIC | Age: 48
End: 2023-10-11
Payer: COMMERCIAL

## 2023-10-11 VITALS
DIASTOLIC BLOOD PRESSURE: 79 MMHG | BODY MASS INDEX: 26.66 KG/M2 | HEIGHT: 65 IN | SYSTOLIC BLOOD PRESSURE: 118 MMHG | WEIGHT: 160 LBS

## 2023-10-11 DIAGNOSIS — G47.419 PRIMARY NARCOLEPSY WITHOUT CATAPLEXY: Primary | ICD-10-CM

## 2023-10-11 DIAGNOSIS — G47.19 EXCESSIVE DAYTIME SLEEPINESS: ICD-10-CM

## 2023-10-11 DIAGNOSIS — N95.1 MENOPAUSAL SYMPTOMS: ICD-10-CM

## 2023-10-11 DIAGNOSIS — R53.83 FATIGUE, UNSPECIFIED TYPE: ICD-10-CM

## 2023-10-11 DIAGNOSIS — F51.5 NIGHTMARES: ICD-10-CM

## 2023-10-11 PROCEDURE — 99417 PROLNG OP E/M EACH 15 MIN: CPT | Performed by: NURSE PRACTITIONER

## 2023-10-11 PROCEDURE — 99205 OFFICE O/P NEW HI 60 MIN: CPT | Performed by: NURSE PRACTITIONER

## 2023-10-11 NOTE — PATIENT INSTRUCTIONS
Schedule diagnostic sleep study with multiple sleep latency testing  The nurse will call you with results and plan treatment  You may want to consider evaluation with Dr. Natividad Whitehead, a hormonal specialist  Do not drive or operate machinery if feeling sleepy      Nursing Support:  When: Monday through Friday 7A-5PM except holidays  Where: Our direct line is [phone]. If you are having a true emergency please call 911. In the event that the line is busy or it is after hours please leave a voice message and we will return your call. Please speak clearly, leaving your full name, birth date, best number to reach you and the reason for your call. Medication refills: We will need the name of the medication, the dosage, the ordering provider, whether you get a 30 or 90 day refill, and the pharmacy name and address. Medications will be ordered by the provider only. Nurses cannot call in prescriptions. Please allow 7 days for medication refills. Physician requested updates: If your provider requested that you call with an update after starting medication, please be ready to provide us the medication and dosage, what time you take your medication, the time you attempt to fall asleep, time you fall asleep, when you wake up, and what time you get out of bed. Sleep Study Results: We will contact you with sleep study results and/or next steps after the physician has reviewed your testing.   Do not drive

## 2023-10-11 NOTE — PROGRESS NOTES
Consultation - [address], 1975, MRN: [id number]    10/11/2023        Reason for Consult / Principal Problem:  Excessive daytime sleepiness  Fatigue  Evaluation of possible Narcolepsy without Cataplexy       Thank you for the opportunity of participating in the evaluation and care of this patient in the Sleep Clinic at [address] [place] [address]. Subjective:     HPI: Galo Larson is a 53y.o. year old female. She presents with her fiance for a consultation regarding concern of ongoing excessive daytime sleepiness and fatigue. She was previously evaluated by Dr. Irvin Kaur in 2021 for concern of possible obstructive sleep apnea. She completed a home sleep study, followed by a diagnostic sleep study in November 2021. Testing did not confirm MALDONADO with AHI of 0.1 with oxygen jaciel of 91%. Sleep efficiency and architecture were normal with 57% of sleep in a supine position. She had follow up after testing to review results with plan to work on weight loss and possibly treat anxiety. Weight has increased slightly over the past 2 years. She reports that she has had difficulty sleeping prior to age 29, including waking up between 2-4 am and having difficulty returning to sleep. She gave birth to her daughter at age 29. She has felt like her sleep has worsened since then. She has had recent hormonal testing done by her OB-gyn. She states she was told she is not in menopause but menstrual cycles have become very irregular. She also mentions that she began having palpitations after childbirth. She began use of magnesium and has not had ongoing palpitations with use of magnesium. She feels tired throughout the day, worsening through the afternoon and evening. She has had difficulty completing tasks at work. This has worsened over the past several years. She has difficulty with focus and concentrations.   She feels so exhausted in the Subjective


Progress Note Date: 02/13/21





Patient is awake and alert today.  He is very frustrated that his left lower 

extremity swelling is not improving.  He appeared very anxious and kept asking 

me whether he is given by the blood clot dose to his lung.  No acute events 

overnight reported by nursing staff.





Objective





- Vital Signs


Vital signs: 


                                   Vital Signs











Temp  97.9 F   02/13/21 07:54


 


Pulse  95   02/13/21 07:54


 


Resp  18   02/13/21 07:54


 


BP  130/69   02/13/21 07:54


 


Pulse Ox  93 L  02/13/21 07:54








                                 Intake & Output











 02/12/21 02/13/21 02/13/21





 18:59 06:59 18:59


 


Intake Total 610 189.977 10


 


Output Total  950 


 


Balance 610 -760.023 10


 


Weight 136.078 kg 146 kg 


 


Intake:   


 


  IV 10 20 10


 


    Invasive Line 1 10 20 10


 


  Intake, IV Titration  169.977 





  Amount   


 


    Heparin Sod,Pork in 0.45%  169.977 





    NaCl 25,000 unit In 0.45   





    % NaCl 1 250ml.bag @ 16.   





    89 UNITS/KG/HR 22.984 mls   





    /hr IV .[id number] [facility] Rx#:   





    [id number]   


 


  Oral 600  


 


Output:   


 


  Urine  950 


 


Other:   


 


  Voiding Method  Toilet Toilet





  Urinal Urinal














- Exam





General:  The patient is awake and alert, in no distress


Eye: there is normal conjunctiva bilaterally.  


Neck:  The neck is supple, there is no  JVD.  


Cardiovascular:  Normal  S1-S2, no S3-S4, no murmurs.


Respiratory: Lungs clear to auscultation bilaterally


Gastrointestinal: Abdomen is soft, nontender


Musculoskeletal:  There is significant left lower extremity swelling extending 

from the calf all the way up to the hip 


Neurological:. Speech is normal. 


Skin:  Skin is warm and dry 





- Labs


CBC & Chem 7: 


                                 02/13/21 04:17





                                 02/13/21 04:17


Labs: 


                  Abnormal Lab Results - Last 24 Hours (Table)











  02/12/21 02/12/21 02/12/21 Range/Units





  13:58 13:58 13:58 


 


WBC  14.3 H    (3.8-10.6)  k/uL


 


Neutrophils #  11.0 H    (1.3-7.7)  k/uL


 


APTT     (22.0-30.0)  sec


 


D-Dimer   5.24 H   (<0.60)  mg/L FEU


 


Sodium    135 L  (137-145)  mmol/L


 


Carbon Dioxide    21 L  (22-30)  mmol/L


 


Glucose    117 H  (74-99)  mg/dL


 


ALT    62 H  (4-49)  U/L


 


Total Protein    8.3 H  (6.3-8.2)  g/dL














  02/12/21 02/13/21 02/13/21 Range/Units





  22:31 04:17 04:17 


 


WBC   12.3 H   (3.8-10.6)  k/uL


 


Neutrophils #   7.8 H   (1.3-7.7)  k/uL


 


APTT  43.7 H    (22.0-30.0)  sec


 


D-Dimer     (<0.60)  mg/L FEU


 


Sodium    136 L  (137-145)  mmol/L


 


Carbon Dioxide     (22-30)  mmol/L


 


Glucose     (74-99)  mg/dL


 


ALT     (4-49)  U/L


 


Total Protein     (6.3-8.2)  g/dL














Assessment and Plan


Assessment: 








This is a 38-year-old male with no known past medical history of presented to 

the emergency room with sudden onset left lower extremity swelling that happened

overnight.  He was evaluated in the ER and admitted to the hospital for further 

management of his medical problems noted below.





1.  Acute PE, with evidence of a large thrombus in the right middle and lower 

lobe pulmonary artery junction.  No evidence of right ventricular strain on CT. 

I would obtain echocardiogram for further evaluation.  On anticoagulation with 

IV heparin.





2.  Acute DVT involving left lower extremity with extensive clot burden.  Con

tinue leg elevation.  Pain control as needed.





3.  Tobacco abuse, counseled extensively to quit





Today, I reviewed his medication list and lab work results.  I consulted 

vascular surgery and hematology for further evaluation.  Continue telemetry 

monitoring.  Discontinue IV fluid hydration.  Repeat lab work in the morning. evening before bed that she feels like she can't function or keep her thoughts straight. She uses a checklist of tasks she needs to complete so she doesn't forget anything. She is having difficulty pushing through daily activities because she feels so exhausted. She has had to miss some of her child's activities because she is so exhausted and does not feel she would be safe driving. She does not drive if she feels tired. She wakes up around 4:00am daily. At this time, she feels a surge that includes heart racing and sweating. She goes to the bathroom and is able to return to sleep for another 1-3 hours. She states that she took benadryl to try to help her stay asleep. She took this for 2 months but did not notice it helped and she felt "hung over" the next day. She states that she began seeing a naturopathic provider and was taking supplements. She is not sure what was in the proprietary product but did not notice any improvement when taking it. She also reports that she has had sleep paralysis, beginning in her 19's. She has a history of low serum ferritin levels. She has had 3 iron infusions in the past, after childbirth. She denies RLS symptoms. Component Ref Range & Units 5/19/23  9:03 AM 11/11/20  8:00 AM 7/21/17  1:28 PM   Ferritin 11 - 307 ng/mL 26 22 R 24 R, CM        She had a positive FLOR with no additional findings in the past.  The recent FLOR was  negative in May. She mentions that she took prednisone for a URI in March and felt so much more energized when taking prednisone. AM cortisol test was normal. Reports that she had cortisol testing by saliva which was reportedly abnormal, however results are not available for review.       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    Sleep Schedule: Bedtime:  she gets into bed between 8:30pm-9:00pm      Latency:  30-60 minutes      Wakeup time:  6:00-6:15am without the use of an alarm on work days and 7:00am on days off, occasionally longer, if schedule allows    Awakenings:       Frequency:  typically 1-2 times per night - usually 2:00am and 4:00am      Causes:  heart racing, sweating      Duration:  she returns to sleep within a few minutes    Daytime Sleepiness / Inappropriate Sleep:       Most severe:  she feels tired all day, but by 2:00pm she starts to feel like she can't function anymore       Naps :  a few times per month - scheduled doesn't allow      Time:  late morning      Duration:  90 minutes Naps are refreshing in quality      Inappropriate drowsiness / sleep:  she feels brain fog and dozes or feels unable to multitask    Snoring:  minimal snoring with no gasping or witnessed apnea    Change in Weight:  she gained 10 lbs since the time of the last study    Restless Leg Syndrome:  no clinical symptoms consistent with this diagnosis     Other Complaints:   No reports of sleep walking, sleep talking. She has had episodes of sleep paralysis over the years. She has several episodes for a short period of time and then it goes away. Episodes began in her 19's and 30's. She notices flashes of light when she is trying to fall asleep, without any distinct visions/hallucinations. She has seen shadows in her bathroom recently during the night that were not real.   She has nightmares that can be vivid and seem real for a period of time. She keeps a nightmare journal.  She does not wake up with headache. She reports bruxism and uses a custom fit oral appliance at times. She has not had symptoms consistent with cataplexy. She feels tired at times driving and can feel distracted. She is able to recognize when she is feeling tired and does not drive.      Social History:      Caffeine:  occasional soda, if she feels she need it to stay awake Tobacco:   reports that she has never smoked. She has never used smokeless tobacco.     E-cig/Vaping:    E-Cigarette/Vaping    E-Cigarette Use Never User       E-Cigarette/Vaping Substances    Nicotine No     THC No     CBD No     Flavoring No     Other No     Unknown No          Alcohol:   reports that she does not currently use alcohol. Drugs:   reports no history of drug use. The review of systems and following portions of the patient's history were reviewed and updated as appropriate: allergies, current medications, past family history, past medical history, past social history, past surgical history and problem list.        Objective:       Vitals:    10/11/23 1439   BP: 118/79   BP Location: Left arm   Patient Position: Sitting   Cuff Size: Large   Weight: 72.6 kg (160 lb)   Height: 5' 5" (1.651 m)     Body mass index is 26.63 kg/m². Neck Circumference: 14.75  [place] Sleepiness Scale: Total score: 11      Current Outpatient Medications:     Magnesium 300 MG CAPS, Take 1 capsule by mouth Daily at 2am, Disp: , Rfl:     Probiotic Product (PROBIOTIC ADVANCED PO), Take 1 capsule by mouth Daily at 2am, Disp: , Rfl:     Bilberry, Vaccinium myrtillus, (BILBERRY EXTRACT PO), Take 1 capsule by mouth in the morning, Disp: , Rfl:     cholecalciferol (VITAMIN D3) 1,000 units tablet, Take 5,000 Units by mouth daily , Disp: , Rfl:     Cod Liver Oil 10 MINIM CAPS, Take 1 capsule by mouth Daily at 2am, Disp: , Rfl:     Licorice, Glycyrrhiza glabra, (LICORICE ROOT PO), Take 1 tablet by mouth in the morning, Disp: , Rfl:     Magnesium Glycinate 100 MG CAPS, Take 1 capsule by mouth Daily at 2am, Disp: , Rfl:     MISC NATURAL PRODUCTS PO, Take 1 capsule by mouth in the morning, Disp: , Rfl:     miSOPROStol (Cytotec) 200 mcg tablet, Take 1 tablet by mouth night before procedure and 1 morning of procedure. If unable to tolerate orally insert tablet in vagina near cervix. Keep additional 2 pills for later use. (Patient not taking: Reported on 10/11/2023), Disp: 4 tablet, Rfl: 0    Pregnenolone Micronized (PREGNENOLONE PO), Take 1 tablet by mouth Daily at 2am Taking 30 mg daily, Disp: , Rfl:     Quercetin 500 MG CAPS, Take 500 mg by mouth in the morning, Disp: , Rfl:     Resveratrol 250 MG CAPS, Take 1 capsule by mouth Daily at 2am, Disp: , Rfl:     Physical Exam  General Appearance:   Alert, cooperative, no distress, appears stated age     Head:   Normocephalic, without obvious abnormality, atraumatic     Eyes:   Conjunctiva/corneas clear          Nose:  Nares normal, septum midline, mucosa normal, no drainage or sinus tenderness           Throat:  Lips, teeth and gums normal; tongue normal in size and midline in position; mucosa moist with short A/P diameter and low-lying soft palatal tissue, uvula normal, tonsils not visualized, Mallampati class 3       Neck:  Supple, short, symmetrical, trachea midline, no adenopathy; no thyromegaly noted, no carotid bruit or JVD     Lungs:      Clear to auscultation bilaterally, respirations unlabored     Heart:   Regular rate and rhythm, S1 and S2 normal, no murmur, rub or gallop       Extremities:  Extremities normal, atraumatic, no cyanosis or edema       Skin:  Skin color, texture, turgor normal, no rashes or lesions       Neurologic:  No focal deficits noted. ASSESSMENT / PLAN     1. Primary narcolepsy without cataplexy  Multiple sleep latency test with diagnostic study      2. Fatigue, unspecified type  Ambulatory Referral to Sleep Medicine    Multiple sleep latency test with diagnostic study      3. Nightmares  Ambulatory Referral to Sleep Medicine    Multiple sleep latency test with diagnostic study      4.  Excessive daytime sleepiness  Multiple sleep latency test with diagnostic study            Counseling / Coordination of Care  I have spent a total time of 90 minutes on 10/12/23 in caring for this patient including Risks and benefits of tx options, Instructions for management, Patient and family education, Importance of tx compliance, Risk factor reductions, Impressions, Counseling / Coordination of care, Documenting in the medical record, Reviewing / ordering tests, medicine, procedures  , and Obtaining or reviewing history  . Today I reviewed the patients current and past symptoms. Past sleep studies were reviewed. I spoke to the patient regarding the possible diagnosis of Narcolepsy. We talked about different presentations such as Narcolepsy Type 1 and Type 2 as well as other causes of daytime sleepimess including Idiopathic CNS Hypersomnia. I described the typical clinical presentation of Narcolepsy including age of onset and profound daytime sleepiness with irrepressible sleep attacks. Other associated features including cataplexy, hypnogogic and hypnopompic hallucinations and sleep paralysis were covered in some detail. I talked about how this disease effects both daytime wakefulness and nighttime sleep and went over treatment options to improve sleep, maintain daytime wakefulness and prevent cataplectic attacks. Next we spoke about the laboratory evaluation used to diagnose Narcolepsy. This involves performance of a diagnostic polysomnogram followed immediately by a multiple sleep latency test (MSLT). I then explained the protocol used to complete both of these tests. After completion of testing the patient will return to the Sleep [address] [place] [address] for a review of their results, a discussion of possible diagnoses and treatment options. Some of her symptoms may be related to hormonal changes. Referral to Dr. Nata Hussein was provided.     The following instructions have been given to the patient today:    Patient Instructions    Schedule diagnostic sleep study with multiple sleep latency testing  The nurse will call you with results and plan treatment  You may want to consider evaluation with Dr. Rei King, a hormonal specialist      Nursing Support:  When: Monday through Friday 7A-5PM except holidays  Where: Our direct line is [phone]. If you are having a true emergency please call 911. In the event that the line is busy or it is after hours please leave a voice message and we will return your call. Please speak clearly, leaving your full name, birth date, best number to reach you and the reason for your call. Medication refills: We will need the name of the medication, the dosage, the ordering provider, whether you get a 30 or 90 day refill, and the pharmacy name and address. Medications will be ordered by the provider only. Nurses cannot call in prescriptions. Please allow 7 days for medication refills. Physician requested updates: If your provider requested that you call with an update after starting medication, please be ready to provide us the medication and dosage, what time you take your medication, the time you attempt to fall asleep, time you fall asleep, when you wake up, and what time you get out of bed. Sleep Study Results: We will contact you with sleep study results and/or next steps after the physician has reviewed your testing. Saran Tsai, [address]      Portions of the record may have been created with voice recognition software. Occasional wrong word or "sound a like" substitutions may have occurred due to the inherent limitations of voice recognition software. Read the chart carefully and recognize, using context, where substitutions have occurred.

## 2023-10-31 ENCOUNTER — OFFICE VISIT (OUTPATIENT)
Dept: FAMILY MEDICINE CLINIC | Facility: CLINIC | Age: 48
End: 2023-10-31
Payer: COMMERCIAL

## 2023-10-31 VITALS
TEMPERATURE: 98.2 F | DIASTOLIC BLOOD PRESSURE: 94 MMHG | WEIGHT: 162 LBS | RESPIRATION RATE: 18 BRPM | HEART RATE: 97 BPM | BODY MASS INDEX: 26.99 KG/M2 | OXYGEN SATURATION: 98 % | HEIGHT: 65 IN | SYSTOLIC BLOOD PRESSURE: 138 MMHG

## 2023-10-31 DIAGNOSIS — S29.012A STRAIN OF MUSCLE AND TENDON OF BACK WALL OF THORAX, INITIAL ENCOUNTER: ICD-10-CM

## 2023-10-31 DIAGNOSIS — M99.02 SOMATIC DYSFUNCTION OF THORACIC REGION: ICD-10-CM

## 2023-10-31 DIAGNOSIS — M99.08 SEGMENTAL AND SOMATIC DYSFUNCTION OF RIB CAGE: ICD-10-CM

## 2023-10-31 DIAGNOSIS — M54.6 ACUTE RIGHT-SIDED THORACIC BACK PAIN: Primary | ICD-10-CM

## 2023-10-31 DIAGNOSIS — S23.41XA RIB SPRAIN, INITIAL ENCOUNTER: ICD-10-CM

## 2023-10-31 PROCEDURE — 99214 OFFICE O/P EST MOD 30 MIN: CPT | Performed by: FAMILY MEDICINE

## 2023-10-31 PROCEDURE — 3725F SCREEN DEPRESSION PERFORMED: CPT | Performed by: FAMILY MEDICINE

## 2023-10-31 PROCEDURE — 98925 OSTEOPATH MANJ 1-2 REGIONS: CPT | Performed by: FAMILY MEDICINE

## 2023-10-31 NOTE — PROGRESS NOTES
Name: Kori Plunkett      : 1975      MRN: [id number]  Encounter Provider: Binta Corcoran DO  Encounter Date: 10/31/2023   Encounter department: [facility]    Assessment & Plan   BMI Counseling: Body mass index is 26.96 kg/m². The BMI is above normal. Nutrition recommendations include reducing portion sizes and consuming healthier snacks. Chief Complaint   Patient presents with    Back Pain     Started a week ago, on and off back pain, located on the right side. Discomfort, seen by chiropractor and some adjustments were done. 2/10 pain scale      1. Acute right-sided thoracic back pain    2. Somatic dysfunction of thoracic region    3. Strain of muscle and tendon of back wall of thorax, initial encounter    4. Rib sprain, initial encounter    5. Segmental and somatic dysfunction of rib cage        Depression Screening and Follow-up Plan: Patient was screened for depression during today's encounter. They screened negative with a PHQ-2 score of 0. Subjective     Pain right side above bra strap just lateral to spine past week. Has been coughing. Review of Systems   Constitutional: Negative. HENT: Negative. Eyes: Negative. Respiratory: Negative. Cardiovascular: Negative. Gastrointestinal: Negative. Genitourinary: Negative. Musculoskeletal:  Positive for back pain. Skin: Negative. Neurological: Negative. Psychiatric/Behavioral: Negative.          Past Medical History:   Diagnosis Date    Abnormal Pap smear of cervix     Anemia     Disease of thyroid gland     thyroid storm with previous pregnancy    Dysmenorrhea     Esophageal reflux     last assessed - [id number]    Female infertility     Herpes zoster     LUQ level T-8 resolved; last assessed - 2015    History of infection due to human papilloma virus (HPV)     HPV (human papilloma virus) infection     Inguinal hernia     last assessed - [id number]    Insomnia     Liver disease 2024 Mastodynia     Resolved -     Migraine     Miscarriage     Ovarian cyst     last assessed - [id number]    Ovarian cyst 2013    Palpitations     last assessed - [id number]    Tachycardia     Resolved - [id number]    Takes dietary supplements     Temporomandibular joint disorder     last assessed - [id number]    Urinary tract infection     Varicella      Past Surgical History:   Procedure Laterality Date     SECTION      COLONOSCOPY  2012    Fiberoptic    CRYOTHERAPY      cervix, age 24    DIAGNOSTIC LAPAROSCOPY      DILATION AND CURETTAGE OF UTERUS      Resolved     GYNECOLOGIC CRYOSURGERY      age 24    TOOTH EXTRACTION       Family History   Problem Relation Age of Onset    Diabetes Mother     Heart disease Mother         cardiac disorder    Gallbladder disease Mother     Hypertension Mother     Glaucoma Mother     Obesity Mother         hx gastric bypass    Stroke Mother     Thyroid disease Mother     Diabetes Father     Heart disease Father         cardiac disorder    Hypertension Father     Hyperlipidemia Father         Pure Hypercholesterolemia    Obesity Father     Other Family         headache syndromes    Breast cancer Cousin     Post-traumatic stress disorder Brother     Hyperlipidemia Brother     Hypertension Brother     Diabetes Maternal Grandmother     Lung cancer Maternal Grandfather     Stroke Paternal Grandmother     Hypertension Paternal Grandmother     Cirrhosis Paternal Grandfather     Ovarian cancer Maternal Aunt     No Known Problems Daughter     No Known Problems Paternal Aunt     Colon cancer Neg Hx      Social History     Socioeconomic History    Marital status:      Spouse name: None    Number of children: None    Years of education: None    Highest education level: None   Occupational History    None   Tobacco Use    Smoking status: Never    Smokeless tobacco: Never   Vaping Use    Vaping Use: Never used   Substance and Sexual Activity    Alcohol use: Not Currently Comment: social/prior to knowledge of pregnancy    Drug use: Never    Sexual activity: Yes     Partners: Male     Birth control/protection: Male Sterilization   Other Topics Concern    None   Social History Narrative    Coffee - denied    History of daily cola consumption (___ Cans/Day) - denied    History of daily tea consumption (___ Cups/Day) - denied    Lack of exercise    Marital History - Currently  - [facility]    Jehovah's [employer] Affiliation Orthodoxy     Social Determinants of Health     Financial Resource Strain: Not on file   Food Insecurity: Not on file   Transportation Needs: Not on file   Physical Activity: Not on file   Stress: Not on file   Social Connections: Not on file   Intimate Partner Violence: Not on file   Housing Stability: Not on file     Current Outpatient Medications on File Prior to Visit   Medication Sig    Probiotic Product (PROBIOTIC ADVANCED PO) Take 1 capsule by mouth Daily at 2am    Bilberry, Vaccinium myrtillus, (BILBERRY EXTRACT PO) Take 1 capsule by mouth in the morning    cholecalciferol (VITAMIN D3) 1,000 units tablet Take 5,000 Units by mouth daily     Cod Liver Oil 10 MINIM CAPS Take 1 capsule by mouth Daily at 2am    Licorice, Glycyrrhiza glabra, (LICORICE ROOT PO) Take 1 tablet by mouth in the morning    Magnesium 300 MG CAPS Take 1 capsule by mouth Daily at 2am (Patient not taking: Reported on 10/31/2023)    Magnesium Glycinate 100 MG CAPS Take 1 capsule by mouth Daily at 2am    MISC NATURAL PRODUCTS PO Take 1 capsule by mouth in the morning    miSOPROStol (Cytotec) 200 mcg tablet Take 1 tablet by mouth night before procedure and 1 morning of procedure. If unable to tolerate orally insert tablet in vagina near cervix. Keep additional 2 pills for later use.  (Patient not taking: Reported on 10/11/2023)    Pregnenolone Micronized (PREGNENOLONE PO) Take 1 tablet by mouth Daily at 2am Taking 30 mg daily    Quercetin 500 MG CAPS Take 500 mg by mouth in the morning    Resveratrol 250 MG CAPS Take 1 capsule by mouth Daily at 2am     Allergies   Allergen Reactions    Amoxicillin Hives    Cymbalta [Duloxetine Hcl] Other (See Comments)     Shaking      Penicillins Hives    Shellfish Allergy - Food Allergy Hives    Sulfa Antibiotics Rash     Immunization History   Administered Date(s) Administered    COVID-19 PFIZER VACCINE 0.3 ML IM 04/08/2021, 04/30/2021    INFLUENZA 10/17/2020, 10/15/2021, 11/22/2022    Influenza, injectable, quadrivalent, preservative free 0.5 mL 10/17/2020    Tdap 06/19/2011       Objective     /94 (BP Location: Left arm, Patient Position: Sitting, Cuff Size: Standard)   Pulse 97   Temp 98.2 °F (36.8 °C) (Oral)   Resp 18   Ht 5' 5" (1.651 m)   Wt 73.5 kg (162 lb)   LMP 09/27/2023 (Approximate)   SpO2 98%   BMI 26.96 kg/m²     Physical Exam  Cardiovascular:      Heart sounds: Normal heart sounds. Pulmonary:      Effort: Pulmonary effort is normal.      Breath sounds: Normal breath sounds. Musculoskeletal:      Comments: Angle of rib T6 on thye right - tender - reproduces the pain with palpation. T6 flexed. Skin:     General: Skin is warm and dry. Neurological:      Mental Status: She is alert and oriented to person, place, and time. Psychiatric:         Mood and Affect: Mood normal.         Behavior: Behavior normal.         Thought Content:  Thought content normal.         Judgment: Judgment normal.       Peyman Lines, DO

## 2023-10-31 NOTE — PROGRESS NOTES
OMT    Performed by: Lianna Kelly DO  Authorized by: Lianna Kelly DO  Universal Protocol:  Consent: Verbal consent obtained. Consent given by: patient      Procedure Details:     Region evaluated and treated:  Ribs and Thoracic    Thoracic Information  Thoracic Region: T5 - T9  Thoracic T5 - T9 details:     Examination Method:  Asymmetry, Misalignment, Crepitation, Defects, Masses and Tenderness, Pain    Severity:  Mild    Treatment Method:  Soft Tissue Treatment and High Velocity, Low Amplitude Treatment    Response:  Resolved - The somatic dysfunction is completely resolved without evidence of it ever having been present. Ribs details:     Examination Method:  Asymmetry, Misalignment, Crepitation, Defects, Masses and Tenderness, Pain    Severity:  Mild    Treatment Method:  High Velocity, Low Amplitude Treatment and Soft Tissue Treatment    Response:  Resolved - The somatic dysfunction is completely resolved without evidence of it ever having been present.     Total Regions Treated:  2

## 2023-11-05 ENCOUNTER — OFFICE VISIT (OUTPATIENT)
Dept: URGENT CARE | Facility: CLINIC | Age: 48
End: 2023-11-05
Payer: COMMERCIAL

## 2023-11-05 VITALS
HEIGHT: 60 IN | BODY MASS INDEX: 31.41 KG/M2 | SYSTOLIC BLOOD PRESSURE: 144 MMHG | OXYGEN SATURATION: 98 % | WEIGHT: 160 LBS | HEART RATE: 83 BPM | RESPIRATION RATE: 18 BRPM | DIASTOLIC BLOOD PRESSURE: 70 MMHG | TEMPERATURE: 97.7 F

## 2023-11-05 DIAGNOSIS — B02.9 HERPES ZOSTER WITHOUT COMPLICATION: Primary | ICD-10-CM

## 2023-11-05 PROCEDURE — 99214 OFFICE O/P EST MOD 30 MIN: CPT | Performed by: NURSE PRACTITIONER

## 2023-11-05 RX ORDER — ACYCLOVIR 50 MG/G
OINTMENT TOPICAL EVERY 6 HOURS PRN
Qty: 15 G | Refills: 0 | Status: SHIPPED | OUTPATIENT
Start: 2023-11-05

## 2023-11-05 RX ORDER — ACYCLOVIR 800 MG/1
800 TABLET ORAL 3 TIMES DAILY
Qty: 15 TABLET | Refills: 0 | Status: SHIPPED | OUTPATIENT
Start: 2023-11-05 | End: 2023-11-10

## 2023-11-05 NOTE — PROGRESS NOTES
=  [facility]        NAME: Lesia Moncada is a 53 y.o. female  : 1975    MRN: [id number]  DATE: 2023  TIME: 11:06 AM    Assessment and Plan   Herpes zoster without complication [W57.0]  1. Herpes zoster without complication  acyclovir (ZOVIRAX) 800 mg tablet    acyclovir (ZOVIRAX) 5 % ointment            Patient Instructions       Follow up with PCP in 3-5 days. Proceed to  ER if symptoms worsen. You appear to have shingles. You have been prescribed acyclovir tabs and cream - use as directed  You are to avoid any one who is pregnant, immunocompromised. You are to follow up with your PCP as discussed  Go to the ED if symptoms worsen  Take tylenol or motrin for pain      Chief Complaint     Chief Complaint   Patient presents with    Rash     Patient c/o painful rash in between breasts that started a few days ago. History of Present Illness       This is a 53year old female who states had some upper back pain along her bra line a few days ago and noted 2 pimples at the area. She states that she then developed a pimple like lesion between her breasts. She went to her doctor and was told that it did not look like shingles but had a back adjustment for back pain. She states that she did not show him the pimple between her breasts but he did see the ones on her back and told her it did not look like shingles. She states she can say that she had pain, burning before the pimples on the back developed and also states that she had the same pain, itching and burning between the breasts then developed several lesions. She states she did try to pop the middle one. She states she has a hx of shingles and is concerned it is shingles. She denies pregnancy or breast feeding. She states that when the air hits it, the pain is worse. She states she covers the area and it feels better. Review of Systems   Review of Systems   Constitutional: Negative. HENT: Negative. Eyes: Negative. Respiratory: Negative. Cardiovascular: Negative. Gastrointestinal: Negative. Endocrine: Negative. Genitourinary: Negative. Musculoskeletal:  Positive for back pain. Skin:  Positive for rash. Allergic/Immunologic: Negative. Neurological: Negative. Hematological: Negative. Psychiatric/Behavioral: Negative. Current Medications       Current Outpatient Medications:     acyclovir (ZOVIRAX) 5 % ointment, Apply topically every 6 (six) hours as needed (for pain, itching of lesions), Disp: 15 g, Rfl: 0    acyclovir (ZOVIRAX) 800 mg tablet, Take 1 tablet (800 mg total) by mouth 3 (three) times a day for 5 days, Disp: 15 tablet, Rfl: 0    Magnesium 300 MG CAPS, Take 1 capsule by mouth Daily at 2am, Disp: , Rfl:     Probiotic Product (PROBIOTIC ADVANCED PO), Take 1 capsule by mouth Daily at 2am, Disp: , Rfl:     Bilberry, Vaccinium myrtillus, (BILBERRY EXTRACT PO), Take 1 capsule by mouth in the morning, Disp: , Rfl:     cholecalciferol (VITAMIN D3) 1,000 units tablet, Take 5,000 Units by mouth daily , Disp: , Rfl:     Cod Liver Oil 10 MINIM CAPS, Take 1 capsule by mouth Daily at 2am, Disp: , Rfl:     Licorice, Glycyrrhiza glabra, (LICORICE ROOT PO), Take 1 tablet by mouth in the morning, Disp: , Rfl:     Magnesium Glycinate 100 MG CAPS, Take 1 capsule by mouth Daily at 2am, Disp: , Rfl:     MISC NATURAL PRODUCTS PO, Take 1 capsule by mouth in the morning, Disp: , Rfl:     miSOPROStol (Cytotec) 200 mcg tablet, Take 1 tablet by mouth night before procedure and 1 morning of procedure. If unable to tolerate orally insert tablet in vagina near cervix. Keep additional 2 pills for later use.  (Patient not taking: Reported on 10/11/2023), Disp: 4 tablet, Rfl: 0    Pregnenolone Micronized (PREGNENOLONE PO), Take 1 tablet by mouth Daily at 2am Taking 30 mg daily, Disp: , Rfl:     Quercetin 500 MG CAPS, Take 500 mg by mouth in the morning, Disp: , Rfl:     Resveratrol 250 MG CAPS, Take 1 capsule by mouth Daily at 2am, Disp: , Rfl:     Current Allergies     Allergies as of 2023 - Reviewed 2023   Allergen Reaction Noted    Amoxicillin Hives 10/24/2012    Cymbalta [duloxetine hcl] Other (See Comments) 2021    Penicillins Hives 10/24/2012    Shellfish allergy - food allergy Hives 2013    Sulfa antibiotics Rash 2013            The following portions of the patient's history were reviewed and updated as appropriate: allergies, current medications, past family history, past medical history, past social history, past surgical history and problem list.     Past Medical History:   Diagnosis Date    Abnormal Pap smear of cervix     Anemia     Disease of thyroid gland     thyroid storm with previous pregnancy    Dysmenorrhea     Esophageal reflux     last assessed - [id number]    Female infertility     Herpes zoster     LUQ level T-8 resolved; last assessed - 2015    History of infection due to human papilloma virus (HPV)     HPV (human papilloma virus) infection     Inguinal hernia     last assessed - [id number]    Insomnia     Liver disease 2024    Mastodynia     Resolved -     Migraine     Miscarriage     Ovarian cyst     last assessed - [id number]    Ovarian cyst     Palpitations     last assessed - [id number]    Tachycardia     Resolved - [id number]    Takes dietary supplements     Temporomandibular joint disorder     last assessed - [id number]    Urinary tract infection     Varicella        Past Surgical History:   Procedure Laterality Date     SECTION      COLONOSCOPY  2012    Fiberoptic    CRYOTHERAPY      cervix, age 24    DIAGNOSTIC LAPAROSCOPY      DILATION AND CURETTAGE OF UTERUS      Resolved     GYNECOLOGIC CRYOSURGERY      age 24    TOOTH EXTRACTION         Family History   Problem Relation Age of Onset    Diabetes Mother     Heart disease Mother         cardiac disorder    Gallbladder disease Mother     Hypertension Mother Glaucoma Mother     Obesity Mother         hx gastric bypass    Stroke Mother     Thyroid disease Mother     Diabetes Father     Heart disease Father         cardiac disorder    Hypertension Father     Hyperlipidemia Father         Pure Hypercholesterolemia    Obesity Father     Other Family         headache syndromes    Breast cancer Cousin     Post-traumatic stress disorder Brother     Hyperlipidemia Brother     Hypertension Brother     Diabetes Maternal Grandmother     Lung cancer Maternal Grandfather     Stroke Paternal Grandmother     Hypertension Paternal Grandmother     Cirrhosis Paternal Grandfather     Ovarian cancer Maternal Aunt     No Known Problems Daughter     No Known Problems Paternal Aunt     Colon cancer Neg Hx          Medications have been verified. Objective   /70   Pulse 83   Temp 97.7 °F (36.5 °C) (Temporal)   Resp 18   Ht 5' (1.524 m)   Wt 72.6 kg (160 lb)   LMP 11/03/2023 (Exact Date)   SpO2 98%   BMI 31.25 kg/m²   Patient's last menstrual period was 11/03/2023 (exact date). Physical Exam     Physical Exam  Vitals and nursing note reviewed. Constitutional:       General: She is not in acute distress. Appearance: Normal appearance. She is normal weight. She is not ill-appearing, toxic-appearing or diaphoretic. HENT:      Head: Normocephalic and atraumatic. Nose: Nose normal.      Mouth/Throat:      Mouth: Mucous membranes are moist.   Eyes:      Extraocular Movements: Extraocular movements intact. Cardiovascular:      Rate and Rhythm: Normal rate. Pulses: Normal pulses. Pulmonary:      Effort: Pulmonary effort is normal.   Musculoskeletal:         General: Normal range of motion. Cervical back: Normal range of motion. Skin:     General: Skin is warm. Capillary Refill: Capillary refill takes less than 2 seconds. Findings: Lesion and rash present. Neurological:      General: No focal deficit present.       Mental Status: She is alert and oriented to person, place, and time. Psychiatric:         Mood and Affect: Mood normal.         Behavior: Behavior normal.         Thought Content:  Thought content normal.         Judgment: Judgment normal.

## 2023-11-05 NOTE — PATIENT INSTRUCTIONS
You appear to have shingles. You have been prescribed acyclovir tabs and cream - use as directed  You are to avoid any one who is pregnant, immunocompromised.   You are to follow up with your PCP as discussed  Go to the ED if symptoms worsen  Take tylenol or motrin for pain

## 2023-11-06 ENCOUNTER — OFFICE VISIT (OUTPATIENT)
Dept: FAMILY MEDICINE CLINIC | Facility: CLINIC | Age: 48
End: 2023-11-06
Payer: COMMERCIAL

## 2023-11-06 VITALS
WEIGHT: 160.8 LBS | OXYGEN SATURATION: 98 % | SYSTOLIC BLOOD PRESSURE: 118 MMHG | HEIGHT: 60 IN | TEMPERATURE: 98.1 F | HEART RATE: 78 BPM | BODY MASS INDEX: 31.57 KG/M2 | RESPIRATION RATE: 18 BRPM | DIASTOLIC BLOOD PRESSURE: 82 MMHG

## 2023-11-06 DIAGNOSIS — B00.9 HERPES: Primary | ICD-10-CM

## 2023-11-06 PROCEDURE — 99213 OFFICE O/P EST LOW 20 MIN: CPT | Performed by: FAMILY MEDICINE

## 2023-11-06 RX ORDER — GABAPENTIN 100 MG/1
100 CAPSULE ORAL 3 TIMES DAILY
Qty: 30 CAPSULE | Refills: 1 | Status: SHIPPED | OUTPATIENT
Start: 2023-11-06

## 2023-11-06 NOTE — PROGRESS NOTES
Name: Kwadwo Martin      : 1975      MRN: [id number]  Encounter Provider: Nuha Freed DO  Encounter Date: 2023   Encounter department: [facility]    Assessment & Plan     No occlusive dressings, soft cotton cloth between breasts. BMI Counseling: Body mass index is 31.4 kg/m². The BMI is above normal. Nutrition recommendations include reducing portion sizes, consuming healthier snacks, and moderation in carbohydrate intake. Chief Complaint   Patient presents with    Urgent care follow up Herpes zoster without complication      1. Herpes  -     gabapentin (Neurontin) 100 mg capsule; Take 1 capsule (100 mg total) by mouth 3 (three) times a day           Subjective     Burning, stinging, itching rast between breasts started about one week ago. Lesions cross midline. Review of Systems   Constitutional: Negative. HENT: Negative. Eyes: Negative. Respiratory: Negative. Cardiovascular: Negative. Gastrointestinal: Negative. Genitourinary: Negative. Musculoskeletal: Negative. Skin:  Positive for rash. Neurological: Negative. Psychiatric/Behavioral: Negative.          Past Medical History:   Diagnosis Date    Abnormal Pap smear of cervix     Anemia     Disease of thyroid gland     thyroid storm with previous pregnancy    Dysmenorrhea     Esophageal reflux     last assessed - [id number]    Female infertility     Herpes zoster     LUQ level T-8 resolved; last assessed - 2015    History of infection due to human papilloma virus (HPV)     HPV (human papilloma virus) infection     Inguinal hernia     last assessed - [id number]    Insomnia     Liver disease 2024    Mastodynia     Resolved -     Migraine     Miscarriage     Ovarian cyst     last assessed - [id number]    Ovarian cyst 2013    Palpitations     last assessed - [id number]    Tachycardia     Resolved - [id number]    Takes dietary supplements     Temporomandibular joint disorder     last assessed - [id number]    Urinary tract infection     Varicella      Past Surgical History:   Procedure Laterality Date     SECTION      COLONOSCOPY  2012    Fiberoptic    CRYOTHERAPY      cervix, age 24    DIAGNOSTIC LAPAROSCOPY      DILATION AND CURETTAGE OF UTERUS      Resolved 2010    GYNECOLOGIC CRYOSURGERY      age 24    TOOTH EXTRACTION       Family History   Problem Relation Age of Onset    Diabetes Mother     Heart disease Mother         cardiac disorder    Gallbladder disease Mother     Hypertension Mother     Glaucoma Mother     Obesity Mother         hx gastric bypass    Stroke Mother     Thyroid disease Mother     Diabetes Father     Heart disease Father         cardiac disorder    Hypertension Father     Hyperlipidemia Father         Pure Hypercholesterolemia    Obesity Father     Other Family         headache syndromes    Breast cancer Cousin     Post-traumatic stress disorder Brother     Hyperlipidemia Brother     Hypertension Brother     Diabetes Maternal Grandmother     Lung cancer Maternal Grandfather     Stroke Paternal Grandmother     Hypertension Paternal Grandmother     Cirrhosis Paternal Grandfather     Ovarian cancer Maternal Aunt     No Known Problems Daughter     No Known Problems Paternal Aunt     Colon cancer Neg Hx      Social History     Socioeconomic History    Marital status:      Spouse name: None    Number of children: None    Years of education: None    Highest education level: None   Occupational History    None   Tobacco Use    Smoking status: Never    Smokeless tobacco: Never   Vaping Use    Vaping Use: Never used   Substance and Sexual Activity    Alcohol use: Not Currently     Comment: social/prior to knowledge of pregnancy    Drug use: Never    Sexual activity: Yes     Partners: Male     Birth control/protection: Male Sterilization   Other Topics Concern    None   Social History Narrative    Coffee - denied    History of daily cola consumption (___ Cans/Day) - denied    History of daily tea consumption (___ Cups/Day) - denied    Lack of exercise    Marital History - Currently  - per [employer]    Christian Affiliation Catholic     Social Determinants of Health     Financial Resource Strain: Not on file   Food Insecurity: Not on file   Transportation Needs: Not on file   Physical Activity: Not on file   Stress: Not on file   Social Connections: Not on file   Intimate Partner Violence: Not on file   Housing Stability: Not on file     Current Outpatient Medications on File Prior to Visit   Medication Sig    acyclovir (ZOVIRAX) 5 % ointment Apply topically every 6 (six) hours as needed (for pain, itching of lesions)    acyclovir (ZOVIRAX) 800 mg tablet Take 1 tablet (800 mg total) by mouth 3 (three) times a day for 5 days    Magnesium 300 MG CAPS Take 1 capsule by mouth Daily at 2am    Probiotic Product (PROBIOTIC ADVANCED PO) Take 1 capsule by mouth Daily at 2am    miSOPROStol (Cytotec) 200 mcg tablet Take 1 tablet by mouth night before procedure and 1 morning of procedure. If unable to tolerate orally insert tablet in vagina near cervix. Keep additional 2 pills for later use.  (Patient not taking: Reported on 10/11/2023)    [DISCONTINUED] Bilberry, Vaccinium myrtillus, (BILBERRY EXTRACT PO) Take 1 capsule by mouth in the morning    [DISCONTINUED] cholecalciferol (VITAMIN D3) 1,000 units tablet Take 5,000 Units by mouth daily     [DISCONTINUED] Cod Liver Oil 10 MINIM CAPS Take 1 capsule by mouth Daily at 2am    [DISCONTINUED] Licorice, Glycyrrhiza glabra, (LICORICE ROOT PO) Take 1 tablet by mouth in the morning    [DISCONTINUED] Magnesium Glycinate 100 MG CAPS Take 1 capsule by mouth Daily at 2am    [DISCONTINUED] MISC NATURAL PRODUCTS PO Take 1 capsule by mouth in the morning    [DISCONTINUED] Pregnenolone Micronized (PREGNENOLONE PO) Take 1 tablet by mouth Daily at 2am Taking 30 mg daily    [DISCONTINUED] Quercetin 500 MG CAPS Take 500 mg by mouth in the morning    [DISCONTINUED] Resveratrol 250 MG CAPS Take 1 capsule by mouth Daily at 2am     Allergies   Allergen Reactions    Amoxicillin Hives    Cymbalta [Duloxetine Hcl] Other (See Comments)     Shaking      Penicillins Hives    Shellfish Allergy - Food Allergy Hives    Sulfa Antibiotics Rash     Immunization History   Administered Date(s) Administered    COVID-19 PFIZER VACCINE 0.3 ML IM 04/08/2021, 04/30/2021    INFLUENZA 10/17/2020, 10/15/2021, 11/22/2022    Influenza, injectable, quadrivalent, preservative free 0.5 mL 10/17/2020    Tdap 06/19/2011       Objective     /82 (BP Location: Left arm, Patient Position: Sitting, Cuff Size: Large)   Pulse 78   Temp 98.1 °F (36.7 °C) (Oral)   Resp 18   Ht 5' (1.524 m)   Wt 72.9 kg (160 lb 12.8 oz)   LMP 11/03/2023 (Exact Date)   SpO2 98%   BMI 31.40 kg/m²     Physical Exam  Constitutional:       Appearance: She is well-developed. HENT:      Head: Normocephalic and atraumatic. Right Ear: External ear normal.      Left Ear: External ear normal.      Nose: Nose normal.      Mouth/Throat:      Mouth: Mucous membranes are moist.      Pharynx: Oropharynx is clear. Eyes:      Conjunctiva/sclera: Conjunctivae normal.      Pupils: Pupils are equal, round, and reactive to light. Cardiovascular:      Rate and Rhythm: Normal rate and regular rhythm. Heart sounds: Normal heart sounds. Pulmonary:      Effort: Pulmonary effort is normal.      Breath sounds: Normal breath sounds. Abdominal:      General: Bowel sounds are normal.      Palpations: Abdomen is soft. Musculoskeletal:      Cervical back: Normal range of motion and neck supple. Comments: Blister's both side of midline, cleavage area. Skin:     General: Skin is warm and dry. Findings: Rash present. Comments: Posterior thorax clear. Small lesions both side of midline. Neurological:      Mental Status: She is alert and oriented to person, place, and time. Deep Tendon Reflexes: Reflexes are normal and symmetric. Psychiatric:         Mood and Affect: Mood normal.         Behavior: Behavior normal.         Thought Content:  Thought content normal.         Judgment: Judgment normal.       Tara Ludwig, DO

## 2023-11-16 ENCOUNTER — HOSPITAL ENCOUNTER (OUTPATIENT)
Dept: MAMMOGRAPHY | Facility: HOSPITAL | Age: 48
End: 2023-11-16
Payer: COMMERCIAL

## 2023-11-16 DIAGNOSIS — Z12.31 ENCOUNTER FOR SCREENING MAMMOGRAM FOR BREAST CANCER: ICD-10-CM

## 2023-11-16 DIAGNOSIS — Z01.419 ROUTINE GYNECOLOGICAL EXAMINATION: ICD-10-CM

## 2023-11-16 PROCEDURE — 77067 SCR MAMMO BI INCL CAD: CPT

## 2023-11-16 PROCEDURE — 77063 BREAST TOMOSYNTHESIS BI: CPT

## 2023-11-17 ENCOUNTER — APPOINTMENT (EMERGENCY)
Dept: RADIOLOGY | Facility: HOSPITAL | Age: 48
End: 2023-11-17
Payer: COMMERCIAL

## 2023-11-17 ENCOUNTER — OFFICE VISIT (OUTPATIENT)
Dept: URGENT CARE | Facility: CLINIC | Age: 48
End: 2023-11-17
Payer: COMMERCIAL

## 2023-11-17 ENCOUNTER — APPOINTMENT (EMERGENCY)
Dept: CT IMAGING | Facility: HOSPITAL | Age: 48
End: 2023-11-17
Payer: COMMERCIAL

## 2023-11-17 ENCOUNTER — HOSPITAL ENCOUNTER (EMERGENCY)
Facility: HOSPITAL | Age: 48
Discharge: HOME | End: 2023-11-17
Attending: FAMILY MEDICINE
Payer: COMMERCIAL

## 2023-11-17 VITALS
TEMPERATURE: 97.6 F | BODY MASS INDEX: 31.41 KG/M2 | DIASTOLIC BLOOD PRESSURE: 80 MMHG | OXYGEN SATURATION: 99 % | WEIGHT: 160 LBS | HEART RATE: 97 BPM | RESPIRATION RATE: 18 BRPM | HEIGHT: 60 IN | SYSTOLIC BLOOD PRESSURE: 130 MMHG

## 2023-11-17 VITALS
SYSTOLIC BLOOD PRESSURE: 160 MMHG | DIASTOLIC BLOOD PRESSURE: 83 MMHG | TEMPERATURE: 98.1 F | RESPIRATION RATE: 14 BRPM | OXYGEN SATURATION: 98 % | HEART RATE: 79 BPM

## 2023-11-17 DIAGNOSIS — R07.89 CHEST PRESSURE: Primary | ICD-10-CM

## 2023-11-17 DIAGNOSIS — K52.9 COLITIS: Primary | ICD-10-CM

## 2023-11-17 DIAGNOSIS — R07.89 ATYPICAL CHEST PAIN: ICD-10-CM

## 2023-11-17 LAB
ANION GAP SERPL CALCULATED.3IONS-SCNC: 8 MMOL/L
BASOPHILS # BLD AUTO: 0.04 THOUSANDS/ÂΜL (ref 0–0.1)
BASOPHILS NFR BLD AUTO: 1 % (ref 0–1)
BUN SERPL-MCNC: 13 MG/DL (ref 5–25)
CALCIUM SERPL-MCNC: 9.3 MG/DL (ref 8.4–10.2)
CARDIAC TROPONIN I PNL SERPL HS: <2 NG/L
CHLORIDE SERPL-SCNC: 103 MMOL/L (ref 96–108)
CO2 SERPL-SCNC: 27 MMOL/L (ref 21–32)
CREAT SERPL-MCNC: 0.74 MG/DL (ref 0.6–1.3)
EOSINOPHIL # BLD AUTO: 0.23 THOUSAND/ÂΜL (ref 0–0.61)
EOSINOPHIL NFR BLD AUTO: 3 % (ref 0–6)
ERYTHROCYTE [DISTWIDTH] IN BLOOD BY AUTOMATED COUNT: 11.7 % (ref 11.6–15.1)
GFR SERPL CREATININE-BSD FRML MDRD: 96 ML/MIN/1.73SQ M
GLUCOSE SERPL-MCNC: 88 MG/DL (ref 65–140)
HCT VFR BLD AUTO: 43.7 % (ref 34.8–46.1)
HGB BLD-MCNC: 14.7 G/DL (ref 11.5–15.4)
IMM GRANULOCYTES # BLD AUTO: 0.01 THOUSAND/UL (ref 0–0.2)
IMM GRANULOCYTES NFR BLD AUTO: 0 % (ref 0–2)
LYMPHOCYTES # BLD AUTO: 2.46 THOUSANDS/ÂΜL (ref 0.6–4.47)
LYMPHOCYTES NFR BLD AUTO: 30 % (ref 14–44)
MCH RBC QN AUTO: 30.1 PG (ref 26.8–34.3)
MCHC RBC AUTO-ENTMCNC: 33.6 G/DL (ref 31.4–37.4)
MCV RBC AUTO: 89 FL (ref 82–98)
MONOCYTES # BLD AUTO: 0.66 THOUSAND/ÂΜL (ref 0.17–1.22)
MONOCYTES NFR BLD AUTO: 8 % (ref 4–12)
NEUTROPHILS # BLD AUTO: 4.83 THOUSANDS/ÂΜL (ref 1.85–7.62)
NEUTS SEG NFR BLD AUTO: 58 % (ref 43–75)
NRBC BLD AUTO-RTO: 0 /100 WBCS
PLATELET # BLD AUTO: 294 THOUSANDS/UL (ref 149–390)
PMV BLD AUTO: 10.5 FL (ref 8.9–12.7)
POTASSIUM SERPL-SCNC: 3.7 MMOL/L (ref 3.5–5.3)
RBC # BLD AUTO: 4.89 MILLION/UL (ref 3.81–5.12)
SARS-COV-2 AG UPPER RESP QL IA: NEGATIVE
SODIUM SERPL-SCNC: 138 MMOL/L (ref 135–147)
VALID CONTROL: NORMAL
WBC # BLD AUTO: 8.23 THOUSAND/UL (ref 4.31–10.16)

## 2023-11-17 PROCEDURE — 93005 ELECTROCARDIOGRAM TRACING: CPT | Performed by: NURSE PRACTITIONER

## 2023-11-17 PROCEDURE — 85025 COMPLETE CBC W/AUTO DIFF WBC: CPT | Performed by: FAMILY MEDICINE

## 2023-11-17 PROCEDURE — 71045 X-RAY EXAM CHEST 1 VIEW: CPT

## 2023-11-17 PROCEDURE — 99285 EMERGENCY DEPT VISIT HI MDM: CPT | Performed by: FAMILY MEDICINE

## 2023-11-17 PROCEDURE — 99213 OFFICE O/P EST LOW 20 MIN: CPT | Performed by: NURSE PRACTITIONER

## 2023-11-17 PROCEDURE — 36415 COLL VENOUS BLD VENIPUNCTURE: CPT | Performed by: FAMILY MEDICINE

## 2023-11-17 PROCEDURE — 74177 CT ABD & PELVIS W/CONTRAST: CPT

## 2023-11-17 PROCEDURE — 80048 BASIC METABOLIC PNL TOTAL CA: CPT | Performed by: FAMILY MEDICINE

## 2023-11-17 PROCEDURE — G1004 CDSM NDSC: HCPCS

## 2023-11-17 PROCEDURE — 84484 ASSAY OF TROPONIN QUANT: CPT | Performed by: FAMILY MEDICINE

## 2023-11-17 PROCEDURE — 96375 TX/PRO/DX INJ NEW DRUG ADDON: CPT

## 2023-11-17 PROCEDURE — 93005 ELECTROCARDIOGRAM TRACING: CPT

## 2023-11-17 PROCEDURE — 96374 THER/PROPH/DIAG INJ IV PUSH: CPT

## 2023-11-17 PROCEDURE — 99285 EMERGENCY DEPT VISIT HI MDM: CPT

## 2023-11-17 PROCEDURE — C9113 INJ PANTOPRAZOLE SODIUM, VIA: HCPCS | Performed by: FAMILY MEDICINE

## 2023-11-17 PROCEDURE — 87811 SARS-COV-2 COVID19 W/OPTIC: CPT | Performed by: NURSE PRACTITIONER

## 2023-11-17 RX ORDER — ONDANSETRON 2 MG/ML
4 INJECTION INTRAMUSCULAR; INTRAVENOUS ONCE
Status: COMPLETED | OUTPATIENT
Start: 2023-11-17 | End: 2023-11-17

## 2023-11-17 RX ORDER — PANTOPRAZOLE SODIUM 20 MG/1
20 TABLET, DELAYED RELEASE ORAL DAILY
Qty: 20 TABLET | Refills: 0 | Status: SHIPPED | OUTPATIENT
Start: 2023-11-17

## 2023-11-17 RX ORDER — ONDANSETRON 4 MG/1
4 TABLET, FILM COATED ORAL EVERY 6 HOURS
Qty: 12 TABLET | Refills: 0 | Status: SHIPPED | OUTPATIENT
Start: 2023-11-17

## 2023-11-17 RX ORDER — METRONIDAZOLE 500 MG/1
500 TABLET ORAL EVERY 8 HOURS SCHEDULED
Qty: 15 TABLET | Refills: 0 | Status: SHIPPED | OUTPATIENT
Start: 2023-11-17 | End: 2023-11-22

## 2023-11-17 RX ORDER — CIPROFLOXACIN 500 MG/1
500 TABLET, FILM COATED ORAL 2 TIMES DAILY
Qty: 10 TABLET | Refills: 0 | Status: SHIPPED | OUTPATIENT
Start: 2023-11-17 | End: 2023-11-22

## 2023-11-17 RX ORDER — KETOROLAC TROMETHAMINE 30 MG/ML
30 INJECTION, SOLUTION INTRAMUSCULAR; INTRAVENOUS ONCE
Status: COMPLETED | OUTPATIENT
Start: 2023-11-17 | End: 2023-11-17

## 2023-11-17 RX ORDER — SODIUM CHLORIDE 9 MG/ML
3 INJECTION INTRAVENOUS
Status: DISCONTINUED | OUTPATIENT
Start: 2023-11-17 | End: 2023-11-17 | Stop reason: HOSPADM

## 2023-11-17 RX ORDER — PANTOPRAZOLE SODIUM 40 MG/10ML
40 INJECTION, POWDER, LYOPHILIZED, FOR SOLUTION INTRAVENOUS ONCE
Status: COMPLETED | OUTPATIENT
Start: 2023-11-17 | End: 2023-11-17

## 2023-11-17 RX ADMIN — KETOROLAC TROMETHAMINE 30 MG: 30 INJECTION, SOLUTION INTRAMUSCULAR; INTRAVENOUS at 13:03

## 2023-11-17 RX ADMIN — ONDANSETRON 4 MG: 2 INJECTION INTRAMUSCULAR; INTRAVENOUS at 13:01

## 2023-11-17 RX ADMIN — PANTOPRAZOLE SODIUM 40 MG: 40 INJECTION, POWDER, FOR SOLUTION INTRAVENOUS at 13:04

## 2023-11-17 RX ADMIN — IOHEXOL 100 ML: 350 INJECTION, SOLUTION INTRAVENOUS at 13:15

## 2023-11-17 NOTE — ED PROVIDER NOTES
History  Chief Complaint   Patient presents with    Chest Pain     Describes as pressure, started yesterday, reports worse when laying flat        HPI   46-year-old female presented with complaint of chest pressure nausea and couple episode of diarrhea that started yesterday. Patient was seen in urgent care sent to the ED for further evaluation. States that she is feeling pressure in her epigastric area as well as the lower abdomen. She denies any chest pain. Complain of nausea and episode of diarrhea. Denies any fever chills at this time. Patient states that her family have the same symptoms. Prior to Admission Medications   Prescriptions Last Dose Informant Patient Reported? Taking? Magnesium 300 MG CAPS  Self Yes No   Sig: Take 1 capsule by mouth Daily at 2am   Probiotic Product (PROBIOTIC ADVANCED PO)  Self Yes No   Sig: Take 1 capsule by mouth Daily at 2am   acyclovir (ZOVIRAX) 5 % ointment  Self No No   Sig: Apply topically every 6 (six) hours as needed (for pain, itching of lesions)   acyclovir (ZOVIRAX) 800 mg tablet  Self No No   Sig: Take 1 tablet (800 mg total) by mouth 3 (three) times a day for 5 days   gabapentin (Neurontin) 100 mg capsule   No No   Sig: Take 1 capsule (100 mg total) by mouth 3 (three) times a day   miSOPROStol (Cytotec) 200 mcg tablet  Self No No   Sig: Take 1 tablet by mouth night before procedure and 1 morning of procedure. If unable to tolerate orally insert tablet in vagina near cervix. Keep additional 2 pills for later use.    Patient not taking: Reported on 10/11/2023      Facility-Administered Medications: None       Past Medical History:   Diagnosis Date    Abnormal Pap smear of cervix     Anemia     Disease of thyroid gland     thyroid storm with previous pregnancy    Dysmenorrhea     Esophageal reflux     last assessed - 22Dec2014    Female infertility     Herpes zoster     LUQ level T-8 resolved; last assessed - 12Jan2015    History of infection due to human papilloma virus (HPV)     HPV (human papilloma virus) infection     Inguinal hernia     last assessed - [id number]    Insomnia     Liver disease 2024    Mastodynia     Resolved -     Migraine     Miscarriage     Ovarian cyst     last assessed - [id number]    Ovarian cyst 2013    Palpitations     last assessed - [id number]    Shingles 2023    Tachycardia     Resolved - [id number]    Takes dietary supplements     Temporomandibular joint disorder     last assessed - [id number]    Urinary tract infection     Varicella        Past Surgical History:   Procedure Laterality Date     SECTION      COLONOSCOPY  2012    Fiberoptic    CRYOTHERAPY      cervix, age 24    DIAGNOSTIC LAPAROSCOPY      DILATION AND CURETTAGE OF UTERUS      Resolved     GYNECOLOGIC CRYOSURGERY      age 24    TOOTH EXTRACTION         Family History   Problem Relation Age of Onset    Diabetes Mother     Heart disease Mother         cardiac disorder    Gallbladder disease Mother     Hypertension Mother     Glaucoma Mother     Obesity Mother         hx gastric bypass    Stroke Mother     Thyroid disease Mother     Diabetes Father     Heart disease Father         cardiac disorder    Hypertension Father     Hyperlipidemia Father         Pure Hypercholesterolemia    Obesity Father     Other Family         headache syndromes    Breast cancer Cousin     Post-traumatic stress disorder Brother     Hyperlipidemia Brother     Hypertension Brother     Diabetes Maternal Grandmother     Lung cancer Maternal Grandfather     Stroke Paternal Grandmother     Hypertension Paternal Grandmother     Cirrhosis Paternal Grandfather     Ovarian cancer Maternal Aunt     No Known Problems Daughter     No Known Problems Paternal Aunt     Colon cancer Neg Hx      I have reviewed and agree with the history as documented.     E-Cigarette/Vaping    E-Cigarette Use Never User      E-Cigarette/Vaping Substances    Nicotine No     THC No     CBD No     Flavoring No     Other No     Unknown No      Social History     Tobacco Use    Smoking status: Never    Smokeless tobacco: Never   Vaping Use    Vaping Use: Never used   Substance Use Topics    Alcohol use: Not Currently     Comment: social/prior to knowledge of pregnancy    Drug use: Never       Review of Systems   Constitutional:  Negative for chills and fever. HENT:  Negative for rhinorrhea and sore throat. Eyes:  Negative for visual disturbance. Respiratory:  Negative for cough and shortness of breath. Cardiovascular:  Positive for chest pain. Negative for leg swelling. Gastrointestinal:  Positive for diarrhea and nausea. Negative for abdominal pain and vomiting. Genitourinary:  Negative for dysuria. Musculoskeletal:  Negative for back pain and myalgias. Skin:  Negative for rash. Neurological:  Negative for dizziness and headaches. Psychiatric/Behavioral:  Negative for confusion. All other systems reviewed and are negative. Physical Exam  Physical Exam  Vitals and nursing note reviewed. Constitutional:       Appearance: She is well-developed. HENT:      Head: Normocephalic and atraumatic. Right Ear: External ear normal.      Left Ear: External ear normal.      Nose: Nose normal.      Mouth/Throat:      Mouth: Mucous membranes are moist.      Pharynx: No oropharyngeal exudate. Eyes:      General: No scleral icterus. Right eye: No discharge. Left eye: No discharge. Conjunctiva/sclera: Conjunctivae normal.      Pupils: Pupils are equal, round, and reactive to light. Cardiovascular:      Rate and Rhythm: Normal rate and regular rhythm. Pulses: Normal pulses. Heart sounds: Normal heart sounds. Pulmonary:      Effort: Pulmonary effort is normal. No respiratory distress. Breath sounds: Normal breath sounds. No wheezing. Abdominal:      General: Bowel sounds are normal.      Palpations: Abdomen is soft.    Musculoskeletal:         General: Normal range of motion. Cervical back: Normal range of motion and neck supple. Lymphadenopathy:      Cervical: No cervical adenopathy. Skin:     General: Skin is warm and dry. Capillary Refill: Capillary refill takes less than 2 seconds. Neurological:      General: No focal deficit present. Mental Status: She is alert and oriented to person, place, and time.    Psychiatric:         Mood and Affect: Mood normal.         Behavior: Behavior normal.         Vital Signs  ED Triage Vitals   Temperature Pulse Respirations Blood Pressure SpO2   11/17/23 1141 11/17/23 1141 11/17/23 1141 11/17/23 1141 11/17/23 1141   98.1 °F (36.7 °C) 79 14 160/83 98 %      Temp src Heart Rate Source Patient Position - Orthostatic VS BP Location FiO2 (%)   -- -- -- -- --             Pain Score       11/17/23 1303       1           Vitals:    11/17/23 1141   BP: 160/83   Pulse: 79         Visual Acuity      ED Medications  Medications   sodium chloride (PF) 0.9 % injection 3 mL (has no administration in time range)   ondansetron (ZOFRAN) injection 4 mg (4 mg Intravenous Given 11/17/23 1301)   pantoprazole (PROTONIX) injection 40 mg (40 mg Intravenous Given 11/17/23 1304)   ketorolac (TORADOL) injection 30 mg (30 mg Intravenous Given 11/17/23 1303)   iohexol (OMNIPAQUE) 350 MG/ML injection (MULTI-DOSE) 100 mL (100 mL Intravenous Given 11/17/23 1315)       Diagnostic Studies  Results Reviewed       Procedure Component Value Units Date/Time    HS Troponin 0hr (reflex protocol) [[id number]]  (Normal) Collected: 11/17/23 1205    Lab Status: Final result Specimen: Blood from Arm, Right Updated: 11/17/23 1244     hs TnI 0hr <2 ng/L     Basic metabolic panel [[id number]] Collected: 11/17/23 1205    Lab Status: Final result Specimen: Blood from Arm, Right Updated: 11/17/23 1241     Sodium 138 mmol/L      Potassium 3.7 mmol/L      Chloride 103 mmol/L      CO2 27 mmol/L      ANION GAP 8 mmol/L      BUN 13 mg/dL      Creatinine 0.74 mg/dL Glucose 88 mg/dL      Calcium 9.3 mg/dL      eGFR 96 ml/min/1.73sq m     Narrative:      National Kidney Disease Foundation guidelines for Chronic Kidney Disease (CKD):     Stage 1 with normal or high GFR (GFR > 90 mL/min/1.73 square meters)    Stage 2 Mild CKD (GFR = 60-89 mL/min/1.73 square meters)    Stage 3A Moderate CKD (GFR = 45-59 mL/min/1.73 square meters)    Stage 3B Moderate CKD (GFR = 30-44 mL/min/1.73 square meters)    Stage 4 Severe CKD (GFR = 15-29 mL/min/1.73 square meters)    Stage 5 End Stage CKD (GFR <15 mL/min/1.73 square meters)  Note: GFR calculation is accurate only with a steady state creatinine    CBC and differential [[id number]] Collected: 11/17/23 1205    Lab Status: Final result Specimen: Blood from Arm, Right Updated: 11/17/23 1214     WBC 8.23 Thousand/uL      RBC 4.89 Million/uL      Hemoglobin 14.7 g/dL      Hematocrit 43.7 %      MCV 89 fL      MCH 30.1 pg      MCHC 33.6 g/dL      RDW 11.7 %      MPV 10.5 fL      Platelets 940 Thousands/uL      nRBC 0 /100 WBCs      Neutrophils Relative 58 %      Immat GRANS % 0 %      Lymphocytes Relative 30 %      Monocytes Relative 8 %      Eosinophils Relative 3 %      Basophils Relative 1 %      Neutrophils Absolute 4.83 Thousands/µL      Immature Grans Absolute 0.01 Thousand/uL      Lymphocytes Absolute 2.46 Thousands/µL      Monocytes Absolute 0.66 Thousand/µL      Eosinophils Absolute 0.23 Thousand/µL      Basophils Absolute 0.04 Thousands/µL                    CT abdomen pelvis with contrast   Final Result by Isabell Cortés MD (11/17 8585)      1) Circumferential wall thickening of the distal transverse, descending, and sigmoid colon, unclear whether related to underdistention or colitis. 2) No other acute abdominal or pelvic pathology. 3)Additional findings as above.                   Workstation performed: [id number]         X-ray chest 1 view portable   Final Result by Sameera Urbina MD (11/17 7908)   No acute consolidation or congestion            Workstation performed: [id number]                    Procedures  Procedures         ED Course             HEART Risk Score      Flowsheet Row Most Recent Value   Heart Score Risk Calculator    History 0 Filed at: 11/17/2023 1556   ECG 0 Filed at: 11/17/2023 1556   Age 1 Filed at: 11/17/2023 1556   Risk Factors 0 Filed at: 11/17/2023 1556   Troponin 0 Filed at: 11/17/2023 1556   HEART Score 1 Filed at: 11/17/2023 1556                          SBIRT 22yo+      Flowsheet Row Most Recent Value   Initial Alcohol Screen: US AUDIT-C     1. How often do you have a drink containing alcohol? 0 Filed at: 11/17/2023 1139   2. How many drinks containing alcohol do you have on a typical day you are drinking? 0 Filed at: 11/17/2023 1139   3a. Male UNDER 65: How often do you have five or more drinks on one occasion? 0 Filed at: 11/17/2023 1139   3b. FEMALE Any Age, or MALE 65+: How often do you have 4 or more drinks on one occassion? 0 Filed at: 11/17/2023 1139   Audit-C Score 0 Filed at: 11/17/2023 1139   DEDE: How many times in the past year have you. .. Used an illegal drug or used a prescription medication for non-medical reasons? Never Filed at: 11/17/2023 1139                      Medical Decision Making   27-year-old female presented with complaint of chest pressure nausea and couple episode of diarrhea that started yesterday. She is taken from patient. Differential diagnosis :cholecystitis, appendicitis, SBO, AAA rupture, or other serious pathology. PE, low risk by clinical criteria. Low suspicion for pneumothorax, pneumonia, pericarditis, dissection, or other serious cause of chest pain. Plan: We will obtain labs CBC BMP troponin chest x-ray CT abdomen pelvis. Reviewed within normal limits troponin less than 2 CT shows possible colitis.   The patient is started on Cipro and Flagyl given patient has no fever and no worsening pain tolerating p.o. intake recommended to do clear liquid diet if becomes symptomatic with fever nausea vomiting worsening pain then started the medication. Patient states she is feeling much better after the treatment in the ED. Recommending to continue to have a clear liquid diet increase hydration follow-up with PCP and GI. Disposition discharge home with strict precaution to return to the ED if symptom does not improve or worsen patient verbalized understand plan discharge home. Amount and/or Complexity of Data Reviewed  Labs: ordered. Radiology: ordered. Risk  Prescription drug management. Disposition  Final diagnoses:   Colitis   Atypical chest pain     Time reflects when diagnosis was documented in both MDM as applicable and the Disposition within this note       Time User Action Codes Description Comment    11/17/2023  2:25 PM Edwyna Mar Add [K52.9] Colitis     11/17/2023  4:02 PM Edwyna Mar Add [R07.9] Chest pain     11/17/2023  4:02 PM Edwyna Mar Add [R07.89] Atypical chest pain     11/17/2023  4:02 PM Edwyna Mar Remove [R07.9] Chest pain           ED Disposition       ED Disposition   Discharge    Condition   Stable    Date/Time   Fri Nov 17, 2023 [facility] discharge to home/self care.                    Follow-up Information       Follow up With Specialties Details Why [address] [place] [address], DO Family Medicine Schedule an appointment as soon as possible for a visit in 2 days If symptoms worsen [address]  [phone]              Discharge Medication List as of 11/17/2023  2:27 PM        START taking these medications    Details   ciprofloxacin (CIPRO) 500 mg tablet Take 1 tablet (500 mg total) by mouth 2 (two) times a day for 5 days, Starting Fri 11/17/2023, Until Wed 11/22/2023, Normal      metroNIDAZOLE (FLAGYL) 500 mg tablet Take 1 tablet (500 mg total) by mouth every 8 (eight) hours for 5 days, Starting Fri 11/17/2023, Until Wed 11/22/2023, Normal      ondansetron (ZOFRAN) 4 mg tablet Take 1 tablet (4 mg total) by mouth every 6 (six) hours, Starting Fri 11/17/2023, Normal      pantoprazole (PROTONIX) 20 mg tablet Take 1 tablet (20 mg total) by mouth daily, Starting Fri 11/17/2023, Normal           CONTINUE these medications which have NOT CHANGED    Details   acyclovir (ZOVIRAX) 5 % ointment Apply topically every 6 (six) hours as needed (for pain, itching of lesions), Starting Sun 11/5/2023, Normal      acyclovir (ZOVIRAX) 800 mg tablet Take 1 tablet (800 mg total) by mouth 3 (three) times a day for 5 days, Starting Sun 11/5/2023, Until Fri 11/10/2023, Normal      gabapentin (Neurontin) 100 mg capsule Take 1 capsule (100 mg total) by mouth 3 (three) times a day, Starting Mon 11/6/2023, Normal      Magnesium 300 MG CAPS Take 1 capsule by mouth Daily at 2am, Historical Med      miSOPROStol (Cytotec) 200 mcg tablet Take 1 tablet by mouth night before procedure and 1 morning of procedure. If unable to tolerate orally insert tablet in vagina near cervix.  Keep additional 2 pills for later use., Normal      Probiotic Product (PROBIOTIC ADVANCED PO) Take 1 capsule by mouth Daily at 2am, Historical Med                 PDMP Review         Value Time User    PDMP Reviewed  Yes 3/1/2022  9:12 AM Neena Trujillo MD            ED Provider  Electronically Signed by             Soco Beck MD  11/17/23 4275

## 2023-11-17 NOTE — PROGRESS NOTES
[facility]        NAME: Breana Robles is a 53 y.o. female  : 1975    MRN: [id number]  DATE: 2023  TIME: 10:59 AM    Assessment and Plan   Chest pressure [R07.89]  1. Chest pressure  ECG 12 lead    Poct Covid 19 Rapid Antigen Test    Transfer to other facility        Covid negative     Patient Instructions     Proceed to  ER for further evaluation, patient agreeable to plan of care. Chief Complaint     Chief Complaint   Patient presents with    Pain     Patient c/o pressure on chest and feels like she has to burp but cannot. History of Present Illness       Chest Pain   This is a new problem. The current episode started yesterday. The onset quality is sudden. The problem occurs constantly. The problem has been unchanged. The pain is present in the substernal region. The pain is moderate. The quality of the pain is described as pressure. The pain does not radiate. Associated symptoms include nausea. Pertinent negatives include no abdominal pain, back pain, claudication, cough, diaphoresis, dizziness, fever, headaches, irregular heartbeat, leg pain, lower extremity edema, malaise/fatigue, numbness, orthopnea, palpitations, PND, shortness of breath, sputum production, syncope, vomiting or weakness. The pain is aggravated by nothing. She has tried nothing for the symptoms. The treatment provided no relief. States that she feels like she needs to burp but can't. Review of Systems   Review of Systems   Constitutional:  Negative for chills, diaphoresis, fatigue, fever and malaise/fatigue. HENT: Negative. Respiratory:  Negative for cough, sputum production, chest tightness, shortness of breath, wheezing and stridor. Cardiovascular:  Positive for chest pain. Negative for palpitations, orthopnea, claudication, syncope and PND. Gastrointestinal:  Positive for nausea.  Negative for abdominal distention, abdominal pain, anal bleeding, blood in stool, constipation, diarrhea, rectal pain and vomiting. Genitourinary:  Negative for decreased urine volume, difficulty urinating, dysuria, flank pain, frequency, hematuria and urgency. Musculoskeletal:  Negative for arthralgias, back pain, joint swelling, myalgias, neck pain and neck stiffness. Skin:  Negative for rash. Neurological:  Negative for dizziness, syncope, speech difficulty, weakness, light-headedness, numbness and headaches. Current Medications       Current Outpatient Medications:     Magnesium 300 MG CAPS, Take 1 capsule by mouth Daily at 2am, Disp: , Rfl:     Probiotic Product (PROBIOTIC ADVANCED PO), Take 1 capsule by mouth Daily at 2am, Disp: , Rfl:     acyclovir (ZOVIRAX) 5 % ointment, Apply topically every 6 (six) hours as needed (for pain, itching of lesions), Disp: 15 g, Rfl: 0    acyclovir (ZOVIRAX) 800 mg tablet, Take 1 tablet (800 mg total) by mouth 3 (three) times a day for 5 days, Disp: 15 tablet, Rfl: 0    gabapentin (Neurontin) 100 mg capsule, Take 1 capsule (100 mg total) by mouth 3 (three) times a day, Disp: 30 capsule, Rfl: 1    miSOPROStol (Cytotec) 200 mcg tablet, Take 1 tablet by mouth night before procedure and 1 morning of procedure. If unable to tolerate orally insert tablet in vagina near cervix. Keep additional 2 pills for later use.  (Patient not taking: Reported on 10/11/2023), Disp: 4 tablet, Rfl: 0    Current Allergies     Allergies as of 11/17/2023 - Reviewed 11/17/2023   Allergen Reaction Noted    Amoxicillin Hives 10/24/2012    Cymbalta [duloxetine hcl] Other (See Comments) 02/26/2021    Penicillins Hives 10/24/2012    Shellfish allergy - food allergy Hives 05/07/2013    Sulfa antibiotics Rash 02/25/2013            The following portions of the patient's history were reviewed and updated as appropriate: allergies, current medications, past family history, past medical history, past social history, past surgical history and problem list.     Past Medical History:   Diagnosis Date    Abnormal Pap smear of cervix     Anemia     Disease of thyroid gland     thyroid storm with previous pregnancy    Dysmenorrhea     Esophageal reflux     last assessed - [id number]    Female infertility     Herpes zoster     LUQ level T-8 resolved; last assessed - 2015    History of infection due to human papilloma virus (HPV)     HPV (human papilloma virus) infection     Inguinal hernia     last assessed - [id number]    Insomnia     Liver disease 2024    Mastodynia     Resolved -     Migraine     Miscarriage     Ovarian cyst     last assessed - [id number]    Ovarian cyst 2013    Palpitations     last assessed - [id number]    Tachycardia     Resolved - [id number]    Takes dietary supplements     Temporomandibular joint disorder     last assessed - [id number]    Urinary tract infection     Varicella        Past Surgical History:   Procedure Laterality Date     SECTION      COLONOSCOPY  2012    Fiberoptic    CRYOTHERAPY      cervix, age 24    DIAGNOSTIC LAPAROSCOPY      DILATION AND CURETTAGE OF UTERUS      Resolved     GYNECOLOGIC CRYOSURGERY      age 24    TOOTH EXTRACTION         Family History   Problem Relation Age of Onset    Diabetes Mother     Heart disease Mother         cardiac disorder    Gallbladder disease Mother     Hypertension Mother     Glaucoma Mother     Obesity Mother         hx gastric bypass    Stroke Mother     Thyroid disease Mother     Diabetes Father     Heart disease Father         cardiac disorder    Hypertension Father     Hyperlipidemia Father         Pure Hypercholesterolemia    Obesity Father     Other Family         headache syndromes    Breast cancer Cousin     Post-traumatic stress disorder Brother     Hyperlipidemia Brother     Hypertension Brother     Diabetes Maternal Grandmother     Lung cancer Maternal Grandfather     Stroke Paternal Grandmother     Hypertension Paternal Grandmother     Cirrhosis Paternal Grandfather     Ovarian cancer Maternal Aunt     No Known Problems Daughter     No Known Problems Paternal Aunt     Colon cancer Neg Hx          Medications have been verified. Objective   /80   Pulse 97   Temp 97.6 °F (36.4 °C) (Temporal)   Resp 18   Ht 5' (1.524 m)   Wt 72.6 kg (160 lb)   LMP 11/03/2023 (Exact Date)   SpO2 99%   BMI 31.25 kg/m²   Patient's last menstrual period was 11/03/2023 (exact date). Physical Exam     Physical Exam  Constitutional:       General: She is not in acute distress. Appearance: Normal appearance. She is well-developed. She is not diaphoretic. Cardiovascular:      Rate and Rhythm: Normal rate and regular rhythm. Heart sounds: Normal heart sounds, S1 normal and S2 normal.   Pulmonary:      Effort: Pulmonary effort is normal.      Breath sounds: Normal breath sounds and air entry. Abdominal:      General: Bowel sounds are normal. There is no distension. Palpations: Abdomen is soft. Abdomen is not rigid. There is no mass. Tenderness: There is no abdominal tenderness. There is no right CVA tenderness, left CVA tenderness, guarding or rebound. Negative signs include Fernandez's sign and McBurney's sign. Lymphadenopathy:      Cervical: No cervical adenopathy. Skin:     General: Skin is warm and dry. Neurological:      Mental Status: She is alert and oriented to person, place, and time.

## 2023-11-19 LAB
ATRIAL RATE: 70 BPM
ATRIAL RATE: 74 BPM
P AXIS: 54 DEGREES
P AXIS: 54 DEGREES
PR INTERVAL: 124 MS
PR INTERVAL: 130 MS
QRS AXIS: -1 DEGREES
QRS AXIS: -3 DEGREES
QRSD INTERVAL: 80 MS
QRSD INTERVAL: 84 MS
QT INTERVAL: 382 MS
QT INTERVAL: 396 MS
QTC INTERVAL: 424 MS
QTC INTERVAL: 427 MS
T WAVE AXIS: 43 DEGREES
T WAVE AXIS: 48 DEGREES
VENTRICULAR RATE: 70 BPM
VENTRICULAR RATE: 74 BPM

## 2023-11-19 PROCEDURE — 93010 ELECTROCARDIOGRAM REPORT: CPT | Performed by: INTERNAL MEDICINE

## 2023-11-20 ENCOUNTER — VBI (OUTPATIENT)
Dept: FAMILY MEDICINE CLINIC | Facility: CLINIC | Age: 48
End: 2023-11-20

## 2023-11-20 NOTE — TELEPHONE ENCOUNTER
11/20/23 3:32 PM    Patient contacted post ED visit, second outreach attempt made. Message was left for patient to return a call to the [facility] Department at [facility]: Phone [phone]. Thank you.   Zoraida Kahn MA  [facility]

## 2023-11-20 NOTE — TELEPHONE ENCOUNTER
11/20/23 9:48 AM    Patient contacted post ED visit, first outreach attempt made. Message was left for patient to return a call to the [facility] Department at [facility]: Phone [phone]. Thank you.   Raymon Veliz MA  [facility]

## 2023-11-20 NOTE — LETTER
[facility]  [address]el [address]  [address] [address]    Date: 11/21/23    Rosa M Stewart  [address] [facility]    Dear Rene Avery: Thank you for choosing [facility] emergency department for care. Your primary care provider wants to make sure that your ongoing medical care is being addressed. If you require follow up care as a result of your emergency department visit, there are a few things the practice would like you to know. As part of the network's continuing commitment to caring for our patients, we have added more same day appointments and have extended office hours to meet your medical needs. After hours, on-call physicians are available via your primary care provider's main office line. We encourage you to contact our office prior to seeking treatment to discuss your symptoms with the medical staff. Together, we can determine the correct course of action. A majority of non-emergent conditions such as: common cold, flu-like symptoms, fevers, strains/sprains, dislocations, minor burns, cuts and animal bites can be treated at [facility]. Diagnostic testing is available at some sites. Of course, if you are experiencing a life threatening medical emergency call 911 or proceed directly to the nearest emergency room.     Your nearest [facility] facility is conveniently located at:    [address] Extension offered at most [facility] Now locations  [facility] your spot online at [email or web address] or on the [address] [place] [address] Drive    Sincerely,    [facility]  Dept: [phone]

## 2023-11-21 NOTE — TELEPHONE ENCOUNTER
11/21/23 12:02 PM    Patient contacted post ED visit, phone outreaches were unsuccessful and a MyChart letter has been sent to the patient as follow-up. Thank you.   Mikayla Cedeño MA  [facility]

## 2023-11-21 NOTE — TELEPHONE ENCOUNTER
11/21/23 11:57 AM    Patient contacted post ED visit, third outreach attempt made. Message was left for patient to return a call to either the VBI Department at [facility]: Phone [phone] or the PCP office. Thank you.   Vicente Dodson MA  [facility]

## 2023-11-28 ENCOUNTER — OFFICE VISIT (OUTPATIENT)
Dept: FAMILY MEDICINE CLINIC | Facility: CLINIC | Age: 48
End: 2023-11-28
Payer: COMMERCIAL

## 2023-11-28 VITALS
DIASTOLIC BLOOD PRESSURE: 82 MMHG | BODY MASS INDEX: 32.47 KG/M2 | OXYGEN SATURATION: 100 % | WEIGHT: 165.4 LBS | HEART RATE: 85 BPM | RESPIRATION RATE: 18 BRPM | TEMPERATURE: 98.1 F | HEIGHT: 60 IN | SYSTOLIC BLOOD PRESSURE: 122 MMHG

## 2023-11-28 DIAGNOSIS — G47.19 EXCESSIVE DAYTIME SLEEPINESS: ICD-10-CM

## 2023-11-28 DIAGNOSIS — R53.83 TIREDNESS: ICD-10-CM

## 2023-11-28 DIAGNOSIS — K52.9 COLITIS: Primary | ICD-10-CM

## 2023-11-28 PROCEDURE — 99214 OFFICE O/P EST MOD 30 MIN: CPT | Performed by: FAMILY MEDICINE

## 2023-11-28 NOTE — PROGRESS NOTES
Name: Minesh Broderick      : 1975      MRN: [id number]  Encounter Provider: Ericka Ramires DO  Encounter Date: 2023   Encounter department: [address] [place] [address] with sleep medicine. BMI Counseling: Body mass index is 32.3 kg/m². The BMI is above normal. Nutrition recommendations include reducing portion sizes, consuming healthier snacks, moderation in carbohydrate intake, and increasing intake of lean protein. Chief Complaint   Patient presents with    Herpes Zoster     Follow up    ER follow up 23 for Colitis      1. Colitis  -     Ambulatory Referral to Gastroenterology; Future           Subjective     Seen in ER, presently no chest pressure, GI symptoms decreased. Was recommended to see GI. Didn't take the antibiotics. Had small amt of blood, one time, cleaning on paper after a BM   Rashj on chest cleared, no symptoms. Rosaura Adwoamarkysandro said felt good on viral med. Neg sleep apnea. Is going for more testing. Tired all the time- denies depression or falling asleep during the day. I have spent a total time of 30 minutes on 23 in caring for this patient including Diagnostic results, Patient and family education, and Impressions. Review of Systems   Constitutional:  Positive for fatigue. HENT: Negative. Eyes: Negative. Respiratory: Negative. Cardiovascular: Negative. Gastrointestinal: Negative. Genitourinary: Negative. Musculoskeletal: Negative. Skin: Negative. Neurological: Negative. Psychiatric/Behavioral: Negative.          Past Medical History:   Diagnosis Date    Abnormal Pap smear of cervix     Anemia     Disease of thyroid gland     thyroid storm with previous pregnancy    Dysmenorrhea     Esophageal reflux     last assessed - [id number]    Female infertility     Herpes zoster     LUQ level T-8 resolved; last assessed - 2015    History of infection due to human papilloma virus (HPV)     HPV (human papilloma virus) infection     Inguinal hernia     last assessed - [id number]    Insomnia     Liver disease 2024    Mastodynia     Resolved -     Migraine     Miscarriage     Ovarian cyst     last assessed - [id number]    Ovarian cyst 2013    Palpitations     last assessed - [id number]    Shingles 2023    Tachycardia     Resolved - [id number]    Takes dietary supplements     Temporomandibular joint disorder     last assessed - [id number]    Urinary tract infection     Varicella      Past Surgical History:   Procedure Laterality Date     SECTION      COLONOSCOPY  2012    Fiberoptic    CRYOTHERAPY      cervix, age 24    DIAGNOSTIC LAPAROSCOPY      DILATION AND CURETTAGE OF UTERUS      Resolved     GYNECOLOGIC CRYOSURGERY      age 24    TOOTH EXTRACTION       Family History   Problem Relation Age of Onset    Diabetes Mother     Heart disease Mother         cardiac disorder    Gallbladder disease Mother     Hypertension Mother     Glaucoma Mother     Obesity Mother         hx gastric bypass    Stroke Mother     Thyroid disease Mother     Diabetes Father     Heart disease Father         cardiac disorder    Hypertension Father     Hyperlipidemia Father         Pure Hypercholesterolemia    Obesity Father     Other Family         headache syndromes    Breast cancer Cousin     Post-traumatic stress disorder Brother     Hyperlipidemia Brother     Hypertension Brother     Diabetes Maternal Grandmother     Lung cancer Maternal Grandfather     Stroke Paternal Grandmother     Hypertension Paternal Grandmother     Cirrhosis Paternal Grandfather     Ovarian cancer Maternal Aunt     No Known Problems Daughter     No Known Problems Paternal Aunt     Colon cancer Neg Hx      Social History     Socioeconomic History    Marital status:      Spouse name: None    Number of children: None    Years of education: None    Highest education level: None   Occupational History    None   Tobacco Use    Smoking status: Never    Smokeless tobacco: Never   Vaping Use    Vaping Use: Never used   Substance and Sexual Activity    Alcohol use: Not Currently     Comment: social/prior to knowledge of pregnancy    Drug use: Never    Sexual activity: Yes     Partners: Male     Birth control/protection: Male Sterilization   Other Topics Concern    None   Social History Narrative    Coffee - denied    History of daily cola consumption (___ Cans/Day) - denied    History of daily tea consumption (___ Cups/Day) - denied    Lack of exercise    Marital History - Currently  - per [employer]    Sikh Affiliation Roman Catholic     Social Determinants of Health     Financial Resource Strain: Not on file   Food Insecurity: Not on file   Transportation Needs: Not on file   Physical Activity: Not on file   Stress: Not on file   Social Connections: Not on file   Intimate Partner Violence: Not on file   Housing Stability: Not on file     Current Outpatient Medications on File Prior to Visit   Medication Sig    Magnesium 300 MG CAPS Take 1 capsule by mouth Daily at 2am    ondansetron (ZOFRAN) 4 mg tablet Take 1 tablet (4 mg total) by mouth every 6 (six) hours    pantoprazole (PROTONIX) 20 mg tablet Take 1 tablet (20 mg total) by mouth daily    Probiotic Product (PROBIOTIC ADVANCED PO) Take 1 capsule by mouth Daily at 2am    acyclovir (ZOVIRAX) 5 % ointment Apply topically every 6 (six) hours as needed (for pain, itching of lesions)    acyclovir (ZOVIRAX) 800 mg tablet Take 1 tablet (800 mg total) by mouth 3 (three) times a day for 5 days    gabapentin (Neurontin) 100 mg capsule Take 1 capsule (100 mg total) by mouth 3 (three) times a day    miSOPROStol (Cytotec) 200 mcg tablet Take 1 tablet by mouth night before procedure and 1 morning of procedure. If unable to tolerate orally insert tablet in vagina near cervix. Keep additional 2 pills for later use.  (Patient not taking: Reported on 10/11/2023)     Allergies   Allergen Reactions Amoxicillin Hives    Cymbalta [Duloxetine Hcl] Other (See Comments)     Shaking      Penicillins Hives    Shellfish Allergy - Food Allergy Hives    Sulfa Antibiotics Rash     Immunization History   Administered Date(s) Administered    COVID-19 PFIZER VACCINE 0.3 ML IM 04/08/2021, 04/30/2021    INFLUENZA 10/17/2020, 10/15/2021, 11/22/2022    Influenza, injectable, quadrivalent, preservative free 0.5 mL 10/17/2020    Tdap 06/19/2011       Objective     /82 (BP Location: Left arm, Patient Position: Sitting, Cuff Size: Large)   Pulse 85   Temp 98.1 °F (36.7 °C) (Oral)   Resp 18   Ht 5' (1.524 m)   Wt 75 kg (165 lb 6.4 oz)   LMP 11/03/2023 (Exact Date)   SpO2 100%   BMI 32.30 kg/m²     Physical Exam  Constitutional:       Appearance: She is well-developed. HENT:      Head: Normocephalic and atraumatic. Right Ear: External ear normal.      Left Ear: External ear normal.      Nose: Nose normal.      Mouth/Throat:      Mouth: Mucous membranes are moist.      Pharynx: Oropharynx is clear. Eyes:      Conjunctiva/sclera: Conjunctivae normal.      Pupils: Pupils are equal, round, and reactive to light. Cardiovascular:      Rate and Rhythm: Normal rate and regular rhythm. Heart sounds: Normal heart sounds. Pulmonary:      Effort: Pulmonary effort is normal.      Breath sounds: Normal breath sounds. Abdominal:      General: Bowel sounds are normal.      Palpations: Abdomen is soft. Musculoskeletal:      Cervical back: Normal range of motion and neck supple. Skin:     General: Skin is warm and dry. Neurological:      Mental Status: She is alert and oriented to person, place, and time. Deep Tendon Reflexes: Reflexes are normal and symmetric. Psychiatric:         Mood and Affect: Mood normal.         Behavior: Behavior normal.         Thought Content:  Thought content normal.         Judgment: Judgment normal.       Greg Parks DO

## 2023-12-08 ENCOUNTER — APPOINTMENT (OUTPATIENT)
Dept: LAB | Facility: CLINIC | Age: 48
End: 2023-12-08
Payer: COMMERCIAL

## 2023-12-08 DIAGNOSIS — E55.9 VITAMIN D INSUFFICIENCY: ICD-10-CM

## 2023-12-08 DIAGNOSIS — R53.83 FATIGUE, UNSPECIFIED TYPE: ICD-10-CM

## 2023-12-08 LAB
25(OH)D3 SERPL-MCNC: 30 NG/ML (ref 30–100)
BASOPHILS # BLD AUTO: 0.04 THOUSANDS/ÂΜL (ref 0–0.1)
BASOPHILS NFR BLD AUTO: 1 % (ref 0–1)
EOSINOPHIL # BLD AUTO: 0.19 THOUSAND/ÂΜL (ref 0–0.61)
EOSINOPHIL NFR BLD AUTO: 4 % (ref 0–6)
ERYTHROCYTE [DISTWIDTH] IN BLOOD BY AUTOMATED COUNT: 11.9 % (ref 11.6–15.1)
FERRITIN SERPL-MCNC: 31 NG/ML (ref 11–307)
HCT VFR BLD AUTO: 40.3 % (ref 34.8–46.1)
HGB BLD-MCNC: 14.1 G/DL (ref 11.5–15.4)
IMM GRANULOCYTES # BLD AUTO: 0.01 THOUSAND/UL (ref 0–0.2)
IMM GRANULOCYTES NFR BLD AUTO: 0 % (ref 0–2)
LYMPHOCYTES # BLD AUTO: 2.03 THOUSANDS/ÂΜL (ref 0.6–4.47)
LYMPHOCYTES NFR BLD AUTO: 37 % (ref 14–44)
MCH RBC QN AUTO: 31.3 PG (ref 26.8–34.3)
MCHC RBC AUTO-ENTMCNC: 35 G/DL (ref 31.4–37.4)
MCV RBC AUTO: 89 FL (ref 82–98)
MONOCYTES # BLD AUTO: 0.57 THOUSAND/ÂΜL (ref 0.17–1.22)
MONOCYTES NFR BLD AUTO: 11 % (ref 4–12)
NEUTROPHILS # BLD AUTO: 2.61 THOUSANDS/ÂΜL (ref 1.85–7.62)
NEUTS SEG NFR BLD AUTO: 47 % (ref 43–75)
NRBC BLD AUTO-RTO: 0 /100 WBCS
PLATELET # BLD AUTO: 364 THOUSANDS/UL (ref 149–390)
PMV BLD AUTO: 11.1 FL (ref 8.9–12.7)
RBC # BLD AUTO: 4.51 MILLION/UL (ref 3.81–5.12)
WBC # BLD AUTO: 5.45 THOUSAND/UL (ref 4.31–10.16)

## 2023-12-08 PROCEDURE — 85025 COMPLETE CBC W/AUTO DIFF WBC: CPT

## 2023-12-08 PROCEDURE — 82306 VITAMIN D 25 HYDROXY: CPT

## 2023-12-08 PROCEDURE — 36415 COLL VENOUS BLD VENIPUNCTURE: CPT

## 2023-12-08 PROCEDURE — 82728 ASSAY OF FERRITIN: CPT

## 2023-12-10 ENCOUNTER — HOSPITAL ENCOUNTER (OUTPATIENT)
Dept: SLEEP CENTER | Facility: CLINIC | Age: 48
Discharge: HOME | End: 2023-12-10
Payer: COMMERCIAL

## 2023-12-10 DIAGNOSIS — F51.5 NIGHTMARES: ICD-10-CM

## 2023-12-10 DIAGNOSIS — G47.19 EXCESSIVE DAYTIME SLEEPINESS: ICD-10-CM

## 2023-12-10 DIAGNOSIS — G47.419 PRIMARY NARCOLEPSY WITHOUT CATAPLEXY: ICD-10-CM

## 2023-12-10 DIAGNOSIS — R53.83 FATIGUE, UNSPECIFIED TYPE: ICD-10-CM

## 2023-12-10 PROCEDURE — 95810 POLYSOM 6/> YRS 4/> PARAM: CPT

## 2023-12-10 PROCEDURE — 95810 POLYSOM 6/> YRS 4/> PARAM: CPT | Performed by: INTERNAL MEDICINE

## 2023-12-11 ENCOUNTER — HOSPITAL ENCOUNTER (OUTPATIENT)
Dept: SLEEP CENTER | Facility: CLINIC | Age: 48
Discharge: HOME | End: 2023-12-11
Payer: COMMERCIAL

## 2023-12-11 PROCEDURE — 95805 MULTIPLE SLEEP LATENCY TEST: CPT | Performed by: INTERNAL MEDICINE

## 2023-12-11 PROCEDURE — 95805 MULTIPLE SLEEP LATENCY TEST: CPT

## 2023-12-11 NOTE — PROGRESS NOTES
Medication Review  The patient was provided a list of their current medications in EPIC. The patient was asked to review the list and update any changes. Patient reports: Changes in medication.  no    Patient currently using marijuana (medical or recreational): no    Patient currently using nicotine: no     Pre-Sleep Study       Sleep testing procedure explained to patient:YES    Urine drug screen performed: No: Provide Reason No orcer in Epic    Study Documentation    Patient reports:    Nap: 1: Sleep yes Dream no    Nap 2:  Sleep yes Dream no    Nap 3:  Sleep yes Dream no    Nap 4: Sleep yes Dream no    Nap 5:  Sleep yes Dream no    EKG abnormalities: no     EEG abnormalities: no    Naps completed: 5

## 2023-12-11 NOTE — PROGRESS NOTES
Sleep Study Documentation    Pre-Sleep Study       Sleep testing procedure explained to patient:YES    Patient napped prior to study:NO    [address] worker after 12PM.  Caffeine use:NO    Alcohol:Dayshift workers after 5PM: Alcohol use:NO    Typical day for patient:YES       Study Documentation    Sleep Study Indications:  Irrepressible sleep, hypnogognic hallucinations, sleep paralysis, suspected narcolepsy. Sleep Study: Diagnostic   Snore:None  Supplemental O2: no    O2 flow rate (L/min) range 0  O2 flow rate (L/min) final 0  Minimum SaO2  87.0 %  Baseline SaO2  95.6 %      EKG abnormalities: no     EEG abnormalities: yes:  ECG artifact throughout study. Averaged Ms. Were abnormal behaviors in sleep observed:NO    Is Total Sleep Study Recording Time < 2 hours: N/A    Is Total Sleep Study Recording Time > 2 hours but study is incomplete: N/A    Is Total Sleep Study Recording Time 6 hours or more but sleep was not obtained: NO        Post-Sleep Study    Medication used at bedtime or during sleep study:NO    Patient reports time it took to fall asleep:less than 20 minutes    Patient reports waking up during study:3 or more times. Patient reports returning to sleep without difficulty. Patient reports sleeping 6 to 8 hours with dreaming. Does the Patient feel this is a typical night of sleep:typical    Patient rated sleepiness: Somewhat sleepy or tired    PAP treatment:no.

## 2023-12-13 ENCOUNTER — PROCEDURE VISIT (OUTPATIENT)
Dept: OBGYN CLINIC | Facility: CLINIC | Age: 48
End: 2023-12-13
Payer: COMMERCIAL

## 2023-12-13 VITALS
HEIGHT: 60 IN | BODY MASS INDEX: 31.02 KG/M2 | DIASTOLIC BLOOD PRESSURE: 70 MMHG | SYSTOLIC BLOOD PRESSURE: 124 MMHG | WEIGHT: 158 LBS

## 2023-12-13 DIAGNOSIS — N93.9 ABNORMAL UTERINE BLEEDING (AUB): Primary | ICD-10-CM

## 2023-12-13 PROCEDURE — 58100 BIOPSY OF UTERUS LINING: CPT | Performed by: ADVANCED PRACTICE MIDWIFE

## 2023-12-13 PROCEDURE — 88305 TISSUE EXAM BY PATHOLOGIST: CPT | Performed by: PATHOLOGY

## 2023-12-13 RX ORDER — TRIAMCINOLONE ACETONIDE 1 MG/G
OINTMENT TOPICAL
COMMUNITY
Start: 2023-12-04

## 2023-12-13 RX ORDER — VALACYCLOVIR HYDROCHLORIDE 1 G/1
TABLET, FILM COATED ORAL
COMMUNITY
Start: 2023-12-04

## 2023-12-13 NOTE — PROGRESS NOTES
Endometrial biopsy    Date/Time: 12/13/2023 8:30 AM    Performed by: Kemal Jain CNM  Authorized by: Kemal Jain CNM  Universal Protocol:  Consent: Written consent obtained. Risks and benefits: risks, benefits and alternatives were discussed  Consent given by: patient  Patient understanding: patient states understanding of the procedure being performed  Patient consent: the patient's understanding of the procedure matches consent given  Procedure consent: procedure consent matches procedure scheduled  Relevant documents: relevant documents present and verified  Test results: test results available and properly labeled  Radiology Images displayed and confirmed. If images not available, report reviewed: imaging studies available  Required items: required blood products, implants, devices, and special equipment available  Patient identity confirmed: verbally with patient    Indication:     Indications: Other disorder of menstruation and other abnormal bleeding from female genital tract    Pre-procedure:     Anesthesia premedication: cytotec. Procedure:     Procedure: endometrial biopsy with Pipelle      A bivalve speculum was placed in the vagina: yes      Cervix cleaned and prepped: yes      The cervix was dilated: no      Uterus sounded: yes      Uterus sound depth (cm):  7    Specimen collected: specimen collected and sent to pathology      Patient tolerated procedure well with no complications: yes    Comments:     Procedure comments:  Reviewed consent. Risk/benefit, post procedure precautions. Will call with results.

## 2023-12-18 PROCEDURE — 88305 TISSUE EXAM BY PATHOLOGIST: CPT | Performed by: PATHOLOGY

## 2023-12-19 ENCOUNTER — TELEPHONE (OUTPATIENT)
Dept: OBGYN CLINIC | Facility: CLINIC | Age: 48
End: 2023-12-19

## 2023-12-19 NOTE — TELEPHONE ENCOUNTER
Per patient communication consent, left detailed message normal tissue (EMB) results. To call office with any questions or concerns.

## 2023-12-19 NOTE — TELEPHONE ENCOUNTER
----- Message from Monserrat Mendez CNM sent at 12/18/2023  4:58 PM EST -----  Please inform Isabel of normal endometrial biopsy results.

## 2023-12-26 ENCOUNTER — TELEPHONE (OUTPATIENT)
Dept: SLEEP CENTER | Facility: CLINIC | Age: 48
End: 2023-12-26

## 2023-12-26 NOTE — TELEPHONE ENCOUNTER
Patient viewed results via [employer] on 12/15/23.    Called patient and left message providing office number to call to schedule follow up appointment with Shirley.

## 2023-12-26 NOTE — TELEPHONE ENCOUNTER
----- Message from ATIYA Yanez sent at 12/15/2023  6:01 AM EST -----  There were some respiratory events noted, but sleep apnea was not confirmed.  There were no periodic limb movements noted.  REM sleep occurred earlier than usual and there was a slightly higher amount of REM sleep during the diagnostic study, which can indicate sleep deprivation.  There was no REM during the MSLT and hypersomnia was not noted.  MSLT was normal - no indication of hypersomnia or narcolepsy.  Recommend patient schedule follow up visit to review results and plan of treatment.

## 2024-01-04 ENCOUNTER — OFFICE VISIT (OUTPATIENT)
Dept: SLEEP CENTER | Facility: CLINIC | Age: 49
End: 2024-01-04
Payer: COMMERCIAL

## 2024-01-04 VITALS
HEART RATE: 75 BPM | WEIGHT: 158 LBS | DIASTOLIC BLOOD PRESSURE: 60 MMHG | BODY MASS INDEX: 31.02 KG/M2 | HEIGHT: 60 IN | SYSTOLIC BLOOD PRESSURE: 127 MMHG

## 2024-01-04 DIAGNOSIS — R53.83 FATIGUE, UNSPECIFIED TYPE: ICD-10-CM

## 2024-01-04 DIAGNOSIS — E66.9 CLASS 1 OBESITY: ICD-10-CM

## 2024-01-04 DIAGNOSIS — G47.8 NON-RESTORATIVE SLEEP: Primary | ICD-10-CM

## 2024-01-04 DIAGNOSIS — G47.19 EXCESSIVE DAYTIME SLEEPINESS: ICD-10-CM

## 2024-01-04 PROCEDURE — 99214 OFFICE O/P EST MOD 30 MIN: CPT | Performed by: NURSE PRACTITIONER

## 2024-01-04 NOTE — PATIENT INSTRUCTIONS
Recommend increasing physical activity, which can increase N3 sleep, which may help you feel more rested  Follow up on an as needed basis      Nursing Support:  When: Monday through Friday 7A-5PM except holidays  Where: Our direct line is [phone].    If you are having a true emergency please call 911.  In the event that the line is busy or it is after hours please leave a voice message and we will return your call.  Please speak clearly, leaving your full name, birth date, best number to reach you and the reason for your call.   Medication refills: We will need the name of the medication, the dosage, the ordering provider, whether you get a 30 or 90 day refill, and the pharmacy name and address.  Medications will be ordered by the provider only.  Nurses cannot call in prescriptions.  Please allow 7 days for medication refills.  Physician requested updates: If your provider requested that you call with an update after starting medication, please be ready to provide us the medication and dosage, what time you take your medication, the time you attempt to fall asleep, time you fall asleep, when you wake up, and what time you get out of bed.  Sleep Study Results: We will contact you with sleep study results and/or next steps after the physician has reviewed your testing.

## 2024-01-07 PROBLEM — G47.419 PRIMARY NARCOLEPSY WITHOUT CATAPLEXY: Status: RESOLVED | Noted: 2023-10-11 | Resolved: 2024-01-07

## 2024-01-07 PROBLEM — G47.8 NON-RESTORATIVE SLEEP: Status: ACTIVE | Noted: 2024-01-07

## 2024-01-07 NOTE — PROGRESS NOTES
Progress Note - [facility]   Isabel Calhoun 48 y.o. female   :1975, MRN: [id number]  2024          Follow Up Evaluation / Problem:  Non-restorative sleep  Excessive daytime sleepiness  Chronic fatigue      Thank you for the opportunity of participating in the evaluation and care of this patient in the Sleep Clinic at [facility].      HPI: Isabel Calhoun is a 48 y.o. year old female.  She presents for follow up to review results of a diagnostic sleep study with MSLT.          Current Outpatient Medications:     Magnesium 300 MG CAPS, Take 1 capsule by mouth Daily at 2am, Disp: , Rfl:     Probiotic Product (PROBIOTIC ADVANCED PO), Take 1 capsule by mouth Daily at 2am, Disp: , Rfl:     triamcinolone (KENALOG) 0.1 % ointment, APPLY A THIN LAYER TWICE DAILY FOR UP TO 1 WEEK AS NEEDED FOR ITCHY/FLAKEY RASH ON LIP, Disp: , Rfl:     How likely are you to doze off or fall asleep in the following situations, in contrast to feeling just tired?  Sitting and reading: Slight chance of dozing  Watching TV: Slight chance of dozing  Sitting, inactive in a public place (e.g. a theatre or a meeting): Moderate chance of dozing  As a passenger in a car for an hour without a break: High chance of dozing  Lying down to rest in the afternoon when circumstances permit: High chance of dozing  Sitting and talking to someone: Slight chance of dozing  Sitting quietly after a lunch without alcohol: Slight chance of dozing  In a car, while stopped for a few minutes in traffic: Slight chance of dozing  Total score: 13              Vitals:    24 1616   BP: 127/60   BP Location: Left arm   Patient Position: Sitting   Cuff Size: Large   Pulse: 75   Weight: 71.7 kg (158 lb)   Height: 5' (1.524 m)       Body mass index is 30.86 kg/m².            EPWORTH SLEEPINESS SCORE  Total score: 13      Past History Since Last Sleep Center Visit:   She reports that she has been  working remotely with a provider in [place], primarily with nutritional changes for concern of fatigue and daytime sleepiness.  She had lab tests done and made changes to her diet, based on results and recommendations from the provider.  She lost 8lbs with the changes.  She feels that energy levels have improved, however, she still feels like she doesn't get any deep sleep and feels tired all the time.  She has been working on increasing exercise, now that she has more energy.  She continues to use check lists to help her complete tasks.  If she doesn't have a checklist, she can't remember things and forgets simple things like brushing her hair, if it is not on the checklist.      Results of the diagnostic sleep study, completed on 12/10/23, are as follows:  Patient slept for 338.5 minutes out of 405.9 minutes in bed representing a sleep efficiency of 83.4%.  Sleep architecture showed a sleep latency of 15.0 minutes and a REM sleep latency of 35.5 minutes.  There was 9.3% Stage N1 noted.  There was 62.0% Stage N2 noted.  There was 0.0% Stage N3 noted. There was 28.7% REM sleep noted. The patient had 49 arousals for an average of 8.7 arousals per hour of sleep.     RESPIRATORY:  There were a total of 18 respiratory events made up of 0 obstructive apneas, 0 mixed apneas, 1 central apneas, and 17 hypopneas resulting in an apnea/hypopnea index (AHI) of 3.2 events per hour of sleep.  The AHI in the supine position was 5.7.  The AHI during REM sleep was 9.9.     OXIMETRY:  The baseline oxygen saturation with the patient awake was 95.6%.  The mean oxygen saturation throughout the study was 94.7%.  The amount of sleep time below 90% was 0.3 minutes.  The lowest oxygen saturation was 87.0%.    IMPRESSION:   This test does not support the diagnosis of obstructive sleep apnea as the apnea hypopnea index (AHI) was normal (Normal AHI  is less than 5) Respiratory events were worse in supine sleep and REM sleep.   Normal  baseline oxygen saturation with event related desaturations observed.  Sleep efficiency was low normal.. A high normal amount percentage of Stage N1 sleep was observed.. Stage N3 sleep was nearly absent. REM sleep percentage was slightly increased on this test.  No evidence of periodic limb movements during sleep.   EKG showed normal sinus rhythm.        Results of the MSLT, completed on 12/11/23, are as follows:  IMPRESSION:   The patient slept on 3 out of 5 naps, shortest sleep latency at 12 minutes, overall average sleep latency of 16 minutes 30 seconds. No sleep onset REM.  This test result does not suggest narcolepsy or idiopathic hypersomnia, clinical correlation suggested      The review of systems and following portions of the patient's history were reviewed and updated as appropriate: allergies, current medications, past family history, past medical history, past social history, past surgical history, and problem list.        OBJECTIVE    Physical Exam:     General Appearance:   Alert, cooperative, no distress, appears stated age, obese     Lungs:   Clear to auscultation bilaterally, respirations unlabored     Heart:   Regular rate and rhythm, S1 and S2 normal, no murmur, rub or gallop           ASSESSMENT / PLAN    1. Non-restorative sleep        2. Excessive daytime sleepiness        3. Fatigue, unspecified type        4. Class 1 obesity              Counseling / Coordination of Care  I have spent a total time of 35 minutes on 1/4/24 in caring for this patient including Risks and benefits of tx options, Instructions for management, Patient and family education, Importance of tx compliance, Risk factor reductions, Impressions, Counseling / Coordination of care, Documenting in the medical record, and Reviewing / ordering tests, medicine, procedures  .    Today we reviewed the results of the recent diagnostic sleep study.  The AHI was less than 5, which is not diagnostic for sleep apnea.  The lowest oxygen  saturation during the study was 87%, however was only less than 90% for 0.3 minutes.  Treatment with CPAP is not indicated.  It is recommended that lifestyle changes regarding diet and exercise be considered, which can help with symptoms of snoring.  Sleep efficiency was normal at 83.4%.  Sleep latency was normal at 15 minutes, however, REM sleep was early at 35.5 minutes, but not meeting the narcolepsy criteria of 15 minutes or less for SoREM.  This may be due to sleep deprivation or depression, as she does not take medication or use substances that would affect REM.  There were some respiratory events while supine and in REM, so recommendation to avoid supine sleep was made. We discussed possible use of a sleep bumper belt.  We discussed lack of N3 sleep.  We discussed that increase in physical activity, primarily in the morning hours, can be helpful in increasing N3 sleep.  Other things may be following a low carbohydrate diet with minimal processed foods and use of pink noise during sleep.  We discussed evaluation with neurology if memory issues are ongoing or worsen.        The following instructions have been given to the patient today:    Patient Instructions    Recommend increasing physical activity, which can increase N3 sleep, which may help you feel more rested  Follow up on an as needed basis      Nursing Support:  When: Monday through Friday 7A-5PM except holidays  Where: Our direct line is [phone].    If you are having a true emergency please call 911.  In the event that the line is busy or it is after hours please leave a voice message and we will return your call.  Please speak clearly, leaving your full name, birth date, best number to reach you and the reason for your call.   Medication refills: We will need the name of the medication, the dosage, the ordering provider, whether you get a 30 or 90 day refill, and the pharmacy name and address.  Medications will be ordered by the provider only.   Nurses cannot call in prescriptions.  Please allow 7 days for medication refills.  Physician requested updates: If your provider requested that you call with an update after starting medication, please be ready to provide us the medication and dosage, what time you take your medication, the time you attempt to fall asleep, time you fall asleep, when you wake up, and what time you get out of bed.  Sleep Study Results: We will contact you with sleep study results and/or next steps after the physician has reviewed your testing.      ATIYA Yanez  [facility]

## 2024-01-18 ENCOUNTER — TELEPHONE (OUTPATIENT)
Dept: SLEEP CENTER | Facility: CLINIC | Age: 49
End: 2024-01-18

## 2024-01-18 NOTE — TELEPHONE ENCOUNTER
Patient left message stating she received a message that Shirley wanted to speak to her.  She is returning the call.

## 2024-01-19 NOTE — TELEPHONE ENCOUNTER
Spoke with patient via phone.  Reviewed additional information from my case discussion with Dr. Bernal.  Information provided regarding gabapentin.  She will look into use of this medication and contact the Sleep Center if she decides she would like to try it.  She has had issues with anxiety in the past and may consider revisiting anxiety evaluation and treatment.  She has noticed that dietary changes and increases in exercise have helped with her energy levels and even her sleep.  She plans to see Dr. Akhtar next month regarding possible hormonal issues contributing.

## 2024-02-06 ENCOUNTER — TELEMEDICINE (OUTPATIENT)
Dept: OBGYN CLINIC | Facility: CLINIC | Age: 49
End: 2024-02-06
Payer: COMMERCIAL

## 2024-02-06 DIAGNOSIS — G93.32 MYALGIC ENCEPHALOMYELITIS/CHRONIC FATIGUE SYNDROME (ME/CFS): ICD-10-CM

## 2024-02-06 DIAGNOSIS — N95.1 MENOPAUSAL SYMPTOMS: ICD-10-CM

## 2024-02-06 PROCEDURE — 99215 OFFICE O/P EST HI 40 MIN: CPT | Performed by: OBSTETRICS & GYNECOLOGY

## 2024-02-09 NOTE — PROGRESS NOTES
Virtual Regular Visit    Verification of patient location:    Patient is located at Home in the following state in which I hold an active license [place]      Assessment/Plan:    Problem List Items Addressed This Visit          Other    Fatigue     Other Visit Diagnoses       Menopausal symptoms              1)This was a lengthy visit spent discussing her functional medicine journey. She is on the right track. She did not want to cancel her appointment with me in case she was not making progress, but she really is happy with her practitioner in [place], who she will be following for at least 6 months for detox regimen.  2) We discussed my functional medicine training, and my expertise in hormone optimization should she need this down the road.   3) She thanked me for my time. I do not have anything new to add here. Follow up prn.    This was a 40 minute visit with greater than 50% of time spent in face to face counseling and coordination of care         Chief Complaint   Patient presents with    Virtual Regular Visit          Encounter provider Yaneli Akhtar MD    Provider located at OB/GYN [facility] [address] [address] [phone]      Recent Visits  Date Type Provider Dept   02/06/24 Telemedicine Yaneli Akhtar MD  Ob/Gyn [facility]   Showing recent visits within past 7 days and meeting all other requirements  Future Appointments  No visits were found meeting these conditions.  Showing future appointments within next 150 days and meeting all other requirements       The patient was identified by name and date of birth. Isabel Calhoun was informed that this is a telemedicine visit and that the visit is being conducted through the Epic Embedded platform. She agrees to proceed..  My office door was closed. No one else was in the room.  She acknowledged consent and understanding of privacy and  "security of the video platform. The patient has agreed to participate and understands they can discontinue the visit at any time.    Patient is aware this is a billable service.     Subjective      Isabel presents for hormonal consult, her first visit with me; referred by her sleep medicine provider, hugos JOSS Mendez with [facility] for gyn care.   Isabel is perimenopausal. She has not felt well  for years. Her complaints are many:  1) Severe and chronic fatigue, wakes up exhausted and has for years. Felt the best with prednisone taper for illness 2 years ago!  2) Brain fog, impacting job performance  3) Sleep disturbance, followed by sleep medicine, can not get enough REM sleep  4) Weight gain  5) Recurrent shingles  She is part of a CFS support group. When she could not get into see me in a timely fashion, sought the care of Dr. [place], MORGAN, telemedicine functional medicine practitioner in [place] and is starting to feel much better!!   A) Food sensitivity testing done with many odd sensitivities noted. Foods cut out which helped immensely. Nutreval testing through [employer] done revealing:  B) Toxic metal exposures, getting ready to undergo detox regimen. Now with filtered water, etc.  C) Immune support with Vitamin C, lycine, Valtrex for now  D) Walking, movement, light therapy. Blue light glasses for screen time in evening to help with sleep.   She was diagnosed with stage 2 adrenal fatigue by Dr. Samayoa in [place] a few years back with salivary cortisol testing. Given \" seriphos\" which is actually phosphorylated serine which did actually help, no longer on this.   PMHX: as above  FHX: did not go over.          Past Medical History:   Diagnosis Date    Abnormal Pap smear of cervix     Anemia     Disease of thyroid gland     thyroid storm with previous pregnancy    Dysmenorrhea     Esophageal reflux     last assessed - [id number]    Female infertility     Herpes zoster     LUQ level T-8 resolved; last assessed - " 2015    History of infection due to human papilloma virus (HPV)     HPV (human papilloma virus) infection     Inguinal hernia     last assessed - [id number]    Insomnia     Liver disease 2024    Mastodynia     Resolved -     Migraine     Miscarriage     Ovarian cyst     last assessed - [id number]    Ovarian cyst 2013    Palpitations     last assessed - [id number]    Primary narcolepsy without cataplexy 10/11/2023    Shingles 2023    Tachycardia     Resolved - [id number]    Takes dietary supplements     Temporomandibular joint disorder     last assessed - [id number]    Urinary tract infection     Varicella        Past Surgical History:   Procedure Laterality Date     SECTION      COLONOSCOPY  2012    Fiberoptic    CRYOTHERAPY      cervix, age 20    DIAGNOSTIC LAPAROSCOPY      DILATION AND CURETTAGE OF UTERUS      Resolved     GYNECOLOGIC CRYOSURGERY      age 20    TOOTH EXTRACTION         Current Outpatient Medications   Medication Sig Dispense Refill    Magnesium 300 MG CAPS Take 1 capsule by mouth Daily at 2am      Probiotic Product (PROBIOTIC ADVANCED PO) Take 1 capsule by mouth Daily at 2am      triamcinolone (KENALOG) 0.1 % ointment APPLY A THIN LAYER TWICE DAILY FOR UP TO 1 WEEK AS NEEDED FOR ITCHY/FLAKEY RASH ON LIP       No current facility-administered medications for this visit.        Allergies   Allergen Reactions    Amoxicillin Hives    Cymbalta [Duloxetine Hcl] Other (See Comments)     Shaking      Penicillins Hives    Shellfish Allergy - Food Allergy Hives    Sulfa Antibiotics Rash       Review of Systems   Constitutional:  Positive for fatigue and unexpected weight change.   Gastrointestinal: Negative.    Endocrine: Positive for heat intolerance.   Genitourinary:  Positive for menstrual problem.   Musculoskeletal: Negative.    Allergic/Immunologic:        Food sensitivities as above     Psychiatric/Behavioral:  Positive for decreased concentration, dysphoric mood and  sleep disturbance.      Video Exam    There were no vitals filed for this visit.    Physical Exam

## 2024-03-24 ENCOUNTER — HOSPITAL ENCOUNTER (EMERGENCY)
Facility: HOSPITAL | Age: 49
Discharge: HOME | End: 2024-03-24
Attending: EMERGENCY MEDICINE
Payer: COMMERCIAL

## 2024-03-24 VITALS
SYSTOLIC BLOOD PRESSURE: 169 MMHG | RESPIRATION RATE: 18 BRPM | HEART RATE: 104 BPM | DIASTOLIC BLOOD PRESSURE: 121 MMHG | TEMPERATURE: 98.4 F | OXYGEN SATURATION: 100 %

## 2024-03-24 DIAGNOSIS — R21 RASH AND NONSPECIFIC SKIN ERUPTION: Primary | ICD-10-CM

## 2024-03-24 LAB
ALBUMIN SERPL BCP-MCNC: 3.7 G/DL (ref 3.5–5)
ALP SERPL-CCNC: 72 U/L (ref 34–104)
ALT SERPL W P-5'-P-CCNC: 19 U/L (ref 7–52)
ANION GAP SERPL CALCULATED.3IONS-SCNC: 10 MMOL/L (ref 4–13)
AST SERPL W P-5'-P-CCNC: 15 U/L (ref 13–39)
BASOPHILS # BLD AUTO: 0.01 THOUSANDS/ÂΜL (ref 0–0.1)
BASOPHILS NFR BLD AUTO: 0 % (ref 0–1)
BILIRUB SERPL-MCNC: 0.38 MG/DL (ref 0.2–1)
BUN SERPL-MCNC: 16 MG/DL (ref 5–25)
CALCIUM SERPL-MCNC: 8.6 MG/DL (ref 8.4–10.2)
CHLORIDE SERPL-SCNC: 105 MMOL/L (ref 96–108)
CK SERPL-CCNC: 44 U/L (ref 26–192)
CO2 SERPL-SCNC: 21 MMOL/L (ref 21–32)
CREAT SERPL-MCNC: 0.76 MG/DL (ref 0.6–1.3)
EOSINOPHIL # BLD AUTO: 0.07 THOUSAND/ÂΜL (ref 0–0.61)
EOSINOPHIL NFR BLD AUTO: 1 % (ref 0–6)
ERYTHROCYTE [DISTWIDTH] IN BLOOD BY AUTOMATED COUNT: 12.5 % (ref 11.6–15.1)
GFR SERPL CREATININE-BSD FRML MDRD: 93 ML/MIN/1.73SQ M
GLUCOSE SERPL-MCNC: 98 MG/DL (ref 65–140)
HCT VFR BLD AUTO: 42.1 % (ref 34.8–46.1)
HGB BLD-MCNC: 14.3 G/DL (ref 11.5–15.4)
IMM GRANULOCYTES # BLD AUTO: 0.03 THOUSAND/UL (ref 0–0.2)
IMM GRANULOCYTES NFR BLD AUTO: 0 % (ref 0–2)
LYMPHOCYTES # BLD AUTO: 1.64 THOUSANDS/ÂΜL (ref 0.6–4.47)
LYMPHOCYTES NFR BLD AUTO: 15 % (ref 14–44)
MCH RBC QN AUTO: 30.6 PG (ref 26.8–34.3)
MCHC RBC AUTO-ENTMCNC: 34 G/DL (ref 31.4–37.4)
MCV RBC AUTO: 90 FL (ref 82–98)
MONOCYTES # BLD AUTO: 0.79 THOUSAND/ÂΜL (ref 0.17–1.22)
MONOCYTES NFR BLD AUTO: 7 % (ref 4–12)
NEUTROPHILS # BLD AUTO: 8.48 THOUSANDS/ÂΜL (ref 1.85–7.62)
NEUTS SEG NFR BLD AUTO: 77 % (ref 43–75)
NRBC BLD AUTO-RTO: 0 /100 WBCS
PLATELET # BLD AUTO: 325 THOUSANDS/UL (ref 149–390)
PMV BLD AUTO: 10 FL (ref 8.9–12.7)
POTASSIUM SERPL-SCNC: 3.7 MMOL/L (ref 3.5–5.3)
PROT SERPL-MCNC: 6.9 G/DL (ref 6.4–8.4)
RBC # BLD AUTO: 4.68 MILLION/UL (ref 3.81–5.12)
SODIUM SERPL-SCNC: 136 MMOL/L (ref 135–147)
WBC # BLD AUTO: 11.02 THOUSAND/UL (ref 4.31–10.16)

## 2024-03-24 PROCEDURE — 86618 LYME DISEASE ANTIBODY: CPT

## 2024-03-24 PROCEDURE — 80053 COMPREHEN METABOLIC PANEL: CPT

## 2024-03-24 PROCEDURE — 99284 EMERGENCY DEPT VISIT MOD MDM: CPT | Performed by: EMERGENCY MEDICINE

## 2024-03-24 PROCEDURE — 99282 EMERGENCY DEPT VISIT SF MDM: CPT

## 2024-03-24 PROCEDURE — 96374 THER/PROPH/DIAG INJ IV PUSH: CPT

## 2024-03-24 PROCEDURE — 85025 COMPLETE CBC W/AUTO DIFF WBC: CPT

## 2024-03-24 PROCEDURE — 86780 TREPONEMA PALLIDUM: CPT

## 2024-03-24 PROCEDURE — 82550 ASSAY OF CK (CPK): CPT

## 2024-03-24 PROCEDURE — 36415 COLL VENOUS BLD VENIPUNCTURE: CPT

## 2024-03-24 RX ORDER — PREDNISONE 20 MG/1
40 TABLET ORAL DAILY
Qty: 10 TABLET | Refills: 0 | Status: SHIPPED | OUTPATIENT
Start: 2024-03-24 | End: 2024-03-29

## 2024-03-24 RX ORDER — NAPROXEN 500 MG/1
500 TABLET ORAL 2 TIMES DAILY WITH MEALS
Qty: 30 TABLET | Refills: 0 | Status: SHIPPED | OUTPATIENT
Start: 2024-03-24

## 2024-03-24 RX ORDER — KETOROLAC TROMETHAMINE 30 MG/ML
15 INJECTION, SOLUTION INTRAMUSCULAR; INTRAVENOUS ONCE
Status: COMPLETED | OUTPATIENT
Start: 2024-03-24 | End: 2024-03-24

## 2024-03-24 RX ORDER — PREDNISONE 20 MG/1
40 TABLET ORAL ONCE
Status: COMPLETED | OUTPATIENT
Start: 2024-03-24 | End: 2024-03-24

## 2024-03-24 RX ADMIN — KETOROLAC TROMETHAMINE 15 MG: 30 INJECTION, SOLUTION INTRAMUSCULAR; INTRAVENOUS at 21:39

## 2024-03-24 RX ADMIN — PREDNISONE 40 MG: 20 TABLET ORAL at 21:28

## 2024-03-24 NOTE — Clinical Note
Isabel Calhoun was seen and treated in our emergency department on 3/24/2024.                Diagnosis:     Isabel  may return to work on return date.    She may return on this date: 03/27/2024         If you have any questions or concerns, please don't hesitate to call.      Joaquín Brewster MD    ______________________________           _______________          _______________  Hospital Representative                              Date                                Time

## 2024-03-25 LAB
B BURGDOR IGG+IGM SER QL IA: NEGATIVE
TREPONEMA PALLIDUM IGG+IGM AB [PRESENCE] IN SERUM OR PLASMA BY IMMUNOASSAY: NORMAL

## 2024-03-25 NOTE — ED PROVIDER NOTES
Chief Complaint   Patient presents with    Rash     Pt reports red inflammed rash all over her body, mostly on her joins. Started yesterday, reports pain and itchiness     History of Present Illness and Review of Systems   This is a 48 y.o. female with PMH notable for anemia, migraines  coming in today with complaint of rash.  The patient states that she broke out in a rash last night that is generalized and located throughout her body.  She states she has erythematous rash on the palms of her hands, back of her knuckles, as well as on her bilateral knees and her thighs.  She states she had a similar rash approximately 10 years ago and she was seen by rheumatologist at that time and there were concerns for lupus or other autoimmune pathology.  She has no fevers or infectious symptoms.  She states the rash is itchy.  She additionally describes that she is having joint pains diffusely.  Describes pain in her wrist and her knees primarily.  Denies any recent medication changes.  No history of tick borne exposure.  Denies any mucous membrane lesions.  No chest pain, shortness of breath, abdominal pain or episodes of syncope.  No history of international travel.  Denies any other symptoms.    - No language barrier.     No other complaints for this encounter.    Remainder of ROS Reviewed and Non-Pertinent    Past Medical, Past Surgical History:    has a past medical history of Abnormal Pap smear of cervix, Anemia, Disease of thyroid gland, Dysmenorrhea, Esophageal reflux, Female infertility, Herpes zoster, History of infection due to human papilloma virus (HPV), HPV (human papilloma virus) infection, Inguinal hernia, Insomnia, Liver disease (7/1/2024), Mastodynia, Migraine, Miscarriage, Ovarian cyst, Ovarian cyst (2013), Palpitations, Primary narcolepsy without cataplexy (10/11/2023), Shingles (11/03/2023), Tachycardia, Takes dietary supplements, Temporomandibular joint disorder, Urinary tract infection, and Varicella.    has a past surgical history that includes Gynecologic cryosurgery;  section; Colonoscopy (2012); Dilation and curettage of uterus; Diagnostic laparoscopy; Tooth extraction; and Cryotherapy.     Allergies:     Allergies   Allergen Reactions    Amoxicillin Hives    Cymbalta [Duloxetine Hcl] Other (See Comments)     Shaking      Penicillins Hives    Shellfish Allergy - Food Allergy Hives    Sulfa Antibiotics Rash       Social and Family History:     Social History     Substance and Sexual Activity   Alcohol Use Not Currently    Comment: social/prior to knowledge of pregnancy     Social History     Tobacco Use   Smoking Status Never   Smokeless Tobacco Never     Social History     Substance and Sexual Activity   Drug Use Never       Physical Examination     Vitals:    24   BP: (!) 169/121   BP Location: Left arm   Pulse: 104   Resp: 18   Temp: 98.4 °F (36.9 °C)   TempSrc: Temporal   SpO2: 100%       Physical Exam  Vitals and nursing note reviewed.   Constitutional:       General: She is not in acute distress.     Appearance: She is well-developed.   HENT:      Head: Normocephalic and atraumatic.      Nose: Nose normal.      Mouth/Throat:      Mouth: Mucous membranes are moist.   Eyes:      Conjunctiva/sclera: Conjunctivae normal.   Cardiovascular:      Rate and Rhythm: Normal rate and regular rhythm.      Heart sounds: No murmur heard.  Pulmonary:      Effort: Pulmonary effort is normal. No respiratory distress.      Breath sounds: Normal breath sounds.   Abdominal:      Palpations: Abdomen is soft.      Tenderness: There is no abdominal tenderness.   Musculoskeletal:         General: No swelling.      Cervical back: Neck supple.   Skin:     General: Skin is warm and dry.      Capillary Refill: Capillary refill takes less than 2 seconds.      Findings: Rash present.      Comments: Erythematous rash that is diffusely disseminated and located on her knuckles, palms of her hands, anterior knees, as  well as her thighs, primarily macular and papular lesions, karina with pressure, excoriations present, no mucous membrane lesions   Neurological:      General: No focal deficit present.      Mental Status: She is alert.   Psychiatric:         Mood and Affect: Mood normal.           Procedures   Procedures      MDM:   Medical Decision Making  Isabel Calhoun is a 48 y.o. who presents with complaints of rash    Vital signs are stable, physical exam shows patient has a disseminated rash, erythematous, blanching, diffusely distributed primarily on her hands and knees and thighs.  No oropharyngeal lesions.  Otherwise appears nontoxic.    Ddx: Clinically may be related to erythema multiforme however will evaluate for dermatomyositis versus Lyme disease versus syphilis.  Also may be related to coxsackievirus and hand-foot-and-mouth disease.  This does not appear consistent with SJS/TEN.  Also consider [place] spotted fever however also does not appear consistent with this.    Plan: Workup will include CBC, CMP, CK, RPR, Lyme.  Will treat with steroids.  Ultimately will require rheumatology and dermatology follow-up.  Will monitor closely and reassess.    Reassessment/Disposition: Workup negative for acute abnormality. Pain controlled. No indication for further workup or admission, referred to derm/rheum and provided steroid burst and advised on return precautions.         Amount and/or Complexity of Data Reviewed  Labs: ordered. Decision-making details documented in ED Course.    Risk  Prescription drug management.        - Reviewed relevant past office visits/hospitalizations/procedures  -Obtained pertinent history that influenced decision making from the patient    ED Course as of 03/25/24 1413   Sun Mar 24, 2024   2212 Total CK: 44   2212 GFR, Calculated: 93      Final Dispo   Final Diagnosis:  1. Rash and nonspecific skin eruption      Time reflects when diagnosis was documented in both MDM as applicable and the  Disposition within this note       Time User Action Codes Description Comment    3/24/2024 10:12 PM Joaquín Brewster Add [R21] Rash and nonspecific skin eruption           ED Disposition       ED Disposition   Discharge    Condition   Stable    Date/Time   Sun Mar 24, 2024 10:12 PM    Comment   Isabel Calhoun discharge to home/self care.                   Follow-up Information       Follow up With Specialties Details Why Contact Info Additional Information    [facility]   [address]  [address]  [facility] [address] [phone] [facility], [address], [place], [place], [id number] [phone]    [facility]   [address] [address] [phone] [facility], [address] [place] [address], [address] [phone]          Medications   ketorolac (TORADOL) injection 15 mg (15 mg Intravenous Given 3/24/24 2139)   predniSONE tablet 40 mg (40 mg Oral Given 3/24/24 2128)       Risk Stratification Tools                Orders Placed This Encounter   Procedures    CBC and differential    Comprehensive metabolic panel    CK    Lyme Total AB W Reflex to IGM/IGG    RPR-Syphilis Screening (Total Syphilis IGG/IGM)    Lyme Total AB W Reflex to IGM/IGG    Ambulatory Referral to Rheumatology    Ambulatory Referral to Dermatology       Labs:     Labs Reviewed   CBC AND DIFFERENTIAL - Abnormal       Result Value Ref Range Status    WBC 11.02 (*) 4.31 - 10.16 Thousand/uL Final    RBC 4.68  3.81 - 5.12 Million/uL Final    Hemoglobin 14.3  11.5 - 15.4 g/dL Final    Hematocrit 42.1  34.8 - 46.1 % Final    MCV 90  82 - 98 fL Final    MCH 30.6  26.8 - 34.3 pg Final    MCHC 34.0  31.4 - 37.4 g/dL Final    RDW 12.5  11.6 - 15.1 % Final    MPV 10.0  8.9 - 12.7 fL Final    Platelets 325  149 - 390 Thousands/uL Final    nRBC 0  /100 WBCs  Final    Neutrophils Relative 77 (*) 43 - 75 % Final    Immature Grans % 0  0 - 2 % Final    Lymphocytes Relative 15  14 - 44 % Final    Monocytes Relative 7  4 - 12 % Final    Eosinophils Relative 1  0 - 6 % Final    Basophils Relative 0  0 - 1 % Final    Neutrophils Absolute 8.48 (*) 1.85 - 7.62 Thousands/µL Final    Absolute Immature Grans 0.03  0.00 - 0.20 Thousand/uL Final    Absolute Lymphocytes 1.64  0.60 - 4.47 Thousands/µL Final    Absolute Monocytes 0.79  0.17 - 1.22 Thousand/µL Final    Eosinophils Absolute 0.07  0.00 - 0.61 Thousand/µL Final    Basophils Absolute 0.01  0.00 - 0.10 Thousands/µL Final   CK - Normal    Total CK 44  26 - 192 U/L Final   COMPREHENSIVE METABOLIC PANEL    Sodium 136  135 - 147 mmol/L Final    Potassium 3.7  3.5 - 5.3 mmol/L Final    Chloride 105  96 - 108 mmol/L Final    CO2 21  21 - 32 mmol/L Final    ANION GAP 10  4 - 13 mmol/L Final    BUN 16  5 - 25 mg/dL Final    Creatinine 0.76  0.60 - 1.30 mg/dL Final    Comment: Standardized to IDMS reference method    Glucose 98  65 - 140 mg/dL Final    Comment: If the patient is fasting, the ADA then defines impaired fasting glucose as > 100 mg/dL and diabetes as > or equal to 123 mg/dL.    Calcium 8.6  8.4 - 10.2 mg/dL Final    AST 15  13 - 39 U/L Final    ALT 19  7 - 52 U/L Final    Comment: Specimen collection should occur prior to Sulfasalazine administration due to the potential for falsely depressed results.     Alkaline Phosphatase 72  34 - 104 U/L Final    Total Protein 6.9  6.4 - 8.4 g/dL Final    Albumin 3.7  3.5 - 5.0 g/dL Final    Total Bilirubin 0.38  0.20 - 1.00 mg/dL Final    Comment: Use of this assay is not recommended for patients undergoing treatment with eltrombopag due to the potential for falsely elevated results.  N-acetyl-p-benzoquinone imine (metabolite of Acetaminophen) will generate erroneously low results in samples for patients that have taken an overdose of Acetaminophen.    eGFR 93  ml/min/1.73sq m Final  "   Narrative:     National Kidney Disease Foundation guidelines for Chronic Kidney Disease (CKD):     Stage 1 with normal or high GFR (GFR > 90 mL/min/1.73 square meters)    Stage 2 Mild CKD (GFR = 60-89 mL/min/1.73 square meters)    Stage 3A Moderate CKD (GFR = 45-59 mL/min/1.73 square meters)    Stage 3B Moderate CKD (GFR = 30-44 mL/min/1.73 square meters)    Stage 4 Severe CKD (GFR = 15-29 mL/min/1.73 square meters)    Stage 5 End Stage CKD (GFR <15 mL/min/1.73 square meters)  Note: GFR calculation is accurate only with a steady state creatinine       Imaging:     No orders to display      All details of the evaluation and treatment plan were made clear and additionally all questions and concerns were addressed while under my care.    Portions of the record may have been created with voice recognition software. Occasional wrong word or \"sound a like\" substitutions may have occurred due to the inherent limitations of voice recognition software. Read the chart carefully and recognize, using context, where substitutions have occurred.    The attending physician physically available and evaluated the above patient alongside myself.      Joaquín Brewster MD  03/25/24 1413    "

## 2024-03-25 NOTE — DISCHARGE INSTRUCTIONS
Take the medications as prescribed.    You should follow-up with a rheumatologist and dermatologist and your primary care doctor.     Return to the ER for any worsening symptoms

## 2024-03-28 NOTE — ED ATTENDING ATTESTATION
3/24/2024  I, Glen Alicea MD, saw and evaluated the patient. I have discussed the patient with the resident/non-physician practitioner and agree with the resident's/non-physician practitioner's findings, Plan of Care, and MDM as documented in the resident's/non-physician practitioner's note, except where noted. All available labs and Radiology studies were reviewed.  I was present for key portions of any procedure(s) performed by the resident/non-physician practitioner and I was immediately available to provide assistance.       At this point I agree with the current assessment done in the Emergency Department.  I have conducted an independent evaluation of this patient a history and physical is as follows:    Subjective: Patient is a 48-year-old female that presents for evaluation of rash.  Patient says that over the past several days she has had worsening rash over her palms, the back of her hands, wrists, knees, inner thighs.  The rash is red swollen and painful.  She also describes polyarthralgias in both major joints.  Patient denies any fevers chills or rigors.  He has been able to eat and drink with no evidence of vomiting.  No intraoral lesions.  No medication changes.  Patient has similar rash about 10 years ago, the cause of which was idiopathic despite rheumatology evaluation.  She denies urinary or bowel symptoms.    Objective: Macular blanching rash over the palms soles knees thighs.  Some tenderness to the major joints without joint effusion noted.    Assessment and plan: Patient with rash over palms, initial concern for possible syphilis.  RPR sent.  Patient describes aching joints, Lyme sent as well.  Basic blood work including CK reviewed and negative.  Unclear etiology of her rash though this may be something related to an autoimmune process.  Started with p.o. steroids and given referral to specialist.  Advised supportive measures and strict return precautions.    ED Course         Critical  Care Time  Procedures

## 2024-04-02 ENCOUNTER — TELEPHONE (OUTPATIENT)
Age: 49
End: 2024-04-02

## 2024-04-02 NOTE — TELEPHONE ENCOUNTER
Called pt to schedule an appt for referral; no answer  Left vm to call back for scheduling, left Derm phone number

## 2024-04-12 ENCOUNTER — TELEPHONE (OUTPATIENT)
Age: 49
End: 2024-04-12

## 2024-05-21 ENCOUNTER — APPOINTMENT (EMERGENCY)
Dept: RADIOLOGY | Facility: HOSPITAL | Age: 49
End: 2024-05-21
Payer: COMMERCIAL

## 2024-05-21 ENCOUNTER — APPOINTMENT (EMERGENCY)
Dept: CT IMAGING | Facility: HOSPITAL | Age: 49
End: 2024-05-21
Payer: COMMERCIAL

## 2024-05-21 ENCOUNTER — HOSPITAL ENCOUNTER (EMERGENCY)
Facility: HOSPITAL | Age: 49
Discharge: HOME | End: 2024-05-21
Attending: EMERGENCY MEDICINE | Admitting: EMERGENCY MEDICINE
Payer: COMMERCIAL

## 2024-05-21 VITALS
RESPIRATION RATE: 18 BRPM | DIASTOLIC BLOOD PRESSURE: 78 MMHG | TEMPERATURE: 98.4 F | BODY MASS INDEX: 31.02 KG/M2 | WEIGHT: 158 LBS | OXYGEN SATURATION: 97 % | SYSTOLIC BLOOD PRESSURE: 130 MMHG | HEIGHT: 60 IN | HEART RATE: 90 BPM

## 2024-05-21 DIAGNOSIS — M54.2 NECK PAIN: ICD-10-CM

## 2024-05-21 DIAGNOSIS — M47.812 SPONDYLOSIS OF CERVICAL SPINE: ICD-10-CM

## 2024-05-21 DIAGNOSIS — M25.512 LEFT SHOULDER PAIN: Primary | ICD-10-CM

## 2024-05-21 PROCEDURE — 99283 EMERGENCY DEPT VISIT LOW MDM: CPT

## 2024-05-21 PROCEDURE — 96372 THER/PROPH/DIAG INJ SC/IM: CPT

## 2024-05-21 PROCEDURE — 73030 X-RAY EXAM OF SHOULDER: CPT

## 2024-05-21 PROCEDURE — 99284 EMERGENCY DEPT VISIT MOD MDM: CPT

## 2024-05-21 PROCEDURE — 72125 CT NECK SPINE W/O DYE: CPT

## 2024-05-21 PROCEDURE — 73060 X-RAY EXAM OF HUMERUS: CPT

## 2024-05-21 RX ORDER — ACETAMINOPHEN 325 MG/1
975 TABLET ORAL ONCE
Status: COMPLETED | OUTPATIENT
Start: 2024-05-21 | End: 2024-05-21

## 2024-05-21 RX ORDER — PREDNISONE 20 MG/1
40 TABLET ORAL ONCE
Status: COMPLETED | OUTPATIENT
Start: 2024-05-21 | End: 2024-05-21

## 2024-05-21 RX ORDER — LIDOCAINE 50 MG/G
1 PATCH TOPICAL ONCE
Status: DISCONTINUED | OUTPATIENT
Start: 2024-05-21 | End: 2024-05-21 | Stop reason: HOSPADM

## 2024-05-21 RX ORDER — LIDOCAINE 50 MG/G
1 PATCH TOPICAL DAILY
Qty: 14 PATCH | Refills: 0 | Status: SHIPPED | OUTPATIENT
Start: 2024-05-21 | End: 2024-06-04

## 2024-05-21 RX ORDER — KETOROLAC TROMETHAMINE 30 MG/ML
30 INJECTION, SOLUTION INTRAMUSCULAR; INTRAVENOUS ONCE
Status: COMPLETED | OUTPATIENT
Start: 2024-05-21 | End: 2024-05-21

## 2024-05-21 RX ORDER — PREDNISONE 20 MG/1
40 TABLET ORAL DAILY
Qty: 10 TABLET | Refills: 0 | Status: SHIPPED | OUTPATIENT
Start: 2024-05-21 | End: 2024-05-26

## 2024-05-21 RX ORDER — IBUPROFEN 600 MG/1
600 TABLET ORAL ONCE
Status: COMPLETED | OUTPATIENT
Start: 2024-05-21 | End: 2024-05-21

## 2024-05-21 RX ADMIN — LIDOCAINE 1 PATCH: 50 PATCH CUTANEOUS at 11:40

## 2024-05-21 RX ADMIN — ACETAMINOPHEN 975 MG: 325 TABLET ORAL at 08:34

## 2024-05-21 RX ADMIN — PREDNISONE 40 MG: 20 TABLET ORAL at 08:34

## 2024-05-21 RX ADMIN — KETOROLAC TROMETHAMINE 30 MG: 30 INJECTION, SOLUTION INTRAMUSCULAR; INTRAVENOUS at 11:40

## 2024-05-21 RX ADMIN — IBUPROFEN 600 MG: 600 TABLET, FILM COATED ORAL at 08:34

## 2024-05-21 NOTE — Clinical Note
Isabel Calhoun was seen and treated in our emergency department on 5/21/2024.    No restrictions            Diagnosis: Left shoulder pain    Isabel  may return to work on return date.    She may return on this date: 05/22/2024         If you have any questions or concerns, please don't hesitate to call.      Noel Naylor PA-C    ______________________________           _______________          _______________  Hospital Representative                              Date                                Time

## 2024-05-21 NOTE — ED PROVIDER NOTES
History  Chief Complaint   Patient presents with    Arm Pain     Neck and left arm pain. History of previous problems. 3 weeks ago fell onto left side from ladder and has increased pain and paresthesias since.     Patient is a 49-year-old female with relevant past medical history of anemia, thyroid disease, herpes zoster, liver disease, migraine, and UTI presenting with left shoulder pain x 3 weeks. Patient has been experiencing left arm pain and left arm numbness/tingling radiating from her neck for years. 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 She had a nerve test performed many years ago for this. She reports falling off a step ladder roughly 3 weeks ago and landed onto the ground but is unsure if she landed onto her left arm or shoulder. She has been experiencing left shoulder pain since then and woke up this morning with the severe pain in her left shoulder. She reports difficulty lifting her left arm overhead and has pain putting her left arm on the steering wheel. She reports the pain is a burning/shooting sensation that radiates from her neck down her left anterior shoulder down her left arm and left forearm. She feels burning/tingling in her three medial fingers. She rates the left shoulder pain an 8/10. She has been trying massage therapy and heat but has not tried pain medications yet. She reports getting prednisone in the past when she had pain like this and this drastically improved her pain and tingling. She has no other concerns or complaints.          History provided by:  Patient and significant other   used: No    Arm Pain  Associated symptoms: no abdominal pain, no chest pain, no congestion, no cough, no diarrhea, no ear pain, no fever, no headaches, no nausea, no rash, no rhinorrhea, no shortness of breath, no sore throat and no vomiting        Prior to Admission Medications   Prescriptions Last Dose Informant Patient Reported? Taking?   Ascorbic Acid (vitamin C) 1000 MG tablet   Yes  No   Sig: Take 1,000 mg by mouth daily   L-Lysine 1000 MG TABS   Yes No   Sig: Take 1,000 mg by mouth Daily at 2am   Magnesium 300 MG CAPS  Self Yes No   Sig: Take 1 capsule by mouth Daily at 2am   Multiple Vitamins-Minerals (MULTIVITAMIN ADULT EXTRA C PO)   Yes No   Sig: Take 1 tablet by mouth Daily at 2am   Probiotic Product (PROBIOTIC ADVANCED PO)  Self Yes No   Sig: Take 1 capsule by mouth Daily at 2am   Sulfacetamide Sodium-Sulfur 10-5 % LIQD   Yes No   Sig: Apply 1 Application topically 2 (two) times a day   clindamycin (CLEOCIN T) 1 % lotion   Yes No   Sig: Apply 1 Application topically 2 (two) times a day   naproxen (Naprosyn) 500 mg tablet   No No   Sig: Take 1 tablet (500 mg total) by mouth 2 (two) times a day with meals   spironolactone (ALDACTONE) 50 mg tablet   Yes No   Sig: Take 50 mg by mouth daily      Facility-Administered Medications: None       Past Medical History:   Diagnosis Date    Abnormal Pap smear of cervix     Anemia     Disease of thyroid gland     thyroid storm with previous pregnancy    Dysmenorrhea     Esophageal reflux     last assessed - [id number]    Female infertility     Herpes zoster     LUQ level T-8 resolved; last assessed - 2015    History of infection due to human papilloma virus (HPV)     HPV (human papilloma virus) infection     Inguinal hernia     last assessed - [id number]    Insomnia     Liver disease 2024    Mastodynia     Resolved -     Migraine     Miscarriage     Ovarian cyst     last assessed - [id number]    Ovarian cyst 2013    Palpitations     last assessed - [id number]    Primary narcolepsy without cataplexy 10/11/2023    Shingles 2023    Tachycardia     Resolved - [id number]    Takes dietary supplements     Temporomandibular joint disorder     last assessed - 2012    Urinary tract infection     Varicella        Past Surgical History:   Procedure Laterality Date     SECTION      COLONOSCOPY  2012    Fiberoptic    CRYOTHERAPY       cervix, age 20    DIAGNOSTIC LAPAROSCOPY      DILATION AND CURETTAGE OF UTERUS      Resolved 2010    GYNECOLOGIC CRYOSURGERY      age 20    TOOTH EXTRACTION         Family History   Problem Relation Age of Onset    Diabetes Mother     Heart disease Mother         cardiac disorder    Gallbladder disease Mother     Hypertension Mother     Glaucoma Mother     Obesity Mother         hx gastric bypass    Stroke Mother     Thyroid disease Mother     Diabetes Father     Heart disease Father         cardiac disorder    Hypertension Father     Hyperlipidemia Father         Pure Hypercholesterolemia    Obesity Father     Other Family         headache syndromes    Breast cancer Cousin     Post-traumatic stress disorder Brother     Hyperlipidemia Brother     Hypertension Brother     Diabetes Maternal Grandmother     Lung cancer Maternal Grandfather     Stroke Paternal Grandmother     Hypertension Paternal Grandmother     Cirrhosis Paternal Grandfather     Ovarian cancer Maternal Aunt     No Known Problems Daughter     No Known Problems Paternal Aunt     Colon cancer Neg Hx      I have reviewed and agree with the history as documented.    E-Cigarette/Vaping    E-Cigarette Use Never User      E-Cigarette/Vaping Substances    Nicotine No     THC No     CBD No     Flavoring No     Other No     Unknown No      Social History     Tobacco Use    Smoking status: Never    Smokeless tobacco: Never   Vaping Use    Vaping status: Never Used   Substance Use Topics    Alcohol use: Not Currently     Comment: social/prior to knowledge of pregnancy    Drug use: Never       Review of Systems   Constitutional:  Negative for chills and fever.   HENT:  Negative for congestion, ear pain, rhinorrhea and sore throat.    Eyes:  Negative for pain and visual disturbance.   Respiratory:  Negative for cough and shortness of breath.    Cardiovascular:  Negative for chest pain and palpitations.   Gastrointestinal:  Negative for abdominal pain, constipation,  diarrhea, nausea and vomiting.   Genitourinary:  Negative for dysuria, frequency, hematuria and urgency.   Musculoskeletal:  Positive for neck pain. Negative for arthralgias and back pain.        L shoulder pain.   Skin:  Negative for color change and rash.   Neurological:  Negative for dizziness, seizures, syncope, light-headedness and headaches.        Tingling left arm.   Psychiatric/Behavioral:  Negative for agitation and confusion.        Physical Exam  Physical Exam  Vitals and nursing note reviewed.   Constitutional:       General: She is not in acute distress.     Appearance: She is well-developed. She is not ill-appearing.   HENT:      Head: Normocephalic and atraumatic.   Eyes:      Conjunctiva/sclera: Conjunctivae normal.   Cardiovascular:      Rate and Rhythm: Normal rate and regular rhythm.      Heart sounds: No murmur heard.  Pulmonary:      Effort: Pulmonary effort is normal. No respiratory distress.      Breath sounds: Normal breath sounds. No wheezing, rhonchi or rales.   Abdominal:      Palpations: Abdomen is soft.      Tenderness: There is no abdominal tenderness. There is no guarding or rebound.   Musculoskeletal:         General: No swelling.      Right shoulder: Normal.      Left shoulder: Tenderness present. Normal strength. Normal pulse.      Left upper arm: Normal.      Cervical back: Neck supple. Tenderness present. No crepitus.      Thoracic back: Normal.      Lumbar back: Normal.      Comments: Tenderness over left anterior shoulder. Left arm is neurovascularly intact. Increased pain beyond 90 degrees of anterior flexion. No signs of trauma, ecchymosis, or erythema. No crepitus. +Neer's test. Mild tenderness over C6-C7.   Skin:     General: Skin is warm and dry.      Capillary Refill: Capillary refill takes less than 2 seconds.   Neurological:      General: No focal deficit present.      Mental Status: She is alert and oriented to person, place, and time.   Psychiatric:         Mood and  Affect: Mood normal.         Vital Signs  ED Triage Vitals   Temperature Pulse Respirations Blood Pressure SpO2   05/21/24 0837 05/21/24 0806 05/21/24 0806 05/21/24 0806 05/21/24 0806   98.4 °F (36.9 °C) 92 18 146/81 97 %      Temp src Heart Rate Source Patient Position - Orthostatic VS BP Location FiO2 (%)   -- 05/21/24 0806 05/21/24 0806 05/21/24 0806 --    Monitor Lying Right arm       Pain Score       05/21/24 0806       8           Vitals:    05/21/24 0815 05/21/24 0830 05/21/24 1030 05/21/24 1130   BP: 146/81 130/63 125/65 130/78   Pulse: 93 78 69 90   Patient Position - Orthostatic VS: Lying Sitting Sitting Sitting         Visual Acuity      ED Medications  Medications   lidocaine (LIDODERM) 5 % patch 1 patch (1 patch Topical Medication Applied 5/21/24 1140)   acetaminophen (TYLENOL) tablet 975 mg (975 mg Oral Given 5/21/24 0834)   ibuprofen (MOTRIN) tablet 600 mg (600 mg Oral Given 5/21/24 0834)   predniSONE tablet 40 mg (40 mg Oral Given 5/21/24 0834)   ketorolac (TORADOL) injection 30 mg (30 mg Intramuscular Given 5/21/24 1140)       Diagnostic Studies  Results Reviewed       None                   CT cervical spine without contrast   Final Result by Mukund Escobar MD (05/21 1206)      -No cervical spine fracture or traumatic malalignment.      -C5-C7 spondylosis resulting in mild to moderate spinal canal and neuroforaminal narrowing more prominent at C6-C7. Details above               Workstation performed: [id number]         XR shoulder 2+ views LEFT   ED Interpretation by Noel Naylor PA-C (05/21 0902)   No acute osseous abnormality.      XR humerus LEFT   ED Interpretation by Noel Naylor PA-C (05/21 0902)   No acute osseous abnormality.                 Procedures  Procedures         ED Course  ED Course as of 05/21/24 1228   Tue May 21, 2024   0810 Vital signs reviewed and within normal limits other than slightly elevated blood pressure which is likely secondary to her pain.   0920 Went over  results thus far with patient and fiancé. Her shoulder pain is now a 6/10. She still has some tenderness over her lower neck. Ordered CT cervical spine.   1118 Called radiology reading room as there is an extended wait time on the CT cervical scan.   1135 Patient reports her pain crept back up to a 8/10.  Ordered Toradol 30 mg IM and lidocaine 5% patch for her left anterior shoulder pain.   1208 CT cervical spine without contrast  IMPRESSION:  -No cervical spine fracture or traumatic malalignment.  -C5-C7 spondylosis resulting in mild to moderate spinal canal and neuroforaminal narrowing more prominent at C6-C7. Details above.   1209 Went over results with patient and . Her left shoulder is now a 3/10 and this is the best pain relief she felt today. She is ready to go home. Sent a prescription for prednisone and lidocaine patches into her pharmacy and provided ambulatory referral.  Will discharge home with strict return precautions.                               SBIRT 22yo+      Flowsheet Row Most Recent Value   Initial Alcohol Screen: US AUDIT-C     1. How often do you have a drink containing alcohol? 0 Filed at: 05/21/2024 0809   2. How many drinks containing alcohol do you have on a typical day you are drinking?  0 Filed at: 05/21/2024 0809   3b. FEMALE Any Age, or MALE 65+: How often do you have 4 or more drinks on one occassion? 0 Filed at: 05/21/2024 0809   Audit-C Score 0 Filed at: 05/21/2024 0809   DEDE: How many times in the past year have you...    Used an illegal drug or used a prescription medication for non-medical reasons? Never Filed at: 05/21/2024 0809                      Medical Decision Making  Patient is a well-appearing 49-year-old female with relevant past medical history of chronic left arm pain and tingling presenting with left shoulder pain x 3 weeks. Mild tenderness over her lower cervical spine. No focal neuro deficits.  Patient has tenderness over her left anterior shoulder. Her  left arm is neurovascularly intact and she has increased pain beyond 90 degrees of anterior flexion. Brief focused differential includes: nerve impingement, rotator cuff injury, tendinitis, bursitis, left shoulder fracture, left arm fracture  Ordered x-ray left shoulder and x-ray left arm to help rule out left shoulder fracture or left humerus fracture. No fractures or dislocations upon my preliminary interpretation.  Tylenol 975 mg p.o., Motrin 600 mg p.o., and prednisone 40 mg p.o. for her left shoulder pain.  Sent a prednisone burst pack into her pharmacy for radiculopathy. Sent lidocaine patches as this significantly helped her left shoulder pain. Provided ambulatory referral to orthopedic surgery to discuss her neck and shoulder pain.  Dispo: Patient discharged home with strict return precautions. Advised patient to return to the nearest emergency room if she has new or worsening symptoms or if any questions arise. Advised patient to follow-up with her family doctor. Patient is satisfied with care and agrees with management and plan.     Amount and/or Complexity of Data Reviewed  External Data Reviewed: labs, radiology and notes.  Radiology: ordered and independent interpretation performed. Decision-making details documented in ED Course.    Risk  OTC drugs.  Prescription drug management.             Disposition  Final diagnoses:   Left shoulder pain   Neck pain   Spondylosis of cervical spine     Time reflects when diagnosis was documented in both MDM as applicable and the Disposition within this note       Time User Action Codes Description Comment    5/21/2024  8:48 AM Noel Naylor Add [M25.512] Left shoulder pain     5/21/2024 12:15 PM Noel Naylor Add [M54.2] Neck pain     5/21/2024 12:15 PM Noel Naylor Add [M47.812] Spondylosis of cervical spine           ED Disposition       ED Disposition   Discharge    Condition   Stable    Date/Time   Tue May 21, 2024 0902    Comment   Isabel Calhoun  discharge to home/self care.                   Follow-up Information       Follow up With Specialties Details Why Contact Info Additional Information    [facility] Emergency Department Emergency Medicine Go to  If symptoms worsen [address] [place] [address] [phone] [facility] Emergency Department, [address] [facility], [place], [place] [address]    Jim Roblero DO Family Medicine Schedule an appointment as soon as possible for a visit   [address] [place] [address] [place]yann [place] [address]  [phone]       [facility] [place] [facility] Call   [address]  [facility] [facility] [address] [phone] [facility] [place], [facility] [address], [place], [address], [phone]            Discharge Medication List as of 5/21/2024 12:22 PM        START taking these medications    Details   lidocaine (Lidoderm) 5 % Apply 1 patch topically over 12 hours daily for 14 days Remove & Discard patch within 12 hours or as directed by MD, Starting Tue 5/21/2024, Until Tue 6/4/2024, Normal      predniSONE 20 mg tablet Take 2 tablets (40 mg total) by mouth daily for 5 days, Starting Tue 5/21/2024, Until Sun 5/26/2024, Normal           CONTINUE these medications which have NOT CHANGED    Details   Ascorbic Acid (vitamin C) 1000 MG tablet Take 1,000 mg by mouth daily, Starting Sun 12/31/2023, Historical Med      clindamycin (CLEOCIN T) 1 % lotion Apply 1 Application topically 2 (two) times a day, Starting Tue 1/23/2024, Historical Med      L-Lysine 1000 MG TABS Take 1,000 mg by mouth Daily at 2am, Starting Sun 12/31/2023, Historical Med      Magnesium 300 MG CAPS Take 1 capsule by mouth Daily at 2am, Historical Med      Multiple Vitamins-Minerals (MULTIVITAMIN ADULT EXTRA C PO) Take 1 tablet by mouth Daily at 2am, Starting Sun 12/31/2023, Historical Med      naproxen (Naprosyn) 500 mg tablet Take 1  tablet (500 mg total) by mouth 2 (two) times a day with meals, Starting Sun 3/24/2024, Normal      Probiotic Product (PROBIOTIC ADVANCED PO) Take 1 capsule by mouth Daily at 2am, Historical Med      spironolactone (ALDACTONE) 50 mg tablet Take 50 mg by mouth daily, Starting Tue 1/23/2024, Historical Med      Sulfacetamide Sodium-Sulfur 10-5 % LIQD Apply 1 Application topically 2 (two) times a day, Starting Tue 1/23/2024, Historical Med                 PDMP Review         Value Time User    PDMP Reviewed  Yes 3/1/2022  9:12 AM Neena Bahena MD            ED Provider  Electronically Signed by             Noel Naylor PA-C  05/21/24 9519

## 2024-05-21 NOTE — DISCHARGE INSTRUCTIONS
Immediately return to the emergency room if you experience any new or worsening symptoms or if the symptoms are lasting longer than expected.     Please  the prednisone from your pharmacy and complete the full 5-day course. Please  the lidocaine patches from your pharmacy and use as needed. Ibuprofen and Tylenol dosing instructions are attached. Please follow-up with orthopedic surgery.    CT cervical spine IMPRESSION:  -No cervical spine fracture or traumatic malalignment.  -C5-C7 spondylosis resulting in mild to moderate spinal canal and neuroforaminal narrowing more prominent at C6-C7. Details above.    Please take ibuprofen (Motrin) or acetaminophen (Tylenol) as needed for pain. These are available over-the-counter. You may take ibuprofen 600 mg every 8 hours with food for pain. You may also take acetaminophen 650 mg every 4-6 hours for pain. Do not exceed 3000 mg of Tylenol a day as this can cause liver damage. Do not drink alcohol with either of these medications. Evidence-based research has shown taking these 2 drugs together work the same (or better in some cases) for pain than opioids or narcotic pain medication.

## 2024-05-21 NOTE — ED NOTES
Pt ringing call bell stating pain is back. Provider made aware.     Ai Sullivan RN  05/21/24 2713

## 2024-05-22 ENCOUNTER — OFFICE VISIT (OUTPATIENT)
Dept: OBGYN CLINIC | Facility: CLINIC | Age: 49
End: 2024-05-22
Payer: COMMERCIAL

## 2024-05-22 VITALS
HEART RATE: 80 BPM | DIASTOLIC BLOOD PRESSURE: 69 MMHG | WEIGHT: 158.6 LBS | HEIGHT: 60 IN | SYSTOLIC BLOOD PRESSURE: 108 MMHG | BODY MASS INDEX: 31.14 KG/M2

## 2024-05-22 DIAGNOSIS — M25.512 ACUTE PAIN OF LEFT SHOULDER: Primary | ICD-10-CM

## 2024-05-22 PROCEDURE — 99203 OFFICE O/P NEW LOW 30 MIN: CPT | Performed by: ORTHOPAEDIC SURGERY

## 2024-05-22 NOTE — PROGRESS NOTES
[facility] ORTHOPEDIC SPINE SURGERY  DR.AMIR VEENA MD  [address] [facility] [address] [phone]    HISTORY OF PRESENT ILLNESS:    Isabel Calhoun is a 49 y.o. female who presents for initial evaluation of cervical spine. Patient reports she has been having flares of pain/neuropathy going down the left arm into the last three digits of the left arm. Patient states she fell off a ladder about three weeks ago and has worsening of symptoms since then. Patient reports pain was severe yesterday and she had difficulty driving the arm yesterday.       ALLERGIES:   Allergies   Allergen Reactions    Amoxicillin Hives    Cymbalta [Duloxetine Hcl] Other (See Comments)     Shaking      Penicillins Hives    Shellfish Allergy - Food Allergy Hives    Sulfa Antibiotics Rash       MEDICATIONS:    Current Outpatient Medications:     Ascorbic Acid (vitamin C) 1000 MG tablet, Take 1,000 mg by mouth daily, Disp: , Rfl:     clindamycin (CLEOCIN T) 1 % lotion, Apply 1 Application topically 2 (two) times a day, Disp: , Rfl:     L-Lysine 1000 MG TABS, Take 1,000 mg by mouth Daily at 2am, Disp: , Rfl:     lidocaine (Lidoderm) 5 %, Apply 1 patch topically over 12 hours daily for 14 days Remove & Discard patch within 12 hours or as directed by MD, Disp: 14 patch, Rfl: 0    Magnesium 300 MG CAPS, Take 1 capsule by mouth Daily at 2am, Disp: , Rfl:     Multiple Vitamins-Minerals (MULTIVITAMIN ADULT EXTRA C PO), Take 1 tablet by mouth Daily at 2am, Disp: , Rfl:     naproxen (Naprosyn) 500 mg tablet, Take 1 tablet (500 mg total) by mouth 2 (two) times a day with meals, Disp: 30 tablet, Rfl: 0    predniSONE 20 mg tablet, Take 2 tablets (40 mg total) by mouth daily for 5 days, Disp: 10 tablet, Rfl: 0    Probiotic Product (PROBIOTIC ADVANCED PO), Take 1 capsule by mouth Daily at 2am, Disp: , Rfl:     spironolactone (ALDACTONE) 50 mg tablet, Take 50 mg by mouth daily, Disp: , Rfl:     Sulfacetamide Sodium-Sulfur 10-5 % LIQD, Apply 1  Application topically 2 (two) times a day, Disp: , Rfl:   No current facility-administered medications for this visit.     PAST MEDICAL HISTORY:   Past Medical History:   Diagnosis Date    Abnormal Pap smear of cervix     Anemia     Disease of thyroid gland     thyroid storm with previous pregnancy    Dysmenorrhea     Esophageal reflux     last assessed - [id number]    Female infertility     Herpes zoster     LUQ level T-8 resolved; last assessed - 2015    History of infection due to human papilloma virus (HPV)     HPV (human papilloma virus) infection     Inguinal hernia     last assessed - [id number]    Insomnia     Liver disease 2024    Mastodynia     Resolved -     Migraine     Miscarriage     Ovarian cyst     last assessed - [id number]    Ovarian cyst 2013    Palpitations     last assessed - [id number]    Primary narcolepsy without cataplexy 10/11/2023    Shingles 2023    Tachycardia     Resolved - [id number]    Takes dietary supplements     Temporomandibular joint disorder     last assessed - 2012    Urinary tract infection     Varicella        PAST SURGICAL HISTORY:  Past Surgical History:   Procedure Laterality Date     SECTION      COLONOSCOPY  2012    Fiberoptic    CRYOTHERAPY      cervix, age 20    DIAGNOSTIC LAPAROSCOPY      DILATION AND CURETTAGE OF UTERUS      Resolved     GYNECOLOGIC CRYOSURGERY      age 20    TOOTH EXTRACTION         SOCIAL HISTORY:  Social History     Tobacco Use   Smoking Status Never   Smokeless Tobacco Never          PHYSICAL EXAM:  49 y.o. female sitting comfortably on exam chair in no apparent distress.   Ambulates with normal gait  Normal sagittal and coronal balance  Able to stand erect  Normal range of motion bilateral upper extremities  TTP over left bicipital groove.  Discomfort with resisted supination localized to the left shoulder  Negative impingement sign of left shoulder       RADIOGRAPHIC STUDIES:  CT, cervical spine, 2024:  Moderate degenerative changes at C5-6 and C6-7 posterior osteophyte formation.  There is no central stenosis.  Mild left neuroforaminal stenosis present at C6-7 consistent with uncovertebral hypertrophy.  I did review the report which mentioned have some moderate stenosis.  This is inconsistent with my evaluation of the MRI scan.      ASSESSMENT:  1. Neck pain  -     Ambulatory Referral to Orthopedic Surgery      PLAN:  49 y.o. female with acute left shoulder pain s/p fall from ladder three weeks ago.     CT of cervical spine were reviewed in the office today. It was discussed that symptoms originate from left shoulder pathology rather than cervical spine given pain with movement of the shoulder joint and pain on palpation of the bicipital groove. Patient was advised that we should start with conservative management involving physical therapy for the left shoulder. If pain persists, then patient should follow-up with orthopedics in six weeks for re-evaluation of left shoulder. Patient lives near [place], so referral to Dr. Partida was placed.  She will need an MRI study of the left shoulder if the physical therapy is not helpful.    On physical examination there was also cervical radiculopathy.  Full range of motion cervical spine was present.    Patient will follow-up as needed for any new or worsening symptoms.        Scribe Attestation      I,:  Lesly Mendoza PA-C am acting as a scribe while in the presence of the attending physician.:       I,:  Dennis Hamm MD personally performed the services described in this documentation    as scribed in my presence.:

## 2024-06-03 NOTE — PROGRESS NOTES
PT Evaluation     Today's date: 2024  Patient name: Isabel Calhoun  : 1975  MRN: [id number]  Referring provider: Dennis Hamm MD  Dx:   Encounter Diagnosis     ICD-10-CM    1. Acute pain of left shoulder  M25.512 Ambulatory Referral to Physical Therapy      2. Cervical radiculopathy  M54.12                      Assessment  Impairments: abnormal or restricted ROM, activity intolerance, impaired physical strength, lacks appropriate home exercise program, pain with function and poor posture   Functional limitations: Difficulty lifting/carrying objects heavier than 10 pounds, difficulty reaching behind back, difficulty reaching above head, difficulty with prolonged sitting/driving, difficulty lying on L side, and difficulty pushing with L UE  Symptom irritability: high    Assessment details: Isabel Calhoun is a 49 y.o. female with a history of shingles, narcolepsy, ovarian cyst, anemia, thyroid disease, GERD, HPV, inguinal hernia, insomnia, migraine, tachycardia, TMJ D/O that presents for a moderate complexity physical therapy initial evaluation.  The patient demonstrates signs and symptoms consistent with acute L shoulder pain;cervical radiculopathy.  During the examination the patient demonstrated decreased L UE strength, decreased L shoulder ROM, impaired cervical ROM, activity intolerance, and L shoulder/UE pain.  The patient's impairments are causing the following functional limitations: Difficulty lifting/carrying objects heavier than 10 pounds, difficulty reaching behind back, difficulty reaching above head, difficulty with prolonged sitting/driving, difficulty lying on L side, and difficulty pushing with L UE.  The patient's clinical presentation is evolving due to a number of participation restrictions, significant medial history, and functional limitation (FOTO 50% function).  The patient demonstrated a reduction in L hand symptoms with repeated cervical retraction and extension exercises.  The patient will benefit from skilled PT services to address impairments, work towards goals, and restore PLOF.      Understanding of Dx/Px/POC: good     Prognosis: good  Prognosis details: Positive prognostic indicators include: positive attitude toward recovery, good understanding of diagnosis/treatment plan, and absence of observed red flags.      Negative prognostic indicators include: Significant medical Hx, chronicity of condition; ?nerve involvement L UE    Goals  STG: ACHIEVE in 4 -6 weeks  1.  Improve L shoulder pain at worst by 50% to improve ADL's.  2.  Improve L shoulder strength by 1/2-1 muscle grade to improve lifting/carrying tasks.  3.  Improve L shoulder IR/EXT ROM by 10-20 degrees; C-spine ROM WNL to improve the ability to reach over head.    LTG:  Achieve in 8-12 weeks  1.  Patient learn what causes her L UE pain and learn how to avoid aggravating and also how to treat/decrease pain to achieve their personal goal.  2.  Patient's L shoulder strength return to equal for both sides to lift/carry items without pain/difficulty.  3.  Patient's L shoulder /cervical ROM improve to WNL to perform all ADL's and overhead tasks without difficulty.  4.  Patient to be independent with home exercise plan at time of discharge.  5. Patient's shoulder FOTO score improve to > 66% to indicate a return to normal function.             Plan  Patient would benefit from: skilled physical therapy  Planned modality interventions: cryotherapy, thermotherapy: hydrocollator packs and traction    Planned therapy interventions: joint mobilization, manual therapy, neuromuscular re-education, patient education, postural training, self care, strengthening, stretching, therapeutic activities, therapeutic exercise, home exercise program, abdominal trunk stabilization and IASTM    Frequency: 1-3x/wk.  Duration in weeks: 12  Plan of Care beginning date: 6/5/2024  Plan of Care expiration date: 9/4/2024  Treatment plan discussed with:  "PTA and patient  Plan details: RE-ASSESS 1X/MONTH        Subjective Evaluation    History of Present Illness  Date of onset: 5/1/2024  Mechanism of injury: Isabel Calhoun is a 49 y.o. female that presents to outpatient physical therapy with complaints of chronic ( past 13 years on/off)L shoulder pain and difficulty functioning.  The patient reports having L shoulder pain on and off for a long time.  The patient notes re-aggravating her pain when falling off of a ladder onto an outstretched arm 1 month prior.  The patient describes the pain as \"squeezing\" and \"tightness\" at her L UT and   chest wall with pain radiating down her L arm and into her 3rd through 5th digits.  The patient notes difficulty with reaching her L arm away from her body, donning a seatbelt, and reaching arm behind back.  The patient notes aggravation with attempting to play volleyball - opposing force on L hand.  The patient's main goal for physical therapy is to understand why she gets pain, what to do to avoid pain and treat pain.       CT Cervical spine: IMPRESSION:     -No cervical spine fracture or traumatic malalignment.     -C5-C7 spondylosis resulting in mild to moderate spinal canal and neuroforaminal narrowing more prominent at C6-C7. Details above            Recurrent probem    Patient Goals  Patient goals for therapy: decreased pain, increased motion, increased strength, return to sport/leisure activities and independence with ADLs/IADLs  Patient goal: understand why she gets pain, what to do to avoid pain and treat pain.  Pain  Current pain rating: 3  At worst pain rating: 10  Location: L shoulder  Quality: burning, squeezing and tight  Alleviating factors: chiropractic, massage.  Aggravating factors: lifting (reaching)  Progression: no change    Social Support  Lives with: spouse and young children    Employment status: working (sales, desk/driving)  Hand dominance: right    Treatments  Previous treatment: massage and " chiropractic  Current treatment: physical therapy        Objective     Concurrent Complaints  Positive for disturbed sleep (painful to sleep on L side) and history of trauma (CT scan of cervical spine cleared of fracture). Negative for night pain, dizziness, faints, headaches, nausea/motion sickness, tinnitus, trouble swallowing, difficulty breathing, shortness of breath, respiratory pain, visual change, history of cancer and infection    Postural Observations  Seated posture: fair  Standing posture: fair  Correction of posture: makes symptoms better      Neurological Testing     Sensation   Cervical/Thoracic   Left   Intact: light touch    Right   Intact: light touch    Reflexes   Left   Biceps (C5/C6): brisk (3+)  Brachioradialis (C6): brisk (3+)  Judd's reflex: negative    Right   Biceps (C5/C6): brisk (3+)  Brachioradialis (C6): brisk (3+)  Judd's reflex: negative    Active Range of Motion   Cervical/Thoracic Spine       Cervical  Subcranial protraction:  with pain   Restriction level: minimal  Subcranial retraction:   Restriction level: moderate  Flexion:  with pain Restriction level: minimal  Extension:  Restriction level: moderate  Left lateral flexion:  with pain Restriction level: moderate  Right lateral flexion:  with pain Restriction level moderate  Left rotation: 70 degrees with pain Restriction level: minimal  Right rotation: 70 degrees    Restriction level: minimal  Left Shoulder   Flexion: 170 degrees   Extension: 35 degrees with pain  Abduction: 170 degrees with pain  Adduction: 30 degrees with pain  External rotation 0°: 70 degrees with pain  External rotation BTH: T1   Internal rotation BTB: L1 with pain    Right Shoulder   Flexion: 170 degrees   Extension: 68 degrees   Abduction: 175 degrees   Adduction: 40 degrees   External rotation 0°: 80 degrees   External rotation BTH: T2   Internal rotation BTB: T9   Mechanical Assessment    Cervical    Seated retraction: repeated movements   Pain  location: no change  Seated Extension: repeated movements  Pain intensity: better  Pain level: decreased    Thoracic      Lumbar      Strength/Myotome Testing   Cervical Spine     Left   Interossei strength (t1): 5    Right   Interossei strength (t1): 5    Left Shoulder     Planes of Motion   Flexion: 4-   Extension: 4   Abduction: 3+ (pain)   External rotation at 0°: 4+   Internal rotation at 0°: 4+     Right Shoulder     Planes of Motion   Flexion: 4   Extension: 4   Abduction: 4   External rotation at 0°: 4+   Internal rotation at 0°: 4+     Left Elbow   Flexion: 4+  Extension: 4+    Right Elbow   Flexion: 4+  Extension: 4+    Left Wrist/Hand   Wrist extension: 4+  Wrist flexion: 4+  Thumb extension: 4+    Right Wrist/Hand   Wrist extension: 4+  Wrist flexion: 4+  Thumb extension: 4+    Tests     Additional Tests Details  FOTO: 50% ( predicted 66%)    Neuro Exam:     Headaches   Patient reports headaches: No.              Precautions: none    Re-evaluation: 7/3  [id number]   Shoulder Specialty Daily Treatment Diary     Manual  6/5       STM/MFR shoulder /scapula  /stretching        Cervical P/A        GH jt mobs        Pec Minor stretch *?       Cervical traction / SOR Traction - test           Therapeutic Exercise 6/5       UBE for muscle endurance Retro       Pulleys        Shoulder isometrics (flex, ext, abd, IR, ER)        TB sh. IR, ER        Table slides flex/abd/ER        T-spine extension *               Shoulder Cane EXT *?if not better       Shoulder IR stretch *? If not better       Cane AAROM        Prone rows / extension / horizontal abduction                                Upper Trap Stretch        Levator Scap Stretch        SCM stretch        Neuro Re-ed        Chin Tucks X10  BETTER       Chin tuck with EXT X10 BETTER @ L hand       MTP/LTP *       YTI wall slide with lift off *       PNF D1/D2        Alternating isometrics 100 deg elevation; ER        Towel roll education; posture correction for  work set up X10 mins AD       Table rocking / wall ball circles        Scapular retraction test       Therapeutic Activity        Crate carry        3 height crate lifts                Cones in Shelf            Modalities        CP shoulder                               The patient was given a new home exercise plan with instruction, pictures, and verbal feedback.  The patient accepts and understands the new home activities.

## 2024-06-05 ENCOUNTER — EVALUATION (OUTPATIENT)
Dept: PHYSICAL THERAPY | Facility: CLINIC | Age: 49
End: 2024-06-05
Payer: COMMERCIAL

## 2024-06-05 DIAGNOSIS — M25.512 ACUTE PAIN OF LEFT SHOULDER: Primary | ICD-10-CM

## 2024-06-05 DIAGNOSIS — M54.12 CERVICAL RADICULOPATHY: ICD-10-CM

## 2024-06-05 PROCEDURE — 97112 NEUROMUSCULAR REEDUCATION: CPT | Performed by: PHYSICAL THERAPIST

## 2024-06-05 PROCEDURE — 97162 PT EVAL MOD COMPLEX 30 MIN: CPT | Performed by: PHYSICAL THERAPIST

## 2024-06-07 ENCOUNTER — OFFICE VISIT (OUTPATIENT)
Dept: PHYSICAL THERAPY | Facility: CLINIC | Age: 49
End: 2024-06-07
Payer: COMMERCIAL

## 2024-06-07 DIAGNOSIS — M54.12 CERVICAL RADICULOPATHY: ICD-10-CM

## 2024-06-07 DIAGNOSIS — M25.512 ACUTE PAIN OF LEFT SHOULDER: Primary | ICD-10-CM

## 2024-06-07 PROCEDURE — 97110 THERAPEUTIC EXERCISES: CPT | Performed by: PHYSICAL THERAPIST

## 2024-06-07 PROCEDURE — 97112 NEUROMUSCULAR REEDUCATION: CPT | Performed by: PHYSICAL THERAPIST

## 2024-06-07 NOTE — PROGRESS NOTES
Daily Note     Today's date: 2024  Patient name: Isabel Calhoun  : 1975  MRN: [id number]  Referring provider: Dennis Hamm MD  Dx:   Encounter Diagnosis     ICD-10-CM    1. Acute pain of left shoulder  M25.512       2. Cervical radiculopathy  M54.12                      Subjective: The patient reports feeling 1-2/10 pain in the L shoulder to start the session.  The patient notes her exercises are going well at home and it is helping to decrease her pain.      Objective: See treatment diary below      Assessment: The patient tolerated all activities well today.  The patient needed verbal and manual cues for proper posture and technique to perform the exercises properly.  There were no complaints of increased pain or problems after the session today.  The patient will benefit from continued skilled physical therapy to progress towards achieving patient centered goals.         Plan: Continue per plan of care.  Progress treatment as tolerated.       Precautions: none    Re-evaluation: 7/3  [id number]   Shoulder Specialty Daily Treatment Diary     Manual        STM/MFR shoulder /scapula  /stretching        Cervical P/A        GH jt mobs        Pec Minor stretch *?       Cervical traction / SOR Traction - test           Therapeutic Exercise       UBE for muscle endurance Retro /70  X4 mins      Pulleys        Shoulder isometrics (flex, ext, abd, IR, ER)        TB sh. IR, ER        Table slides flex/abd/ER        T-spine extension * X10  X10 w/ clin O.P.              Shoulder Cane EXT *?if not better       Shoulder IR stretch *? If not better       Cane AAROM        Prone rows / extension / horizontal abduction        Pec stretch 1/2 roll vertical seated  5x:15 clin O.P.                      Upper Trap Stretch        Levator Scap Stretch        SCM stretch        Neuro Re-ed        Chin Tucks X10  BETTER X10        Chin tuck with EXT X10 BETTER @ L hand X10       MTP/LTP *       YTI wall  slide with lift off *       PNF D1/D2        Alternating isometrics 100 deg elevation; ER        Towel roll education; posture correction for work set up X10 mins AD REVIEWED AD x 5 mins      Table rocking / wall ball circles        Scapular retraction test       Therapeutic Activity        Crate carry        3 height crate lifts                Cones in Shelf            Modalities        CP shoulder                                 The patient was given a new home exercise plan with instruction, pictures, and verbal feedback.  The patient accepts and understands the new home activities.

## 2024-06-13 ENCOUNTER — OFFICE VISIT (OUTPATIENT)
Dept: PHYSICAL THERAPY | Facility: CLINIC | Age: 49
End: 2024-06-13
Payer: COMMERCIAL

## 2024-06-13 DIAGNOSIS — M54.12 CERVICAL RADICULOPATHY: ICD-10-CM

## 2024-06-13 DIAGNOSIS — M25.512 ACUTE PAIN OF LEFT SHOULDER: Primary | ICD-10-CM

## 2024-06-13 PROCEDURE — 97112 NEUROMUSCULAR REEDUCATION: CPT | Performed by: PHYSICAL THERAPIST

## 2024-06-13 PROCEDURE — 97110 THERAPEUTIC EXERCISES: CPT | Performed by: PHYSICAL THERAPIST

## 2024-06-13 NOTE — PROGRESS NOTES
Daily Note     Today's date: 2024  Patient name: Isabel Calhoun  : 1975  MRN: [id number]  Referring provider: Dennis Hamm MD  Dx:   Encounter Diagnosis     ICD-10-CM    1. Acute pain of left shoulder  M25.512       2. Cervical radiculopathy  M54.12                      Subjective: The patient reports feeling 3/10 pain at her L anterior chest/shoulder/clavicle area.  The patient notes pain flare with receiving a massage this past Saturday when the masseuse massaged this area the patient felt severe radiating pain down her L arm.  This area is still irritated today - the patient used a pain patch to help. The patient continued with the exercises which did help some.      Objective: See treatment diary below      Assessment: The patient tolerated all activities well today.  The patient needed verbal and manual cues for proper posture and technique to perform the exercises properly.  We focused on stretching anterior cervical muscle / pec stretching today as the patient's symptom presentation is more like thoracic outlet / brachial plexus in origin.  Education on those conditions provided verbally and added to her HEP.  The patient did not tolerate 1st rib self mobilization so it as held today.  There were no complaints of increased pain or problems after the session today.  The patient did have 3/10 pain at her L anterior neck at the end of the session.  The patient will benefit from continued skilled physical therapy to progress towards achieving patient centered goals.         Plan: Continue per plan of care.  Progress treatment as tolerated.       Precautions: none    Re-evaluation: 7/3  75AHYBNQ   Shoulder Specialty Daily Treatment Diary     Manual       STM/MFR shoulder /scapula  /stretching        Cervical P/A        GH jt mobs        Pec Minor stretch *?       Cervical traction / SOR Traction - test           Therapeutic Exercise      UBE for muscle endurance Retro ALT  100/70  X4 mins /70  X 4 mins ALT     Pulleys        Shoulder isometrics (flex, ext, abd, IR, ER)        TB sh. IR, ER        Table slides flex/abd/ER        T-spine extension * X10  X10 w/ clin O.P. X10       1st rib mobility seated with strap   X5 p!p! HOLD     Shoulder Cane EXT *?if not better       Shoulder IR stretch *? If not better       Cane AAROM        Prone rows / extension / horizontal abduction        Pec stretch 1/2 roll vertical seated  5x:15 clin O.P.      Supine Pec minor stretch  Supine Arms apart   5x:10 pull shoulders    5x:10             Upper Trap Stretch        Scalene stretch   L shoulder depress on chair and rotate away+look up  10x:10     SCM stretch   10x:10 hand on clavicle side bend away and look up     Neuro Re-ed        Chin Tucks X10  BETTER X10   x10     Chin tuck with EXT X10 BETTER @ L hand X10  x10     MTP/LTP *  *     YTI wall slide with lift off *  *chin tucked     PNF D1/D2        Alternating isometrics 100 deg elevation; ER        Towel roll education; posture correction for work set up X10 mins AD REVIEWED AD x 5 mins Education on thoracic outlet, brachial plexus injuries     Table rocking / wall ball circles        Scapular retraction test  W/ ER     Therapeutic Activity        Crate carry        3 height crate lifts                Cones in Shelf            Modalities        CP shoulder

## 2024-06-17 ENCOUNTER — ANNUAL EXAM (OUTPATIENT)
Dept: OBGYN CLINIC | Facility: CLINIC | Age: 49
End: 2024-06-17
Payer: COMMERCIAL

## 2024-06-17 VITALS
DIASTOLIC BLOOD PRESSURE: 64 MMHG | WEIGHT: 153 LBS | BODY MASS INDEX: 30.04 KG/M2 | HEIGHT: 60 IN | SYSTOLIC BLOOD PRESSURE: 108 MMHG

## 2024-06-17 DIAGNOSIS — Z01.419 ENCOUNTER FOR GYNECOLOGICAL EXAMINATION WITHOUT ABNORMAL FINDING: Primary | ICD-10-CM

## 2024-06-17 DIAGNOSIS — Z12.31 ENCOUNTER FOR SCREENING MAMMOGRAM FOR BREAST CANCER: ICD-10-CM

## 2024-06-17 PROBLEM — Z11.51 ENCOUNTER FOR SCREENING FOR HUMAN PAPILLOMAVIRUS (HPV): Status: ACTIVE | Noted: 2024-06-17

## 2024-06-17 PROCEDURE — S0612 ANNUAL GYNECOLOGICAL EXAMINA: HCPCS | Performed by: ADVANCED PRACTICE MIDWIFE

## 2024-06-17 RX ORDER — VALACYCLOVIR HYDROCHLORIDE 500 MG/1
500 TABLET, FILM COATED ORAL DAILY
COMMUNITY

## 2024-06-17 NOTE — PROGRESS NOTES
[facility] [address] Izaiah Meng PA    ASSESSMENT/PLAN: Isabel Calhoun is a 49 y.o.  who presents for annual gynecologic exam.    Encounter for routine gynecologic examination  - Routine well woman exam completed today.  - Cervical Cancer Screening: Current ASCCP Guidelines reviewed. Last Pap: 2023 normal. Next Pap Due:   - HPV Vaccination status: Not immunized  - STI screening offered including HIV: not indicated based on hx or requested at time of visit  - Contraceptive counseling discussed.  Current contraception: partner had vasectomy   - Breast Cancer Screening: Last Mammogram 2023, script given  - RTO 1 yr    Additional problems addressed at this visit:  1. Encounter for gynecological examination without abnormal finding  -     Mammo screening bilateral w 3d & cad; Future; Expected date: 2024  2. Encounter for screening mammogram for breast cancer  -     Mammo screening bilateral w 3d & cad; Future; Expected date: 2024      CC:  Annual Gynecologic Examination    HPI: Isabel Calhoun is a 49 y.o.  who presents for annual gynecologic examination.  Isabel presents for gyn exam today. 6/3/2024 LMP. Menses is every 6-8 weeks, flow heavy and has low iron levels. Pad saturates every 2-3 hours. Partner has vasectomy as birth control method. Happy with method. Last pap smear 2023. Hx of abnormal pap smear- age 21 HPV, cryo. Sexually active- ocassionally. Does not desire STI testing.  2023 mammogram- normal. Colonoscopy 2020- repeat 5 yrs.  Improved sleep per day. Minimal servings of calcium rich food per day. Walks - 10,000 steps per day. Minimal servings of caffeine per day. Occasionally perform SBE monthly. Safe at home- yes. Wears seatbelt - yes. Concerns: heavy menses, is working with a naturopathic.     The following portions of the patient's history were reviewed and updated as appropriate: allergies, current medications,  past family history, past medical history, obstetric history, gynecologic history, past social history, past surgical history and problem list.    Review of Systems   Constitutional:  Negative for chills, fatigue and fever.   Respiratory:  Negative for cough and shortness of breath.    Cardiovascular:  Negative for chest pain, palpitations and leg swelling.   Gastrointestinal:  Negative for constipation and diarrhea.   Genitourinary:  Positive for dyspareunia and menstrual problem. Negative for difficulty urinating, dysuria, frequency, pelvic pain, urgency, vaginal bleeding, vaginal discharge and vaginal pain.   Neurological:  Negative for light-headedness and headaches.   Psychiatric/Behavioral:  Negative for self-injury. The patient is nervous/anxious.          Objective:  /64   Ht 5' (1.524 m)   Wt 69.4 kg (153 lb)   LMP 06/03/2024 (Approximate)   BMI 29.88 kg/m²    Physical Exam  Vitals reviewed.   Constitutional:       Appearance: Normal appearance.   HENT:      Head: Normocephalic.   Neck:      Thyroid: No thyroid mass or thyroid tenderness.   Cardiovascular:      Rate and Rhythm: Normal rate and regular rhythm.      Heart sounds: Normal heart sounds.   Pulmonary:      Effort: Pulmonary effort is normal.      Breath sounds: Normal breath sounds.   Chest:   Breasts:     Right: No mass, nipple discharge, skin change or tenderness.      Left: No mass, nipple discharge, skin change or tenderness.   Abdominal:      General: There is no distension.      Palpations: There is no mass.      Tenderness: There is no abdominal tenderness. There is no guarding.   Genitourinary:     General: Normal vulva.      Exam position: Lithotomy position.      Labia:         Right: No rash, tenderness or lesion.         Left: No rash, tenderness or lesion.       Urethra: No urethral pain, urethral swelling or urethral lesion.      Vagina: No vaginal discharge, erythema, tenderness, bleeding or lesions.      Cervix: No  cervical motion tenderness, discharge, friability, lesion, erythema or cervical bleeding.      Uterus: Normal. Not enlarged and not tender.       Adnexa:         Right: No mass, tenderness or fullness.          Left: No mass, tenderness or fullness.     Musculoskeletal:      Cervical back: Normal range of motion.   Lymphadenopathy:      Upper Body:      Right upper body: No axillary adenopathy.      Left upper body: No axillary adenopathy.   Skin:     General: Skin is warm and dry.   Neurological:      Mental Status: She is alert.   Psychiatric:         Mood and Affect: Mood normal.         Behavior: Behavior normal.         Judgment: Judgment normal.           Monserrat Mendez CNM  [facility]  06/17/24 8:15 AM

## 2024-06-19 ENCOUNTER — APPOINTMENT (OUTPATIENT)
Dept: PHYSICAL THERAPY | Facility: CLINIC | Age: 49
End: 2024-06-19
Payer: COMMERCIAL

## 2024-06-21 ENCOUNTER — OFFICE VISIT (OUTPATIENT)
Dept: PHYSICAL THERAPY | Facility: CLINIC | Age: 49
End: 2024-06-21
Payer: COMMERCIAL

## 2024-06-21 DIAGNOSIS — M25.512 ACUTE PAIN OF LEFT SHOULDER: Primary | ICD-10-CM

## 2024-06-21 DIAGNOSIS — M54.12 CERVICAL RADICULOPATHY: ICD-10-CM

## 2024-06-21 PROCEDURE — 97110 THERAPEUTIC EXERCISES: CPT

## 2024-06-21 PROCEDURE — 97112 NEUROMUSCULAR REEDUCATION: CPT

## 2024-06-21 NOTE — PROGRESS NOTES
"Daily Note     Today's date: 2024  Patient name: Isabel Calhoun  : 1975  MRN: [id number]  Referring provider: Dennis Hamm MD  Dx:   Encounter Diagnosis     ICD-10-CM    1. Acute pain of left shoulder  M25.512       2. Cervical radiculopathy  M54.12           Start Time: 0700          Subjective: Patient states her pain flared a lot after last session. She felt her neck swelled and everything in her neck spasm on left side. She went to her chiropractor who told her C1 is \"out\" in the neck. Patient limited her HEP to just chin tucks, neck extensions, and minimal nerve glides because of the pain. Presently her pain is a 6/10 in left upper trap area.       Objective: See treatment diary below    Patient is holding left neck and shoulder musculature tight and does not swing arm while walking. She also has a tendency to keep shoulders in hiked position. Patient's home exercise program updated to include additional exercises. Handout issued and explained with demonstration. She accepts new exercises. She was educated on self massage in left pectoral muscle into left shoulder for pain relief and desensitization of area.      Assessment: Tolerated treatment well. Patient would benefit from continued PT for stretching and strengthening. She was able to add exercises with little difficulty and discomfort. A little facial tingle was felt with open books in right side lying, but it went away quickly at rest. While performing this exercise, she had improved ROM on both sides. She felt this exercise helped her and she felt better after performing it. She seemed to understand all education given throughout session.Leaving department she had less tingling in left hand and her overall pain was less then when she came into department.      Plan: Continue per plan of care.  Progress treatment as tolerated.       Precautions: none    [facility]  [facility]   [facility]ty Daily Treatment Diary     Manual  " 6/5 6/7 6/13 6/21    STM/MFR shoulder /scapula  /stretching        Cervical P/A        GH jt mobs        Pec Minor stretch *?   Supine w/ OP :10x5    Cervical traction / SOR Traction - test           Therapeutic Exercise 6/5 6/7 6/13 6/21    UBE for muscle endurance Retro /70  X4 mins /70  X 4 mins ALT /70   x4 min ALT    Pulleys        Shoulder isometrics (flex, ext, abd, IR, ER)        TB sh. IR, ER        Table slides flex/abd/ER        T-spine extension * X10  X10 w/ clin O.P. X10   x10    1st rib mobility seated with strap   X5 p!p! HOLD     Shoulder Cane EXT *?if not better   X10 no diff    Shoulder IR stretch *? If not better       Cane AAROM        Prone rows / extension / horizontal abduction        Pec stretch 1/2 roll vertical seated  5x:15 clin O.P.      Supine Pec minor stretch  Supine Arms apart   5x:10 pull shoulders    5x:10 Supine lay x2 min            Upper Trap Stretch    :15x4 tingle    Scalene stretch   L shoulder depress on chair and rotate away+look up  10x:10     SCM stretch   10x:10 hand on clavicle side bend away and look up     Neuro Re-ed        Chin Tucks X10  BETTER X10   x10 x10    Chin tuck with EXT X10 BETTER @ L hand X10  x10 x10    MTP/LTP *  *     YTI wall slide with lift off *  *chin tucked     PNF D1/D2        Alternating isometrics 100 deg elevation; ER        Towel roll education; posture correction for work set up X10 mins AD REVIEWED AD x 5 mins Education on thoracic outlet, brachial plexus injuries     Table rocking / wall ball circles        Scapular retraction test  W/ ER -Sit x10 w/ towel  -W/ ER x10    Therapeutic Activity        Crate carry        3 height crate lifts                Con[place] in Shelf            Modalities        CP shoulder                            Access Code: [email or web address]  URL: [email or web address]  Date: 06/21/2024  Prepared by: Ny Stacy    Exercises  - Seated Cervical Retraction  - 6-8 x daily - 7 x weekly -  1 sets - 10 reps - 5 hold  - Seated Cervical Retraction and Extension  - 6-8 x daily - 7 x weekly - 1 sets - 10 reps  - Seated Thoracic Extension AROM  - 3 x daily - 7 x weekly - 1 sets - 10 reps - 3 hold  - Median Nerve Flossing - Tray  - 1-2 x daily - 7 x weekly - 1 sets - 20 reps  - Sternocleidomastoid Stretch (Mirrored)  - 2 x daily - 7 x weekly - 1 sets - 5 reps - 15 hold  - Seated Scalenes Stretch (Mirrored)  - 2 x daily - 7 x weekly - 1 sets - 5 reps - 15 hold  - Supine Thoracic Mobilization Towel Roll Vertical  - 2 x daily - 7 x weekly - 1 sets - 10 reps - 10 hold  - Supine Chest Stretch on Foam Roll  - 2 x daily - 7 x weekly - 1 sets - 10 reps - 10 hold  - Sidelying Open Book Thoracic Lumbar Rotation and Extension  - 2 x daily - 7 x weekly - 1 sets - 10 reps    Patient Education  - Office Posture  - Thoracic Outlet Syndrome  - Brachial Plexus Injury

## 2024-06-26 ENCOUNTER — OFFICE VISIT (OUTPATIENT)
Dept: PHYSICAL THERAPY | Facility: CLINIC | Age: 49
End: 2024-06-26
Payer: COMMERCIAL

## 2024-06-26 DIAGNOSIS — M54.12 CERVICAL RADICULOPATHY: ICD-10-CM

## 2024-06-26 DIAGNOSIS — M25.512 ACUTE PAIN OF LEFT SHOULDER: Primary | ICD-10-CM

## 2024-06-26 PROCEDURE — 97112 NEUROMUSCULAR REEDUCATION: CPT | Performed by: PHYSICAL THERAPIST

## 2024-06-26 PROCEDURE — 97110 THERAPEUTIC EXERCISES: CPT | Performed by: PHYSICAL THERAPIST

## 2024-06-26 NOTE — PROGRESS NOTES
"Daily Note     Today's date: 2024  Patient name: Isabel Calhoun  : 1975  MRN: [id number]  Referring provider: Dennis Hamm MD  Dx:   Encounter Diagnosis     ICD-10-CM    1. Acute pain of left shoulder  M25.512       2. Cervical radiculopathy  M54.12                      Subjective: The patient notes feeling 1-2 pain at her L UT area with mild tingling at her fingertips.      Objective: See treatment diary below      Assessment: We discussed the flare up of pain related to after the visit on  today.  The patient has been told from her doctor that she may have a connective tissue disorder but has not been formally diagnosed due to having a long history or rib sublux/dislocation and various other subluxations throughout her history.  The patient tolerated the treatment well today with minimal complaints of pain increase or L hand \" heat\".  The patient denies pain increase or problems at the end of the session.  The patient will benefit from continued PT to achieve her goals.       Plan: Continue per plan of care.  Progress treatment as tolerated.       Precautions: none    Re-evaluation: 7/3  [email or web address]   Shoulder Specialty Daily Treatment Diary     Manual     STM/MFR shoulder /scapula  /stretching        Cervical P/A        GH jt mobs        Pec Minor stretch *?   Supine w/ OP :10x5 Supine 5x:15 O.P.   Cervical traction / SOR Traction - test           Therapeutic Exercise    UBE for muscle endurance Retro /70  X4 mins /70  X 4 mins ALT /70   x4 min ALT 90/70 x 6 mins ALT   Pulleys        Shoulder isometrics (flex, ext, abd, IR, ER)        TB sh. IR, ER        Table slides flex/abd/ER        T-spine extension * X10  X10 w/ clin O.P. X10   x10 x10   1st rib mobility seated with strap   X5 p!p! HOLD     Shoulder Cane EXT *?if not better   X10 no diff    Shoulder IR stretch *? If not better       Cane AAROM        Prone rows / extension / " horizontal abduction        Pec stretch 1/2 roll vertical seated  5x:15 clin O.P.      Supine Pec minor stretch  Supine Arms apart   5x:10 pull shoulders    5x:10 Supine lay x2 min Supine lay x 2 mins           Upper Trap Stretch    :15x4 tingle    Scalene stretch   L shoulder depress on chair and rotate away+look up  10x:10     SCM stretch   10x:10 hand on clavicle side bend away and look up     Neuro Re-ed        Chin Tucks X10  BETTER X10   x10 x10 X10   Chin tuck with EXT X10 BETTER @ L hand X10  x10 x10 x10   MTP/LTP *  *  GRN MTP x10  RED LTP x10   YTI wall slide with lift off *  *chin tucked  Y liftoff  x10   Table plank     NV   Open book rotation     X10 B/L  L rot limited vs R rot   Towel roll education; posture correction for work set up X10 mins AD REVIEWED AD x 5 mins Education on thoracic outlet, brachial plexus injuries     Quadruped ALT UE     *   Scapular retraction test  W/ ER -Sit x10 w/ towel  -W/ ER x10 Sit x10 w/ towel   Therapeutic Activity        Crate carry        3 height crate lifts                Con[place] in Shelf            Modalities        CP shoulder                            Access Code: [email or web address]  URL: [email or web address]  Date: 06/21/2024  Prepared by: Ny Stacy    Exercises  - Seated Cervical Retraction  - 6-8 x daily - 7 x weekly - 1 sets - 10 reps - 5 hold  - Seated Cervical Retraction and Extension  - 6-8 x daily - 7 x weekly - 1 sets - 10 reps  - Seated Thoracic Extension AROM  - 3 x daily - 7 x weekly - 1 sets - 10 reps - 3 hold  - Median Nerve Flossing - Tray  - 1-2 x daily - 7 x weekly - 1 sets - 20 reps  - Sternocleidomastoid Stretch (Mirrored)  - 2 x daily - 7 x weekly - 1 sets - 5 reps - 15 hold  - Seated Scalenes Stretch (Mirrored)  - 2 x daily - 7 x weekly - 1 sets - 5 reps - 15 hold  - Supine Thoracic Mobilization Towel Roll Vertical  - 2 x daily - 7 x weekly - 1 sets - 10 reps - 10 hold  - Supine Chest Stretch on Foam Roll  - 2 x daily - 7 x  weekly - 1 sets - 10 reps - 10 hold  - Sidelying Open Book Thoracic Lumbar Rotation and Extension  - 2 x daily - 7 x weekly - 1 sets - 10 reps    Patient Education  - Office Posture  - Thoracic Outlet Syndrome  - Brachial Plexus Injury

## 2024-06-28 ENCOUNTER — OFFICE VISIT (OUTPATIENT)
Dept: PHYSICAL THERAPY | Facility: CLINIC | Age: 49
End: 2024-06-28
Payer: COMMERCIAL

## 2024-06-28 DIAGNOSIS — M25.512 ACUTE PAIN OF LEFT SHOULDER: Primary | ICD-10-CM

## 2024-06-28 DIAGNOSIS — M54.12 CERVICAL RADICULOPATHY: ICD-10-CM

## 2024-06-28 PROCEDURE — 97110 THERAPEUTIC EXERCISES: CPT

## 2024-06-28 PROCEDURE — 97112 NEUROMUSCULAR REEDUCATION: CPT

## 2024-06-28 NOTE — PROGRESS NOTES
Daily Note     Today's date: 2024  Patient name: Isabel Calhoun  : 1975  MRN: [id number]  Referring provider: Dennis Hamm MD  Dx:   Encounter Diagnosis     ICD-10-CM    1. Acute pain of left shoulder  M25.512       2. Cervical radiculopathy  M54.12           Start Time: 0701          Subjective: Patient states her pain is out of her finger and now it is resting at 3/10 pain in the left forearm area.       Objective: See treatment diary below    Patient seemed to understand all education on new exercises. Handout declined, but exercises accepted.     Assessment: Tolerated treatment well. Patient would benefit from continued PT for stretching and strengthening. She was able to add exercises to her program with little difficulty and discomfort. Standing Y exercise adjusted for decreased arm irritation. She seemed to understand all education on new exercises. Patient felt looser with same pain level leaving department.       Plan: Continue per plan of care.  Progress treatment as tolerated.       Precautions: none    Re-evaluation: 7/3  [email or web address]   Shoulder Specialty Daily Treatment Diary     Manual     STM/MFR shoulder /scapula  /stretching        Cervical P/A        GH jt mobs        Pec Minor stretch Supine 5x:15 O.P.   Supine w/ OP :10x5 Supine 5x:15 O.P.   Cervical traction / SOR            Therapeutic Exercise    UBE for muscle endurance 90/70 x6 min ALT /70  X4 mins /70  X 4 mins ALT /70   x4 min ALT 90/70 x 6 mins ALT   Pulleys        Shoulder isometrics (flex, ext, abd, IR, ER)        TB sh. IR, ER        Table slides flex/abd/ER        T-spine extension X10 sit  Supine lay w/ towel x2 min X10  X10 w/ clin O.P. X10   x10 x10   1st rib mobility seated with strap   X5 p!p! HOLD     Shoulder Cane EXT    X10 no diff    Shoulder IR stretch        Cane AAROM        Prone rows / extension / horizontal abduction        Pec stretch /  roll vertical seated  5x:15 clin O.P.      Supine Pec minor stretch  Supine Arms apart Supine lay x 2 mins  5x:10 pull shoulders    5x:10 Supine lay x2 min Supine lay x 2 mins           Upper Trap Stretch    :15x4 tingle    Scalene stretch   L shoulder depress on chair and rotate away+look up  10x:10     SCM stretch   10x:10 hand on clavicle side bend away and look up     Neuro Re-ed        Chin Tucks x10 X10   x10 x10 X10   Chin tuck with EXT x10 X10  x10 x10 x10   MTP/LTP GRN MTP x10  RED LTP x10  *  GRN MTP x10  RED LTP x10   YTI wall slide with lift off   *chin tucked  Y liftoff  x10   Table plank :30x2    NV   Open book rotation X10 B/L    X10 B/L  L rot limited vs R rot   Towel roll education; posture correction for work set up  REVIEWED AD x 5 mins Education on thoracic outlet, brachial plexus injuries     Quadruped ALT UE     *   Scapular retraction Sit x10 w/ towel  W/ ER -Sit x10 w/ towel  -W/ ER x10 Sit x10 w/ towel   Therapeutic Activity        Crate carry        3 height crate lifts                Cones in Shelf            Modalities        CP shoulder                            Access Code: [email or web address]  URL: [email or web address]  Date: 06/21/2024  Prepared by: Ny Stacy    Exercises  - Seated Cervical Retraction  - 6-8 x daily - 7 x weekly - 1 sets - 10 reps - 5 hold  - Seated Cervical Retraction and Extension  - 6-8 x daily - 7 x weekly - 1 sets - 10 reps  - Seated Thoracic Extension AROM  - 3 x daily - 7 x weekly - 1 sets - 10 reps - 3 hold  - Median Nerve Flossing - Tray  - 1-2 x daily - 7 x weekly - 1 sets - 20 reps  - Sternocleidomastoid Stretch (Mirrored)  - 2 x daily - 7 x weekly - 1 sets - 5 reps - 15 hold  - Seated Scalenes Stretch (Mirrored)  - 2 x daily - 7 x weekly - 1 sets - 5 reps - 15 hold  - Supine Thoracic Mobilization Towel Roll Vertical  - 2 x daily - 7 x weekly - 1 sets - 10 reps - 10 hold  - Supine Chest Stretch on Foam Roll  - 2 x daily - 7 x weekly - 1 sets -  10 reps - 10 hold  - Sidelying Open Book Thoracic Lumbar Rotation and Extension  - 2 x daily - 7 x weekly - 1 sets - 10 reps    Patient Education  - Office Posture  - Thoracic Outlet Syndrome  - Brachial Plexus Injury

## 2024-07-01 ENCOUNTER — APPOINTMENT (OUTPATIENT)
Dept: PHYSICAL THERAPY | Facility: CLINIC | Age: 49
End: 2024-07-01
Payer: COMMERCIAL

## 2024-07-03 NOTE — PROGRESS NOTES
PT Re-Evaluation     Today's date: 2024  Patient name: Isabel Calhoun  : 1975  MRN: [id number]  Referring provider: Dennis Hamm MD  Dx:   Encounter Diagnosis     ICD-10-CM    1. Acute pain of left shoulder  M25.512       2. Cervical radiculopathy  M54.12                      Assessment  Impairments: abnormal or restricted ROM, activity intolerance, impaired physical strength, lacks appropriate home exercise program, pain with function and poor posture   Functional limitations: Difficulty lifting/carrying objects heavier than 10 pounds, difficulty reaching behind back, difficulty reaching above head, difficulty with prolonged sitting/driving, difficulty lying on L side, and difficulty pushing with L UE  Symptom irritability: high    Assessment details: Isabel Calhoun is a 49 y.o. female with a history of shingles, narcolepsy, ovarian cyst, anemia, thyroid disease, GERD, HPV, inguinal hernia, insomnia, migraine, tachycardia, TMJ D/O that presents for a physical therapy RE evaluation.  The patient demonstrates signs and symptoms consistent with acute L shoulder pain;cervical radiculopathy.  During the examination the patient demonstrated improved L UE strength, improved L shoulder ROM, improved cervical ROM, activity tolerance, and improvd L shoulder/UE pain.  The patient has made functional gains since starting therapy.  The patient notes a reduction in pain at the L shoulder with ADL's.  The patient notes her pain/symptoms do calm down within minutes compared to last hours when her symptoms are provoked.  The patient notes improvement with reaching above head with L arm.  The patient continues to have difficulty with moving the left shoulder back behind her body with twisting to the L at her trunk. The patient notes persistent pain with donning a seat belt. The patient continues to have difficulty with lifting weight above head.  The patient would like to place formal therapy on hold for the next  2-4 weeks with a re-evaluation to make sure her symptoms continue to regress and function improves.          Understanding of Dx/Px/POC: good     Prognosis: good  Prognosis details: Positive prognostic indicators include: positive attitude toward recovery, good understanding of diagnosis/treatment plan, and absence of observed red flags.      Negative prognostic indicators include: Significant medical Hx, chronicity of condition; ?nerve involvement L UE    Goals  STG: ACHIEVE in 4 -6 weeks  1.  Improve L shoulder pain at worst by 50% to improve ADL's. MET  2.  Improve L shoulder strength by 1/2-1 muscle grade to improve lifting/carrying tasks. PROGRESSING  3.  Improve L shoulder IR/EXT ROM by 10-20 degrees; C-spine ROM WNL to improve the ability to reach over head. PROGRESSING    LTG:  Achieve in 8-12 weeks  1.  Patient learn what causes her L UE pain and learn how to avoid aggravating and also how to treat/decrease pain to achieve their personal goal. PROGRESSING  2.  Patient's L shoulder strength return to equal for both sides to lift/carry items without pain/difficulty. PROGRESSING  3.  Patient's L shoulder /cervical ROM improve to WNL to perform all ADL's and overhead tasks without difficulty. PROGRESSING  4.  Patient to be independent with home exercise plan at time of discharge. PROGRESSING  5. Patient's shoulder FOTO score improve to > 66% to indicate a return to normal function. PROGRESSING             Plan  Patient would benefit from: skilled physical therapy  Planned modality interventions: cryotherapy, thermotherapy: hydrocollator packs and traction    Planned therapy interventions: joint mobilization, manual therapy, neuromuscular re-education, patient education, postural training, self care, strengthening, stretching, therapeutic activities, therapeutic exercise, home exercise program, abdominal trunk stabilization and IASTM    Frequency: 1-3x/wk.  Duration in weeks: 12  Plan of Care beginning date:  "6/5/2024  Plan of Care expiration date: 9/4/2024  Treatment plan discussed with: PTA and patient  Plan details: RE-ASSESS 1X/MONTH        Subjective Evaluation    History of Present Illness  Date of onset: 5/1/2024  Mechanism of injury: SUBJECTIVE: 7/5/24: The patient reports improvement since starting therapy.  The patient notes a reduction in pain at the L shoulder with ADL's.  The patient notes her pain/symptoms do calm down within minutes compared to last hours when her symptoms are provoked.  The patient notes improvement with reaching above head with L arm.  The patient continues to have difficulty with moving the left shoulder back behind her body with twisting to the L at her trunk. The patient notes persistent pain with donning a seat belt. The patient continues to have difficulty with lifting weight above head.  The patient will benefit from continued PT focused on achieving patient specific goals.          INJURY HISTORY: Isabel Calhoun is a 49 y.o. female that presents to outpatient physical therapy with complaints of chronic ( past 13 years on/off)L shoulder pain and difficulty functioning.  The patient reports having L shoulder pain on and off for a long time.  The patient notes re-aggravating her pain when falling off of a ladder onto an outstretched arm 1 month prior.  The patient describes the pain as \"squeezing\" and \"tightness\" at her L UT and   chest wall with pain radiating down her L arm and into her 3rd through 5th digits.  The patient notes difficulty with reaching her L arm away from her body, donning a seatbelt, and reaching arm behind back.  The patient notes aggravation with attempting to play volleyball - opposing force on L hand.  The patient's main goal for physical therapy is to understand why she gets pain, what to do to avoid pain and treat pain.       CT Cervical spine: IMPRESSION:     -No cervical spine fracture or traumatic malalignment.     -C5-C7 spondylosis resulting in mild " to moderate spinal canal and neuroforaminal narrowing more prominent at C6-C7. Details above            Recurrent probem    Patient Goals  Patient goals for therapy: decreased pain, increased motion, increased strength, return to sport/leisure activities and independence with ADLs/IADLs  Patient goal: understand why she gets pain, what to do to avoid pain and treat pain.  Pain  Current pain ratin  At worst pain rating: 3  Location: L shoulder  Quality: burning, squeezing and tight  Alleviating factors: chiropractic, massage.  Aggravating factors: lifting (reaching)  Progression: improved    Social Support  Lives with: spouse and young children    Employment status: working (sales, desk/driving)  Hand dominance: right    Treatments  Previous treatment: massage and chiropractic  Current treatment: physical therapy        Objective     Concurrent Complaints  Positive for disturbed sleep (painful to sleep on L side) and history of trauma (CT scan of cervical spine cleared of fracture). Negative for night pain, dizziness, faints, headaches, nausea/motion sickness, tinnitus, trouble swallowing, difficulty breathing, shortness of breath, respiratory pain, visual change, history of cancer and infection    Postural Observations  Seated posture: fair  Standing posture: fair  Correction of posture: makes symptoms better      Neurological Testing     Sensation   Cervical/Thoracic   Left   Intact: light touch    Right   Intact: light touch    Reflexes   Left   Biceps (C5/C6): brisk (3+)  Brachioradialis (C6): brisk (3+)  Judd's reflex: negative    Right   Biceps (C5/C6): brisk (3+)  Brachioradialis (C6): brisk (3+)  Judd's reflex: negative    Active Range of Motion   Cervical/Thoracic Spine       Cervical  Subcranial protraction:   Restriction level: minimal  Subcranial retraction:   Restriction level: minimal  Flexion: Neck active flexion: L cervical spine.  with pain Restriction level: minimal  Extension:   Restriction level: minimal  Left lateral flexion:  Restriction level: moderate  Right lateral flexion: Neck active lateral bend right: tightness L ant neck.     with pain Restriction level moderate  Left rotation: 65 degrees with pain Restriction level: minimal  Right rotation: 70 degrees    Restriction level: minimal  Left Shoulder   Flexion: 170 degrees   Extension: 45 degrees with pain  Abduction: 175 degrees   Adduction: 38 degrees   External rotation 0°: 70 degrees with pain  External rotation BTH: T3   Internal rotation BTB: T10 with pain    Right Shoulder   Flexion: 170 degrees   Extension: 68 degrees   Abduction: 175 degrees   Adduction: 40 degrees   External rotation 0°: 80 degrees   External rotation BTH: T2   Internal rotation BTB: T9   Mechanical Assessment    Cervical    Seated retraction: repeated movements   Pain location: no change  Seated Extension: repeated movements  Pain intensity: better  Pain level: decreased    Thoracic      Lumbar      Strength/Myotome Testing   Cervical Spine     Left   Interossei strength (t1): 5    Right   Interossei strength (t1): 5    Left Shoulder     Planes of Motion   Flexion: 4   Extension: 4   Abduction: 4- (pain)   External rotation at 0°: 4+   Internal rotation at 0°: 4+     Right Shoulder     Planes of Motion   Flexion: 4   Extension: 4   Abduction: 4   External rotation at 0°: 4+   Internal rotation at 0°: 4+     Left Elbow   Flexion: 4+  Extension: 4+    Right Elbow   Flexion: 4+  Extension: 4+    Left Wrist/Hand   Wrist extension: 4+  Wrist flexion: 4+  Thumb extension: 4+    Right Wrist/Hand   Wrist extension: 4+  Wrist flexion: 4+  Thumb extension: 4+    Tests     Additional Tests Details  FOTO: 59% ( predicted 66%)    Neuro Exam:     Headaches   Patient reports headaches: No.              Precautions: none    Re-evaluation:7/19    [id number]   Shoulder Specialty Daily Treatment Diary     Manual  6/28 7/5 6/13 6/21 6/26   STM/MFR shoulder /scapula  /stretching         Cervical P/A        GH jt mobs        Pec Minor stretch Supine 5x:15 O.P.   Supine w/ OP :10x5 Supine 5x:15 O.P.   Cervical traction / SOR            Therapeutic Exercise 6/28 7/5 6/13 6/21 6/26   UBE for muscle endurance 90/70 x6 min ALT 90/70 x 6 mins /70  X 4 mins ALT /70   x4 min ALT 90/70 x 6 mins ALT   Pulleys        Shoulder isometrics (flex, ext, abd, IR, ER)        TB sh. IR, ER        Table slides flex/abd/ER        T-spine extension X10 sit  Supine lay w/ towel x2 min X10  SUPINE X 2 MIN X10   x10 x10   1st rib mobility seated with strap   X5 p!p! HOLD     Shoulder Cane EXT    X10 no diff    Shoulder IR stretch  X10 repeated  BETTER shouldr IR ROM, ABD strength, EXT ROM      Cane AAROM        Prone rows / extension / horizontal abduction        Pec stretch 1/2 roll vertical seated        Supine Pec minor stretch  Supine Arms apart Supine lay x 2 mins X 2 mins 5x:10 pull shoulders    5x:10 Supine lay x2 min Supine lay x 2 mins           Upper Trap Stretch    :15x4 tingle    Scalene stretch   L shoulder depress on chair and rotate away+look up  10x:10     SCM stretch   10x:10 hand on clavicle side bend away and look up     Neuro Re-ed        Chin Tucks x10 X10 x10 x10 X10   Chin tuck with EXT x10  x10 x10 x10   MTP/LTP GRN MTP x10  RED LTP x10  *  GRN MTP x10  RED LTP x10   YTI wall slide with lift off   *chin tucked  Y liftoff  x10   Table plank :30x2    NV   Open book rotation X10 B/L X10 L ROT   X10 B/L  L rot limited vs R rot   Towel roll education; posture correction for work set up   Education on thoracic outlet, brachial plexus injuries     Quadruped ALT UE     *   Scapular retraction Sit x10 w/ towel X10 W/ ER -Sit x10 w/ towel  -W/ ER x10 Sit x10 w/ towel   Therapeutic Activity        Crate carry        3 height crate lifts                Cones in Shelf            Modalities        CP shoulder                                 The patient was given a new home exercise plan with  instruction, pictures, and verbal feedback.  The patient accepts and understands the new home activities.

## 2024-07-05 ENCOUNTER — EVALUATION (OUTPATIENT)
Dept: PHYSICAL THERAPY | Facility: CLINIC | Age: 49
End: 2024-07-05
Payer: COMMERCIAL

## 2024-07-05 DIAGNOSIS — M25.512 ACUTE PAIN OF LEFT SHOULDER: Primary | ICD-10-CM

## 2024-07-05 DIAGNOSIS — M54.12 CERVICAL RADICULOPATHY: ICD-10-CM

## 2024-07-05 PROCEDURE — 97110 THERAPEUTIC EXERCISES: CPT | Performed by: PHYSICAL THERAPIST

## 2024-07-05 PROCEDURE — 97112 NEUROMUSCULAR REEDUCATION: CPT | Performed by: PHYSICAL THERAPIST

## 2024-07-10 ENCOUNTER — OFFICE VISIT (OUTPATIENT)
Dept: URGENT CARE | Facility: CLINIC | Age: 49
End: 2024-07-10
Payer: COMMERCIAL

## 2024-07-10 VITALS
OXYGEN SATURATION: 99 % | DIASTOLIC BLOOD PRESSURE: 68 MMHG | BODY MASS INDEX: 30.04 KG/M2 | TEMPERATURE: 97.6 F | RESPIRATION RATE: 18 BRPM | HEART RATE: 88 BPM | HEIGHT: 60 IN | WEIGHT: 153 LBS | SYSTOLIC BLOOD PRESSURE: 114 MMHG

## 2024-07-10 DIAGNOSIS — N30.00 ACUTE CYSTITIS WITHOUT HEMATURIA: Primary | ICD-10-CM

## 2024-07-10 DIAGNOSIS — B37.31 VAGINAL YEAST INFECTION: ICD-10-CM

## 2024-07-10 LAB
SL AMB  POCT GLUCOSE, UA: NEGATIVE
SL AMB LEUKOCYTE ESTERASE,UA: ABNORMAL
SL AMB POCT BILIRUBIN,UA: NEGATIVE
SL AMB POCT BLOOD,UA: NEGATIVE
SL AMB POCT CLARITY,UA: CLEAR
SL AMB POCT COLOR,UA: ABNORMAL
SL AMB POCT KETONES,UA: NEGATIVE
SL AMB POCT NITRITE,UA: NEGATIVE
SL AMB POCT PH,UA: 7
SL AMB POCT SPECIFIC GRAVITY,UA: 1.01
SL AMB POCT URINE PROTEIN: NEGATIVE
SL AMB POCT UROBILINOGEN: 0.2

## 2024-07-10 PROCEDURE — 81002 URINALYSIS NONAUTO W/O SCOPE: CPT | Performed by: NURSE PRACTITIONER

## 2024-07-10 PROCEDURE — 87086 URINE CULTURE/COLONY COUNT: CPT | Performed by: NURSE PRACTITIONER

## 2024-07-10 PROCEDURE — S9083 URGENT CARE CENTER GLOBAL: HCPCS | Performed by: NURSE PRACTITIONER

## 2024-07-10 PROCEDURE — G0382 LEV 3 HOSP TYPE B ED VISIT: HCPCS | Performed by: NURSE PRACTITIONER

## 2024-07-10 RX ORDER — NITROFURANTOIN 25; 75 MG/1; MG/1
100 CAPSULE ORAL 2 TIMES DAILY
Qty: 14 CAPSULE | Refills: 0 | Status: SHIPPED | OUTPATIENT
Start: 2024-07-10 | End: 2024-07-17

## 2024-07-10 RX ORDER — FLUCONAZOLE 150 MG/1
150 TABLET ORAL
Qty: 4 TABLET | Refills: 0 | Status: SHIPPED | OUTPATIENT
Start: 2024-07-10 | End: 2024-07-20

## 2024-07-10 NOTE — PATIENT INSTRUCTIONS
"Patient Education     Vulvovaginal yeast infection   The Basics   Written by the doctors and editors at [facility]   What is a vulvovaginal yeast infection? -- This is an infection that causes itching and irritation of the vulva, the outer lips of the vagina (figure 1). The infection is usually caused by a fungus called \"Candida.\" (Yeast are a type of fungus.)  What causes yeast infections? -- The fungus that causes yeast infections normally lives in the vagina and the gut. Even though the yeast are there, they do not usually cause symptoms. Certain medicines (especially antibiotics), stress, and other things can cause the fungus to grow more than it should. When that happens, a yeast infection can start.  Your risk of getting a yeast infection is higher if you:   Have diabetes   Are pregnant   Have a weaker-than-normal immune system  Yeast infections are not usually spread through sex.  What are the symptoms of a yeast infection? -- Symptoms include:   Itching of the vulva (this is the most common symptom)   Pain, redness, or irritation of the vulva and vagina   Pain when you urinate   Pain during sex   Abnormal vaginal discharge, which might be thick and white or thin and watery  The symptoms of a yeast infection are a lot like the symptoms of many other conditions. The best way to find out what is causing your symptoms is to see your doctor or nurse. This way, you can get the right treatment.  Is there a test for yeast infection? -- Yes. Your doctor or nurse will use a swab to get a sample of fluid from your vagina. Then, they will put it under a microscope and look for the fungus that causes yeast infections. Sometimes, they might do a test to find out which type of yeast you have.  Depending on your situation, your doctor or nurse might do other tests on your vaginal fluid, too. One common test checks for yeast infections as well as bacterial vaginosis and trichomoniasis. These are other infections that can also " cause itching and irritation.  How are yeast infections treated? -- Yeast infections are treated with medicines. All medicines for yeast infections work by killing the fungus that causes the infections. They come in different forms:   A pill you take by mouth   Medicines you put in your vagina and on your vulva - These come as creams or tablets. This is the preferred treatment for pregnant people.  When will I feel better? -- You will probably feel better within a few days of starting treatment. If you do not get better after you finish treatment, see your doctor or nurse again. You might need to take more medicine or a different medicine.  What if I get yeast infections often? -- Tell your doctor or nurse. They can help find out whether your symptoms are caused by a yeast infection and, if so, which type of yeast. There are a few different types of yeast, and they respond to different treatments. Plus, the same symptoms that you get with a yeast infection can sometimes be caused by other types of infections, an allergy, or other problems. If you get frequent infections, you might need a different treatment than what you tried in the past.  When should I call the doctor? -- Call your doctor or nurse for advice if your symptoms do not get better after treatment. It might take up to a week for symptoms to improve.  All topics are updated as new evidence becomes available and our peer review process is complete.  This topic retrieved from [employer] on: Feb 26, 2024.  Topic 25279 Version 14.0  Release: 32.2.4 - C32.56  © 2024 UpToDate, Inc. and/or its affiliates. All rights reserved.  figure 1: Adult female external genitalia     This drawing shows the different parts of the genitals.  Graphic 99392 Version 9.0  Consumer Information Use and Disclaimer   Disclaimer: This generalized information is a limited summary of diagnosis, treatment, and/or medication information. It is not meant to be comprehensive and should be  "used as a tool to help the user understand and/or assess potential diagnostic and treatment options. It does NOT include all information about conditions, treatments, medications, side effects, or risks that may apply to a specific patient. It is not intended to be medical advice or a substitute for the medical advice, diagnosis, or treatment of a health care provider based on the health care provider's examination and assessment of a patient's specific and unique circumstances. Patients must speak with a health care provider for complete information about their health, medical questions, and treatment options, including any risks or benefits regarding use of medications. This information does not endorse any treatments or medications as safe, effective, or approved for treating a specific patient. UpToDate, Inc. and its affiliates disclaim any warranty or liability relating to this information or the use thereof.The use of this information is governed by the Terms of Use, available at https://www.[employer]/en/know/clinical-effectiveness-terms. 2024© UpToDate, Inc. and its affiliates and/or licensors. All rights reserved.  Copyright   © 2024 UpToDate, Inc. and/or its affiliates. All rights reserved.    Patient Education     Urinary tract infections in adults   The Basics   Written by the doctors and editors at [employer]   What is the urinary tract? -- The urinary tract is the group of organs in the body that handle urine (figure 1). The urinary tract includes the:   Kidneys - These are 2 bean-shaped organs that filter the blood to make urine.   Bladder - This is a balloon-shaped organ that stores urine.   Ureters - These are 2 tubes that carry urine from the kidneys to the bladder.   Urethra - This is the tube that carries urine from the bladder to the outside of the body.  What are urinary tract infections? -- Urinary tract infections (\"UTIs\") are infections that affect either the bladder or the kidneys:   " "Bladder infections are more common than kidney infections. They happen when bacteria get into the urethra and travel up into the bladder. The medical term for bladder infection is \"cystitis.\" Most of the time, when people talk about a UTI, they mean a bladder infection.   Kidney infections happen when the bacteria travel even higher, up into the kidneys. The medical term for kidney infection is \"pyelonephritis.\" This is more serious than a bladder infection, and can lead to other serious problems if it is not treated properly.  Both bladder and kidney infections are more common in females than males.  The risk of UTIs is also higher in people who have a urinary catheter. A catheter is a thin, flexible tube that drains urine from the bladder. It might be used in people who are in the hospital and cannot urinate the normal way.  What are the symptoms of a bladder infection? -- The symptoms include:   Pain or a burning feeling when you urinate   The need to urinate often   The need to urinate suddenly or in a hurry   Blood in the urine  What are the symptoms of a kidney infection? -- The symptoms of a kidney infection can include the same urinary symptoms that happen with a bladder infection.  In addition, kidney infections can cause:   Fever   Back pain   Nausea or vomiting  How do I find out if I have a UTI? -- If you think that you might have a UTI, call your doctor or nurse. Sometimes, they can tell if you have a UTI just by learning about your symptoms.  Your doctor or nurse might do a simple urine test. If they think that you might have a kidney infection or are unsure what is causing your symptoms, they might also do a more involved urine test to check for bacteria.  How are UTIs treated? -- Most UTIs are treated with antibiotic pills. These pills work by killing the germs that cause the infection.   If you have a bladder infection, you will probably need to take antibiotics for 3 to 7 days.   If you have a " kidney infection, you will probably need to take antibiotics for longer, maybe for up to 10 days. If you have a kidney infection, it's also possible that you will need to be treated in the hospital.  Your symptoms should begin to improve within a day of starting antibiotics. But you should finish all of the antibiotic pills. Otherwise, the infection might come back.  If needed, you can also take a medicine to numb your bladder. This medicine eases the pain caused by UTIs. It also reduces the need to urinate.  What if I get bladder infections a lot? -- First, check with your doctor or nurse to make sure that you are really having bladder infections. The symptoms of bladder infection can be caused by other things. Your doctor or nurse will want to see if those problems might be causing your symptoms.  But if you are really having repeated infections, there are things that you can do to keep from getting more infections. These include:   Drinking more fluid - This can help prevent bladder infections.   Vaginal estrogen - If you have already been through menopause, your doctor might suggest this. Vaginal estrogen comes in a cream or a flexible ring that you put into your vagina. It can help prevent bladder infections.  Other things that might help:   Avoid spermicides (sperm-killing creams or gels) - Spermicide is a form of birth control. It seems to increase the risk of bladder infections in some females, especially when used with a diaphragm. If you use spermicide and get a lot of bladder infections, you might want to try switching to a different form of birth control.   Urinate right after sex - Some doctors think this helps, because it helps flush out germs that might get into the bladder during sex. There is no proof it works, but it also cannot hurt.  If you get a lot of bladder infections, and the above methods have not helped, your doctor might give you antibiotics to help prevent infection. But long-term use of  "antibiotics has downsides, so doctors usually suggest trying other things first.  Can cranberry juice or other products prevent bladder infections? -- People often wonder about \"natural\" products that claim to help prevent bladder infections. These include cranberry juice and other cranberry products, probiotics, vitamin C, and D-mannose. There is not good evidence that these things work. However, there is also no clear evidence that they are harmful. If you have questions about these or other products, talk with your doctor or nurse.  All topics are updated as new evidence becomes available and our peer review process is complete.  This topic retrieved from [employer] on: May 09, 2024.  Topic 66212 Version 24.0  Release: 32.4.3 - C32.128  © 2024 UpToDate, Inc. and/or its affiliates. All rights reserved.  figure 1: Anatomy of the urinary tract     Urine is made by the kidneys. It passes from the kidneys into the bladder through 2 tubes called the ureters. Then, it leaves the bladder through another tube called the urethra.  Graphic 75870 Version 8.0  Consumer Information Use and Disclaimer   Disclaimer: This generalized information is a limited summary of diagnosis, treatment, and/or medication information. It is not meant to be comprehensive and should be used as a tool to help the user understand and/or assess potential diagnostic and treatment options. It does NOT include all information about conditions, treatments, medications, side effects, or risks that may apply to a specific patient. It is not intended to be medical advice or a substitute for the medical advice, diagnosis, or treatment of a health care provider based on the health care provider's examination and assessment of a patient's specific and unique circumstances. Patients must speak with a health care provider for complete information about their health, medical questions, and treatment options, including any risks or benefits regarding use of " medications. This information does not endorse any treatments or medications as safe, effective, or approved for treating a specific patient. UpToDate, Inc. and its affiliates disclaim any warranty or liability relating to this information or the use thereof.The use of this information is governed by the Terms of Use, available at https://www.[employer]/en/know/clinical-effectiveness-terms. 2024© UpToDate, Inc. and its affiliates and/or licensors. All rights reserved.  Copyright   © 2024 UpToDate, Inc. and/or its affiliates. All rights reserved.

## 2024-07-10 NOTE — PROGRESS NOTES
"  [facility]        NAME: Isabel Calhoun is a 49 y.o. female  : 1975    MRN: [id number]  DATE: July 10, 2024  TIME: 1:50 PM      Assessment and Plan     Acute cystitis without hematuria [N30.00]  1. Acute cystitis without hematuria  POCT urine dip    Urine culture    nitrofurantoin (MACROBID) 100 mg capsule      2. Vaginal yeast infection  fluconazole (DIFLUCAN) 150 mg tablet            Patient Instructions     Patient Instructions   Patient Education     Vulvovaginal yeast infection   The Basics   Written by the doctors and editors at [facility]   What is a vulvovaginal yeast infection? -- This is an infection that causes itching and irritation of the vulva, the outer lips of the vagina (figure 1). The infection is usually caused by a fungus called \"Candida.\" (Yeast are a type of fungus.)  What causes yeast infections? -- The fungus that causes yeast infections normally lives in the vagina and the gut. Even though the yeast are there, they do not usually cause symptoms. Certain medicines (especially antibiotics), stress, and other things can cause the fungus to grow more than it should. When that happens, a yeast infection can start.  Your risk of getting a yeast infection is higher if you:   Have diabetes   Are pregnant   Have a weaker-than-normal immune system  Yeast infections are not usually spread through sex.  What are the symptoms of a yeast infection? -- Symptoms include:   Itching of the vulva (this is the most common symptom)   Pain, redness, or irritation of the vulva and vagina   Pain when you urinate   Pain during sex   Abnormal vaginal discharge, which might be thick and white or thin and watery  The symptoms of a yeast infection are a lot like the symptoms of many other conditions. The best way to find out what is causing your symptoms is to see your doctor or nurse. This way, you can get the right treatment.  Is there a test for yeast infection? -- Yes. Your doctor or nurse will use " Detail Level: Zone a swab to get a sample of fluid from your vagina. Then, they will put it under a microscope and look for the fungus that causes yeast infections. Sometimes, they might do a test to find out which type of yeast you have.  Depending on your situation, your doctor or nurse might do other tests on your vaginal fluid, too. One common test checks for yeast infections as well as bacterial vaginosis and trichomoniasis. These are other infections that can also cause itching and irritation.  How are yeast infections treated? -- Yeast infections are treated with medicines. All medicines for yeast infections work by killing the fungus that causes the infections. They come in different forms:   A pill you take by mouth   Medicines you put in your vagina and on your vulva - These come as creams or tablets. This is the preferred treatment for pregnant people.  When will I feel better? -- You will probably feel better within a few days of starting treatment. If you do not get better after you finish treatment, see your doctor or nurse again. You might need to take more medicine or a different medicine.  What if I get yeast infections often? -- Tell your doctor or nurse. They can help find out whether your symptoms are caused by a yeast infection and, if so, which type of yeast. There are a few different types of yeast, and they respond to different treatments. Plus, the same symptoms that you get with a yeast infection can sometimes be caused by other types of infections, an allergy, or other problems. If you get frequent infections, you might need a different treatment than what you tried in the past.  When should I call the doctor? -- Call your doctor or nurse for advice if your symptoms do not get better after treatment. It might take up to a week for symptoms to improve.  All topics are updated as new evidence becomes available and our peer review process is complete.  This topic retrieved from [employer] on: Feb 26, 2024.  Topic  83627 Version 14.0  Release: 32.2.4 - C32.56  © 2024 UpToDate, Inc. and/or its affiliates. All rights reserved.  figure 1: Adult female external genitalia     This drawing shows the different parts of the genitals.  Graphic 90843 Version 9.0  Consumer Information Use and Disclaimer   Disclaimer: This generalized information is a limited summary of diagnosis, treatment, and/or medication information. It is not meant to be comprehensive and should be used as a tool to help the user understand and/or assess potential diagnostic and treatment options. It does NOT include all information about conditions, treatments, medications, side effects, or risks that may apply to a specific patient. It is not intended to be medical advice or a substitute for the medical advice, diagnosis, or treatment of a health care provider based on the health care provider's examination and assessment of a patient's specific and unique circumstances. Patients must speak with a health care provider for complete information about their health, medical questions, and treatment options, including any risks or benefits regarding use of medications. This information does not endorse any treatments or medications as safe, effective, or approved for treating a specific patient. UpToDate, Inc. and its affiliates disclaim any warranty or liability relating to this information or the use thereof.The use of this information is governed by the Terms of Use, available at https://www.[employer].com/en/know/clinical-effectiveness-terms. 2024© UpToDate, Inc. and its affiliates and/or licensors. All rights reserved.  Copyright   © 2024 UpToDate, Inc. and/or its affiliates. All rights reserved.    Patient Education     Urinary tract infections in adults   The Basics   Written by the doctors and editors at [employer]   What is the urinary tract? -- The urinary tract is the group of organs in the body that handle urine (figure 1). The urinary tract includes  "the:   Kidneys - These are 2 bean-shaped organs that filter the blood to make urine.   Bladder - This is a balloon-shaped organ that stores urine.   Ureters - These are 2 tubes that carry urine from the kidneys to the bladder.   Urethra - This is the tube that carries urine from the bladder to the outside of the body.  What are urinary tract infections? -- Urinary tract infections (\"UTIs\") are infections that affect either the bladder or the kidneys:   Bladder infections are more common than kidney infections. They happen when bacteria get into the urethra and travel up into the bladder. The medical term for bladder infection is \"cystitis.\" Most of the time, when people talk about a UTI, they mean a bladder infection.   Kidney infections happen when the bacteria travel even higher, up into the kidneys. The medical term for kidney infection is \"pyelonephritis.\" This is more serious than a bladder infection, and can lead to other serious problems if it is not treated properly.  Both bladder and kidney infections are more common in females than males.  The risk of UTIs is also higher in people who have a urinary catheter. A catheter is a thin, flexible tube that drains urine from the bladder. It might be used in people who are in the hospital and cannot urinate the normal way.  What are the symptoms of a bladder infection? -- The symptoms include:   Pain or a burning feeling when you urinate   The need to urinate often   The need to urinate suddenly or in a hurry   Blood in the urine  What are the symptoms of a kidney infection? -- The symptoms of a kidney infection can include the same urinary symptoms that happen with a bladder infection.  In addition, kidney infections can cause:   Fever   Back pain   Nausea or vomiting  How do I find out if I have a UTI? -- If you think that you might have a UTI, call your doctor or nurse. Sometimes, they can tell if you have a UTI just by learning about your symptoms.  Your doctor " or nurse might do a simple urine test. If they think that you might have a kidney infection or are unsure what is causing your symptoms, they might also do a more involved urine test to check for bacteria.  How are UTIs treated? -- Most UTIs are treated with antibiotic pills. These pills work by killing the germs that cause the infection.   If you have a bladder infection, you will probably need to take antibiotics for 3 to 7 days.   If you have a kidney infection, you will probably need to take antibiotics for longer, maybe for up to 10 days. If you have a kidney infection, it's also possible that you will need to be treated in the hospital.  Your symptoms should begin to improve within a day of starting antibiotics. But you should finish all of the antibiotic pills. Otherwise, the infection might come back.  If needed, you can also take a medicine to numb your bladder. This medicine eases the pain caused by UTIs. It also reduces the need to urinate.  What if I get bladder infections a lot? -- First, check with your doctor or nurse to make sure that you are really having bladder infections. The symptoms of bladder infection can be caused by other things. Your doctor or nurse will want to see if those problems might be causing your symptoms.  But if you are really having repeated infections, there are things that you can do to keep from getting more infections. These include:   Drinking more fluid - This can help prevent bladder infections.   Vaginal estrogen - If you have already been through menopause, your doctor might suggest this. Vaginal estrogen comes in a cream or a flexible ring that you put into your vagina. It can help prevent bladder infections.  Other things that might help:   Avoid spermicides (sperm-killing creams or gels) - Spermicide is a form of birth control. It seems to increase the risk of bladder infections in some females, especially when used with a diaphragm. If you use spermicide and get a  "lot of bladder infections, you might want to try switching to a different form of birth control.   Urinate right after sex - Some doctors think this helps, because it helps flush out germs that might get into the bladder during sex. There is no proof it works, but it also cannot hurt.  If you get a lot of bladder infections, and the above methods have not helped, your doctor might give you antibiotics to help prevent infection. But long-term use of antibiotics has downsides, so doctors usually suggest trying other things first.  Can cranberry juice or other products prevent bladder infections? -- People often wonder about \"natural\" products that claim to help prevent bladder infections. These include cranberry juice and other cranberry products, probiotics, vitamin C, and D-mannose. There is not good evidence that these things work. However, there is also no clear evidence that they are harmful. If you have questions about these or other products, talk with your doctor or nurse.  All topics are updated as new evidence becomes available and our peer review process is complete.  This topic retrieved from [employer] on: May 09, 2024.  Topic 00129 Version 24.0  Release: 32.4.3 - C32.128  © 2024 UpToDate, Inc. and/or its affiliates. All rights reserved.  figure 1: Anatomy of the urinary tract     Urine is made by the kidneys. It passes from the kidneys into the bladder through 2 tubes called the ureters. Then, it leaves the bladder through another tube called the urethra.  Graphic 60394 Version 8.0  Consumer Information Use and Disclaimer   Disclaimer: This generalized information is a limited summary of diagnosis, treatment, and/or medication information. It is not meant to be comprehensive and should be used as a tool to help the user understand and/or assess potential diagnostic and treatment options. It does NOT include all information about conditions, treatments, medications, side effects, or risks that may apply to " a specific patient. It is not intended to be medical advice or a substitute for the medical advice, diagnosis, or treatment of a health care provider based on the health care provider's examination and assessment of a patient's specific and unique circumstances. Patients must speak with a health care provider for complete information about their health, medical questions, and treatment options, including any risks or benefits regarding use of medications. This information does not endorse any treatments or medications as safe, effective, or approved for treating a specific patient. UpToDate, Inc. and its affiliates disclaim any warranty or liability relating to this information or the use thereof.The use of this information is governed by the Terms of Use, available at https://www.[employer].com/en/know/clinical-effectiveness-terms. 2024© UpToDate, Inc. and its affiliates and/or licensors. All rights reserved.  Copyright   © 2024 UpToDate, Inc. and/or its affiliates. All rights reserved.      Follow up with PCP in 3-5 days.  Proceed to  ER if symptoms worsen.    Chief Complaint     Chief Complaint   Patient presents with   • Possible UTI     Patient c/o urinary burning, pressure and is only urinating little amounts that started yesterday.         History of Present Illness     Onset of  suspected UTI symptoms yesterday.  Notes that she had a little bit of diarrhea back on Saturday.  Has had UTIs in the past but not for several years.  In late 2019-fall 2020, multiple + UTI, tx for yeast vaginitis and tx 1x for BV without issues until now.  Has also had external vaginal irritation/itching over the last few days and has been swimming recently.    Review of Systems     Review of Systems   Genitourinary:  Positive for dysuria, frequency, pelvic pain, vaginal discharge (yellow; no fishy or foul smell) and vaginal pain (external itching/irritation).   All other systems reviewed and are negative.      Current  Medications       Current Outpatient Medications:   •  Ascorbic Acid (vitamin C) 1000 MG tablet, Take 1,000 mg by mouth daily, Disp: , Rfl:   •  fluconazole (DIFLUCAN) 150 mg tablet, Take 1 tablet (150 mg total) by mouth every 3 (three) days for 4 doses, Disp: 4 tablet, Rfl: 0  •  L-Lysine 1000 MG TABS, Take 1,000 mg by mouth Daily at 2am, Disp: , Rfl:   •  Magnesium 300 MG CAPS, Take 1 capsule by mouth Daily at 2am, Disp: , Rfl:   •  Multiple Vitamins-Minerals (MULTIVITAMIN ADULT EXTRA C PO), Take 1 tablet by mouth Daily at 2am, Disp: , Rfl:   •  nitrofurantoin (MACROBID) 100 mg capsule, Take 1 capsule (100 mg total) by mouth 2 (two) times a day for 7 days, Disp: 14 capsule, Rfl: 0  •  Probiotic Product (PROBIOTIC ADVANCED PO), Take 1 capsule by mouth Daily at 2am, Disp: , Rfl:   •  valACYclovir (VALTREX) 500 mg tablet, Take 500 mg by mouth daily, Disp: , Rfl:   •  lidocaine (Lidoderm) 5 %, Apply 1 patch topically over 12 hours daily for 14 days Remove & Discard patch within 12 hours or as directed by MD, Disp: 14 patch, Rfl: 0    Current Allergies     Allergies as of 07/10/2024 - Reviewed 07/10/2024   Allergen Reaction Noted   • Amoxicillin Hives 10/24/2012   • Cymbalta [duloxetine hcl] Other (See Comments) 02/26/2021   • Penicillins Hives 10/24/2012   • Shellfish allergy - food allergy Hives 05/07/2013   • Sulfa antibiotics Rash 02/25/2013              The following portions of the patient's history were reviewed and updated as appropriate: allergies, current medications, past family history, past medical history, past social history, past surgical history and problem list.     Past Medical History:   Diagnosis Date   • Abnormal Pap smear of cervix    • Anemia    • Disease of thyroid gland     thyroid storm with previous pregnancy   • Dysmenorrhea    • Esophageal reflux     last assessed - 22Dec2014   • Female infertility    • Herpes zoster     LUQ level T-8 resolved; last assessed - 12Jan2015   • History of  infection due to human papilloma virus (HPV)    • HPV (human papilloma virus) infection    • Inguinal hernia     last assessed - 2013   • Insomnia    • Liver disease 2024   • Mastodynia     Resolved -    • Migraine    • Miscarriage    • Ovarian cyst     last assessed - [id number]   • Ovarian cyst    • Palpitations     last assessed - [id number]   • Primary narcolepsy without cataplexy 10/11/2023   • Shingles 2023   • Tachycardia     Resolved - [id number]   • Takes dietary supplements    • Temporomandibular joint disorder     last assessed - 2012   • Urinary tract infection    • Varicella        Past Surgical History:   Procedure Laterality Date   •  SECTION     • COLONOSCOPY  2012    Fiberoptic   • CRYOTHERAPY      cervix, age 20   • DIAGNOSTIC LAPAROSCOPY     • DILATION AND CURETTAGE OF UTERUS      Resolved    • GYNECOLOGIC CRYOSURGERY      age 20   • TOOTH EXTRACTION         Family History   Problem Relation Age of Onset   • Diabetes Mother    • Heart disease Mother         cardiac disorder   • Gallbladder disease Mother    • Hypertension Mother    • Glaucoma Mother    • Obesity Mother         hx gastric bypass   • Stroke Mother    • Thyroid disease Mother    • Diabetes Father    • Heart disease Father         cardiac disorder   • Hypertension Father    • Hyperlipidemia Father         Pure Hypercholesterolemia   • Obesity Father    • Other Family         headache syndromes   • Breast cancer Cousin    • Post-traumatic stress disorder Brother    • Hyperlipidemia Brother    • Hypertension Brother    • Diabetes Maternal Grandmother    • Lung cancer Maternal Grandfather    • Stroke Paternal Grandmother    • Hypertension Paternal Grandmother    • Cirrhosis Paternal Grandfather    • Ovarian cancer Maternal Aunt    • No Known Problems Daughter    • No Known Problems Paternal Aunt    • Colon cancer Neg Hx          Medications have been verified.        Objective     /68    Pulse 88   Temp 97.6 °F (36.4 °C) (Temporal)   Resp 18   Ht 5' (1.524 m)   Wt 69.4 kg (153 lb)   LMP 06/03/2024 (Approximate)   SpO2 99%   BMI 29.88 kg/m²   Patient's last menstrual period was 06/03/2024 (approximate).         Physical Exam     Physical Exam  Vitals and nursing note reviewed.   Constitutional:       General: She is not in acute distress.     Appearance: Normal appearance. She is well-developed. She is not ill-appearing, toxic-appearing or diaphoretic.   HENT:      Head: Normocephalic and atraumatic.   Pulmonary:      Effort: Pulmonary effort is normal. No respiratory distress.   Abdominal:      General: There is no distension.      Palpations: Abdomen is soft.      Tenderness: There is abdominal tenderness in the suprapubic area. There is no right CVA tenderness or left CVA tenderness.   Musculoskeletal:         General: Normal range of motion.   Skin:     General: Skin is warm and dry.      Capillary Refill: Capillary refill takes less than 2 seconds.   Neurological:      General: No focal deficit present.      Mental Status: She is alert and oriented to person, place, and time.   Psychiatric:         Mood and Affect: Mood and affect normal.         Behavior: Behavior normal. Behavior is cooperative.         Thought Content: Thought content normal.         Judgment: Judgment normal.

## 2024-07-12 LAB
BACTERIA UR CULT: ABNORMAL
BACTERIA UR CULT: ABNORMAL

## 2024-07-17 PROBLEM — Z11.51 ENCOUNTER FOR SCREENING FOR HUMAN PAPILLOMAVIRUS (HPV): Status: RESOLVED | Noted: 2024-06-17 | Resolved: 2024-07-17

## 2024-07-17 NOTE — PROGRESS NOTES
PT Re-Evaluation     Today's date: 2024  Patient name: Isabel Calhoun  : 1975  MRN: [id number]  Referring provider: Dennis Hamm MD  Dx:   Encounter Diagnosis     ICD-10-CM    1. Acute pain of left shoulder  M25.512       2. Cervical radiculopathy  M54.12                      Assessment  Impairments: abnormal or restricted ROM, activity intolerance, impaired physical strength, lacks appropriate home exercise program, pain with function and poor posture   Functional limitations: Difficulty lifting/carrying objects heavier than 10 pounds, difficulty reaching behind back, difficulty reaching above head, difficulty with prolonged sitting/driving, difficulty lying on L side, and difficulty pushing with L UE  Symptom irritability: high    Assessment details: Isabel Calhoun is a 49 y.o. female with a history of shingles, narcolepsy, ovarian cyst, anemia, thyroid disease, GERD, HPV, inguinal hernia, insomnia, migraine, tachycardia, TMJ D/O that presents for a physical therapy RE evaluation.  The patient demonstrates signs and symptoms consistent with acute L shoulder pain;cervical radiculopathy.  During the examination the patient demonstrated improved L UE strength, improved L shoulder ROM, improved cervical ROM, activity tolerance, and improvd L shoulder/UE pain.  The patient has made functional gains since starting therapy.  The patient notes a reduction in pain at the L shoulder with ADL's.  The patient notes her pain/symptoms do calm down within minutes compared to last hours when her symptoms are provoked.  The patient notes improvement with reaching above head with L arm.  The patient continues to have difficulty with moving the left shoulder back behind her body with twisting to the L at her trunk. The patient notes persistent pain with donning a seat belt. The patient continues to have difficulty with lifting weight above head.  The patient would like to place formal therapy on hold for the next  2-4 weeks with a re-evaluation to make sure her symptoms continue to regress and function improves.          Understanding of Dx/Px/POC: good     Prognosis: good  Prognosis details: Positive prognostic indicators include: positive attitude toward recovery, good understanding of diagnosis/treatment plan, and absence of observed red flags.      Negative prognostic indicators include: Significant medical Hx, chronicity of condition; ?nerve involvement L UE    Goals  STG: ACHIEVE in 4 -6 weeks  1.  Improve L shoulder pain at worst by 50% to improve ADL's. MET  2.  Improve L shoulder strength by 1/2-1 muscle grade to improve lifting/carrying tasks. PROGRESSING  3.  Improve L shoulder IR/EXT ROM by 10-20 degrees; C-spine ROM WNL to improve the ability to reach over head. PROGRESSING    LTG:  Achieve in 8-12 weeks  1.  Patient learn what causes her L UE pain and learn how to avoid aggravating and also how to treat/decrease pain to achieve their personal goal. PROGRESSING  2.  Patient's L shoulder strength return to equal for both sides to lift/carry items without pain/difficulty. PROGRESSING  3.  Patient's L shoulder /cervical ROM improve to WNL to perform all ADL's and overhead tasks without difficulty. PROGRESSING  4.  Patient to be independent with home exercise plan at time of discharge. PROGRESSING  5. Patient's shoulder FOTO score improve to > 66% to indicate a return to normal function. PROGRESSING             Plan  Patient would benefit from: skilled physical therapy  Planned modality interventions: cryotherapy, thermotherapy: hydrocollator packs and traction    Planned therapy interventions: joint mobilization, manual therapy, neuromuscular re-education, patient education, postural training, self care, strengthening, stretching, therapeutic activities, therapeutic exercise, home exercise program, abdominal trunk stabilization and IASTM    Frequency: 1-3x/wk.  Duration in weeks: 12  Plan of Care beginning date:  "6/5/2024  Plan of Care expiration date: 9/4/2024  Treatment plan discussed with: PTA and patient  Plan details: RE-ASSESS 1X/MONTH        Subjective Evaluation    History of Present Illness  Date of onset: 5/1/2024  Mechanism of injury: SUBJECTIVE: 7/19/24: The patient reports *** since last therapy assessment.  The patient is now able to***.  The patient continues to have difficulty with ***.  The patient will benefit from continued PT focused on achieving patient specific goals.          SUBJECTIVE: 7/5/24: The patient reports improvement since starting therapy.  The patient notes a reduction in pain at the L shoulder with ADL's.  The patient notes her pain/symptoms do calm down within minutes compared to last hours when her symptoms are provoked.  The patient notes improvement with reaching above head with L arm.  The patient continues to have difficulty with moving the left shoulder back behind her body with twisting to the L at her trunk. The patient notes persistent pain with donning a seat belt. The patient continues to have difficulty with lifting weight above head.  The patient will benefit from continued PT focused on achieving patient specific goals.          INJURY HISTORY: Isabel Calhoun is a 49 y.o. female that presents to outpatient physical therapy with complaints of chronic ( past 13 years on/off)L shoulder pain and difficulty functioning.  The patient reports having L shoulder pain on and off for a long time.  The patient notes re-aggravating her pain when falling off of a ladder onto an outstretched arm 1 month prior.  The patient describes the pain as \"squeezing\" and \"tightness\" at her L UT and   chest wall with pain radiating down her L arm and into her 3rd through 5th digits.  The patient notes difficulty with reaching her L arm away from her body, donning a seatbelt, and reaching arm behind back.  The patient notes aggravation with attempting to play volleyball - opposing force on L hand.  " The patient's main goal for physical therapy is to understand why she gets pain, what to do to avoid pain and treat pain.       CT Cervical spine: IMPRESSION:     -No cervical spine fracture or traumatic malalignment.     -C5-C7 spondylosis resulting in mild to moderate spinal canal and neuroforaminal narrowing more prominent at C6-C7. Details above            Recurrent probem    Patient Goals  Patient goals for therapy: decreased pain, increased motion, increased strength, return to sport/leisure activities and independence with ADLs/IADLs  Patient goal: understand why she gets pain, what to do to avoid pain and treat pain.  Pain  Current pain ratin  At worst pain rating: 3  Location: L shoulder  Quality: burning, squeezing and tight  Alleviating factors: chiropractic, massage.  Aggravating factors: lifting (reaching)  Progression: improved    Social Support  Lives with: spouse and young children    Employment status: working (sales, desk/driving)  Hand dominance: right    Treatments  Previous treatment: massage and chiropractic  Current treatment: physical therapy        Objective     Concurrent Complaints  Positive for disturbed sleep (painful to sleep on L side) and history of trauma (CT scan of cervical spine cleared of fracture). Negative for night pain, dizziness, faints, headaches, nausea/motion sickness, tinnitus, trouble swallowing, difficulty breathing, shortness of breath, respiratory pain, visual change, history of cancer and infection    Postural Observations  Seated posture: fair  Standing posture: fair  Correction of posture: makes symptoms better      Neurological Testing     Sensation   Cervical/Thoracic   Left   Intact: light touch    Right   Intact: light touch    Reflexes   Left   Biceps (C5/C6): brisk (3+)  Brachioradialis (C6): brisk (3+)  Judd's reflex: negative    Right   Biceps (C5/C6): brisk (3+)  Brachioradialis (C6): brisk (3+)  Judd's reflex: negative    Active Range of Motion    Cervical/Thoracic Spine       Cervical  Subcranial protraction:   Restriction level: minimal  Subcranial retraction:   Restriction level: minimal  Flexion: Neck active flexion: L cervical spine.  with pain Restriction level: minimal  Extension:  Restriction level: minimal  Left lateral flexion:  Restriction level: moderate  Right lateral flexion: Neck active lateral bend right: tightness L ant neck.     with pain Restriction level moderate  Left rotation: 65 degrees with pain Restriction level: minimal  Right rotation: 70 degrees    Restriction level: minimal  Left Shoulder   Flexion: 170 degrees   Extension: 45 degrees with pain  Abduction: 175 degrees   Adduction: 38 degrees   External rotation 0°: 70 degrees with pain  External rotation BTH: T3   Internal rotation BTB: T10 with pain    Right Shoulder   Flexion: 170 degrees   Extension: 68 degrees   Abduction: 175 degrees   Adduction: 40 degrees   External rotation 0°: 80 degrees   External rotation BTH: T2   Internal rotation BTB: T9   Mechanical Assessment    Cervical    Seated retraction: repeated movements   Pain location: no change  Seated Extension: repeated movements  Pain intensity: better  Pain level: decreased    Thoracic      Lumbar      Strength/Myotome Testing   Cervical Spine     Left   Interossei strength (t1): 5    Right   Interossei strength (t1): 5    Left Shoulder     Planes of Motion   Flexion: 4   Extension: 4   Abduction: 4- (pain)   External rotation at 0°: 4+   Internal rotation at 0°: 4+     Right Shoulder     Planes of Motion   Flexion: 4   Extension: 4   Abduction: 4   External rotation at 0°: 4+   Internal rotation at 0°: 4+     Left Elbow   Flexion: 4+  Extension: 4+    Right Elbow   Flexion: 4+  Extension: 4+    Left Wrist/Hand   Wrist extension: 4+  Wrist flexion: 4+  Thumb extension: 4+    Right Wrist/Hand   Wrist extension: 4+  Wrist flexion: 4+  Thumb extension: 4+    Tests     Additional Tests Details  FOTO: 59% ( predicted  66%)    Neuro Exam:     Headaches   Patient reports headaches: No.              Precautions: none    Re-evaluation:7/19    [id number]   Shoulder Specialty Daily Treatment Diary     Manual  6/28 7/5 6/21 6/26   STM/MFR shoulder /scapula  /stretching        Cervical P/A        GH jt mobs        Pec Minor stretch Supine 5x:15 O.P.   Supine w/ OP :10x5 Supine 5x:15 O.P.   Cervical traction / SOR            Therapeutic Exercise 6/28 7/5 6/21 6/26   UBE for muscle endurance 90/70 x6 min ALT 90/70 x 6 mins  /70   x4 min ALT 90/70 x 6 mins ALT   Pulleys        Shoulder isometrics (flex, ext, abd, IR, ER)        TB sh. IR, ER        Table slides flex/abd/ER        T-spine extension X10 sit  Supine lay w/ towel x2 min X10  SUPINE X 2 MIN  x10 x10   1st rib mobility seated with strap        Shoulder Cane EXT    X10 no diff    Shoulder IR stretch  X10 repeated  BETTER shouldr IR ROM, ABD strength, EXT ROM      Cane AAROM        Prone rows / extension / horizontal abduction        Pec stretch 1/2 roll vertical seated        Supine Pec minor stretch  Supine Arms apart Supine lay x 2 mins X 2 mins  Supine lay x2 min Supine lay x 2 mins           Upper Trap Stretch    :15x4 tingle    Scalene stretch        SCM stretch        Neuro Re-ed        Chin Tucks x10 X10  x10 X10   Chin tuck with EXT x10   x10 x10   MTP/LTP GRN MTP x10  RED LTP x10    GRN MTP x10  RED LTP x10   YTI wall slide with lift off     Y liftoff  x10   Table plank :30x2    NV   Open book rotation X10 B/L X10 L ROT   X10 B/L  L rot limited vs R rot   Towel roll education; posture correction for work set up        Quadruped ALT UE     *   Scapular retraction Sit x10 w/ towel X10  -Sit x10 w/ towel  -W/ ER x10 Sit x10 w/ towel   Therapeutic Activity        Crate carry        3 height crate lifts                Cones in Shelf            Modalities        CP shoulder                                 The patient was given a new home exercise plan with instruction,  pictures, and verbal feedback.  The patient accepts and understands the new home activities.

## 2024-07-19 ENCOUNTER — APPOINTMENT (OUTPATIENT)
Dept: PHYSICAL THERAPY | Facility: CLINIC | Age: 49
End: 2024-07-19
Payer: COMMERCIAL

## 2024-07-19 DIAGNOSIS — M54.12 CERVICAL RADICULOPATHY: ICD-10-CM

## 2024-07-19 DIAGNOSIS — M25.512 ACUTE PAIN OF LEFT SHOULDER: Primary | ICD-10-CM

## 2024-08-24 ENCOUNTER — HOSPITAL ENCOUNTER (EMERGENCY)
Facility: HOSPITAL | Age: 49
Discharge: HOME | End: 2024-08-25
Attending: EMERGENCY MEDICINE | Admitting: EMERGENCY MEDICINE
Payer: COMMERCIAL

## 2024-08-24 VITALS
RESPIRATION RATE: 16 BRPM | DIASTOLIC BLOOD PRESSURE: 56 MMHG | HEIGHT: 60 IN | TEMPERATURE: 97.2 F | SYSTOLIC BLOOD PRESSURE: 119 MMHG | WEIGHT: 145 LBS | HEART RATE: 92 BPM | BODY MASS INDEX: 28.47 KG/M2 | OXYGEN SATURATION: 98 %

## 2024-08-24 DIAGNOSIS — R51.9 HEADACHE: Primary | ICD-10-CM

## 2024-08-24 PROCEDURE — 99284 EMERGENCY DEPT VISIT MOD MDM: CPT

## 2024-08-25 ENCOUNTER — APPOINTMENT (EMERGENCY)
Dept: CT IMAGING | Facility: HOSPITAL | Age: 49
End: 2024-08-25
Payer: COMMERCIAL

## 2024-08-25 LAB
ALBUMIN SERPL BCG-MCNC: 4.2 G/DL (ref 3.5–5)
ALP SERPL-CCNC: 50 U/L (ref 34–104)
ALT SERPL W P-5'-P-CCNC: 21 U/L (ref 7–52)
ANION GAP SERPL CALCULATED.3IONS-SCNC: 6 MMOL/L (ref 4–13)
AST SERPL W P-5'-P-CCNC: 16 U/L (ref 13–39)
BASOPHILS # BLD AUTO: 0.05 THOUSANDS/ÂΜL (ref 0–0.1)
BASOPHILS NFR BLD AUTO: 1 % (ref 0–1)
BILIRUB SERPL-MCNC: 0.43 MG/DL (ref 0.2–1)
BUN SERPL-MCNC: 17 MG/DL (ref 5–25)
CALCIUM SERPL-MCNC: 9.2 MG/DL (ref 8.4–10.2)
CHLORIDE SERPL-SCNC: 104 MMOL/L (ref 96–108)
CO2 SERPL-SCNC: 26 MMOL/L (ref 21–32)
CREAT SERPL-MCNC: 0.75 MG/DL (ref 0.6–1.3)
EOSINOPHIL # BLD AUTO: 0.12 THOUSAND/ÂΜL (ref 0–0.61)
EOSINOPHIL NFR BLD AUTO: 2 % (ref 0–6)
ERYTHROCYTE [DISTWIDTH] IN BLOOD BY AUTOMATED COUNT: 11.4 % (ref 11.6–15.1)
FLUAV RNA RESP QL NAA+PROBE: NEGATIVE
FLUBV RNA RESP QL NAA+PROBE: NEGATIVE
GFR SERPL CREATININE-BSD FRML MDRD: 94 ML/MIN/1.73SQ M
GLUCOSE SERPL-MCNC: 127 MG/DL (ref 65–140)
HCT VFR BLD AUTO: 40.5 % (ref 34.8–46.1)
HGB BLD-MCNC: 13.6 G/DL (ref 11.5–15.4)
IMM GRANULOCYTES # BLD AUTO: 0.01 THOUSAND/UL (ref 0–0.2)
IMM GRANULOCYTES NFR BLD AUTO: 0 % (ref 0–2)
LYMPHOCYTES # BLD AUTO: 2.1 THOUSANDS/ÂΜL (ref 0.6–4.47)
LYMPHOCYTES NFR BLD AUTO: 26 % (ref 14–44)
MCH RBC QN AUTO: 30.6 PG (ref 26.8–34.3)
MCHC RBC AUTO-ENTMCNC: 33.6 G/DL (ref 31.4–37.4)
MCV RBC AUTO: 91 FL (ref 82–98)
MONOCYTES # BLD AUTO: 0.78 THOUSAND/ÂΜL (ref 0.17–1.22)
MONOCYTES NFR BLD AUTO: 10 % (ref 4–12)
NEUTROPHILS # BLD AUTO: 5.02 THOUSANDS/ÂΜL (ref 1.85–7.62)
NEUTS SEG NFR BLD AUTO: 61 % (ref 43–75)
NRBC BLD AUTO-RTO: 0 /100 WBCS
PLATELET # BLD AUTO: 268 THOUSANDS/UL (ref 149–390)
PMV BLD AUTO: 10.3 FL (ref 8.9–12.7)
POTASSIUM SERPL-SCNC: 3.8 MMOL/L (ref 3.5–5.3)
PROT SERPL-MCNC: 7 G/DL (ref 6.4–8.4)
RBC # BLD AUTO: 4.45 MILLION/UL (ref 3.81–5.12)
RSV RNA RESP QL NAA+PROBE: NEGATIVE
SARS-COV-2 RNA RESP QL NAA+PROBE: NEGATIVE
SODIUM SERPL-SCNC: 136 MMOL/L (ref 135–147)
WBC # BLD AUTO: 8.08 THOUSAND/UL (ref 4.31–10.16)

## 2024-08-25 PROCEDURE — 36415 COLL VENOUS BLD VENIPUNCTURE: CPT | Performed by: EMERGENCY MEDICINE

## 2024-08-25 PROCEDURE — 80053 COMPREHEN METABOLIC PANEL: CPT | Performed by: EMERGENCY MEDICINE

## 2024-08-25 PROCEDURE — 99285 EMERGENCY DEPT VISIT HI MDM: CPT | Performed by: EMERGENCY MEDICINE

## 2024-08-25 PROCEDURE — 85025 COMPLETE CBC W/AUTO DIFF WBC: CPT | Performed by: EMERGENCY MEDICINE

## 2024-08-25 PROCEDURE — 96375 TX/PRO/DX INJ NEW DRUG ADDON: CPT

## 2024-08-25 PROCEDURE — 70450 CT HEAD/BRAIN W/O DYE: CPT

## 2024-08-25 PROCEDURE — 96365 THER/PROPH/DIAG IV INF INIT: CPT

## 2024-08-25 PROCEDURE — 96372 THER/PROPH/DIAG INJ SC/IM: CPT

## 2024-08-25 PROCEDURE — 0241U HB NFCT DS VIR RESP RNA 4 TRGT: CPT | Performed by: EMERGENCY MEDICINE

## 2024-08-25 RX ORDER — SUMATRIPTAN 6 MG/.5ML
6 INJECTION, SOLUTION SUBCUTANEOUS ONCE
Status: COMPLETED | OUTPATIENT
Start: 2024-08-25 | End: 2024-08-25

## 2024-08-25 RX ORDER — METOCLOPRAMIDE 10 MG/1
10 TABLET ORAL EVERY 6 HOURS PRN
Qty: 30 TABLET | Refills: 0 | Status: SHIPPED | OUTPATIENT
Start: 2024-08-25

## 2024-08-25 RX ORDER — KETOROLAC TROMETHAMINE 30 MG/ML
30 INJECTION, SOLUTION INTRAMUSCULAR; INTRAVENOUS ONCE
Status: COMPLETED | OUTPATIENT
Start: 2024-08-25 | End: 2024-08-25

## 2024-08-25 RX ORDER — ONDANSETRON 2 MG/ML
4 INJECTION INTRAMUSCULAR; INTRAVENOUS ONCE
Status: COMPLETED | OUTPATIENT
Start: 2024-08-25 | End: 2024-08-25

## 2024-08-25 RX ORDER — MAGNESIUM SULFATE HEPTAHYDRATE 40 MG/ML
2 INJECTION, SOLUTION INTRAVENOUS ONCE
Status: COMPLETED | OUTPATIENT
Start: 2024-08-25 | End: 2024-08-25

## 2024-08-25 RX ORDER — METOCLOPRAMIDE HYDROCHLORIDE 5 MG/ML
10 INJECTION INTRAMUSCULAR; INTRAVENOUS ONCE
Status: COMPLETED | OUTPATIENT
Start: 2024-08-25 | End: 2024-08-25

## 2024-08-25 RX ORDER — DIPHENHYDRAMINE HYDROCHLORIDE 50 MG/ML
25 INJECTION INTRAMUSCULAR; INTRAVENOUS ONCE
Status: DISCONTINUED | OUTPATIENT
Start: 2024-08-25 | End: 2024-08-25

## 2024-08-25 RX ADMIN — SUMATRIPTAN 6 MG: 6 INJECTION SUBCUTANEOUS at 00:43

## 2024-08-25 RX ADMIN — METOCLOPRAMIDE 10 MG: 5 INJECTION, SOLUTION INTRAMUSCULAR; INTRAVENOUS at 00:37

## 2024-08-25 RX ADMIN — SODIUM CHLORIDE 1000 ML: 0.9 INJECTION, SOLUTION INTRAVENOUS at 00:51

## 2024-08-25 RX ADMIN — ONDANSETRON 4 MG: 2 INJECTION INTRAMUSCULAR; INTRAVENOUS at 00:45

## 2024-08-25 RX ADMIN — KETOROLAC TROMETHAMINE 30 MG: 30 INJECTION, SOLUTION INTRAMUSCULAR at 00:40

## 2024-08-25 RX ADMIN — MAGNESIUM SULFATE HEPTAHYDRATE 2 G: 40 INJECTION, SOLUTION INTRAVENOUS at 00:52

## 2024-08-25 NOTE — ED PROVIDER NOTES
History  Chief Complaint   Patient presents with    Migraine     Pt reports headache, nausea, diarrhea, and sore throat     49-year-old female presents to the emergency department for evaluation of sudden onset severe headache that started around 9:30 PM.  Is associated with photophobia and nausea, but no vomiting.  Also had 2 episodes of diarrhea that was nonbloody in nature.  She does have sinus congestion and rhinorrhea as well that started on Thursday with fatigue that caused her to miss work.  She did take a home COVID test which was negative.  She denies any fevers or chills.  No head strike, trauma or fall.  No blurry vision or double vision.  Is able to ambulate with steady gait.  She tried Tylenol and Benadryl prior to arrival without improvement.  She denies a history of migraine headaches, but does have a diagnosis of migraine on her chart.  No neck pain or stiffness.  No numbness or tingling.  No chest pain, shortness of breath.                  Prior to Admission Medications   Prescriptions Last Dose Informant Patient Reported? Taking?   Ascorbic Acid (vitamin C) 1000 MG tablet  Self Yes No   Sig: Take 1,000 mg by mouth daily   L-Lysine 1000 MG TABS  Self Yes No   Sig: Take 1,000 mg by mouth Daily at 2am   Magnesium 300 MG CAPS  Self Yes No   Sig: Take 1 capsule by mouth Daily at 2am   Multiple Vitamins-Minerals (MULTIVITAMIN ADULT EXTRA C PO)  Self Yes No   Sig: Take 1 tablet by mouth Daily at 2am   Probiotic Product (PROBIOTIC ADVANCED PO)  Self Yes No   Sig: Take 1 capsule by mouth Daily at 2am   lidocaine (Lidoderm) 5 %  Self No No   Sig: Apply 1 patch topically over 12 hours daily for 14 days Remove & Discard patch within 12 hours or as directed by MD   valACYclovir (VALTREX) 500 mg tablet   Yes No   Sig: Take 500 mg by mouth daily      Facility-Administered Medications: None       Past Medical History:   Diagnosis Date    Abnormal Pap smear of cervix     Anemia     Disease of thyroid gland      thyroid storm with previous pregnancy    Dysmenorrhea     Esophageal reflux     last assessed - [id number]    Female infertility     Herpes zoster     LUQ level T-8 resolved; last assessed - 2015    History of infection due to human papilloma virus (HPV)     HPV (human papilloma virus) infection     Inguinal hernia     last assessed - [id number]    Insomnia     Liver disease 2024    Mastodynia     Resolved -     Migraine     Miscarriage     Ovarian cyst     last assessed - [id number]    Ovarian cyst 2013    Palpitations     last assessed - [id number]    Primary narcolepsy without cataplexy 10/11/2023    Shingles 2023    Tachycardia     Resolved - [id number]    Takes dietary supplements     Temporomandibular joint disorder     last assessed - 2012    Urinary tract infection     Varicella        Past Surgical History:   Procedure Laterality Date     SECTION      COLONOSCOPY  2012    Fiberoptic    CRYOTHERAPY      cervix, age 20    DIAGNOSTIC LAPAROSCOPY      DILATION AND CURETTAGE OF UTERUS      Resolved     GYNECOLOGIC CRYOSURGERY      age 20    TOOTH EXTRACTION         Family History   Problem Relation Age of Onset    Diabetes Mother     Heart disease Mother         cardiac disorder    Gallbladder disease Mother     Hypertension Mother     Glaucoma Mother     Obesity Mother         hx gastric bypass    Stroke Mother     Thyroid disease Mother     Diabetes Father     Heart disease Father         cardiac disorder    Hypertension Father     Hyperlipidemia Father         Pure Hypercholesterolemia    Obesity Father     Other Family         headache syndromes    Breast cancer Cousin     Post-traumatic stress disorder Brother     Hyperlipidemia Brother     Hypertension Brother     Diabetes Maternal Grandmother     Lung cancer Maternal Grandfather     Stroke Paternal Grandmother     Hypertension Paternal Grandmother     Cirrhosis Paternal Grandfather     Ovarian cancer Maternal Aunt      No Known Problems Daughter     No Known Problems Paternal Aunt     Colon cancer Neg Hx      I have reviewed and agree with the history as documented.    E-Cigarette/Vaping    E-Cigarette Use Never User      E-Cigarette/Vaping Substances    Nicotine No     THC No     CBD No     Flavoring No     Other No     Unknown No      Social History     Tobacco Use    Smoking status: Never    Smokeless tobacco: Never   Vaping Use    Vaping status: Never Used   Substance Use Topics    Alcohol use: Not Currently    Drug use: Never       Review of Systems   Constitutional:  Negative for chills and fever.   HENT:  Positive for congestion and rhinorrhea. Negative for ear pain and sore throat.    Eyes:  Positive for photophobia. Negative for pain and visual disturbance.   Respiratory:  Negative for cough and shortness of breath.    Cardiovascular:  Negative for chest pain and palpitations.   Gastrointestinal:  Negative for abdominal pain and vomiting.   Genitourinary:  Negative for dysuria and hematuria.   Musculoskeletal:  Negative for arthralgias and back pain.   Skin:  Negative for color change and rash.   Neurological:  Positive for headaches. Negative for seizures and syncope.   All other systems reviewed and are negative.      Physical Exam  Physical Exam  Vitals and nursing note reviewed.   Constitutional:       General: She is not in acute distress.     Comments: Uncomfortable appearing   HENT:      Head: Normocephalic and atraumatic.      Right Ear: External ear normal.      Left Ear: External ear normal.      Nose: Nose normal.      Mouth/Throat:      Mouth: Mucous membranes are moist.   Eyes:      Extraocular Movements: Extraocular movements intact.      Conjunctiva/sclera: Conjunctivae normal.      Pupils: Pupils are equal, round, and reactive to light.   Cardiovascular:      Rate and Rhythm: Normal rate and regular rhythm.      Pulses: Normal pulses.   Pulmonary:      Effort: Pulmonary effort is normal. No respiratory  distress.      Breath sounds: No stridor.   Musculoskeletal:         General: No deformity. Normal range of motion.      Cervical back: Normal range of motion and neck supple. No rigidity.   Skin:     General: Skin is warm and dry.      Capillary Refill: Capillary refill takes less than 2 seconds.   Neurological:      General: No focal deficit present.      Mental Status: She is alert and oriented to person, place, and time.      Cranial Nerves: No cranial nerve deficit.   Psychiatric:         Mood and Affect: Mood normal.         Behavior: Behavior normal.         Vital Signs  ED Triage Vitals [08/24/24 2351]   Temperature Pulse Respirations Blood Pressure SpO2   (!) 97.2 °F (36.2 °C) 92 16 119/56 98 %      Temp Source Heart Rate Source Patient Position - Orthostatic VS BP Location FiO2 (%)   Tympanic Monitor Lying Right arm --      Pain Score       9           Vitals:    08/24/24 2351   BP: 119/56   Pulse: 92   Patient Position - Orthostatic VS: Lying         Visual Acuity      ED Medications  Medications   ketorolac (TORADOL) injection 30 mg (30 mg Intravenous Given 8/25/24 0040)   metoclopramide (REGLAN) injection 10 mg (10 mg Intravenous Given 8/25/24 0037)   SUMAtriptan (IMITREX) subcutaneous injection 6 mg (6 mg Subcutaneous Given 8/25/24 0043)   magnesium sulfate 2 g/50 mL IVPB (premix) 2 g (0 g Intravenous Stopped 8/25/24 0220)   sodium chloride 0.9 % bolus 1,000 mL (0 mL Intravenous Stopped 8/25/24 0220)   ondansetron (ZOFRAN) injection 4 mg (4 mg Intravenous Given 8/25/24 0045)       Diagnostic Studies  Results Reviewed       Procedure Component Value Units Date/Time    COVID19, Influenza A/B, RSV PCR, [facility] [[id number]]  (Normal) Collected: 08/25/24 0024    Lab Status: Final result Specimen: Nares from Nasopharyngeal Swab Updated: 08/25/24 0107     SARS-CoV-2 Negative     INFLUENZA A PCR Negative     INFLUENZA B PCR Negative     RSV PCR Negative    Narrative:      This test has been performed using the  CoV-2/Flu/RSV plus assay on the [employer] GeneXpert platform. This test has been validated by the  and verified by the performing laboratory.     This test is designed to amplify and detect the following: nucleocapsid (N), envelope (E), and RNA-dependent RNA polymerase (RdRP) genes of the SARS-CoV-2 genome; matrix (M), basic polymerase (PB2), and acidic protein (PA) segments of the influenza A genome; matrix (M) and non-structural protein (NS) segments of the influenza B genome, and the nucleocapsid genes of RSV A and RSV B.     Positive results are indicative of the presence of Flu A, Flu B, RSV, and/or SARS-CoV-2 RNA. Positive results for SARS-CoV-2 or suspected novel influenza should be reported to state, local, or federal health departments according to local reporting requirements.      All results should be assessed in conjunction with clinical presentation and other laboratory markers for clinical management.     FOR PEDIATRIC PATIENTS - copy/paste COVID Guidelines URL to browser: [email or web address]       Comprehensive metabolic panel [[id number]] Collected: 08/25/24 0024    Lab Status: Final result Specimen: Blood from Arm, Right Updated: 08/25/24 0047     Sodium 136 mmol/L      Potassium 3.8 mmol/L      Chloride 104 mmol/L      CO2 26 mmol/L      ANION GAP 6 mmol/L      BUN 17 mg/dL      Creatinine 0.75 mg/dL      Glucose 127 mg/dL      Calcium 9.2 mg/dL      AST 16 U/L      ALT 21 U/L      Alkaline Phosphatase 50 U/L      Total Protein 7.0 g/dL      Albumin 4.2 g/dL      Total Bilirubin 0.43 mg/dL      eGFR 94 ml/min/1.73sq m     Narrative:      National Kidney Disease Foundation guidelines for Chronic Kidney Disease (CKD):     Stage 1 with normal or high GFR (GFR > 90 mL/min/1.73 square meters)    Stage 2 Mild CKD (GFR = 60-89 mL/min/1.73 square meters)    Stage 3A Moderate CKD (GFR = 45-59 mL/min/1.73 square meters)    Stage 3B Moderate CKD  (GFR = 30-44 mL/min/1.73 square meters)    Stage 4 Severe CKD (GFR = 15-29 mL/min/1.73 square meters)    Stage 5 End Stage CKD (GFR <15 mL/min/1.73 square meters)  Note: GFR calculation is accurate only with a steady state creatinine    CBC and differential [[id number]]  (Abnormal) Collected: 08/25/24 0024    Lab Status: Final result Specimen: Blood from Arm, Right Updated: 08/25/24 0031     WBC 8.08 Thousand/uL      RBC 4.45 Million/uL      Hemoglobin 13.6 g/dL      Hematocrit 40.5 %      MCV 91 fL      MCH 30.6 pg      MCHC 33.6 g/dL      RDW 11.4 %      MPV 10.3 fL      Platelets 268 Thousands/uL      nRBC 0 /100 WBCs      Segmented % 61 %      Immature Grans % 0 %      Lymphocytes % 26 %      Monocytes % 10 %      Eosinophils Relative 2 %      Basophils Relative 1 %      Absolute Neutrophils 5.02 Thousands/µL      Absolute Immature Grans 0.01 Thousand/uL      Absolute Lymphocytes 2.10 Thousands/µL      Absolute Monocytes 0.78 Thousand/µL      Eosinophils Absolute 0.12 Thousand/µL      Basophils Absolute 0.05 Thousands/µL                    CT head wo contrast   Final Result by Bennett Gastelum MD (08/25 0205)      No acute intracranial abnormality.                  Workstation performed: [id number]                    Procedures  Procedures         ED Course  ED Course as of 08/25/24 0334   Sun Aug 25, 2024   0050 No acute intracranial hemorrhage per my interpretation of ct head   0115 Patient's headache is improving, just complains of feeling tired.  She was updated on lab results and my interpretation of the CT head.  She understands that we will await the formal read                                               Medical Decision Making  49-year-old female presents to the emergency department for evaluation of headache.  On exam, she is uncomfortable appearing, but in no acute distress.  No focal neurologic deficits present.  No meningismus signs.  Differential diagnosis includes but is not limited to benign  headache, viral illness, less likely to represent subarachnoid hemorrhage.    The patient is low risk of SAH. Doing a CT is adequate as the time of onset of headache to time of CT is within 6 hours and doing an LP is unlikely to add any more information (BMJ 2011;343:d4277).      Will treat with migraine cocktail and reassess.  Will swab for COVID, RSV, and influenza.    Viral swab was negative, CT scan of the head was negative for any acute findings.  Patient's symptoms improved following medications.  She is stable for discharge with outpatient follow-up.  She was given prescription for Reglan and strict return precautions.    Problems Addressed:  Headache: acute illness or injury    Amount and/or Complexity of Data Reviewed  External Data Reviewed: notes.  Labs: ordered.  Radiology: ordered and independent interpretation performed.    Risk  OTC drugs.  Prescription drug management.                 Disposition  Final diagnoses:   Headache     Time reflects when diagnosis was documented in both MDM as applicable and the Disposition within this note       Time User Action Codes Description Comment    8/25/2024  1:16 AM Miguel Damon [R51.9] Headache           ED Disposition       ED Disposition   Discharge    Condition   Stable    Date/Time   Sun Aug 25, 2024  1:16 AM    Comment   Isabel Calhoun discharge to home/self care.                   Follow-up Information       Follow up With Specialties Details Why Contact Info Additional Information    [facility] Emergency Medicine  If symptoms worsen [address]   [facility] [address] [phone] [facility], [address], [place], [place] [address]    Jim Roblero DO Family Medicine Schedule an appointment as soon as possible for a visit   [address] [place] [address]  [phone]               Discharge Medication List as of 8/25/2024  2:15 AM         START taking these medications    Details   metoclopramide (Reglan) 10 mg tablet Take 1 tablet (10 mg total) by mouth every 6 (six) hours as needed (nausea, vomiting, headache), Starting Sun 8/25/2024, Normal           CONTINUE these medications which have NOT CHANGED    Details   Ascorbic Acid (vitamin C) 1000 MG tablet Take 1,000 mg by mouth daily, Starting Sun 12/31/2023, Historical Med      L-Lysine 1000 MG TABS Take 1,000 mg by mouth Daily at 2am, Starting Sun 12/31/2023, Historical Med      lidocaine (Lidoderm) 5 % Apply 1 patch topically over 12 hours daily for 14 days Remove & Discard patch within 12 hours or as directed by MD, Starting Tue 5/21/2024, Until Mon 6/17/2024, Normal      Magnesium 300 MG CAPS Take 1 capsule by mouth Daily at 2am, Historical Med      Multiple Vitamins-Minerals (MULTIVITAMIN ADULT EXTRA C PO) Take 1 tablet by mouth Daily at 2am, Starting Sun 12/31/2023, Historical Med      Probiotic Product (PROBIOTIC ADVANCED PO) Take 1 capsule by mouth Daily at 2am, Historical Med      valACYclovir (VALTREX) 500 mg tablet Take 500 mg by mouth daily, Historical Med             No discharge procedures on file.    PDMP Review         Value Time User    PDMP Reviewed  Yes 3/1/2022  9:12 AM Neena Bahena MD            ED Provider  Electronically Signed by             Miguel Damon MD  08/25/24 6452

## 2024-08-25 NOTE — DISCHARGE INSTRUCTIONS
Recommend Tylenol and ibuprofen every 6 hours as needed for headache  You may take Reglan in addition to these as needed for additional headache relief as well as for nausea and vomiting    Follow-up with primary care physician  Return for any worsening symptoms

## 2025-01-29 ENCOUNTER — OFFICE VISIT (OUTPATIENT)
Dept: FAMILY MEDICINE CLINIC | Facility: CLINIC | Age: 50
End: 2025-01-29
Payer: COMMERCIAL

## 2025-01-29 VITALS
SYSTOLIC BLOOD PRESSURE: 130 MMHG | HEART RATE: 89 BPM | DIASTOLIC BLOOD PRESSURE: 72 MMHG | WEIGHT: 152.8 LBS | TEMPERATURE: 98.4 F | BODY MASS INDEX: 30 KG/M2 | RESPIRATION RATE: 18 BRPM | OXYGEN SATURATION: 96 % | HEIGHT: 60 IN

## 2025-01-29 DIAGNOSIS — Z12.4 PAP SMEAR FOR CERVICAL CANCER SCREENING: Primary | ICD-10-CM

## 2025-01-29 DIAGNOSIS — R05.1 ACUTE COUGH: ICD-10-CM

## 2025-01-29 DIAGNOSIS — B34.9 VIRAL INFECTION: ICD-10-CM

## 2025-01-29 LAB
SARS-COV-2 AG UPPER RESP QL IA: NEGATIVE
VALID CONTROL: NORMAL

## 2025-01-29 PROCEDURE — 87811 SARS-COV-2 COVID19 W/OPTIC: CPT | Performed by: FAMILY MEDICINE

## 2025-01-29 PROCEDURE — 99213 OFFICE O/P EST LOW 20 MIN: CPT | Performed by: FAMILY MEDICINE

## 2025-01-29 PROCEDURE — 87804 INFLUENZA ASSAY W/OPTIC: CPT | Performed by: FAMILY MEDICINE

## 2025-01-29 RX ORDER — AZITHROMYCIN 250 MG/1
TABLET, FILM COATED ORAL
Qty: 6 TABLET | Refills: 0 | Status: SHIPPED | OUTPATIENT
Start: 2025-01-29 | End: 2025-02-03

## 2025-01-29 RX ORDER — METHYLPREDNISOLONE 4 MG/1
TABLET ORAL
Qty: 21 EACH | Refills: 0 | Status: SHIPPED | OUTPATIENT
Start: 2025-01-29

## 2025-01-29 NOTE — PROGRESS NOTES
Name: Isabel Calhoun      : 1975      MRN: [id number]  Encounter Provider: Jim Roblero DO  Encounter Date: 2025   Encounter department: [facility]  :  Assessment & Plan  Pap smear for cervical cancer screening    Orders:    Ambulatory Referral to Obstetrics / Gynecology; Future    Viral infection    Orders:    POCT Rapid Covid Ag    POCT rapid flu A and B    methylPREDNISolone 4 MG tablet therapy pack; Use as directed on package    azithromycin (Zithromax) 250 mg tablet; Take 2 tablets (500 mg total) by mouth daily for 1 day, THEN 1 tablet (250 mg total) daily for 4 days.    Acute cough    Orders:    methylPREDNISolone 4 MG tablet therapy pack; Use as directed on package    azithromycin (Zithromax) 250 mg tablet; Take 2 tablets (500 mg total) by mouth daily for 1 day, THEN 1 tablet (250 mg total) daily for 4 days.      Chief Complaint   Patient presents with    flu like symptoms     Started Saturday headache, abdominal pain, sore throat, sinus pressure and congestion, post nasal drip, fever, chills. Took Dayquill. Patient daughter was diagnosed with bronchitis         History of Present Illness   Started getting sick past Saturday.  Viral symptoms.  Flu A, B and Covid are negative.    Fever - 9 weeks to 74 years   The current episode started in the past 7 days. The problem has been gradually worsening. The maximum temperature noted was 101 to 101.9 F. The temperature was taken using a tympanic thermometer. Associated symptoms include abdominal pain, congestion, coughing, diarrhea, ear pain, headaches, muscle aches, nausea, sleepiness and a sore throat. Pertinent negatives include no chest pain, rash, urinary pain, vomiting or wheezing.   Risk factors: immunosuppression and sick contacts    Risk factors: no contaminated food, no contaminated water, no hx of cancer, no occupational exposure, no recent sickness and no recent travel      Review of Systems   Constitutional:  Positive for  fever.   HENT:  Positive for congestion, ear pain and sore throat.    Respiratory:  Positive for cough. Negative for wheezing.    Cardiovascular:  Negative for chest pain.   Gastrointestinal:  Positive for abdominal pain, diarrhea and nausea. Negative for vomiting.   Genitourinary:  Negative for dysuria.   Musculoskeletal:  Positive for myalgias.   Skin: Negative.  Negative for rash.   Neurological:  Positive for headaches.   Hematological: Negative.    Psychiatric/Behavioral: Negative.         Objective   /72 (BP Location: Left arm, Patient Position: Sitting, Cuff Size: Large)   Pulse 89   Temp 98.4 °F (36.9 °C) (Oral)   Resp 18   Ht 5' (1.524 m)   Wt 69.3 kg (152 lb 12.8 oz)   LMP 01/07/2025 (Exact Date)   SpO2 96%   BMI 29.84 kg/m²    BMI Counseling: Body mass index is 29.84 kg/m². The BMI is above normal. Nutrition recommendations include reducing portion sizes, consuming healthier snacks, and moderation in carbohydrate intake.  Physical Exam  Constitutional:       Appearance: She is well-developed.   HENT:      Head: Normocephalic and atraumatic.      Right Ear: Tympanic membrane, ear canal and external ear normal.      Left Ear: Tympanic membrane, ear canal and external ear normal.      Nose: Nose normal.      Mouth/Throat:      Mouth: Mucous membranes are moist.      Pharynx: Oropharynx is clear.   Eyes:      Conjunctiva/sclera: Conjunctivae normal.      Pupils: Pupils are equal, round, and reactive to light.   Cardiovascular:      Rate and Rhythm: Normal rate and regular rhythm.      Heart sounds: Normal heart sounds.   Pulmonary:      Effort: Pulmonary effort is normal.      Breath sounds: Normal breath sounds.   Abdominal:      General: Abdomen is flat. Bowel sounds are normal.      Palpations: Abdomen is soft.   Musculoskeletal:      Cervical back: Normal range of motion and neck supple.   Skin:     General: Skin is warm and dry.   Neurological:      Mental Status: She is alert and oriented  to person, place, and time.      Deep Tendon Reflexes: Reflexes are normal and symmetric.   Psychiatric:         Mood and Affect: Mood normal.         Behavior: Behavior normal.         Thought Content: Thought content normal.         Judgment: Judgment normal.

## 2025-02-05 ENCOUNTER — OFFICE VISIT (OUTPATIENT)
Dept: FAMILY MEDICINE CLINIC | Facility: CLINIC | Age: 50
End: 2025-02-05
Payer: COMMERCIAL

## 2025-02-05 VITALS
HEIGHT: 60 IN | OXYGEN SATURATION: 100 % | HEART RATE: 82 BPM | DIASTOLIC BLOOD PRESSURE: 62 MMHG | RESPIRATION RATE: 16 BRPM | WEIGHT: 148.4 LBS | TEMPERATURE: 98.1 F | SYSTOLIC BLOOD PRESSURE: 140 MMHG | BODY MASS INDEX: 29.13 KG/M2

## 2025-02-05 DIAGNOSIS — Z00.00 ANNUAL PHYSICAL EXAM: Primary | ICD-10-CM

## 2025-02-05 DIAGNOSIS — R05.8 POST-VIRAL COUGH SYNDROME: ICD-10-CM

## 2025-02-05 PROCEDURE — 99213 OFFICE O/P EST LOW 20 MIN: CPT | Performed by: FAMILY MEDICINE

## 2025-02-05 PROCEDURE — 99396 PREV VISIT EST AGE 40-64: CPT | Performed by: FAMILY MEDICINE

## 2025-02-05 RX ORDER — FLUTICASONE PROPIONATE AND SALMETEROL 250; 50 UG/1; UG/1
1 POWDER RESPIRATORY (INHALATION) 2 TIMES DAILY
Qty: 60 BLISTER | Refills: 0 | Status: SHIPPED | OUTPATIENT
Start: 2025-02-05 | End: 2025-08-04

## 2025-02-05 RX ORDER — HYDROCODONE POLISTIREX AND CHLORPHENIRAMINE POLISTIREX 10; 8 MG/5ML; MG/5ML
5 SUSPENSION, EXTENDED RELEASE ORAL EVERY 12 HOURS PRN
Qty: 115 ML | Refills: 0 | Status: SHIPPED | OUTPATIENT
Start: 2025-02-05

## 2025-02-05 NOTE — PROGRESS NOTES
Adult Annual Physical  Name: Isabel Calhoun      : 1975      MRN: [id number]  Encounter Provider: Jim Roblero DO  Encounter Date: 2025   Encounter department: [facility]    Assessment & Plan  Annual physical exam         Post-viral cough syndrome    Orders:    Fluticasone-Salmeterol (Advair) 250-50 mcg/dose inhaler; Inhale 1 puff 2 (two) times a day Rinse mouth after use.    Hydrocod Harjeet-Chlorphe Harjeet ER (TUSSIONEX) 10-8 mg/5 mL ER suspension; Take 5 mL by mouth every 12 (twelve) hours as needed for cough Max Daily Amount: 10 mL    Immunizations and preventive care screenings were discussed with patient today. Appropriate education was printed on patient's after visit summary.    Counseling:  Alcohol/drug use: discussed moderation in alcohol intake, the recommendations for healthy alcohol use, and avoidance of illicit drug use.  Dental Health: discussed importance of regular tooth brushing, flossing, and dental visits.  Injury prevention: discussed safety/seat belts, safety helmets, smoke detectors, carbon monoxide detectors, and smoking near bedding or upholstery.  Sexual health: discussed sexually transmitted diseases, partner selection, use of condoms, avoidance of unintended pregnancy, and contraceptive alternatives.  Exercise: the importance of regular exercise/physical activity was discussed. Recommend exercise 3-5 times per week for at least 30 minutes.        Chief Complaint   Patient presents with    Annual Exam    Cough     Was seen 1/29/15 and was prescribed methylPREDNISolone 4 MG tablet therapy pack; Use as directed on package  ·  azithromycin (Zithromax) 250 mg tablet; Take 2 tablets (500 mg total) by mouth daily for 1 day, THEN 1 tablet (250 mg total) daily for 4 days.  Still has the cough,fatigue, dizziness, nausea, sinus pressure and congestion. Keeping herself well hydrated.         History of Present Illness     Adult Annual Physical:  Patient presents for annual  physical. Annual.  Cough past week..     Diet and Physical Activity:  - Diet/Nutrition: well balanced diet.  - Exercise: no formal exercise.    General Health:  - Sleep: sleeps well, 7-8 hours of sleep on average and > 8 hours of sleep on average.  - Hearing: normal hearing right ear.  - Vision: wears contacts.  - Dental: regular dental visits.    /GYN Health:  - Follows with GYN: yes.   - Menopause: premenopausal.   - History of STDs: no    Advanced Care Planning:  - Has an advanced directive?: no    - Has a durable medical POA?: yes    - ACP document given to patient?: no      Review of Systems   Constitutional: Negative.    HENT: Negative.     Eyes: Negative.    Respiratory:  Positive for cough.    Cardiovascular: Negative.    Gastrointestinal: Negative.    Genitourinary: Negative.    Musculoskeletal: Negative.    Skin: Negative.    Neurological: Negative.    Psychiatric/Behavioral: Negative.           Objective   /62 (BP Location: Left arm, Patient Position: Sitting, Cuff Size: Standard)   Pulse 82   Temp 98.1 °F (36.7 °C) (Temporal)   Resp 16   Ht 5' (1.524 m)   Wt 67.3 kg (148 lb 6.4 oz)   LMP 01/07/2025 (Exact Date)   SpO2 100%   BMI 28.98 kg/m²   BMI Counseling: Body mass index is 28.98 kg/m². The BMI is above normal. Nutrition recommendations include reducing portion sizes, consuming healthier snacks, and moderation in carbohydrate intake.  Physical Exam  Constitutional:       Appearance: She is well-developed.   HENT:      Head: Normocephalic and atraumatic.      Right Ear: Tympanic membrane, ear canal and external ear normal.      Left Ear: Tympanic membrane, ear canal and external ear normal.      Nose: Nose normal.      Mouth/Throat:      Mouth: Mucous membranes are moist.      Pharynx: Oropharynx is clear.   Eyes:      Conjunctiva/sclera: Conjunctivae normal.      Pupils: Pupils are equal, round, and reactive to light.   Cardiovascular:      Rate and Rhythm: Normal rate and regular  rhythm.      Heart sounds: Normal heart sounds.   Pulmonary:      Effort: Pulmonary effort is normal.      Breath sounds: Normal breath sounds.   Musculoskeletal:      Cervical back: Normal range of motion and neck supple.   Skin:     General: Skin is warm and dry.   Neurological:      Mental Status: She is alert and oriented to person, place, and time.      Deep Tendon Reflexes: Reflexes are normal and symmetric.   Psychiatric:         Behavior: Behavior normal.         Thought Content: Thought content normal.         Judgment: Judgment normal.

## 2025-02-22 ENCOUNTER — OFFICE VISIT (OUTPATIENT)
Dept: URGENT CARE | Facility: CLINIC | Age: 50
End: 2025-02-22
Payer: COMMERCIAL

## 2025-02-22 ENCOUNTER — APPOINTMENT (OUTPATIENT)
Dept: URGENT CARE | Facility: CLINIC | Age: 50
End: 2025-02-22
Payer: COMMERCIAL

## 2025-02-22 VITALS
HEART RATE: 110 BPM | OXYGEN SATURATION: 99 % | RESPIRATION RATE: 18 BRPM | WEIGHT: 150 LBS | DIASTOLIC BLOOD PRESSURE: 68 MMHG | TEMPERATURE: 99.4 F | SYSTOLIC BLOOD PRESSURE: 138 MMHG | BODY MASS INDEX: 29.45 KG/M2 | HEIGHT: 60 IN

## 2025-02-22 DIAGNOSIS — Z12.4 PAP SMEAR FOR CERVICAL CANCER SCREENING: ICD-10-CM

## 2025-02-22 DIAGNOSIS — R68.89 FLU-LIKE SYMPTOMS: Primary | ICD-10-CM

## 2025-02-22 PROCEDURE — G0382 LEV 3 HOSP TYPE B ED VISIT: HCPCS | Performed by: PHYSICIAN ASSISTANT

## 2025-02-22 PROCEDURE — 87636 SARSCOV2 & INF A&B AMP PRB: CPT | Performed by: PHYSICIAN ASSISTANT

## 2025-02-22 PROCEDURE — S9083 URGENT CARE CENTER GLOBAL: HCPCS | Performed by: PHYSICIAN ASSISTANT

## 2025-02-22 RX ORDER — OSELTAMIVIR PHOSPHATE 75 MG/1
75 CAPSULE ORAL 2 TIMES DAILY
Qty: 10 CAPSULE | Refills: 0 | Status: SHIPPED | OUTPATIENT
Start: 2025-02-22 | End: 2025-02-27

## 2025-02-22 NOTE — PROGRESS NOTES
[facility]        NAME: Isabel Calhoun is a 49 y.o. female  : 1975    MRN: [id number]  DATE: 2025  TIME: 11:19 AM    Assessment and Plan   Flu-like symptoms [R68.89]  1. Flu-like symptoms  oseltamivir (TAMIFLU) 75 mg capsule    Covid/Flu- Office Collect Normal            Patient Instructions     Take medicine as prescribed  May c/w otc cough/antihistamine med prn  Follow up with PCP in 3-5 days.  Proceed to  ER if symptoms worsen.    If tests have been performed at [facility], our office will contact you with results if changes need to be made to the care plan discussed with you at the visit.  [facility]    Chief Complaint     Chief Complaint   Patient presents with    Cold Like Symptoms     Started 2 days ago with headache, congestion,stuffy nose and cough.          History of Present Illness       URI   This is a new problem. Episode onset: 2 days ago. Progression since onset: acute onset. Maximum temperature: tactile. The fever has been present for Less than 1 day. Associated symptoms include congestion, coughing, diarrhea, rhinorrhea and a sore throat. Pertinent negatives include no ear pain, nausea, vomiting or wheezing. Treatments tried: coricidin hbp for cough.       Review of Systems   Review of Systems   Constitutional:  Positive for fever. Negative for fatigue.   HENT:  Positive for congestion, rhinorrhea and sore throat. Negative for ear pain.    Respiratory:  Positive for cough. Negative for chest tightness, shortness of breath and wheezing.    Gastrointestinal:  Positive for diarrhea. Negative for nausea and vomiting.         Current Medications       Current Outpatient Medications:     Ascorbic Acid (vitamin C) 1000 MG tablet, Take 1,000 mg by mouth daily, Disp: , Rfl:     L-Lysine 1000 MG TABS, Take 1,000 mg by mouth Daily at 2am, Disp: , Rfl:     Magnesium 300 MG CAPS, Take 1 capsule by mouth Daily at 2am, Disp: , Rfl:      Multiple Vitamins-Minerals (MULTIVITAMIN ADULT EXTRA C PO), Take 1 tablet by mouth Daily at 2am, Disp: , Rfl:     oseltamivir (TAMIFLU) 75 mg capsule, Take 1 capsule (75 mg total) by mouth 2 (two) times a day for 5 days, Disp: 10 capsule, Rfl: 0    Probiotic Product (PROBIOTIC ADVANCED PO), Take 1 capsule by mouth Daily at 2am, Disp: , Rfl:     valACYclovir (VALTREX) 500 mg tablet, Take 500 mg by mouth daily, Disp: , Rfl:     Hydrocod Harjeet-Chlorphe Harjeet ER (TUSSIONEX) 10-8 mg/5 mL ER suspension, Take 5 mL by mouth every 12 (twelve) hours as needed for cough Max Daily Amount: 10 mL (Patient not taking: Reported on 2/22/2025), Disp: 115 mL, Rfl: 0    methylPREDNISolone 4 MG tablet therapy pack, Use as directed on package (Patient not taking: Reported on 2/22/2025), Disp: 21 each, Rfl: 0    Current Allergies     Allergies as of 02/22/2025 - Reviewed 02/22/2025   Allergen Reaction Noted    Cymbalta [duloxetine hcl] Other (See Comments) 02/26/2021    Amoxicillin Hives 10/24/2012    Penicillins Hives 10/24/2012    Shellfish allergy - food allergy Hives 05/07/2013    Sulfa antibiotics Rash 02/25/2013            The following portions of the patient's history were reviewed and updated as appropriate: allergies, current medications, past family history, past medical history, past social history, past surgical history and problem list.     Past Medical History:   Diagnosis Date    Abnormal Pap smear of cervix     Anemia     Disease of thyroid gland     thyroid storm with previous pregnancy    Dysmenorrhea     Esophageal reflux     last assessed - 22Dec2014    Female infertility     Herpes zoster     LUQ level T-8 resolved; last assessed - 12Jan2015    History of infection due to human papilloma virus (HPV)     HPV (human papilloma virus) infection     Inguinal hernia     last assessed - 22May2013    Insomnia     Liver disease 7/1/2024    Mastodynia     Resolved - 2012    Migraine     Miscarriage     Ovarian cyst     last  assessed - [id number]    Ovarian cyst 2013    Palpitations     last assessed - [id number]    Primary narcolepsy without cataplexy 10/11/2023    Shingles 2023    Tachycardia     Resolved - [id number]    Takes dietary supplements     Temporomandibular joint disorder     last assessed - [id number]    Urinary tract infection     Varicella        Past Surgical History:   Procedure Laterality Date     SECTION      COLONOSCOPY  2012    Fiberoptic    CRYOTHERAPY      cervix, age 20    DIAGNOSTIC LAPAROSCOPY      DILATION AND CURETTAGE OF UTERUS      Resolved     GYNECOLOGIC CRYOSURGERY      age 20    TOOTH EXTRACTION         Family History   Problem Relation Age of Onset    Diabetes Mother     Heart disease Mother         cardiac disorder    Gallbladder disease Mother     Hypertension Mother     Glaucoma Mother     Obesity Mother         hx gastric bypass    Stroke Mother     Thyroid disease Mother     Diabetes Father     Heart disease Father         cardiac disorder    Hypertension Father     Hyperlipidemia Father         Pure Hypercholesterolemia    Obesity Father     Other Family         headache syndromes    Breast cancer Cousin     Post-traumatic stress disorder Brother     Hyperlipidemia Brother     Hypertension Brother     Diabetes Maternal Grandmother     Lung cancer Maternal Grandfather     Stroke Paternal Grandmother     Hypertension Paternal Grandmother     Cirrhosis Paternal Grandfather     Ovarian cancer Maternal Aunt     No Known Problems Daughter     No Known Problems Paternal Aunt     Colon cancer Neg Hx          Medications have been verified.        Objective   /68   Pulse (!) 110   Temp 99.4 °F (37.4 °C)   Resp 18   Ht 5' (1.524 m)   Wt 68 kg (150 lb)   LMP 2025 (Exact Date)   SpO2 99%   BMI 29.29 kg/m²   Patient's last menstrual period was 2025 (exact date).       Physical Exam     Physical Exam  Constitutional:       General: She is not in acute distress.      Appearance: She is well-developed.   HENT:      Head: Normocephalic.      Right Ear: Hearing, tympanic membrane, ear canal and external ear normal.      Left Ear: Hearing, tympanic membrane, ear canal and external ear normal.      Nose: Mucosal edema, congestion and rhinorrhea present. Rhinorrhea is clear.      Mouth/Throat:      Mouth: Mucous membranes are moist.      Pharynx: No oropharyngeal exudate or posterior oropharyngeal erythema.      Tonsils: No tonsillar exudate.   Cardiovascular:      Rate and Rhythm: Normal rate and regular rhythm.      Heart sounds: Normal heart sounds. No murmur heard.     No friction rub. No gallop.   Pulmonary:      Effort: Pulmonary effort is normal. No respiratory distress.      Breath sounds: Normal breath sounds. No stridor. No decreased breath sounds, wheezing, rhonchi or rales.      Comments: Frequent dry cough  Abdominal:      General: Bowel sounds are normal. There is no distension.      Palpations: Abdomen is soft. There is no mass.      Tenderness: There is no abdominal tenderness. There is no guarding or rebound.   Lymphadenopathy:      Cervical: No cervical adenopathy.      Right cervical: No superficial cervical adenopathy.     Left cervical: No superficial cervical adenopathy.   Neurological:      Mental Status: She is alert.

## 2025-02-23 LAB
FLUAV RNA RESP QL NAA+PROBE: POSITIVE
FLUBV RNA RESP QL NAA+PROBE: NEGATIVE
SARS-COV-2 RNA RESP QL NAA+PROBE: NEGATIVE

## 2025-05-16 ENCOUNTER — OFFICE VISIT (OUTPATIENT)
Dept: FAMILY MEDICINE CLINIC | Facility: CLINIC | Age: 50
End: 2025-05-16
Payer: COMMERCIAL

## 2025-05-16 ENCOUNTER — APPOINTMENT (OUTPATIENT)
Dept: LAB | Facility: CLINIC | Age: 50
End: 2025-05-16
Payer: COMMERCIAL

## 2025-05-16 VITALS
SYSTOLIC BLOOD PRESSURE: 126 MMHG | WEIGHT: 156.8 LBS | HEIGHT: 60 IN | OXYGEN SATURATION: 99 % | BODY MASS INDEX: 30.78 KG/M2 | DIASTOLIC BLOOD PRESSURE: 78 MMHG | HEART RATE: 88 BPM | RESPIRATION RATE: 18 BRPM | TEMPERATURE: 98 F

## 2025-05-16 DIAGNOSIS — R10.11 RIGHT UPPER QUADRANT ABDOMINAL PAIN: ICD-10-CM

## 2025-05-16 DIAGNOSIS — R10.11 RIGHT UPPER QUADRANT ABDOMINAL PAIN: Primary | ICD-10-CM

## 2025-05-16 LAB
ALBUMIN SERPL BCG-MCNC: 3.8 G/DL (ref 3.5–5)
ALP SERPL-CCNC: 65 U/L (ref 34–104)
ALT SERPL W P-5'-P-CCNC: 16 U/L (ref 7–52)
ANION GAP SERPL CALCULATED.3IONS-SCNC: 8 MMOL/L (ref 4–13)
AST SERPL W P-5'-P-CCNC: 16 U/L (ref 13–39)
BILIRUB DIRECT SERPL-MCNC: 0.02 MG/DL (ref 0–0.2)
BILIRUB SERPL-MCNC: 0.29 MG/DL (ref 0.2–1)
BUN SERPL-MCNC: 20 MG/DL (ref 5–25)
CALCIUM SERPL-MCNC: 8.9 MG/DL (ref 8.4–10.2)
CHLORIDE SERPL-SCNC: 104 MMOL/L (ref 96–108)
CO2 SERPL-SCNC: 27 MMOL/L (ref 21–32)
CREAT SERPL-MCNC: 0.75 MG/DL (ref 0.6–1.3)
ERYTHROCYTE [DISTWIDTH] IN BLOOD BY AUTOMATED COUNT: 12 % (ref 11.6–15.1)
GFR SERPL CREATININE-BSD FRML MDRD: 93 ML/MIN/1.73SQ M
GLUCOSE P FAST SERPL-MCNC: 85 MG/DL (ref 65–99)
HCT VFR BLD AUTO: 39.3 % (ref 34.8–46.1)
HGB BLD-MCNC: 12.7 G/DL (ref 11.5–15.4)
MCH RBC QN AUTO: 29.3 PG (ref 26.8–34.3)
MCHC RBC AUTO-ENTMCNC: 32.3 G/DL (ref 31.4–37.4)
MCV RBC AUTO: 91 FL (ref 82–98)
PLATELET # BLD AUTO: 300 THOUSANDS/UL (ref 149–390)
PMV BLD AUTO: 11.7 FL (ref 8.9–12.7)
POTASSIUM SERPL-SCNC: 4 MMOL/L (ref 3.5–5.3)
PROT SERPL-MCNC: 6.5 G/DL (ref 6.4–8.4)
RBC # BLD AUTO: 4.33 MILLION/UL (ref 3.81–5.12)
SODIUM SERPL-SCNC: 139 MMOL/L (ref 135–147)
WBC # BLD AUTO: 8.59 THOUSAND/UL (ref 4.31–10.16)

## 2025-05-16 PROCEDURE — 85027 COMPLETE CBC AUTOMATED: CPT

## 2025-05-16 PROCEDURE — 99213 OFFICE O/P EST LOW 20 MIN: CPT | Performed by: FAMILY MEDICINE

## 2025-05-16 PROCEDURE — 80076 HEPATIC FUNCTION PANEL: CPT

## 2025-05-16 PROCEDURE — 80048 BASIC METABOLIC PNL TOTAL CA: CPT

## 2025-05-16 PROCEDURE — 36415 COLL VENOUS BLD VENIPUNCTURE: CPT

## 2025-05-16 NOTE — PROGRESS NOTES
Name: Isabel Calhoun      : 1975      MRN: [id number]  Encounter Provider: Jim Roblero DO  Encounter Date: 2025   Encounter department: [facility]  :  Assessment & Plan  Right upper quadrant abdominal pain    Orders:    US abdomen complete; Future    Ambulatory Referral to General Surgery; Future    CBC and Platelet; Future    Hepatic function panel; Future    Basic metabolic panel; Future      Chief Complaint   Patient presents with    ride side pain     On and off for a while but it gets worse in the last week, pain is located underneath right breast area , radiating to back area. Stabbing pain. 5/10 pain scale.            History of Present Illness   Sharp pain under right breast that travels around posterior.  Denies etiology.  Nause HS at around 2 am.  Pain comes and goes but last 2 weeks got worse.        Review of Systems   Constitutional: Negative.    HENT: Negative.     Eyes: Negative.    Respiratory: Negative.     Cardiovascular: Negative.    Gastrointestinal:  Positive for abdominal pain and nausea.   Genitourinary: Negative.    Musculoskeletal: Negative.    Skin: Negative.    Neurological: Negative.    Psychiatric/Behavioral: Negative.         Objective   /78 (BP Location: Left arm, Patient Position: Sitting, Cuff Size: Adult)   Pulse 88   Temp 98 °F (36.7 °C) (Temporal)   Resp 18   Ht 5' (1.524 m)   Wt 71.1 kg (156 lb 12.8 oz)   SpO2 99%   BMI 30.62 kg/m²    BMI Counseling: Body mass index is 30.62 kg/m². The BMI is above normal. Nutrition recommendations include reducing portion sizes.  Physical Exam  Constitutional:       Appearance: She is well-developed.   HENT:      Head: Normocephalic and atraumatic.      Right Ear: External ear normal.      Left Ear: External ear normal.      Nose: Nose normal.      Mouth/Throat:      Mouth: Mucous membranes are moist.      Pharynx: Oropharynx is clear.     Eyes:      Conjunctiva/sclera: Conjunctivae normal.       Pupils: Pupils are equal, round, and reactive to light.       Cardiovascular:      Rate and Rhythm: Normal rate and regular rhythm.      Heart sounds: Normal heart sounds.   Pulmonary:      Effort: Pulmonary effort is normal.      Breath sounds: Normal breath sounds.   Abdominal:      General: Bowel sounds are normal.      Palpations: Abdomen is soft.      Tenderness: There is abdominal tenderness.      Comments: RUQ     Musculoskeletal:      Cervical back: Normal range of motion and neck supple.     Skin:     General: Skin is warm and dry.     Neurological:      Mental Status: She is alert and oriented to person, place, and time.      Deep Tendon Reflexes: Reflexes are normal and symmetric.     Psychiatric:         Mood and Affect: Mood normal.         Behavior: Behavior normal.         Thought Content: Thought content normal.         Judgment: Judgment normal.

## 2025-06-20 ENCOUNTER — OFFICE VISIT (OUTPATIENT)
Dept: URGENT CARE | Facility: CLINIC | Age: 50
End: 2025-06-20
Payer: COMMERCIAL

## 2025-06-20 ENCOUNTER — ANNUAL EXAM (OUTPATIENT)
Dept: OBGYN CLINIC | Facility: CLINIC | Age: 50
End: 2025-06-20
Payer: COMMERCIAL

## 2025-06-20 VITALS
DIASTOLIC BLOOD PRESSURE: 66 MMHG | SYSTOLIC BLOOD PRESSURE: 108 MMHG | BODY MASS INDEX: 30.23 KG/M2 | HEIGHT: 60 IN | WEIGHT: 154 LBS

## 2025-06-20 VITALS
HEIGHT: 60 IN | BODY MASS INDEX: 30.23 KG/M2 | RESPIRATION RATE: 16 BRPM | WEIGHT: 154 LBS | SYSTOLIC BLOOD PRESSURE: 124 MMHG | DIASTOLIC BLOOD PRESSURE: 82 MMHG | HEART RATE: 105 BPM | TEMPERATURE: 97.6 F | OXYGEN SATURATION: 96 %

## 2025-06-20 DIAGNOSIS — Z01.419 ENCOUNTER FOR GYNECOLOGICAL EXAMINATION WITHOUT ABNORMAL FINDING: Primary | ICD-10-CM

## 2025-06-20 DIAGNOSIS — J32.9 SINOBRONCHITIS: Primary | ICD-10-CM

## 2025-06-20 DIAGNOSIS — Z12.31 ENCOUNTER FOR SCREENING MAMMOGRAM FOR BREAST CANCER: ICD-10-CM

## 2025-06-20 DIAGNOSIS — J40 SINOBRONCHITIS: Primary | ICD-10-CM

## 2025-06-20 PROCEDURE — G0383 LEV 4 HOSP TYPE B ED VISIT: HCPCS | Performed by: NURSE PRACTITIONER

## 2025-06-20 PROCEDURE — S0612 ANNUAL GYNECOLOGICAL EXAMINA: HCPCS | Performed by: ADVANCED PRACTICE MIDWIFE

## 2025-06-20 PROCEDURE — S9083 URGENT CARE CENTER GLOBAL: HCPCS | Performed by: NURSE PRACTITIONER

## 2025-06-20 RX ORDER — AZITHROMYCIN 250 MG/1
TABLET, FILM COATED ORAL
Qty: 6 TABLET | Refills: 0 | Status: SHIPPED | OUTPATIENT
Start: 2025-06-20 | End: 2025-06-24

## 2025-06-20 RX ORDER — ALBUTEROL SULFATE 90 UG/1
1-2 INHALANT RESPIRATORY (INHALATION) EVERY 4 HOURS PRN
Qty: 6.7 G | Refills: 1 | Status: SHIPPED | OUTPATIENT
Start: 2025-06-20

## 2025-06-20 RX ORDER — PREDNISONE 20 MG/1
20 TABLET ORAL 2 TIMES DAILY WITH MEALS
Qty: 10 TABLET | Refills: 0 | Status: SHIPPED | OUTPATIENT
Start: 2025-06-20 | End: 2025-06-25

## 2025-06-20 NOTE — PROGRESS NOTES
[facility]        NAME: Isabel Calhoun is a 50 y.o. female  : 1975    MRN: [id number]  DATE: 2025  TIME: 11:35 AM      Assessment and Plan     Sinobronchitis [J32.9, J40]  1. Sinobronchitis  azithromycin (ZITHROMAX) 250 mg tablet    predniSONE 20 mg tablet    albuterol (PROVENTIL HFA,VENTOLIN HFA) 90 mcg/act inhaler            Patient Instructions   There are no Patient Instructions on file for this visit.    Follow up with PCP in 3-5 days.  Proceed to  ER if symptoms worsen.    Chief Complaint     Chief Complaint   Patient presents with    Cold Like Symptoms     Patient c/o cough, chest congestion, sore throat and left ear pain that started on Tuesday.         History of Present Illness     Patient presents for upper and lower respiratory symptoms.  She had onset of illness on Tuesday.  Her daughter was ill at the end of last week and was seen here on Friday or Saturday.  She was diagnosed with a virus and ordered a cough medicine.  She is better now.  Patient states when she started with similar symptoms on Tuesday, she figured she likely had the same virus that her daughter had.  She does note that a coworker also is currently ill with mono.  Patient works Monday through Friday with this past Thursday being a holiday off.  She stayed home from work sick Wednesday and today.  She has been taking over-the-counter medications without much if any improvement.  She notes that she has slept most of the last few days.  Her cough is becoming deep and harsh.  Family has encouraged her to be seen due to severity of the cough.  Patient does not remember ever having bronchitis.  No history of asthma.  Non-smoker, never smoker.        Review of Systems     Review of Systems   Constitutional:  Positive for fatigue.   HENT:  Positive for congestion and postnasal drip.    Respiratory:  Positive for cough, chest tightness and shortness of breath.    All other systems reviewed and are  negative.        Current Medications     Current Medications[1]    Current Allergies     Allergies as of 06/20/2025 - Reviewed 06/20/2025   Allergen Reaction Noted    Cymbalta [duloxetine hcl] Other (See Comments) 02/26/2021    Amoxicillin Hives 10/24/2012    Penicillins Hives 10/24/2012    Shellfish allergy - food allergy Hives 05/07/2013    Sulfa antibiotics Rash 02/25/2013              The following portions of the patient's history were reviewed and updated as appropriate: allergies, current medications, past family history, past medical history, past social history, past surgical history and problem list.     Past Medical History[2]    Past Surgical History[3]    Family History[4]      Medications have been verified.        Objective     /82   Pulse 105   Temp 97.6 °F (36.4 °C) (Temporal)   Resp 16   Ht 5' (1.524 m)   Wt 69.9 kg (154 lb)   LMP 05/17/2025 (Exact Date)   SpO2 96%   BMI 30.08 kg/m²   Patient's last menstrual period was 05/17/2025 (exact date).         Physical Exam     Physical Exam  Vitals and nursing note reviewed.   Constitutional:       General: She is not in acute distress.     Appearance: Normal appearance. She is well-developed and well-groomed. She is ill-appearing. She is not toxic-appearing or diaphoretic.   HENT:      Head: Normocephalic and atraumatic.      Right Ear: Ear canal and external ear normal. Tenderness present. A middle ear effusion is present. Tympanic membrane is not erythematous.      Left Ear: Ear canal and external ear normal. Tenderness present. A middle ear effusion is present. Tympanic membrane is not erythematous.      Nose: Mucosal edema and congestion present.      Mouth/Throat:      Mouth: Mucous membranes are moist.      Pharynx: Oropharynx is clear. Uvula midline. Postnasal drip present. No oropharyngeal exudate or posterior oropharyngeal erythema.      Tonsils: No tonsillar exudate or tonsillar abscesses. 1+ on the right. 1+ on the left.      Eyes:      Pupils: Pupils are equal, round, and reactive to light.       Cardiovascular:      Rate and Rhythm: Normal rate and regular rhythm. No extrasystoles are present.     Heart sounds: Normal heart sounds, S1 normal and S2 normal. No murmur heard.     No friction rub. No gallop.   Pulmonary:      Effort: Pulmonary effort is normal. No tachypnea, bradypnea, accessory muscle usage, prolonged expiration, respiratory distress or retractions.      Breath sounds: Normal air entry. No stridor or decreased air movement. Wheezing (Mild expiratory wheezes throughout) present. No decreased breath sounds, rhonchi or rales.   Abdominal:      General: Bowel sounds are normal. There is no distension.      Palpations: Abdomen is soft.      Tenderness: There is no abdominal tenderness.     Musculoskeletal:         General: Normal range of motion.      Cervical back: Normal range of motion and neck supple.   Lymphadenopathy:      Cervical: Cervical adenopathy present.     Skin:     General: Skin is warm and dry.      Capillary Refill: Capillary refill takes less than 2 seconds.     Neurological:      General: No focal deficit present.      Mental Status: She is alert and oriented to person, place, and time.     Psychiatric:         Mood and Affect: Mood normal.         Behavior: Behavior normal. Behavior is cooperative.         Thought Content: Thought content normal.         Judgment: Judgment normal.            [1]   Current Outpatient Medications:     albuterol (PROVENTIL HFA,VENTOLIN HFA) 90 mcg/act inhaler, Inhale 1-2 puffs every 4 (four) hours as needed for wheezing, Disp: 6.7 g, Rfl: 1    Ascorbic Acid (vitamin C) 1000 MG tablet, Take 1,000 mg by mouth in the morning., Disp: , Rfl:     azithromycin (ZITHROMAX) 250 mg tablet, Take 2 tablets today then 1 tablet daily x 4 days, Disp: 6 tablet, Rfl: 0    L-Lysine 1000 MG TABS, Take 1,000 mg by mouth Daily at 2am, Disp: , Rfl:     Magnesium 300 MG CAPS, Take 1 capsule by  mouth Daily at 2am, Disp: , Rfl:     Multiple Vitamins-Minerals (MULTIVITAMIN ADULT EXTRA C PO), Take 1 tablet by mouth Daily at 2am, Disp: , Rfl:     predniSONE 20 mg tablet, Take 1 tablet (20 mg total) by mouth 2 (two) times a day with meals for 5 days, Disp: 10 tablet, Rfl: 0    Probiotic Product (PROBIOTIC ADVANCED PO), Take 1 capsule by mouth Daily at 2am, Disp: , Rfl:     valACYclovir (VALTREX) 500 mg tablet, Take 500 mg by mouth in the morning., Disp: , Rfl:   [2]   Past Medical History:  Diagnosis Date    Abnormal Pap smear of cervix     Allergic     Anemia     Anxiety     Disease of thyroid gland     thyroid storm with previous pregnancy    Dysmenorrhea     Esophageal reflux     last assessed - [id number]    Female infertility     Fibrocystic breast     GERD (gastroesophageal reflux disease)     Herpes zoster     LUQ level T-8 resolved; last assessed - 2015    History of infection due to human papilloma virus (HPV)     HPV (human papilloma virus) infection     Inguinal hernia     last assessed - [id number]    Insomnia     Liver disease 2024    Mastodynia     Resolved -     Migraine     Miscarriage     Ovarian cyst     last assessed - [id number]    Ovarian cyst 2013    Palpitations     last assessed - [id number]    Primary narcolepsy without cataplexy 10/11/2023    Shingles 2023    Tachycardia     Resolved - [id number]    Takes dietary supplements     Temporomandibular joint disorder     last assessed - 2012    Urinary tract infection     Varicella    [3]   Past Surgical History:  Procedure Laterality Date     SECTION      COLONOSCOPY  2012    Fiberoptic    CRYOTHERAPY      cervix, age 20    DIAGNOSTIC LAPAROSCOPY      DILATION AND CURETTAGE OF UTERUS      Resolved     GYNECOLOGIC CRYOSURGERY      age 20    TOOTH EXTRACTION     [4]   Family History  Problem Relation Name Age of Onset    Diabetes Mother Laurie Cuevaser     Heart disease Mother Laurie Faby         cardiac  disorder    Gallbladder disease Mother Laurie Hobbs     Hypertension Mother Laurie Hobbs     Glaucoma Mother Laurie Hobbs     Obesity Mother Laurie Hobbs         hx gastric bypass    Stroke Mother Laurie Hobbs     Thyroid disease Mother Laurie Hobbs     Vision loss Mother Laurie Hobbs     Anxiety disorder Mother Laurie Hobbs     Diabetes Father Otis Hobbs     Heart disease Father Otis Cuevaser         cardiac disorder    Hypertension Father Otis Cuevaser     Hyperlipidemia Father Otis Hobbs         Pure Hypercholesterolemia    Obesity Father Otis Cuevaser     Anxiety disorder Father Otis Cuevaser     Other Family          headache syndromes    Breast cancer Cousin maternal     Post-traumatic stress disorder Brother Otis Hobbs Jr     Hyperlipidemia Brother Otis Hobbs Jr     Hypertension Brother Otis Hobbs Jr     Anxiety disorder Brother Otis Hobbs,      Diabetes Maternal Grandmother Christ Steffen     Lung cancer Maternal Grandfather      Stroke Paternal Grandmother Sumaya Cuevaser     Hypertension Paternal Grandmother Sumaya Cuevaser     Cirrhosis Paternal Grandfather      Ovarian cancer Maternal Aunt      No Known Problems Daughter      No Known Problems Paternal Aunt      Colon cancer Neg Hx

## 2025-06-20 NOTE — PROGRESS NOTES
Name: Isabel Calhoun      : 1975      MRN: [id number]  Encounter Provider: Monserrat Mendez CNM  Encounter Date: 2025   Encounter department: [facility] [place]  :  Assessment & Plan  Encounter for gynecological examination without abnormal finding  - Routine well woman exam completed today.  - Cervical Cancer Screening: Current ASCCP Guidelines reviewed. Last Pap: 2023 . Next Pap Due:   - HPV Vaccination status: Not immunized  - STI screening offered including HIV: not indicated based on hx or requested at time of visit  - Breast Cancer Screening: Last Mammogram 2023, script in chart  - Colorectal cancer screening - will call gastro to schedule.  - The following were reviewed in today's visit: breast self exam, mammography screening ordered, adequate intake of calcium and vitamin D, and exercise   - RTO 1 yr           History of Present Illness   Isabel presents for gyn exam today. 2025 LMP. Menses is irregular- cycle is extended but more regular now. abstinence as birth control method. Happy with method. Last pap smear 2023. Hx of abnormal pap smear- age 21 HPV, cryo.  Sexually active- no. Does not desire STI testing.  2023 mammogram- normal. Colonoscopy is due and will contact gastro.  Notes improved sleep per day. 1-2 servings of calcium rich food per day, takes a multivitamin . 10,000 steps per day. Occasional servings of caffeine per day. Breast changes: none. Safe at home- yes. Concerns : none   Isabel Calhoun is a 50 y.o. female who presents for annual exam  History obtained from: patient    Review of Systems   Constitutional:  Positive for chills and fever. Negative for fatigue.   Respiratory:  Positive for cough. Negative for shortness of breath.         Current respiratory symptoms   Cardiovascular:  Negative for chest pain, palpitations and leg swelling.   Gastrointestinal:  Negative for constipation and diarrhea.   Genitourinary:   Negative for difficulty urinating, dysuria, frequency, menstrual problem, pelvic pain, urgency, vaginal bleeding, vaginal discharge and vaginal pain.        Occasional leaking of urine with coughing and sneezing   Neurological:  Negative for light-headedness and headaches.   Psychiatric/Behavioral:  Negative for self-injury. The patient is nervous/anxious.      Medical History Reviewed by provider this encounter:     .  Medications Ordered Prior to Encounter[1]   Social History[2]     Objective   There were no vitals taken for this visit.     Physical Exam  Vitals reviewed.   Constitutional:       Appearance: Normal appearance.   Neck:      Thyroid: No thyroid mass or thyroid tenderness.     Cardiovascular:      Rate and Rhythm: Normal rate.   Pulmonary:      Effort: Pulmonary effort is normal.   Chest:   Breasts:     Right: No mass, nipple discharge, skin change or tenderness.      Left: No mass, nipple discharge, skin change or tenderness.   Abdominal:      Tenderness: There is no abdominal tenderness. There is no guarding or rebound.   Genitourinary:     General: Normal vulva.      Exam position: Lithotomy position.      Labia:         Right: No rash, tenderness or lesion.         Left: No rash, tenderness or lesion.       Urethra: No urethral pain, urethral swelling or urethral lesion.      Vagina: No vaginal discharge, erythema, tenderness, bleeding or lesions.      Cervix: No cervical motion tenderness, discharge, friability, lesion or erythema.      Uterus: Normal. Not enlarged and not tender.       Adnexa:         Right: No mass, tenderness or fullness.          Left: No mass, tenderness or fullness.     Lymphadenopathy:      Upper Body:      Right upper body: No axillary adenopathy.      Left upper body: No axillary adenopathy.     Neurological:      Mental Status: She is alert and oriented to person, place, and time.     Psychiatric:         Mood and Affect: Mood normal.         Behavior: Behavior normal.             [1]   Current Outpatient Medications on File Prior to Visit   Medication Sig Dispense Refill    Ascorbic Acid (vitamin C) 1000 MG tablet Take 1,000 mg by mouth in the morning.      L-Lysine 1000 MG TABS Take 1,000 mg by mouth Daily at 2am      Magnesium 300 MG CAPS Take 1 capsule by mouth Daily at 2am      Multiple Vitamins-Minerals (MULTIVITAMIN ADULT EXTRA C PO) Take 1 tablet by mouth Daily at 2am      Probiotic Product (PROBIOTIC ADVANCED PO) Take 1 capsule by mouth Daily at 2am      valACYclovir (VALTREX) 500 mg tablet Take 500 mg by mouth in the morning.       No current facility-administered medications on file prior to visit.   [2]   Social History  Tobacco Use    Smoking status: Never    Smokeless tobacco: Never   Vaping Use    Vaping status: Never Used   Substance and Sexual Activity    Alcohol use: Not Currently    Drug use: Never    Sexual activity: Not Currently     Partners: Male     Birth control/protection: Male Sterilization

## 2025-06-20 NOTE — LETTER
June 20, 2025     Patient: Isabel Calhoun   YOB: 1975   Date of Visit: 6/20/2025       To Whom It May Concern:    It is my medical opinion that Isabel Calhoun may return to work on 6/23 or 6/24 depending on symptoms.  Please excuse for time missed due to acute illness (6/18, 6/20 and possibly 6/23).    If you have any questions or concerns, please don't hesitate to call.         Sincerely,        ATIYA Jason    CC: No Recipients

## 2025-07-28 ENCOUNTER — OFFICE VISIT (OUTPATIENT)
Dept: URGENT CARE | Age: 50
End: 2025-07-28
Payer: COMMERCIAL

## 2025-07-28 VITALS
RESPIRATION RATE: 18 BRPM | BODY MASS INDEX: 30.31 KG/M2 | OXYGEN SATURATION: 99 % | TEMPERATURE: 98.2 F | SYSTOLIC BLOOD PRESSURE: 126 MMHG | HEART RATE: 88 BPM | WEIGHT: 155.2 LBS | DIASTOLIC BLOOD PRESSURE: 80 MMHG

## 2025-07-28 DIAGNOSIS — R21 RASH: Primary | ICD-10-CM

## 2025-07-28 PROCEDURE — S9083 URGENT CARE CENTER GLOBAL: HCPCS

## 2025-07-28 PROCEDURE — G0383 LEV 4 HOSP TYPE B ED VISIT: HCPCS

## 2025-07-28 RX ORDER — MOMETASONE FUROATE 1 MG/G
CREAM TOPICAL DAILY
Qty: 15 G | Refills: 0 | Status: SHIPPED | OUTPATIENT
Start: 2025-07-28

## 2025-08-15 ENCOUNTER — HOSPITAL ENCOUNTER (OUTPATIENT)
Dept: ULTRASOUND IMAGING | Facility: HOSPITAL | Age: 50
End: 2025-08-15
Attending: FAMILY MEDICINE
Payer: COMMERCIAL

## 2025-08-21 ENCOUNTER — HOSPITAL ENCOUNTER (OUTPATIENT)
Dept: MAMMOGRAPHY | Facility: MEDICAL CENTER | Age: 50
Discharge: HOME | End: 2025-08-21
Payer: COMMERCIAL

## 2025-08-21 VITALS — HEIGHT: 60 IN | WEIGHT: 155 LBS | BODY MASS INDEX: 30.43 KG/M2

## 2025-08-21 DIAGNOSIS — Z12.31 ENCOUNTER FOR SCREENING MAMMOGRAM FOR BREAST CANCER: ICD-10-CM

## 2025-08-21 DIAGNOSIS — Z01.419 ENCOUNTER FOR GYNECOLOGICAL EXAMINATION WITHOUT ABNORMAL FINDING: ICD-10-CM

## 2025-08-21 PROCEDURE — 77067 SCR MAMMO BI INCL CAD: CPT

## 2025-08-21 PROCEDURE — 77063 BREAST TOMOSYNTHESIS BI: CPT

## 2025-08-22 DIAGNOSIS — R21 RASH: ICD-10-CM

## 2025-08-22 RX ORDER — MOMETASONE FUROATE 1 MG/G
CREAM TOPICAL DAILY
Qty: 15 G | Refills: 0 | OUTPATIENT
Start: 2025-08-22